# Patient Record
Sex: MALE | Race: BLACK OR AFRICAN AMERICAN | Employment: OTHER | URBAN - METROPOLITAN AREA
[De-identification: names, ages, dates, MRNs, and addresses within clinical notes are randomized per-mention and may not be internally consistent; named-entity substitution may affect disease eponyms.]

---

## 2020-08-19 VITALS
HEART RATE: 76 BPM | SYSTOLIC BLOOD PRESSURE: 110 MMHG | BODY MASS INDEX: 33.8 KG/M2 | DIASTOLIC BLOOD PRESSURE: 70 MMHG | TEMPERATURE: 97.9 F | WEIGHT: 223 LBS | OXYGEN SATURATION: 95 % | HEIGHT: 68 IN

## 2020-08-19 PROBLEM — I62.9 INTRACRANIAL HEMORRHAGE (HCC): Status: ACTIVE | Noted: 2020-08-19

## 2020-08-19 PROBLEM — R13.10 DYSPHAGIA: Status: ACTIVE | Noted: 2020-08-19

## 2020-08-19 PROBLEM — K21.9 GASTROESOPHAGEAL REFLUX DISEASE: Status: ACTIVE | Noted: 2020-08-19

## 2020-08-19 PROBLEM — I10 ESSENTIAL HYPERTENSION: Status: ACTIVE | Noted: 2020-08-19

## 2020-08-19 PROBLEM — G81.90 HEMIPLEGIA (HCC): Status: ACTIVE | Noted: 2020-08-19

## 2020-08-19 PROBLEM — Z86.010 HISTORY OF ADENOMATOUS POLYP OF COLON: Status: ACTIVE | Noted: 2020-08-19

## 2020-08-19 PROBLEM — E88.09 HYPERPROTEINEMIA: Status: ACTIVE | Noted: 2020-08-19

## 2020-08-19 PROBLEM — E11.9 DIABETES MELLITUS TYPE 2, CONTROLLED (HCC): Status: ACTIVE | Noted: 2020-08-19

## 2020-08-19 PROBLEM — K21.9 LARYNGOPHARYNGEAL REFLUX: Status: ACTIVE | Noted: 2020-08-19

## 2020-08-19 RX ORDER — DICLOFENAC SODIUM 10 MG/G
GEL TOPICAL 4 TIMES DAILY
COMMUNITY

## 2020-08-19 RX ORDER — AMLODIPINE BESYLATE 10 MG/1
TABLET ORAL DAILY
COMMUNITY

## 2020-08-19 RX ORDER — POLYETHYLENE GLYCOL 3350, SODIUM SULFATE ANHYDROUS, SODIUM BICARBONATE, SODIUM CHLORIDE, POTASSIUM CHLORIDE 236; 22.74; 6.74; 5.86; 2.97 G/4L; G/4L; G/4L; G/4L; G/4L
POWDER, FOR SOLUTION ORAL
COMMUNITY
End: 2022-03-01

## 2020-08-19 RX ORDER — DOCUSATE SODIUM 100 MG/1
100 CAPSULE, LIQUID FILLED ORAL 2 TIMES DAILY
COMMUNITY

## 2020-08-19 RX ORDER — PRAVASTATIN SODIUM 40 MG/1
40 TABLET ORAL
COMMUNITY

## 2020-08-19 RX ORDER — CARVEDILOL 25 MG/1
25 TABLET ORAL 2 TIMES DAILY WITH MEALS
COMMUNITY

## 2020-08-19 RX ORDER — PANTOPRAZOLE SODIUM 40 MG/1
40 TABLET, DELAYED RELEASE ORAL DAILY
COMMUNITY

## 2020-08-19 RX ORDER — LANCETS 33 GAUGE
EACH MISCELLANEOUS
COMMUNITY

## 2020-08-19 RX ORDER — LISINOPRIL 10 MG/1
TABLET ORAL DAILY
COMMUNITY

## 2020-08-19 RX ORDER — METFORMIN HYDROCHLORIDE 500 MG/1
TABLET ORAL 2 TIMES DAILY WITH MEALS
COMMUNITY

## 2020-08-19 RX ORDER — BLOOD SUGAR DIAGNOSTIC
STRIP MISCELLANEOUS SEE ADMIN INSTRUCTIONS
COMMUNITY

## 2020-08-30 ENCOUNTER — HOSPITAL ENCOUNTER (INPATIENT)
Age: 75
LOS: 12 days | Discharge: NURSING, REHAB OR LONG-TERM CARE | DRG: 177 | End: 2020-09-11
Attending: INTERNAL MEDICINE | Admitting: INTERNAL MEDICINE
Payer: MEDICARE

## 2020-08-30 ENCOUNTER — APPOINTMENT (OUTPATIENT)
Dept: GENERAL RADIOLOGY | Age: 75
DRG: 177 | End: 2020-08-30
Attending: INTERNAL MEDICINE
Payer: MEDICARE

## 2020-08-30 DIAGNOSIS — I47.29 NSVT (NONSUSTAINED VENTRICULAR TACHYCARDIA): ICD-10-CM

## 2020-08-30 PROBLEM — J96.90 RESPIRATORY FAILURE (HCC): Status: ACTIVE | Noted: 2020-08-30

## 2020-08-30 LAB
ABO + RH BLD: NORMAL
ALBUMIN SERPL-MCNC: 2.6 G/DL (ref 3.5–5)
ALBUMIN/GLOB SERPL: 0.7 {RATIO} (ref 1.1–2.2)
ALP SERPL-CCNC: 53 U/L (ref 45–117)
ALT SERPL-CCNC: 38 U/L (ref 12–78)
ANION GAP SERPL CALC-SCNC: 6 MMOL/L (ref 5–15)
APTT PPP: 26.1 SEC (ref 22.1–32)
AST SERPL-CCNC: 15 U/L (ref 15–37)
BILIRUB SERPL-MCNC: 0.6 MG/DL (ref 0.2–1)
BLOOD GROUP ANTIBODIES SERPL: NORMAL
BUN SERPL-MCNC: 30 MG/DL (ref 6–20)
BUN/CREAT SERPL: 42 (ref 12–20)
CALCIUM SERPL-MCNC: 8.6 MG/DL (ref 8.5–10.1)
CHLORIDE SERPL-SCNC: 106 MMOL/L (ref 97–108)
CO2 SERPL-SCNC: 30 MMOL/L (ref 21–32)
CREAT SERPL-MCNC: 0.72 MG/DL (ref 0.7–1.3)
CRP SERPL-MCNC: 4.33 MG/DL (ref 0–0.6)
D DIMER PPP FEU-MCNC: 2.87 MG/L FEU (ref 0–0.65)
ERYTHROCYTE [DISTWIDTH] IN BLOOD BY AUTOMATED COUNT: 12.3 % (ref 11.5–14.5)
FIBRINOGEN PPP-MCNC: 440 MG/DL (ref 200–475)
GLOBULIN SER CALC-MCNC: 4 G/DL (ref 2–4)
GLUCOSE BLD STRIP.AUTO-MCNC: 198 MG/DL (ref 65–100)
GLUCOSE SERPL-MCNC: 215 MG/DL (ref 65–100)
HCT VFR BLD AUTO: 43.1 % (ref 36.6–50.3)
HGB BLD-MCNC: 14.2 G/DL (ref 12.1–17)
INR PPP: 1.2 (ref 0.9–1.1)
LACTATE SERPL-SCNC: 2.2 MMOL/L (ref 0.4–2)
LDH SERPL L TO P-CCNC: 515 U/L (ref 85–241)
MAGNESIUM SERPL-MCNC: 2.6 MG/DL (ref 1.6–2.4)
MCH RBC QN AUTO: 32.6 PG (ref 26–34)
MCHC RBC AUTO-ENTMCNC: 32.9 G/DL (ref 30–36.5)
MCV RBC AUTO: 99.1 FL (ref 80–99)
NRBC # BLD: 0.03 K/UL (ref 0–0.01)
NRBC BLD-RTO: 0.2 PER 100 WBC
PLATELET # BLD AUTO: 354 K/UL (ref 150–400)
PMV BLD AUTO: 10.4 FL (ref 8.9–12.9)
POTASSIUM SERPL-SCNC: 4.1 MMOL/L (ref 3.5–5.1)
PROCALCITONIN SERPL-MCNC: <0.05 NG/ML
PROT SERPL-MCNC: 6.6 G/DL (ref 6.4–8.2)
PROTHROMBIN TIME: 12.3 SEC (ref 9–11.1)
RBC # BLD AUTO: 4.35 M/UL (ref 4.1–5.7)
SERVICE CMNT-IMP: ABNORMAL
SODIUM SERPL-SCNC: 142 MMOL/L (ref 136–145)
SPECIMEN EXP DATE BLD: NORMAL
THERAPEUTIC RANGE,PTTT: NORMAL SECS (ref 58–77)
WBC # BLD AUTO: 14.9 K/UL (ref 4.1–11.1)

## 2020-08-30 PROCEDURE — 74011250636 HC RX REV CODE- 250/636: Performed by: INTERNAL MEDICINE

## 2020-08-30 PROCEDURE — 83615 LACTATE (LD) (LDH) ENZYME: CPT

## 2020-08-30 PROCEDURE — 36600 WITHDRAWAL OF ARTERIAL BLOOD: CPT

## 2020-08-30 PROCEDURE — 85379 FIBRIN DEGRADATION QUANT: CPT

## 2020-08-30 PROCEDURE — 71045 X-RAY EXAM CHEST 1 VIEW: CPT

## 2020-08-30 PROCEDURE — 84145 PROCALCITONIN (PCT): CPT

## 2020-08-30 PROCEDURE — 36415 COLL VENOUS BLD VENIPUNCTURE: CPT

## 2020-08-30 PROCEDURE — 86900 BLOOD TYPING SEROLOGIC ABO: CPT

## 2020-08-30 PROCEDURE — 86140 C-REACTIVE PROTEIN: CPT

## 2020-08-30 PROCEDURE — 82962 GLUCOSE BLOOD TEST: CPT

## 2020-08-30 PROCEDURE — 83605 ASSAY OF LACTIC ACID: CPT

## 2020-08-30 PROCEDURE — 85610 PROTHROMBIN TIME: CPT

## 2020-08-30 PROCEDURE — 80053 COMPREHEN METABOLIC PANEL: CPT

## 2020-08-30 PROCEDURE — 74011636637 HC RX REV CODE- 636/637: Performed by: INTERNAL MEDICINE

## 2020-08-30 PROCEDURE — 94640 AIRWAY INHALATION TREATMENT: CPT

## 2020-08-30 PROCEDURE — 85384 FIBRINOGEN ACTIVITY: CPT

## 2020-08-30 PROCEDURE — 77010033678 HC OXYGEN DAILY

## 2020-08-30 PROCEDURE — 85730 THROMBOPLASTIN TIME PARTIAL: CPT

## 2020-08-30 PROCEDURE — 74011250637 HC RX REV CODE- 250/637: Performed by: NURSE PRACTITIONER

## 2020-08-30 PROCEDURE — 65610000006 HC RM INTENSIVE CARE

## 2020-08-30 PROCEDURE — 85027 COMPLETE CBC AUTOMATED: CPT

## 2020-08-30 PROCEDURE — 83735 ASSAY OF MAGNESIUM: CPT

## 2020-08-30 RX ORDER — DEXTROSE MONOHYDRATE 100 MG/ML
0-250 INJECTION, SOLUTION INTRAVENOUS AS NEEDED
Status: DISCONTINUED | OUTPATIENT
Start: 2020-08-30 | End: 2020-09-12 | Stop reason: HOSPADM

## 2020-08-30 RX ORDER — ACETAMINOPHEN 650 MG/1
650 SUPPOSITORY RECTAL
Status: DISCONTINUED | OUTPATIENT
Start: 2020-08-30 | End: 2020-09-12 | Stop reason: HOSPADM

## 2020-08-30 RX ORDER — ONDANSETRON 4 MG/1
4 TABLET, ORALLY DISINTEGRATING ORAL
Status: DISCONTINUED | OUTPATIENT
Start: 2020-08-30 | End: 2020-09-12 | Stop reason: HOSPADM

## 2020-08-30 RX ORDER — ACETAMINOPHEN 325 MG/1
650 TABLET ORAL
Status: DISCONTINUED | OUTPATIENT
Start: 2020-08-30 | End: 2020-08-30 | Stop reason: SDUPTHER

## 2020-08-30 RX ORDER — FAMOTIDINE 20 MG/1
20 TABLET, FILM COATED ORAL 2 TIMES DAILY
Status: DISCONTINUED | OUTPATIENT
Start: 2020-08-30 | End: 2020-08-31 | Stop reason: ALTCHOICE

## 2020-08-30 RX ORDER — SODIUM CHLORIDE 0.9 % (FLUSH) 0.9 %
5-40 SYRINGE (ML) INJECTION AS NEEDED
Status: DISCONTINUED | OUTPATIENT
Start: 2020-08-30 | End: 2020-09-12 | Stop reason: HOSPADM

## 2020-08-30 RX ORDER — PRAVASTATIN SODIUM 40 MG/1
40 TABLET ORAL
Status: DISCONTINUED | OUTPATIENT
Start: 2020-08-30 | End: 2020-09-12 | Stop reason: HOSPADM

## 2020-08-30 RX ORDER — DEXAMETHASONE SODIUM PHOSPHATE 4 MG/ML
6 INJECTION, SOLUTION INTRA-ARTICULAR; INTRALESIONAL; INTRAMUSCULAR; INTRAVENOUS; SOFT TISSUE EVERY 24 HOURS
Status: COMPLETED | OUTPATIENT
Start: 2020-08-30 | End: 2020-09-08

## 2020-08-30 RX ORDER — INSULIN LISPRO 100 [IU]/ML
INJECTION, SOLUTION INTRAVENOUS; SUBCUTANEOUS EVERY 6 HOURS
Status: DISCONTINUED | OUTPATIENT
Start: 2020-08-30 | End: 2020-09-01

## 2020-08-30 RX ORDER — SODIUM CHLORIDE 0.9 % (FLUSH) 0.9 %
5-40 SYRINGE (ML) INJECTION EVERY 8 HOURS
Status: DISCONTINUED | OUTPATIENT
Start: 2020-08-30 | End: 2020-09-12 | Stop reason: HOSPADM

## 2020-08-30 RX ORDER — ACETAMINOPHEN 325 MG/1
650 TABLET ORAL
Status: DISCONTINUED | OUTPATIENT
Start: 2020-08-30 | End: 2020-09-12 | Stop reason: HOSPADM

## 2020-08-30 RX ORDER — ONDANSETRON 2 MG/ML
4 INJECTION INTRAMUSCULAR; INTRAVENOUS
Status: DISCONTINUED | OUTPATIENT
Start: 2020-08-30 | End: 2020-09-12 | Stop reason: HOSPADM

## 2020-08-30 RX ORDER — ACETAMINOPHEN 650 MG/1
650 SUPPOSITORY RECTAL
Status: DISCONTINUED | OUTPATIENT
Start: 2020-08-30 | End: 2020-08-30 | Stop reason: SDUPTHER

## 2020-08-30 RX ORDER — CARVEDILOL 12.5 MG/1
25 TABLET ORAL 2 TIMES DAILY WITH MEALS
Status: DISCONTINUED | OUTPATIENT
Start: 2020-08-30 | End: 2020-09-12 | Stop reason: HOSPADM

## 2020-08-30 RX ORDER — MAGNESIUM SULFATE 100 %
4 CRYSTALS MISCELLANEOUS AS NEEDED
Status: DISCONTINUED | OUTPATIENT
Start: 2020-08-30 | End: 2020-09-12 | Stop reason: HOSPADM

## 2020-08-30 RX ORDER — ALBUTEROL SULFATE 90 UG/1
2 AEROSOL, METERED RESPIRATORY (INHALATION)
Status: DISCONTINUED | OUTPATIENT
Start: 2020-08-30 | End: 2020-09-10

## 2020-08-30 RX ORDER — FAMOTIDINE 20 MG/1
20 TABLET, FILM COATED ORAL 2 TIMES DAILY
Status: DISCONTINUED | OUTPATIENT
Start: 2020-08-30 | End: 2020-08-30 | Stop reason: SDUPTHER

## 2020-08-30 RX ORDER — ENOXAPARIN SODIUM 100 MG/ML
40 INJECTION SUBCUTANEOUS EVERY 12 HOURS
Status: DISCONTINUED | OUTPATIENT
Start: 2020-08-30 | End: 2020-09-12 | Stop reason: HOSPADM

## 2020-08-30 RX ORDER — SODIUM CHLORIDE 9 MG/ML
60 INJECTION, SOLUTION INTRAVENOUS CONTINUOUS
Status: DISCONTINUED | OUTPATIENT
Start: 2020-08-30 | End: 2020-09-08

## 2020-08-30 RX ORDER — BISACODYL 5 MG
5 TABLET, DELAYED RELEASE (ENTERIC COATED) ORAL DAILY PRN
Status: DISCONTINUED | OUTPATIENT
Start: 2020-08-30 | End: 2020-09-12 | Stop reason: HOSPADM

## 2020-08-30 RX ADMIN — DEXAMETHASONE SODIUM PHOSPHATE 6 MG: 4 INJECTION, SOLUTION INTRA-ARTICULAR; INTRALESIONAL; INTRAMUSCULAR; INTRAVENOUS; SOFT TISSUE at 18:46

## 2020-08-30 RX ADMIN — Medication 10 ML: at 21:05

## 2020-08-30 RX ADMIN — INSULIN LISPRO 2 UNITS: 100 INJECTION, SOLUTION INTRAVENOUS; SUBCUTANEOUS at 18:50

## 2020-08-30 RX ADMIN — ENOXAPARIN SODIUM 40 MG: 40 INJECTION SUBCUTANEOUS at 21:04

## 2020-08-30 RX ADMIN — SODIUM CHLORIDE 60 ML/HR: 900 INJECTION, SOLUTION INTRAVENOUS at 18:58

## 2020-08-30 RX ADMIN — Medication 10 ML: at 18:49

## 2020-08-30 RX ADMIN — ALBUTEROL SULFATE 2 PUFF: 90 AEROSOL, METERED RESPIRATORY (INHALATION) at 23:12

## 2020-08-30 NOTE — H&P
[facility]    ICU TEAM H&P    Name: Francie Velasquez   : [id number]   MRN: [id number]   Date: 2020      I  Subjective:    2020      Reason for ICU Admission: Transferred from outside facility with progressive respiratory failure due to COVID-19 infection    HPI:  33-year-old gentleman with past medical history significant for hypertension and ischemic stroke about 5 years ago left him with residual left hemiplegia presented to [facility] on  with few days history of fever cough and progressive shortness of breath. Was admitted there on nasal cannula supplemental oxygen started on broad-spectrum antibiotic for possible right middle lobe community-acquired pneumonia and his COVID-19 testing came back positive. He received this antibiotic which was advanced later to Zosyn as there was suspicion for aspiration and he received steroid in form of methylprednisolone as well. Per the report I talked from the attending physician in the referring hospital his oxygen requirement continued to increase and for the last 2 days he remained on CPAP with FiO2 of 60% and with that his PO2 was 60. They contacted me today asking for transfer to the patient for higher level of care as they do not have ICU facility and that hospital.  When patient arrived to our unit he was not in distress 100% nonrebreather oxygen mask which changed to 50% Ventimask and the saturation remained above 95% without apparent worsening in his work of breathing or distress level. He is very hard of hearing but he denies chest pain fever persistent cough nausea or vomiting. Overall he stated that he feels a bit better.         Active Problem List:     Problem List  Never Reviewed          Codes Class    Respiratory failure ([facility] ICD-10-CM: J96.90  ICD-9-CM: 518.81         Diabetes mellitus type 2, controlled ([facility] ICD-10-CM: E11.9  ICD-9-CM: 250.00         Dysphagia ICD-10-CM: R13.10  ICD-9-CM: 787.20 Essential hypertension ICD-10-CM: I10  ICD-9-CM: 401.9         Gastroesophageal reflux disease ICD-10-CM: K21.9  ICD-9-CM: 530.81         Genuine stress incontinence, female ICD-10-CM: N39.3  ICD-9-CM: 625.6         Hemiplegia (HCC) ICD-10-CM: G81.90  ICD-9-CM: 342.90         History of adenomatous polyp of colon ICD-10-CM: Z86.010  ICD-9-CM: V12.72         Hyperproteinemia ICD-10-CM: E88.09  ICD-9-CM: 273.8         Intracranial hemorrhage (HCC) ICD-10-CM: I62.9  ICD-9-CM: 432.9         Laryngopharyngeal reflux ICD-10-CM: K21.9  ICD-9-CM: 478.79               Past Medical History:      has a past medical history of Acid reflux, Anemia, Diabetes ([facility] GERD (gastroesophageal reflux disease), Hypertension, and Stroke ([facility] Past Surgical History:      has a past surgical history that includes hx colonoscopy. Home Medications:     Prior to Admission medications    Medication Sig Start Date End Date Taking? Authorizing Provider   amLODIPine (NORVASC) 10 mg tablet Take  by mouth daily. Provider, Historical   carvediloL (COREG) 25 mg tablet Take 25 mg by mouth two (2) times daily (with meals). Provider, Historical   glucose blood VI test strips (Contour Next Test Strips) strip by Does Not Apply route See Admin Instructions. Provider, Historical   diclofenac (VOLTAREN) 1 % gel Apply  to affected area four (4) times daily. Provider, Historical   PEG 3350-Electrolytes (GaviLyte-G) 236-22.74-6.74 -5.86 gram suspension Take  by mouth now. Provider, Historical   lisinopriL (PRINIVIL, ZESTRIL) 10 mg tablet Take  by mouth daily. Provider, Historical   metFORMIN (GLUCOPHAGE) 500 mg tablet Take  by mouth two (2) times daily (with meals). Provider, Historical   pantoprazole (PROTONIX) 40 mg tablet Take 40 mg by mouth daily. Provider, Historical   pravastatin (PRAVACHOL) 40 mg tablet Take 40 mg by mouth nightly.     Provider, Historical   docusate sodium (Stool Softener) 100 mg capsule Take 100 mg by mouth two (2) times a day. Provider, Historical   lancets (TRUEplus Lancets) 33 gauge misc by Does Not Apply route. Provider, Historical       Allergies/Social/Family History: Allergies no known allergies   Social History     Tobacco Use    Smoking status: Not on file   Substance Use Topics    Alcohol use: Not on file      Family History   Problem Relation Age of Onset    Heart Disease Father     Hypertension Brother     Diabetes Brother        Review of Systems:     Not able to obtain as the patient is very hard of hearing    Objective:   Vital Signs:  Visit Vitals  /81 (BP 1 Location: Left arm, BP Patient Position: At rest)   Pulse 66   Temp 98.2 °F (36.8 °C)   Resp 22   Ht 5' 8\" (1.727 m)   Wt 93.2 kg (205 lb 7.5 oz)   SpO2 98%   BMI 31.24 kg/m²    O2 Flow Rate (L/min): 15 l/min O2 Device: Non-rebreather mask Temp (24hrs), Av.2 °F (36.8 °C), Min:98.2 °F (36.8 °C), Max:98.2 °F (36.8 °C)           Intake/Output:   No intake or output data in the 24 hours ending 20 1637    Physical Exam:    General:  Alert, cooperative, well noursished, well developed, appears stated age currently on 50% Ventimask not in distress   Eyes:  Sclera anicteric. Pupils equally round and reactive to light. Mouth/Throat: Mucous membranes normal, oral pharynx clear   Neck: Supple   Lungs:    Fine crepitation bilaterally no wheezing, good effort   CV:  Regular rate and rhythm,no murmur, click, rub or gallop   Abdomen:   Soft, non-tender.  bowel sounds normal. non-distended   Extremities: No cyanosis or edema   Skin: Skin color, texture, turgor normal. no acute rash or lesions   Lymph nodes: Cervical and supraclavicular normal   Musculoskeletal: No swelling or deformity   Lines/Devices:  Intact, no erythema, drainage or tenderness   Psych: Alert and oriented, normal mood affect given the setting  Patient has dense left hemiplegia which is chronic for the last 5 years according to him good power on the right side, neurologically at baseline       LABS AND  DATA: Personally reviewed  No results for input(s): WBC, HGB, HCT, PLT, HGBEXT, HCTEXT, PLTEXT in the last 72 hours. No results for input(s): NA, K, CL, CO2, BUN, CREA, GLU, CA, MG, PHOS in the last 72 hours. No results for input(s): AP, TBIL, TP, ALB, GLOB, AML, LPSE in the last 72 hours. No lab exists for component: SGOT, GPT, AMYP  No results for input(s): INR, PTP, APTT, INREXT in the last 72 hours. No results for input(s): PHI, PCO2I, PO2I, FIO2I in the last 72 hours. No results for input(s): CPK, CKMB, TROIQ, BNPP in the last 72 hours. Hemodynamics:   PAP:   CO:     Wedge:   CI:     CVP:    SVR:       PVR:       Ventilator Settings:  Mode Rate Tidal Volume Pressure FiO2 PEEP                    Peak airway pressure:      Minute ventilation:          MEDS: Reviewed    Chest X-Ray:  CXR Results  (Last 48 hours)    None            Assessment and Plan:   - Acute hypoxic respiratory failure due to COVID-19 infection:  - Bilateral pneumonia due to COVID-19 infection  - Possible community-acquired pneumonia involving the right middle lobe/aspiration status post antibiotic treatment  - Essential hypertension  - History of ischemic stroke with residual left hemiplegia      - Currently patient in the ICU in no distress on 50% Ventimask. Obtain chest x-ray showing significant bilateral infiltrate. Pending complete set of labs including inflammatory marker. We will start him on dexamethasone, pending liver enzymes will consult infectious disease to initiate Remdesivir if appropriate. I will also start him on Lovenox 30 mg subcu twice daily pending d-dimer and fibrinogen levels. If needed he may use BiPAP at night at setting of 12/8.   - Patient completed 8-day course of broad-spectrum antibiotic in the referring hospital.  - Reintroduce antihypertensive medication slowly, will start with beta-blocker, at home he is also on low-dose lisinopril and amlodipine.  - Insulin sliding scale, maintain euglycemia, keep metformin on hold  DVT and GI prophylaxis. I discussed this with his wife and granddaughter over the phone. He is full code. CRITICAL CARE CONSULTANT NOTE  I had a face to face encounter with the patient, reviewed and interpreted patient data including clinical events, labs, images, vital signs, I/O's, and examined patient. I have discussed the case and the plan and management of the patient's care with the consulting services, the bedside nurses and the respiratory therapist.      NOTE OF PERSONAL INVOLVEMENT IN CARE   This patient has a high probability of imminent, clinically significant deterioration, which requires the highest level of preparedness to intervene urgently. I participated in the decision-making and personally managed or directed the management of the following life and organ supporting interventions that required my frequent assessment to treat or prevent imminent deterioration. I personally spent 40 minutes of critical care time. This is time spent at this critically ill patient's bedside actively involved in patient care as well as the coordination of care and discussions with the patient's family. This does not include any procedural time which has been billed separately. Jennifer Moffett M.D.   Staff Intensivist/Pulmonologist  [facility]  8/30/2020

## 2020-08-31 LAB
ALBUMIN SERPL-MCNC: 2.5 G/DL (ref 3.5–5)
ALBUMIN/GLOB SERPL: 0.6 {RATIO} (ref 1.1–2.2)
ALP SERPL-CCNC: 50 U/L (ref 45–117)
ALT SERPL-CCNC: 33 U/L (ref 12–78)
ANION GAP SERPL CALC-SCNC: 5 MMOL/L (ref 5–15)
APTT PPP: 27.3 SEC (ref 22.1–32)
ARTERIAL PATENCY WRIST A: NO
AST SERPL-CCNC: 19 U/L (ref 15–37)
BASE EXCESS BLD CALC-SCNC: 5 MMOL/L
BDY SITE: ABNORMAL
BILIRUB SERPL-MCNC: 0.6 MG/DL (ref 0.2–1)
BUN SERPL-MCNC: 31 MG/DL (ref 6–20)
BUN/CREAT SERPL: 41 (ref 12–20)
CA-I BLD-SCNC: 1.26 MMOL/L (ref 1.12–1.32)
CALCIUM SERPL-MCNC: 8.8 MG/DL (ref 8.5–10.1)
CHLORIDE SERPL-SCNC: 108 MMOL/L (ref 97–108)
CO2 SERPL-SCNC: 28 MMOL/L (ref 21–32)
COMMENT, HOLDF: NORMAL
CREAT SERPL-MCNC: 0.75 MG/DL (ref 0.7–1.3)
ERYTHROCYTE [DISTWIDTH] IN BLOOD BY AUTOMATED COUNT: 12.3 % (ref 11.5–14.5)
EST. AVERAGE GLUCOSE BLD GHB EST-MCNC: 134 MG/DL
FIBRINOGEN PPP-MCNC: 381 MG/DL (ref 200–475)
GAS FLOW.O2 O2 DELIVERY SYS: ABNORMAL L/MIN
GAS FLOW.O2 SETTING OXYMISER: 10 L/M
GLOBULIN SER CALC-MCNC: 4.4 G/DL (ref 2–4)
GLUCOSE BLD STRIP.AUTO-MCNC: 162 MG/DL (ref 65–100)
GLUCOSE BLD STRIP.AUTO-MCNC: 163 MG/DL (ref 65–100)
GLUCOSE BLD STRIP.AUTO-MCNC: 174 MG/DL (ref 65–100)
GLUCOSE BLD STRIP.AUTO-MCNC: 184 MG/DL (ref 65–100)
GLUCOSE BLD STRIP.AUTO-MCNC: 196 MG/DL (ref 65–100)
GLUCOSE SERPL-MCNC: 205 MG/DL (ref 65–100)
HBA1C MFR BLD: 6.3 % (ref 4–5.6)
HCO3 BLD-SCNC: 29.3 MMOL/L (ref 22–26)
HCT VFR BLD AUTO: 41.7 % (ref 36.6–50.3)
HGB BLD-MCNC: 13.9 G/DL (ref 12.1–17)
INR PPP: 1.2 (ref 0.9–1.1)
MAGNESIUM SERPL-MCNC: 2.7 MG/DL (ref 1.6–2.4)
MCH RBC QN AUTO: 32.8 PG (ref 26–34)
MCHC RBC AUTO-ENTMCNC: 33.3 G/DL (ref 30–36.5)
MCV RBC AUTO: 98.3 FL (ref 80–99)
NRBC # BLD: 0.02 K/UL (ref 0–0.01)
NRBC BLD-RTO: 0.1 PER 100 WBC
O2/TOTAL GAS SETTING VFR VENT: 70 %
PCO2 BLD: 44.1 MMHG (ref 35–45)
PH BLD: 7.43 [PH] (ref 7.35–7.45)
PLATELET # BLD AUTO: 337 K/UL (ref 150–400)
PMV BLD AUTO: 10.7 FL (ref 8.9–12.9)
PO2 BLD: 76 MMHG (ref 80–100)
POTASSIUM SERPL-SCNC: 4 MMOL/L (ref 3.5–5.1)
PROT SERPL-MCNC: 6.9 G/DL (ref 6.4–8.2)
PROTHROMBIN TIME: 12.4 SEC (ref 9–11.1)
RBC # BLD AUTO: 4.24 M/UL (ref 4.1–5.7)
SAMPLES BEING HELD,HOLD: NORMAL
SAO2 % BLD: 95 % (ref 92–97)
SERVICE CMNT-IMP: ABNORMAL
SODIUM SERPL-SCNC: 141 MMOL/L (ref 136–145)
SPECIMEN TYPE: ABNORMAL
THERAPEUTIC RANGE,PTTT: NORMAL SECS (ref 58–77)
TOTAL RESP. RATE, ITRR: 16
WBC # BLD AUTO: 15.4 K/UL (ref 4.1–11.1)

## 2020-08-31 PROCEDURE — 83735 ASSAY OF MAGNESIUM: CPT

## 2020-08-31 PROCEDURE — 80053 COMPREHEN METABOLIC PANEL: CPT

## 2020-08-31 PROCEDURE — 94640 AIRWAY INHALATION TREATMENT: CPT

## 2020-08-31 PROCEDURE — 82803 BLOOD GASES ANY COMBINATION: CPT

## 2020-08-31 PROCEDURE — 85610 PROTHROMBIN TIME: CPT

## 2020-08-31 PROCEDURE — 74011250637 HC RX REV CODE- 250/637: Performed by: NURSE PRACTITIONER

## 2020-08-31 PROCEDURE — 85730 THROMBOPLASTIN TIME PARTIAL: CPT

## 2020-08-31 PROCEDURE — 74011250637 HC RX REV CODE- 250/637: Performed by: INTERNAL MEDICINE

## 2020-08-31 PROCEDURE — 74011636637 HC RX REV CODE- 636/637: Performed by: INTERNAL MEDICINE

## 2020-08-31 PROCEDURE — 36415 COLL VENOUS BLD VENIPUNCTURE: CPT

## 2020-08-31 PROCEDURE — 74011250636 HC RX REV CODE- 250/636: Performed by: INTERNAL MEDICINE

## 2020-08-31 PROCEDURE — 92610 EVALUATE SWALLOWING FUNCTION: CPT | Performed by: SPEECH-LANGUAGE PATHOLOGIST

## 2020-08-31 PROCEDURE — 94760 N-INVAS EAR/PLS OXIMETRY 1: CPT

## 2020-08-31 PROCEDURE — 74011250637 HC RX REV CODE- 250/637: Performed by: HOSPITALIST

## 2020-08-31 PROCEDURE — 74011250636 HC RX REV CODE- 250/636: Performed by: HOSPITALIST

## 2020-08-31 PROCEDURE — 85384 FIBRINOGEN ACTIVITY: CPT

## 2020-08-31 PROCEDURE — 83036 HEMOGLOBIN GLYCOSYLATED A1C: CPT

## 2020-08-31 PROCEDURE — 74011000258 HC RX REV CODE- 258: Performed by: INTERNAL MEDICINE

## 2020-08-31 PROCEDURE — 65660000001 HC RM ICU INTERMED STEPDOWN

## 2020-08-31 PROCEDURE — 36600 WITHDRAWAL OF ARTERIAL BLOOD: CPT

## 2020-08-31 PROCEDURE — 82962 GLUCOSE BLOOD TEST: CPT

## 2020-08-31 PROCEDURE — 74011000250 HC RX REV CODE- 250: Performed by: INTERNAL MEDICINE

## 2020-08-31 PROCEDURE — 85027 COMPLETE CBC AUTOMATED: CPT

## 2020-08-31 RX ORDER — ASCORBIC ACID 500 MG
500 TABLET ORAL 2 TIMES DAILY
Status: DISCONTINUED | OUTPATIENT
Start: 2020-08-31 | End: 2020-09-12 | Stop reason: HOSPADM

## 2020-08-31 RX ORDER — NYSTATIN 100000 [USP'U]/ML
500000 SUSPENSION ORAL 4 TIMES DAILY
Status: DISCONTINUED | OUTPATIENT
Start: 2020-08-31 | End: 2020-09-12 | Stop reason: HOSPADM

## 2020-08-31 RX ORDER — ZINC SULFATE 50(220)MG
1 CAPSULE ORAL DAILY
Status: COMPLETED | OUTPATIENT
Start: 2020-08-31 | End: 2020-09-09

## 2020-08-31 RX ADMIN — ALBUTEROL SULFATE 2 PUFF: 90 AEROSOL, METERED RESPIRATORY (INHALATION) at 08:06

## 2020-08-31 RX ADMIN — OXYCODONE HYDROCHLORIDE AND ACETAMINOPHEN 500 MG: 500 TABLET ORAL at 17:41

## 2020-08-31 RX ADMIN — ALBUTEROL SULFATE 2 PUFF: 90 AEROSOL, METERED RESPIRATORY (INHALATION) at 20:53

## 2020-08-31 RX ADMIN — NYSTATIN 500000 UNITS: 100000 SUSPENSION ORAL at 13:02

## 2020-08-31 RX ADMIN — ZINC SULFATE 220 MG (50 MG) CAPSULE 1 CAPSULE: CAPSULE at 13:03

## 2020-08-31 RX ADMIN — Medication 10 ML: at 03:55

## 2020-08-31 RX ADMIN — ENOXAPARIN SODIUM 40 MG: 40 INJECTION SUBCUTANEOUS at 21:06

## 2020-08-31 RX ADMIN — Medication 10 ML: at 13:02

## 2020-08-31 RX ADMIN — ALBUTEROL SULFATE 2 PUFF: 90 AEROSOL, METERED RESPIRATORY (INHALATION) at 03:19

## 2020-08-31 RX ADMIN — PRAVASTATIN SODIUM 40 MG: 40 TABLET ORAL at 23:49

## 2020-08-31 RX ADMIN — Medication 10 ML: at 23:50

## 2020-08-31 RX ADMIN — DEXAMETHASONE SODIUM PHOSPHATE 6 MG: 4 INJECTION, SOLUTION INTRA-ARTICULAR; INTRALESIONAL; INTRAMUSCULAR; INTRAVENOUS; SOFT TISSUE at 17:42

## 2020-08-31 RX ADMIN — Medication 10 ML: at 07:00

## 2020-08-31 RX ADMIN — ALBUTEROL SULFATE 2 PUFF: 90 AEROSOL, METERED RESPIRATORY (INHALATION) at 15:55

## 2020-08-31 RX ADMIN — SODIUM CHLORIDE 60 ML/HR: 900 INJECTION, SOLUTION INTRAVENOUS at 16:16

## 2020-08-31 RX ADMIN — INSULIN LISPRO 2 UNITS: 100 INJECTION, SOLUTION INTRAVENOUS; SUBCUTANEOUS at 13:02

## 2020-08-31 RX ADMIN — CARVEDILOL 25 MG: 12.5 TABLET, FILM COATED ORAL at 16:16

## 2020-08-31 RX ADMIN — ENOXAPARIN SODIUM 40 MG: 40 INJECTION SUBCUTANEOUS at 09:36

## 2020-08-31 RX ADMIN — ALBUTEROL SULFATE 2 PUFF: 90 AEROSOL, METERED RESPIRATORY (INHALATION) at 11:37

## 2020-08-31 RX ADMIN — INSULIN LISPRO 2 UNITS: 100 INJECTION, SOLUTION INTRAVENOUS; SUBCUTANEOUS at 06:59

## 2020-08-31 RX ADMIN — NYSTATIN 500000 UNITS: 100000 SUSPENSION ORAL at 17:42

## 2020-08-31 RX ADMIN — INSULIN LISPRO 2 UNITS: 100 INJECTION, SOLUTION INTRAVENOUS; SUBCUTANEOUS at 00:57

## 2020-08-31 RX ADMIN — REMDESIVIR 200 MG: 5 INJECTION INTRAVENOUS at 20:01

## 2020-08-31 RX ADMIN — INSULIN LISPRO 2 UNITS: 100 INJECTION, SOLUTION INTRAVENOUS; SUBCUTANEOUS at 23:49

## 2020-08-31 RX ADMIN — NYSTATIN 500000 UNITS: 100000 SUSPENSION ORAL at 23:49

## 2020-08-31 RX ADMIN — INSULIN LISPRO 2 UNITS: 100 INJECTION, SOLUTION INTRAVENOUS; SUBCUTANEOUS at 17:41

## 2020-08-31 NOTE — PROGRESS NOTES
TRANSFER - IN REPORT:    Verbal report received from Chung  Street (name) on Isa Santillan  being received from ICU (unit) for routine progression of care      Report consisted of patients Situation, Background, Assessment and   Recommendations(SBAR). Information from the following report(s) SBAR, Kardex, ED Summary, MAR, Med Rec Status and Cardiac Rhythm NSR was reviewed with the receiving nurse. Opportunity for questions and clarification was provided. Assessment completed upon patients arrival to unit and care assumed.            Primary Nurse Latosha Borrego RN and Jolanta Adams RN performed a dual skin assessment on this patient No impairment noted  William score is 14

## 2020-08-31 NOTE — PROGRESS NOTES
[address] [facility] Adult  Hospitalist Group                                                                                          Hospitalist Progress Note  Domitila Taveras MD  Answering service: [phone] from in house phone        Date of Service:  2020  NAME:  Barbara Schafer  :    MRN:  [id number]      Admission Summary:     Barbara Schafer is a 64-year-old male with a past medical history that includes HTN, ischemic stroke with residual left hemiplegia, dysphagia, T2DM and GERD who was transferred to [facility] from [facility] for increasing oxygen requirement. He arrived here at approximately 4 PM.      Patient originally presented at [facility] on  with several days of fever, cough and shortness of breath. He tested positive for COVID and was started on ceftriaxone and azithromycin for RML CAP. There was concern for aspiration so he was started on Zosyn and ceftriaxone was discontinued. He never did receive Remdesivir. On  IV Diflucan was initiated for COVID pneumonitis. Patient was weaned from CPAP to 50% Ventimask and maintained saturations greater than 95% without worsening shortness of breath. He is transferred out of the ICU on high flow nasal cannula and is now resting comfortably on 6 L NC.     Interval history / Subjective:     Patient is a poor historian, he said he feels the same, had on and off cough, no chest pain     Assessment & Plan:     Acute hypoxic respiratory failure due to COVID, CAP POA  -High flow transitioned to mid flow, repeat ABG , , monitor pulse ox monitor  -CRP still 4.3; Lactic 2.2; PT 1.2, coags otherwise WNL  -Had total 9 days Zosyn and 5 days azithromycin PTA.  -COVID was positive at [facility]  -Droplet precautions  -On prn Albuterol     Pneumonia due to COVID 19 infection  -Had total 9 days Zosyn and 5 days azithromycin PTA.  -continue vitamin c, zinc, decadron, oxygen support,   -consult to ID    Elevated d-dimer  -CT chest 8/26 no PE  -On Lovenox 40 mg every 12 hours     Leukocytosis  -Likely related to prolonged steroid use  -Antibiotics completed  -Continue to monitor     HTN  -BP fairly controlled, Continue home carvedilol 25 mg twice daily  -Hold home amlodipine and lisinopril for now for now and monitor VS     T2DM with hyperglycemia  -On Decadron  - A1c 6.3  -continue sliding scale and monitor finger stick glucose     Hx of CVA with left hemiplegia and dysphagia  -CT 8/22 old ischemic changes noted, no acute changes  -Usually walks w assist of a rolling walker  -NPO, consult to speech  -PT/OT           Code status: Full Code  DVT prophylaxis: Lovenox    Care Plan discussed with: Patient/Family and Nurse  Anticipated Disposition: TBD  Anticipated Discharge: 24 hours to 48 hours   I called his son, Krissy Cobb [phone], updated and answered questions      Hospital Problems  Never Reviewed          Codes Class Noted POA    Respiratory failure ([address] ICD-10-CM: J96.90  ICD-9-CM: 518.81  8/30/2020 Unknown                Vital Signs:    Last 24hrs VS reviewed since prior progress note. Most recent are:  Visit Vitals  /72 (BP 1 Location: Left arm, BP Patient Position: At rest)   Pulse 64   Temp 97.4 °F (36.3 °C)   Resp 18   Ht 5' 8\" (1.727 m)   Wt 94.8 kg (209 lb)   SpO2 97%   BMI 31.78 kg/m²         Intake/Output Summary (Last 24 hours) at 8/31/2020 1300  Last data filed at 8/31/2020 0701  Gross per 24 hour   Intake 721 ml   Output 300 ml   Net 421 ml        Physical Examination:             Constitutional:  No acute distress, cooperative, pleasant    ENT:  Oral mucosa moist, oropharynx benign. Resp:  Decrease bronchial breath sound bilaterally. No wheezing/rhonchi/rales. No accessory muscle use   CV:  Regular rhythm, normal rate, no murmurs, gallops, rubs    GI:  Soft, non distended, non tender.  normoactive bowel sounds, no hepatosplenomegaly     Musculoskeletal:  No edema     Neurologic: Conscious and alert, oriented to place and person, motor RE 4-5/5, LLE 3/5, LUE 2/5     Skin:  Dry but no skin rash       Data Review:    Review and/or order of clinical lab test  Review and/or order of tests in the radiology section of CPT  Review and/or order of tests in the medicine section of CPT      Labs:     Recent Labs     08/31/20 0355 08/30/20  1739   WBC 15.4* 14.9*   HGB 13.9 14.2   HCT 41.7 43.1    354     Recent Labs     08/31/20 0355 08/30/20  1739    142   K 4.0 4.1    106   CO2 28 30   BUN 31* 30*   CREA 0.75 0.72   * 215*   CA 8.8 8.6   MG 2.7* 2.6*     Recent Labs     08/31/20 0355 08/30/20  1739   ALT 33 38   AP 50 53   TBILI 0.6 0.6   TP 6.9 6.6   ALB 2.5* 2.6*   GLOB 4.4* 4.0     Recent Labs     08/31/20 0355 08/30/20  1739   INR 1.2* 1.2*   PTP 12.4* 12.3*   APTT 27.3 26.1      No results for input(s): FE, TIBC, PSAT, FERR in the last 72 hours. No results found for: FOL, RBCF   No results for input(s): PH, PCO2, PO2 in the last 72 hours. No results for input(s): CPK, CKNDX, TROIQ in the last 72 hours.     No lab exists for component: CPKMB  No results found for: CHOL, CHOLX, CHLST, CHOLV, HDL, HDLP, LDL, LDLC, DLDLP, TGLX, TRIGL, TRIGP, CHHD, CHHDX  Lab Results   Component Value Date/Time    Glucose (POC) 174 (H) 08/31/2020 11:41 AM    Glucose (POC) 162 (H) 08/31/2020 06:06 AM    Glucose (POC) 196 (H) 08/31/2020 12:54 AM    Glucose (POC) 198 (H) 08/30/2020 06:42 PM     No results found for: COLOR, APPRN, SPGRU, REFSG, DINAH, PROTU, GLUCU, KETU, BILU, UROU, MAGALI, LEUKU, GLUKE, EPSU, BACTU, WBCU, RBCU, CASTS, UCRY      Medications Reviewed:     Current Facility-Administered Medications   Medication Dose Route Frequency    nystatin (MYCOSTATIN) 100,000 unit/mL oral suspension 500,000 Units  500,000 Units Oral QID    ascorbic acid (vitamin C) (VITAMIN C) tablet 500 mg  500 mg Oral BID    zinc sulfate (ZINCATE) 220 (50) mg capsule 1 Cap  1 Cap Oral DAILY    sodium chloride (NS) flush 5-40 mL  5-40 mL IntraVENous Q8H    sodium chloride (NS) flush 5-40 mL  5-40 mL IntraVENous PRN    bisacodyL (DULCOLAX) tablet 5 mg  5 mg Oral DAILY PRN    ondansetron (ZOFRAN ODT) tablet 4 mg  4 mg Oral Q8H PRN    Or    ondansetron (ZOFRAN) injection 4 mg  4 mg IntraVENous Q6H PRN    dexamethasone (DECADRON) 4 mg/mL injection 6 mg  6 mg IntraVENous Q24H    glucose chewable tablet 16 g  4 Tab Oral PRN    glucagon (GLUCAGEN) injection 1 mg  1 mg IntraMUSCular PRN    dextrose 10% infusion 0-250 mL  0-250 mL IntraVENous PRN    insulin lispro (HUMALOG) injection   SubCUTAneous Q6H    enoxaparin (LOVENOX) injection 40 mg  40 mg SubCUTAneous Q12H    acetaminophen (TYLENOL) tablet 650 mg  650 mg Oral Q6H PRN    Or    acetaminophen (TYLENOL) suppository 650 mg  650 mg Rectal Q6H PRN    carvediloL (COREG) tablet 25 mg  25 mg Oral BID WITH MEALS    pravastatin (PRAVACHOL) tablet 40 mg  40 mg Oral QHS    0.9% sodium chloride infusion  60 mL/hr IntraVENous CONTINUOUS    albuterol (PROVENTIL HFA, VENTOLIN HFA, PROAIR HFA) inhaler 2 Puff  2 Puff Inhalation Q4H RT     ______________________________________________________________________  EXPECTED LENGTH OF STAY: - - -  ACTUAL LENGTH OF STAY:          1                 Alyce Olivo MD

## 2020-08-31 NOTE — PROGRESS NOTES
8/31/2020; 15:40 -   CM attempted to reach patient's first listed emergency contact Darleen Kirby) but the listed phone number was actually for patient's granddaughter Lambert Farrell). The granddaughter clarified that Tj Bettencourt is the patient's spouse. Patient's children include: Kalli Chatman \"Guadalupe,\" and Miriam Luciano. Spouse is currently in transit from her home to a relative's home while the patient is admitted; spouse is also COVID + but has been discharged from the hospital.  Family is aware that anyone that comes in contact with the spouse should quarantine for 14 days. Per Prachi Kendall, she lives out of state at the present time. CM identified need to communicate directly with patient's spouse, Tj Bettencourt. Family to return call to [facility] Sherie Zarate for appropriate point of contact. KAELA:  - RUR: 11%  - Disposition is TBD dependent on progression    - ID Consult is pending for initiation of Remdesivir and potential additional ABX needs  - Patient will likely need Pulmonary Consult   - Patient is on High Flow O2 at 10L   - Albuterol and Decadron continue  - Patient will need PT and OT Consults     CM notes that patient was COVID + at previous hospital and completed a 9 day course of ABX. CM to follow and await return call from patient's spouse, Tj Bettencourt. CRM: Mina Ellis, MPH, [facility]; Z: [phone]    16:25 -   CM received return call from patient's granddaughter Lambert Farrell) indicating that the patient's spouse would be available at P: [phone] (daughter Diane's home). CM attempted to reach Alberto Warren x 3 but the call went straight to . CM returned call to granddaughter Lambert Farrell: [phone]) to indicate that CM cannot confirm emergency contact with patient's spouse at the present time and that due to the spouse's LNOK status and potential POA status and with the combined emergency contact information, the hospital should be communicating only with patient's spouse at this time. The granddaughter expressed understanding.   CM coordinated 09:00 phone call 9/1, with the patient's spouse to confirm who should have access to patient's information and to receive consent to communicate with granddaughter, Ekaterina Aleman. Nursing is aware of the above and that at the present time Ramsey Vogt, patient's spouse, should be the only one to receive information.   CRM: Amanda Diaz, MPH, [address] [place] [address]; Z: [phone]

## 2020-08-31 NOTE — CONSULTS
Infectious Disease Consult    Today's Date: 8/31/2020   Admit Date: 8/30/2020    Impression:   · COVID 19 positive  · Hypoxic respiratory failure  · Seems to qualify for remdesivir    Plan:   · Continue current therapy  · Consented for remdesivir--also discussed with family per his request  · Consider convalescent plasma, as well    Anti-infectives:   · None     Subjective:   Date of Consultation:  August 31, 2020  Referring Physician: Dr Nallley Torres    Patient is a 66 y.o. male admitted to outside hospital with pneumonia and respiratory failure. He has completed a course of antibiotic therapy, but was not given remdesivir. He may have aspirated at some point, as well. He has been transferred for progressive respiratory failure and we are asked to see him in consultation. Patient Active Problem List   Diagnosis Code    Diabetes mellitus type 2, controlled ([facility] E11.9    Dysphagia R13.10    Essential hypertension I10    Gastroesophageal reflux disease K21.9    Genuine stress incontinence, female N39.3    Hemiplegia ([facility] G81.90    History of adenomatous polyp of colon Z86.010    Hyperproteinemia E88.09    Intracranial hemorrhage (HCC) I62.9    Laryngopharyngeal reflux K21.9    Respiratory failure (HCC) J96.90     Past Medical History:   Diagnosis Date    Acid reflux     Anemia     Diabetes (HCC)     GERD (gastroesophageal reflux disease)     Hypertension     [facility]       Family History   Problem Relation Age of Onset    Heart Disease Father     Hypertension Brother     Diabetes Brother       Social History     Tobacco Use    Smoking status: Not on file   Substance Use Topics    Alcohol use: Not on file     Past Surgical History:   Procedure Laterality Date    HX COLONOSCOPY        Prior to Admission medications    Medication Sig Start Date End Date Taking? Authorizing Provider   amLODIPine (NORVASC) 10 mg tablet Take  by mouth daily.     Provider, Historical   carvediloL (COREG) 25 mg tablet Take 25 mg by mouth two (2) times daily (with meals). Provider, Historical   glucose blood VI test strips (Contour Next Test Strips) strip by Does Not Apply route See Admin Instructions. Provider, Historical   diclofenac (VOLTAREN) 1 % gel Apply  to affected area four (4) times daily. Provider, Historical   PEG 3350-Electrolytes (GaviLyte-G) 236-22.74-6.74 -5.86 gram suspension Take  by mouth now. Provider, Historical   lisinopriL (PRINIVIL, ZESTRIL) 10 mg tablet Take  by mouth daily. Provider, Historical   metFORMIN (GLUCOPHAGE) 500 mg tablet Take  by mouth two (2) times daily (with meals). Provider, Historical   pantoprazole (PROTONIX) 40 mg tablet Take 40 mg by mouth daily. Provider, Historical   pravastatin (PRAVACHOL) 40 mg tablet Take 40 mg by mouth nightly. Provider, Historical   docusate sodium (Stool Softener) 100 mg capsule Take 100 mg by mouth two (2) times a day. Provider, Historical   lancets (TRUEplus Lancets) 33 gauge misc by Does Not Apply route. Provider, Historical       No Known Allergies     Review of Systems:  Pertinent items are noted in the History of Present Illness. Objective:     Visit Vitals  /70   Pulse 66   Temp 97.2 °F (36.2 °C)   Resp 13   Ht 5' 8\" (1.727 m)   Wt 94.8 kg (209 lb)   SpO2 96%   BMI 31.78 kg/m²     Temp (24hrs), Av.5 °F (36.4 °C), Min:97 °F (36.1 °C), Max:98.3 °F (36.8 °C)       Lines:  Peripheral IV:       Physical Exam:  Lungs:  diminished breath sounds R base, L base  Heart:  regular rate and rhythm  Abdomen:  soft, non-tender.  Bowel sounds normal. No masses,  no organomegaly  Skin:  no rash or abnormalities    Data Review:     CBC:  Recent Labs     20  0355 20  1739   WBC 15.4* 14.9*   HGB 13.9 14.2   HCT 41.7 43.1    354       BMP:  Recent Labs     20  0355 20  1739   CREA 0.75 0.72   BUN 31* 30*    142   K 4.0 4.1    106   CO2 28 30   AGAP 5 6   * 215* LFTS:  Recent Labs     08/31/20  0355 08/30/20  1739   TBILI 0.6 0.6   ALT 33 38   AP 50 53   TP 6.9 6.6   ALB 2.5* 2.6*       Microbiology:     All Micro Results     None          Imaging:   Reviewed     Signed By: Elif Wilkes MD     August 31, 2020

## 2020-08-31 NOTE — PROGRESS NOTES
Problem: Dysphagia (Adult)  Goal: *Acute Goals and Plan of Care (Insert Text)  Description: Speech therapy goals  Initiated 8/31/2020    1. Patient will tolerate puree/thin liquid diet without s/s of aspiration within 7 days   2. Patient will tolerate solid trials with SLP to determine safety for diet upgrade within 7 days   Outcome: Progressing Towards Goal     SPEECH [address] EVALUATION  Patient: Isa Santillan (82 y.o. male)  Date: 8/31/2020  Primary Diagnosis: Respiratory failure ([address] [J96.90]        Precautions: aspiration, fall        ASSESSMENT :  Based on the objective data described below, the patient presents with moderate oral and suspected at least mild pharyngeal dysphagia. Slow and effortful bolus formation and manipulation with delayed posterior propulsion. Suspected delayed swallow initiation and reduced hyolaryngeal elevation/excursion via palpation. No s/s of aspiration observed, however, note patient with baseline dysphagia (unclear of baseline diet, but pt reports he was drinking thin liquids) and decreased respiratory status related to COVID-19 does increase risks. Will benefit from slow initiation of diet to increase safety as respiratory status improves. Suspect intake will be limited at this time. Patient will benefit from skilled intervention to address the above impairments. Patients rehabilitation potential is considered to be Fair     PLAN :  Recommendations and Planned Interventions:  --recommend puree/thin liquid diet. Strict aspiration precautions. No straws, single sips. Meds crushed in puree. Slow rate of intake. Hold during periods of increased RR. May benefit from smaller/more frequent meals to reduce fatigue. Suspect intake will be limited. --will follow for diet tolerance and consideration of upgrade as respiratory status improves.    Frequency/Duration: Patient will be followed by speech-language pathology 3 times a week to address goals.  Discharge Recommendations: To Be Determined     SUBJECTIVE:   Patient alert, cooperative. OBJECTIVE:     Past Medical History:   Diagnosis Date    Acid reflux     Anemia     Diabetes (HCC)     GERD (gastroesophageal reflux disease)     Hypertension     Stroke [facility])      Past Surgical History:   Procedure Laterality Date    HX COLONOSCOPY       Prior Level of Function/Home Situation:      Diet prior to admission: unknown  Current Diet:  NPO    Cognitive and Communication Status:  Neurologic State: Alert, Confused  Orientation Level: Oriented to person, Oriented to place  Cognition: Decreased attention/concentration, Follows commands     Perseveration: No perseveration noted     Oral Assessment:  Oral Assessment  Labial: Decreased seal  Dentition: Limited  Oral Hygiene: dry mucosa   Lingual: Decreased strength  Velum: Unable to visualize  Mandible: No impairment  P.O. Trials:  Patient Position: upright in bed   Vocal quality prior to P.O.: No impairment  Consistency Presented: Thin liquid;Puree; Ice chips  How Presented: SLP-fed/presented;Cup/sip; Successive swallows;Spoon     Bolus Acceptance: No impairment  Bolus Formation/Control: Impaired  Type of Impairment: Incomplete;Lip closure;Delayed(increased effort for manipulation )  Propulsion: Delayed (# of seconds)  Oral Residue: None  Initiation of Swallow: Delayed (# of seconds)  Laryngeal Elevation: Decreased  Aspiration Signs/Symptoms: None  Pharyngeal Phase Characteristics: No impairment, issues, or problems   Effective Modifications: Small sips and bites  Cues for Modifications: Minimal       Oral Phase Severity: Moderate  Pharyngeal Phase Severity : Mild(suspected )    NOMS:   The NOMS functional outcome measure was used to quantify this patient's level of swallowing impairment.   Based on the NOMS, the patient was determined to be at level 3 for swallow function       NOMS Swallowing Levels:  Level 1 (CN): NPO  Level 2 (CM): NPO but takes consistency in therapy  Level 3 (CL): Takes less than 50% of nutrition p.o. and continues with nonoral feedings; and/or safe with mod cues; and/or max diet restriction  Level 4 (CK): Safe swallow but needs mod cues; and/or mod diet restriction; and/or still requires some nonoral feeding/supplements  Level 5 (CJ): Safe swallow with min diet restriction; and/or needs min cues  Level 6 (CI): Independent with p.o.; rare cues; usually self cues; may need to avoid some foods or needs extra time  Level 7 ([address] [place] [address]): Independent for all p.o.  MIA. (2003). National Outcomes Measurement System (NOMS): Adult Speech-Language Pathology User's Guide. Pain:  Pain Scale 1: Numeric (0 - 10)  Pain Intensity 1: 0       After treatment:   Patient left in no apparent distress in bed, Call bell within reach, and Nursing notified    COMMUNICATION/EDUCATION:     The patient's plan of care including recommendations, planned interventions, and recommended diet changes were discussed with: Registered nurse. Patient/family have participated as able in goal setting and plan of care. Patient/family agree to work toward stated goals and plan of care. Thank you for this referral.  Ernestina Li M.CD.  CCC-SLP   Time Calculation: 15 mins

## 2020-08-31 NOTE — PROGRESS NOTES
1538 Verbal report received from Rolando Correa RN(name) on James Celoron  being received from [facility] for urgent transfer      Report consisted of patients Situation, Background, Assessment and   Recommendations(SBAR). Information from the following report(s) SBAR, Kardex, ED Summary, Intake/Output, MAR, Accordion, Recent Results, Med Rec Status, Cardiac Rhythm NSR with PVCs and Alarm Parameters  was reviewed with the receiving nurse. Opportunity for questions and clarification was provided. Assessment completed upon patients arrival to unit and care assumed. Primary Nurse Mikel Toscano RN and Alvaro Clement RN performed a dual skin assessment on this patient No impairment noted  William score is 13    2000 Bedside and Verbal shift change report given to Parviz Choi RN (oncoming nurse) by Kiya Bergman RN (offgoing nurse). Report included the following information SBAR, Kardex, ED Summary, Intake/Output, MAR, Accordion, Recent Results, Med Rec Status, Cardiac Rhythm NSR with PVCs and Alarm Parameters .

## 2020-08-31 NOTE — PROGRESS NOTES
[facility] Adult  Hospitalist Group     ICU Transfer/Accept Summary     [place] [address] [place]  DATE OF TRANSFER: 8/31/2020       PATIENT ID: Basim Gabriel  MRN: [id number]   [facility]: 1945    PRIMARY CARE PROVIDER: Tamra Francisco MD   DATE OF ADMISSION: 8/30/2020  3:37 PM    ATTENDING PHYSICIAN: Hector Ahmadi MD  CONSULTATIONS:   None    PROCEDURES/SURGERIES:   * No surgery found *    REASON FOR ADMISSION: Acute hypoxic respiratory failure, COVID, pneumonia    HOSPITAL PROBLEM LIST:  Patient Active Problem List   Diagnosis Code    Diabetes mellitus type 2, controlled ([facility] E11.9    Dysphagia R13.10    Essential hypertension I10    Gastroesophageal reflux disease K21.9    Genuine stress incontinence, female N39.3    Hemiplegia ([facility] G81.90    History of adenomatous polyp of colon Z86.010    Hyperproteinemia E88.09    Intracranial hemorrhage (HCC) I62.9    Laryngopharyngeal reflux K21.9    Respiratory failure ([facility] J96.90         Brief HPI and Hospital Course:      Basim Gabriel is a 66-year-old male with a past medical history that includes HTN, ischemic stroke with residual left hemiplegia, dysphagia, T2DM and GERD who was transferred to [facility] today from [facility] for increasing oxygen requirement. He arrived here at approximately 4 PM.     Patient originally presented at [facility] on August 22 with several days of fever, cough and shortness of breath. He tested positive for COVID and was started on ceftriaxone and azithromycin for RML CAP. There was concern for aspiration so he was started on Zosyn and ceftriaxone was discontinued. He never did receive Remdesivir. On 8/29 IV Diflucan was initiated for COVID pneumonitis. Patient was weaned from CPAP to 50% Ventimask and maintained saturations greater than 95% without worsening shortness of breath.   He is transferred out of the ICU on high flow nasal cannula and is now resting comfortably on 6 L NC. Assessment and Plan:    Acute hypoxic respiratory failure due to COVID, community-acquired pneumonia POA  CRP still 4.3; Lactic 2.2; PT 1.2, coags otherwise WNL  · Had total 9 days Zosyn and 5 days azithromycin PTA. · Droplet precautions  · Albuterol  · Decadron    Pneumonitis d/t COVID  · IV Diflucan since 8/29 (total 1 day). Did not receive in ICU. Elevated d-dimer  CT chest 8/26 no PE  · On Lovenox 40 mg every 12 hours    Leukocytosis  Likely related to prolonged steroid use  · Antibiotics completed  · Continue to monitor    PMH HTN  · Continue home carvedilol 25 mg twice daily  · Hold home amlodipine and lisinopril for now for now and monitor VS    PMH T2DM with hyperglycemia  On Decadron  · Sliding scale with lispro coverage  · Check A1c with next blood draw    PMH CVA with left hemiplegia and dysphagia  CT 8/22 old ischemic changes noted, no acute changes  Usually walks w assist of a rolling walker  · SLP eval  · PT/OT    DVT: Lovenox  Code: Full  Diet: NPO pending swallow eval  Act: OOB as tolerated. PT/OT           PHYSICAL EXAMINATION:    Patient Vitals for the past 4 hrs:   Temp Pulse Resp BP SpO2   08/31/20 0010 -- -- -- -- 95 %   08/31/20 0000 (P) 97 °F (36.1 °C) 69 21 132/76 92 %   08/30/20 2330 -- 68 26 134/61 94 %   08/30/20 2312 -- -- -- -- 93 %   08/30/20 2300 -- 72 20 125/76 95 %   08/30/20 2230 -- 63 17 141/69 94 %   08/30/20 2200 -- 61 17 138/75 91 %   08/30/20 2130 -- 65 21 130/80 95 %       General:          Alert, no distress. L hemiparesis (baseline  HEENT:           Atraumatic, MMM. Pupils 3mm ERLA. + gag         Neck:               Supple, symmetrical,  thyroid: non tender  Lungs:             Clear to auscultation bilaterally. No Wheezing or Rhonchi. No rales. 6L NC  Heart:              Regular  rhythm,  No  murmur   No edema  Abdomen:       Soft, non-tender. Not distended. Bowel sounds normal  Extremities:     No cyanosis. No clubbing,  +2 distal pulses.  PIV R forearm  Skin:                Not pale. Not Jaundiced  No rashes   Psych:             Not anxious or agitated. Neurologic:      Alert, moves all extremities, oriented X 3. Recent Results (from the past 24 hour(s))   METABOLIC PANEL, COMPREHENSIVE    Collection Time: 08/30/20  5:39 PM   Result Value Ref Range    Sodium 142 136 - 145 mmol/L    Potassium 4.1 3.5 - 5.1 mmol/L    Chloride 106 97 - 108 mmol/L    CO2 30 21 - 32 mmol/L    Anion gap 6 5 - 15 mmol/L    Glucose 215 (H) 65 - 100 mg/dL    BUN 30 (H) 6 - 20 MG/DL    Creatinine 0.72 0.70 - 1.30 MG/DL    BUN/Creatinine ratio 42 (H) 12 - 20      GFR est AA >60 >60 ml/min/1.73m2    GFR est non-AA >60 >60 ml/min/1.73m2    Calcium 8.6 8.5 - 10.1 MG/DL    Bilirubin, total 0.6 0.2 - 1.0 MG/DL    ALT (SGPT) 38 12 - 78 U/L    AST (SGOT) 15 15 - 37 U/L    Alk.  phosphatase 53 45 - 117 U/L    Protein, total 6.6 6.4 - 8.2 g/dL    Albumin 2.6 (L) 3.5 - 5.0 g/dL    Globulin 4.0 2.0 - 4.0 g/dL    A-G Ratio 0.7 (L) 1.1 - 2.2     MAGNESIUM    Collection Time: 08/30/20  5:39 PM   Result Value Ref Range    Magnesium 2.6 (H) 1.6 - 2.4 mg/dL   CBC W/O DIFF    Collection Time: 08/30/20  5:39 PM   Result Value Ref Range    WBC 14.9 (H) 4.1 - 11.1 K/uL    RBC 4.35 4.10 - 5.70 M/uL    HGB 14.2 12.1 - 17.0 g/dL    HCT 43.1 36.6 - 50.3 %    MCV 99.1 (H) 80.0 - 99.0 FL    MCH 32.6 26.0 - 34.0 PG    MCHC 32.9 30.0 - 36.5 g/dL    RDW 12.3 11.5 - 14.5 %    PLATELET 532 729 - 616 K/uL    MPV 10.4 8.9 - 12.9 FL    NRBC 0.2 (H) 0  WBC    ABSOLUTE NRBC 0.03 (H) 0.00 - 0.01 K/uL   D DIMER    Collection Time: 08/30/20  5:39 PM   Result Value Ref Range    D-dimer 2.87 (H) 0.00 - 0.65 mg/L FEU   FIBRINOGEN    Collection Time: 08/30/20  5:39 PM   Result Value Ref Range    Fibrinogen 440 200 - 475 mg/dL   PROTHROMBIN TIME + INR    Collection Time: 08/30/20  5:39 PM   Result Value Ref Range    INR 1.2 (H) 0.9 - 1.1      Prothrombin time 12.3 (H) 9.0 - 11.1 sec   PTT    Collection Time: 08/30/20  5:39 PM   Result Value Ref Range    aPTT 26.1 22.1 - 32.0 sec    aPTT, therapeutic range     58.0 - 77.0 SECS   LACTIC ACID    Collection Time: 08/30/20  5:39 PM   Result Value Ref Range    Lactic acid 2.2 (HH) 0.4 - 2.0 MMOL/L   LD    Collection Time: 08/30/20  5:39 PM   Result Value Ref Range     (H) 85 - 241 U/L   TYPE & SCREEN    Collection Time: 08/30/20  5:39 PM   Result Value Ref Range    Crossmatch Expiration 09/02/2020     ABO/Rh(D) Andrea Maxon POSITIVE     Antibody screen NEG    PROCALCITONIN    Collection Time: 08/30/20  5:39 PM   Result Value Ref Range    Procalcitonin <0.05 ng/mL   C REACTIVE PROTEIN, QT    Collection Time: 08/30/20  5:39 PM   Result Value Ref Range    C-Reactive protein 4.33 (H) 0.00 - 0.60 mg/dL   GLUCOSE, POC    Collection Time: 08/30/20  6:42 PM   Result Value Ref Range    Glucose (POC) 198 (H) 65 - 100 mg/dL    Performed by [address], POC    Collection Time: 08/31/20 12:54 AM   Result Value Ref Range    Glucose (POC) 196 (H) 65 - 100 mg/dL    Performed by Louis Cruz [facility] [address] [address]            CODE STATUS:  x Full Code    DNR    Partial    Comfort Care     Signed:   Annie Camacho NP  Date of Service:  8/31/2020  1:01 AM

## 2020-08-31 NOTE — PROGRESS NOTES
Verbal shift change report given to [address] and Saige RN (oncoming nurses) by Christine Byrd RN (offgoing nurse). Report included the following information SBAR, Kardex, ED Summary, Intake/Output, MAR, Med Rec Status and Cardiac Rhythm NSR. Patient Vitals for the past 12 hrs:   Temp Pulse Resp BP SpO2   08/31/20 0800 97.4 °F (36.3 °C) 64 18 153/72 97 %   08/31/20 0628 -- -- -- -- 96 %   08/31/20 0350 97.9 °F (36.6 °C) 63 20 142/69 96 %   08/31/20 0319 -- -- -- -- 98 %   08/31/20 0145 97.8 °F (36.6 °C) 73 18 147/71 95 %   08/31/20 0010 -- -- -- -- 95 %   08/31/20 0000 97 °F (36.1 °C) 69 21 132/76 92 %   08/30/20 2330 -- 68 26 134/61 94 %   08/30/20 2312 -- -- -- -- 93 %   08/30/20 2300 -- 72 20 125/76 95 %   08/30/20 2230 -- 63 17 141/69 94 %   08/30/20 2200 -- 61 17 138/75 91 %   08/30/20 2130 -- 65 21 130/80 95 %   08/30/20 2100 -- 66 20 160/78 92 %     Last 3 Recorded Weights in this Encounter    08/30/20 1538 08/31/20 0145 08/31/20 0350   Weight: 93.2 kg (205 lb 7.5 oz) 95.9 kg (211 lb 8 oz) 94.8 kg (209 lb)         Problem: Falls - Risk of  Goal: *Absence of Falls  Description: Document Devin Fall Risk and appropriate interventions in the flowsheet. 8/31/2020 0831 by Johann Reynaga RN  Outcome: Progressing Towards Goal  Note: Fall Risk Interventions:     Mentation Interventions: Adequate sleep, hydration, pain control, Evaluate medications/consider consulting pharmacy, Increase mobility, More frequent rounding, Reorient patient, Toileting rounds, Update white board    Medication Interventions: Patient to call before getting OOB    Elimination Interventions: Call light in reach, Patient to call for help with toileting needs, Stay With Me (per policy), Toileting schedule/hourly rounds         Problem: Pressure Injury - Risk of  Goal: *Prevention of pressure injury  Description: Document William Scale and appropriate interventions in the flowsheet.   Outcome: Progressing Towards Goal  Note: Pressure Injury Interventions:  Sensory Interventions: Assess changes in LOC, Assess need for specialty bed, Check visual cues for pain, Float heels, Maintain/enhance activity level, Keep linens dry and wrinkle-free, Minimize linen layers, Pressure redistribution bed/mattress (bed type)    Moisture Interventions: Absorbent underpads, Assess need for specialty bed, Check for incontinence Q2 hours and as needed, Internal/External urinary devices, Minimize layers    Activity Interventions: Assess need for specialty bed, Pressure redistribution bed/mattress(bed type)    Mobility Interventions: Assess need for specialty bed, Float heels, Pressure redistribution bed/mattress (bed type), Turn and reposition approx. every two hours(pillow and wedges)    Nutrition Interventions: Document food/fluid/supplement intake    Friction and Shear Interventions: Minimize layers, Transferring/repositioning devices                Problem: Breathing Pattern - Ineffective  Goal: *Absence of hypoxia  Outcome: Progressing Towards Goal  Goal: *Use of effective breathing techniques  Outcome: Progressing Towards Goal     Problem: Airway Clearance - Ineffective  Goal: Achieve or maintain patent airway  Outcome: Progressing Towards Goal     Problem: Gas Exchange - Impaired  Goal: Absence of hypoxia  Outcome: Progressing Towards Goal  Goal: Promote optimal lung function  Outcome: Progressing Towards Goal     Problem:  Body Temperature -  Risk of, Imbalanced  Goal: Ability to maintain a body temperature within defined limits  Outcome: Progressing Towards Goal  Goal: Will regain or maintain usual level of consciousness  Outcome: Progressing Towards Goal  Goal: Complications related to the disease process, condition or treatment will be avoided or minimized  Outcome: Progressing Towards Goal     Problem: Isolation Precautions - Risk of Spread of Infection  Goal: Prevent transmission of infectious organism to others  Outcome: Progressing Towards Goal     Problem: Loneliness or Risk for Loneliness  Goal: Demonstrate positive use of time alone when socialization is not possible  Outcome: Progressing Towards Goal

## 2020-08-31 NOTE — PROGRESS NOTES
Remdesivir Initiation Note    Kinsey Schuster meets criteria for initiation of remdesivir under the emergency use authorization (EUA) based on the following:   Known or suspected COVID-19   Severe disease (SpO2 ? 94% on RA, requiring supplemental O2, or requiring invasive mechanical ventilation)   Acceptable renal function  o CrCl ? 30 ml/min based on SCr obtained prior to initiation OR   o CrCl < 30 ml/min but the potential benefit of remdesivir outweighs the risk   Acceptable hepatic function (ALT within 5 times ULN)    I have discussed with the patient/proxy information consistent with the Fact Sheet for Patients and Parents/Caregivers\" and the patient/proxy has agreed to initiating remdesivir after being:   Given the Fact Sheet for Patients and Parents/Caregivers   Informed of the alternatives to receiving remdesivir, and    Informed that remdesivir is an unapproved drug that is authorized for use under EUA    Liver function tests will be monitored daily while on remdesivir.

## 2020-09-01 LAB
ALBUMIN SERPL-MCNC: 2.3 G/DL (ref 3.5–5)
ALBUMIN/GLOB SERPL: 0.5 {RATIO} (ref 1.1–2.2)
ALP SERPL-CCNC: 53 U/L (ref 45–117)
ALT SERPL-CCNC: 33 U/L (ref 12–78)
APTT PPP: 28.4 SEC (ref 22.1–32)
AST SERPL-CCNC: 25 U/L (ref 15–37)
BASOPHILS # BLD: 0 K/UL (ref 0–0.1)
BASOPHILS NFR BLD: 0 % (ref 0–1)
BILIRUB DIRECT SERPL-MCNC: 0.1 MG/DL (ref 0–0.2)
BILIRUB SERPL-MCNC: 0.6 MG/DL (ref 0.2–1)
DIFFERENTIAL METHOD BLD: ABNORMAL
EOSINOPHIL # BLD: 0.1 K/UL (ref 0–0.4)
EOSINOPHIL NFR BLD: 1 % (ref 0–7)
ERYTHROCYTE [DISTWIDTH] IN BLOOD BY AUTOMATED COUNT: 12.5 % (ref 11.5–14.5)
FIBRINOGEN PPP-MCNC: 372 MG/DL (ref 200–475)
GLOBULIN SER CALC-MCNC: 4.6 G/DL (ref 2–4)
GLUCOSE BLD STRIP.AUTO-MCNC: 130 MG/DL (ref 65–100)
GLUCOSE BLD STRIP.AUTO-MCNC: 157 MG/DL (ref 65–100)
GLUCOSE BLD STRIP.AUTO-MCNC: 159 MG/DL (ref 65–100)
GLUCOSE BLD STRIP.AUTO-MCNC: 257 MG/DL (ref 65–100)
HCT VFR BLD AUTO: 46.7 % (ref 36.6–50.3)
HGB BLD-MCNC: 14.7 G/DL (ref 12.1–17)
IMM GRANULOCYTES # BLD AUTO: 0.2 K/UL (ref 0–0.04)
IMM GRANULOCYTES NFR BLD AUTO: 2 % (ref 0–0.5)
INR PPP: 1.2 (ref 0.9–1.1)
LYMPHOCYTES # BLD: 0.9 K/UL (ref 0.8–3.5)
LYMPHOCYTES NFR BLD: 9 % (ref 12–49)
MCH RBC QN AUTO: 32.4 PG (ref 26–34)
MCHC RBC AUTO-ENTMCNC: 31.5 G/DL (ref 30–36.5)
MCV RBC AUTO: 102.9 FL (ref 80–99)
MONOCYTES # BLD: 0.9 K/UL (ref 0–1)
MONOCYTES NFR BLD: 9 % (ref 5–13)
NEUTS SEG # BLD: 7.9 K/UL (ref 1.8–8)
NEUTS SEG NFR BLD: 80 % (ref 32–75)
NRBC # BLD: 0 K/UL (ref 0–0.01)
NRBC BLD-RTO: 0 PER 100 WBC
PLATELET # BLD AUTO: 335 K/UL (ref 150–400)
PMV BLD AUTO: 10.4 FL (ref 8.9–12.9)
PROT SERPL-MCNC: 6.9 G/DL (ref 6.4–8.2)
PROTHROMBIN TIME: 12.4 SEC (ref 9–11.1)
RBC # BLD AUTO: 4.54 M/UL (ref 4.1–5.7)
SERVICE CMNT-IMP: ABNORMAL
THERAPEUTIC RANGE,PTTT: NORMAL SECS (ref 58–77)
WBC # BLD AUTO: 9.9 K/UL (ref 4.1–11.1)

## 2020-09-01 PROCEDURE — 85730 THROMBOPLASTIN TIME PARTIAL: CPT

## 2020-09-01 PROCEDURE — 85025 COMPLETE CBC W/AUTO DIFF WBC: CPT

## 2020-09-01 PROCEDURE — 80076 HEPATIC FUNCTION PANEL: CPT

## 2020-09-01 PROCEDURE — 74011250637 HC RX REV CODE- 250/637: Performed by: HOSPITALIST

## 2020-09-01 PROCEDURE — 74011250636 HC RX REV CODE- 250/636: Performed by: INTERNAL MEDICINE

## 2020-09-01 PROCEDURE — 94640 AIRWAY INHALATION TREATMENT: CPT

## 2020-09-01 PROCEDURE — 74011250637 HC RX REV CODE- 250/637: Performed by: INTERNAL MEDICINE

## 2020-09-01 PROCEDURE — 92526 ORAL FUNCTION THERAPY: CPT | Performed by: SPEECH-LANGUAGE PATHOLOGIST

## 2020-09-01 PROCEDURE — 85610 PROTHROMBIN TIME: CPT

## 2020-09-01 PROCEDURE — 74011636637 HC RX REV CODE- 636/637: Performed by: HOSPITALIST

## 2020-09-01 PROCEDURE — 74011250637 HC RX REV CODE- 250/637: Performed by: NURSE PRACTITIONER

## 2020-09-01 PROCEDURE — 65660000001 HC RM ICU INTERMED STEPDOWN

## 2020-09-01 PROCEDURE — 85384 FIBRINOGEN ACTIVITY: CPT

## 2020-09-01 PROCEDURE — 74011636637 HC RX REV CODE- 636/637: Performed by: INTERNAL MEDICINE

## 2020-09-01 PROCEDURE — 74011000250 HC RX REV CODE- 250: Performed by: INTERNAL MEDICINE

## 2020-09-01 PROCEDURE — 74011250636 HC RX REV CODE- 250/636: Performed by: HOSPITALIST

## 2020-09-01 PROCEDURE — 74011000258 HC RX REV CODE- 258: Performed by: INTERNAL MEDICINE

## 2020-09-01 PROCEDURE — 82962 GLUCOSE BLOOD TEST: CPT

## 2020-09-01 PROCEDURE — 36415 COLL VENOUS BLD VENIPUNCTURE: CPT

## 2020-09-01 RX ORDER — INSULIN LISPRO 100 [IU]/ML
INJECTION, SOLUTION INTRAVENOUS; SUBCUTANEOUS
Status: DISCONTINUED | OUTPATIENT
Start: 2020-09-01 | End: 2020-09-12 | Stop reason: HOSPADM

## 2020-09-01 RX ADMIN — ALBUTEROL SULFATE 2 PUFF: 90 AEROSOL, METERED RESPIRATORY (INHALATION) at 19:33

## 2020-09-01 RX ADMIN — INSULIN LISPRO 3 UNITS: 100 INJECTION, SOLUTION INTRAVENOUS; SUBCUTANEOUS at 22:28

## 2020-09-01 RX ADMIN — SODIUM CHLORIDE 60 ML/HR: 900 INJECTION, SOLUTION INTRAVENOUS at 18:47

## 2020-09-01 RX ADMIN — Medication 10 ML: at 06:49

## 2020-09-01 RX ADMIN — ENOXAPARIN SODIUM 40 MG: 40 INJECTION SUBCUTANEOUS at 09:12

## 2020-09-01 RX ADMIN — Medication 10 ML: at 13:21

## 2020-09-01 RX ADMIN — SODIUM CHLORIDE 60 ML/HR: 900 INJECTION, SOLUTION INTRAVENOUS at 13:24

## 2020-09-01 RX ADMIN — NYSTATIN 500000 UNITS: 100000 SUSPENSION ORAL at 22:14

## 2020-09-01 RX ADMIN — NYSTATIN 500000 UNITS: 100000 SUSPENSION ORAL at 18:21

## 2020-09-01 RX ADMIN — INSULIN LISPRO 2 UNITS: 100 INJECTION, SOLUTION INTRAVENOUS; SUBCUTANEOUS at 06:49

## 2020-09-01 RX ADMIN — NYSTATIN 500000 UNITS: 100000 SUSPENSION ORAL at 13:20

## 2020-09-01 RX ADMIN — ZINC SULFATE 220 MG (50 MG) CAPSULE 1 CAPSULE: CAPSULE at 09:11

## 2020-09-01 RX ADMIN — DEXAMETHASONE SODIUM PHOSPHATE 6 MG: 4 INJECTION, SOLUTION INTRA-ARTICULAR; INTRALESIONAL; INTRAMUSCULAR; INTRAVENOUS; SOFT TISSUE at 18:21

## 2020-09-01 RX ADMIN — OXYCODONE HYDROCHLORIDE AND ACETAMINOPHEN 500 MG: 500 TABLET ORAL at 19:13

## 2020-09-01 RX ADMIN — CARVEDILOL 25 MG: 12.5 TABLET, FILM COATED ORAL at 09:11

## 2020-09-01 RX ADMIN — PRAVASTATIN SODIUM 40 MG: 40 TABLET ORAL at 22:14

## 2020-09-01 RX ADMIN — ENOXAPARIN SODIUM 40 MG: 40 INJECTION SUBCUTANEOUS at 22:14

## 2020-09-01 RX ADMIN — ALBUTEROL SULFATE 2 PUFF: 90 AEROSOL, METERED RESPIRATORY (INHALATION) at 15:36

## 2020-09-01 RX ADMIN — REMDESIVIR 100 MG: 5 INJECTION INTRAVENOUS at 19:13

## 2020-09-01 RX ADMIN — ALBUTEROL SULFATE 2 PUFF: 90 AEROSOL, METERED RESPIRATORY (INHALATION) at 11:20

## 2020-09-01 RX ADMIN — Medication 10 ML: at 22:14

## 2020-09-01 RX ADMIN — NYSTATIN 500000 UNITS: 100000 SUSPENSION ORAL at 09:10

## 2020-09-01 RX ADMIN — INSULIN LISPRO 2 UNITS: 100 INJECTION, SOLUTION INTRAVENOUS; SUBCUTANEOUS at 13:20

## 2020-09-01 RX ADMIN — ALBUTEROL SULFATE 2 PUFF: 90 AEROSOL, METERED RESPIRATORY (INHALATION) at 07:22

## 2020-09-01 RX ADMIN — OXYCODONE HYDROCHLORIDE AND ACETAMINOPHEN 500 MG: 500 TABLET ORAL at 09:12

## 2020-09-01 NOTE — PROGRESS NOTES
Bedside shift change report given to Thaddeus Cerda RN (oncoming nurse) by Louis Santiago (offgoing nurse). Report included the following information SBAR, Kardex, Intake/Output, MAR, Cardiac Rhythm NSR, Quality Measures and Dual Neuro Assessment.

## 2020-09-01 NOTE — PROGRESS NOTES
[facility] Adult  Hospitalist Group                                                                                          Hospitalist Progress Note  Deon Brizuela MD  Answering service: [phone] from in house phone        Date of Service:  2020  NAME:  Umair Hamilton  :    MRN:  [id number]      Admission Summary:     Umair Hamilton is a 55-year-old male with a past medical history that includes HTN, ischemic stroke with residual left hemiplegia, dysphagia, T2DM and GERD who was transferred to [facility] today from [facility] for increasing oxygen requirement. He arrived here at approximately 4 PM.      Patient originally presented at [facility] on  with several days of fever, cough and shortness of breath. He tested positive for COVID and was started on ceftriaxone and azithromycin for RML CAP. There was concern for aspiration so he was started on Zosyn and ceftriaxone was discontinued. He never did receive Remdesivir. On  IV Diflucan was initiated for COVID pneumonitis. Patient was weaned from CPAP to 50% Ventimask and maintained saturations greater than 95% without worsening shortness of breath. He is transferred out of the ICU on high flow nasal cannula and is now resting comfortably on 6 L NC.     Interval history / Subjective:     Patient is a poor historian, he said he feels the same, has on and off cough, no chest pain     Assessment & Plan:     Acute hypoxic respiratory failure due to COVID, CAP POA  -High flow transitioned to mid flow, repeat ABG , , monitor pulse ox monitor  -CRP still 4.3; Lactic 2.2; PT 1.2, coags otherwise WNL  -Had total 9 days Zosyn and 5 days azithromycin PTA.  -COVID was positive at [facility]  -Droplet precautions  -On prn Albuterol     Pneumonia due to COVID 19 infection  -Had total 9 days Zosyn and 5 days azithromycin PTA.  -continue vitamin c, zinc, decadron, oxygen support,   - ID consulted started on remdesivir  -Elevated d-dimer  -CT chest 8/26 no PE  -On Lovenox 40 mg every 12 hours     Leukocytosis  -Likely related to prolonged steroid use  -Antibiotics completed  -improved and wbc is normal     HTN  -BP fairly controlled, Continue home carvedilol 25 mg twice daily  -Hold home amlodipine and lisinopril for now for now and monitor VS     T2DM with hyperglycemia  -On Decadron  - A1c 6.3  -continue sliding scale and monitor finger stick glucose     Hx of CVA with left hemiplegia and dysphagia  -CT 8/22 old ischemic changes noted, no acute changes  -Usually walks w assist of a rolling walker  -NPO, consult to speech  -PT/OT     Ambulate with walker and cane at base line       Code status: Full Code  DVT prophylaxis: Lovenox    Care Plan discussed with: Patient/Family and Nurse  Anticipated Disposition: TBD  Anticipated Discharge: 24 hours to 48 hours   I called his son, Festus Ahumada [phone], updated and answered questions 9/1     Hospital Problems  Never Reviewed          Codes Class Noted POA    [facility] ICD-10-CM: J96.90  ICD-9-CM: 518.81  8/30/2020 Unknown                Vital Signs:    Last 24hrs VS reviewed since prior progress note. Most recent are:  Visit Vitals  /85   Pulse 69   Temp 97.9 °F (36.6 °C)   Resp 19   Ht 5' 8\" (1.727 m)   Wt 91.8 kg (202 lb 6.4 oz)   SpO2 96%   BMI 30.77 kg/m²         Intake/Output Summary (Last 24 hours) at 9/1/2020 1210  Last data filed at 9/1/2020 0900  Gross per 24 hour   Intake 1751 ml   Output 1000 ml   Net 751 ml        Physical Examination:             Constitutional:  No acute distress, cooperative, pleasant    ENT:  Oral mucosa moist, oropharynx benign. Resp:  Decrease bronchial breath sound bilaterally. No wheezing/rhonchi/rales. No accessory muscle use   CV:  Regular rhythm, normal rate, no murmurs, gallops, rubs    GI:  Soft, non distended, non tender.  normoactive bowel sounds, no hepatosplenomegaly     Musculoskeletal:  No edema Neurologic:  Conscious and alert, oriented to place and person, motor RE 4-5/5, LLE 3/5, LUE 2/5     Skin:  Dry but no skin rash       Data Review:    Review and/or order of clinical lab test  Review and/or order of tests in the radiology section of CPT  Review and/or order of tests in the medicine section of CPT      Labs:     Recent Labs     09/01/20 0625 08/31/20  0355   WBC 9.9 15.4*   HGB 14.7 13.9   HCT 46.7 41.7    337     Recent Labs     08/31/20  0355 08/30/20  1739    142   K 4.0 4.1    106   CO2 28 30   BUN 31* 30*   CREA 0.75 0.72   * 215*   CA 8.8 8.6   MG 2.7* 2.6*     Recent Labs     09/01/20 0625 08/31/20  0355 08/30/20  1739   ALT 33 33 38   AP 53 50 53   TBILI 0.6 0.6 0.6   TP 6.9 6.9 6.6   ALB 2.3* 2.5* 2.6*   GLOB 4.6* 4.4* 4.0     Recent Labs     08/31/20 0355 08/30/20  1739   INR 1.2* 1.2*   PTP 12.4* 12.3*   APTT 27.3 26.1      No results for input(s): FE, TIBC, PSAT, FERR in the last 72 hours. No results found for: FOL, RBCF   No results for input(s): PH, PCO2, PO2 in the last 72 hours. No results for input(s): CPK, CKNDX, TROIQ in the last 72 hours.     No lab exists for component: CPKMB  No results found for: CHOL, CHOLX, CHLST, CHOLV, HDL, HDLP, LDL, LDLC, DLDLP, TGLX, TRIGL, TRIGP, CHHD, CHHDX  Lab Results   Component Value Date/Time    Glucose (POC) 157 (H) 09/01/2020 06:15 AM    Glucose (POC) 184 (H) 08/31/2020 11:38 PM    Glucose (POC) 163 (H) 08/31/2020 05:10 PM    Glucose (POC) 174 (H) 08/31/2020 11:41 AM    Glucose (POC) 162 (H) 08/31/2020 06:06 AM     No results found for: COLOR, APPRN, SPGRU, REFSG, DINAH, PROTU, GLUCU, KETU, BILU, UROU, MAGALI, LEUKU, GLUKE, EPSU, BACTU, WBCU, RBCU, CASTS, UCRY      Medications Reviewed:     Current Facility-Administered Medications   Medication Dose Route Frequency    nystatin (MYCOSTATIN) 100,000 unit/mL oral suspension 500,000 Units  500,000 Units Oral QID    ascorbic acid (vitamin C) (VITAMIN C) tablet 500 mg 500 mg Oral BID    zinc sulfate (ZINCATE) 220 (50) mg capsule 1 Cap  1 Cap Oral DAILY    remdesivir 100 mg in 0.9% sodium chloride 250 mL IVPB  100 mg IntraVENous Q24H    sodium chloride (NS) flush 5-40 mL  5-40 mL IntraVENous Q8H    sodium chloride (NS) flush 5-40 mL  5-40 mL IntraVENous PRN    bisacodyL (DULCOLAX) tablet 5 mg  5 mg Oral DAILY PRN    ondansetron (ZOFRAN ODT) tablet 4 mg  4 mg Oral Q8H PRN    Or    ondansetron (ZOFRAN) injection 4 mg  4 mg IntraVENous Q6H PRN    dexamethasone (DECADRON) 4 mg/mL injection 6 mg  6 mg IntraVENous Q24H    glucose chewable tablet 16 g  4 Tab Oral PRN    glucagon (GLUCAGEN) injection 1 mg  1 mg IntraMUSCular PRN    dextrose 10% infusion 0-250 mL  0-250 mL IntraVENous PRN    insulin lispro (HUMALOG) injection   SubCUTAneous Q6H    enoxaparin (LOVENOX) injection 40 mg  40 mg SubCUTAneous Q12H    acetaminophen (TYLENOL) tablet 650 mg  650 mg Oral Q6H PRN    Or    acetaminophen (TYLENOL) suppository 650 mg  650 mg Rectal Q6H PRN    carvediloL (COREG) tablet 25 mg  25 mg Oral BID WITH MEALS    pravastatin (PRAVACHOL) tablet 40 mg  40 mg Oral QHS    0.9% sodium chloride infusion  60 mL/hr IntraVENous CONTINUOUS    albuterol (PROVENTIL HFA, VENTOLIN HFA, PROAIR HFA) inhaler 2 Puff  2 Puff Inhalation Q4H RT     ______________________________________________________________________  EXPECTED LENGTH OF STAY: 5d 12h  ACTUAL LENGTH OF STAY:          2                 Shavon Weldon MD

## 2020-09-01 NOTE — PROGRESS NOTES
Problem: Dysphagia (Adult)  Goal: *Acute Goals and Plan of Care (Insert Text)  Description: Speech therapy goals  Initiated 8/31/2020    1. Patient will tolerate puree/thin liquid diet without s/s of aspiration within 7 days   2. Patient will tolerate solid trials with SLP to determine safety for diet upgrade within 7 days   Outcome: Progressing Towards Goal     SPEECH LANGUAGE PATHOLOGY DYSPHAGIA TREATMENT  Patient: Kinsey Schuster (51 y.o. male)  Date: 9/1/2020  Diagnosis: Respiratory failure ([facility] [J96.90]   <principal problem not specified>       Precautions: aspiration, fall       ASSESSMENT:  Patient with excellent tolerance of puree/thin liquid diet with appropriate self-calibration with only a single cue needed to slow rate on first sip. Per nsg, doing well with meals without s/s of aspiration. Solids assessed with poor tolerance with patient swallowing a cracker whole without any attempt to chew. This was followed by repeated grimacing and need for multiple sips of liquids to alleviate discomfort. Patient not yet ready for solids and suspect he will benefit from purees until his endurance and mental status further improve. PLAN:  Recommendations and Planned Interventions:  --Continue puree/thin liquid diet. General aspiration precautions. No straws. Encourage slow rate of intake. Stop with fatigue. --will follow for assessment of solids as overall strength and endurance improve. Suspect he will benefit from purees for the foreseeable future. Patient continues to benefit from skilled intervention to address the above impairments. Continue treatment per established plan of care. Discharge Recommendations: To Be Determined     SUBJECTIVE:   Patient stated oh that's good. After drinking water.      OBJECTIVE:   Cognitive and Communication Status:  Neurologic State: Alert  Orientation Level: Oriented to person, Oriented to place  Cognition: Follows commands  Perception: Appears intact  Perseveration: No perseveration noted  Safety/Judgement: Not assessed  Dysphagia Treatment:  Oral Assessment:  Oral Assessment  Labial: Decreased seal  P.O. Trials:  Patient Position: upright in bed   Vocal quality prior to P.O.: No impairment  Consistency Presented: Thin liquid;Puree; Solid  How Presented: Self-fed/presented;SLP-fed/presented;Cup/sip;Cup/gulp; Successive swallows;Spoon     Bolus Acceptance: No impairment  Bolus Formation/Control: Impaired  Type of Impairment: Poor;Mastication(swallowed cracker whole without attempt to chew )  Propulsion: Delayed (# of seconds)  Oral Residue: None  Initiation of Swallow: Delayed (# of seconds)  Laryngeal Elevation: Decreased  Aspiration Signs/Symptoms: None  Pharyngeal Phase Characteristics: No impairment, issues, or problems   Effective Modifications: Small sips and bites  Cues for Modifications: Minimal       Oral Phase Severity: Moderate  Pharyngeal Phase Severity : Mild  Exercises:  Laryngeal Exercises:                                                                                                                                   Pain:  Pain Scale 1: Numeric (0 - 10)  Pain Intensity 1: 0       After treatment:   Patient left in no apparent distress in bed, Call bell within reach, and Nursing notified    COMMUNICATION/EDUCATION:     The patient's plan of care including recommendations, planned interventions, and recommended diet changes were discussed with: Registered nurse. Joseluis Muir M.CD.  CCC-SLP   Time Calculation: 10 mins

## 2020-09-01 NOTE — PROGRESS NOTES
ID Progress Note  2020    Subjective:     Day 2 remdesivir--tolerating it well    Objective:     Antibiotics:  1. None       Vitals:   Visit Vitals  /61 (BP 1 Location: Left arm, BP Patient Position: At rest)   Pulse 62   Temp 98.3 °F (36.8 °C)   Resp 24   Ht 5' 8\" (1.727 m)   Wt 91.8 kg (202 lb 6.4 oz)   SpO2 95%   BMI 30.77 kg/m²        Tmax:  Temp (24hrs), Av.8 °F (36.6 °C), Min:97.2 °F (36.2 °C), Max:98.3 °F (36.8 °C)      Exam:  Deferred     Labs:      Recent Labs     20  0625 20  0355 20  1739   WBC 9.9 15.4* 14.9*   HGB 14.7 13.9 14.2    337 354   BUN  --  31* 30*   CREA  --  0.75 0.72   AP 53 50 53   TBILI 0.6 0.6 0.6       Cultures:     No results found for: SDES  No results found for: CULT    Radiology:     Line/Insert Date:           Assessment:     1. COVID 19  2. Hypoxia  3. Debility     Objective:     1.  Continue current therapy    Madilyn Buerger, MD

## 2020-09-01 NOTE — PROGRESS NOTES
Problem: Falls - Risk of  Goal: *Absence of Falls  Description: Document Lyric Manning Fall Risk and appropriate interventions in the flowsheet. Outcome: Progressing Towards Goal  Note: Fall Risk Interventions:       Mentation Interventions: Bed/chair exit alarm    Medication Interventions: Bed/chair exit alarm    Elimination Interventions: Bed/chair exit alarm              Problem: Patient Education: Go to Patient Education Activity  Goal: Patient/Family Education  Outcome: Progressing Towards Goal     Problem: Pressure Injury - Risk of  Goal: *Prevention of pressure injury  Description: Document William Scale and appropriate interventions in the flowsheet.   Outcome: Progressing Towards Goal  Note: Pressure Injury Interventions:  Sensory Interventions: Assess changes in LOC, Assess need for specialty bed, Avoid rigorous massage over bony prominences    Moisture Interventions: Absorbent underpads, Apply protective barrier, creams and emollients, Assess need for specialty bed    Activity Interventions: Assess need for specialty bed    Mobility Interventions: Assess need for specialty bed    Nutrition Interventions: Document food/fluid/supplement intake    Friction and Shear Interventions: Apply protective barrier, creams and emollients                Problem: Breathing Pattern - Ineffective  Goal: *Absence of hypoxia  Outcome: Progressing Towards Goal  Goal: *Use of effective breathing techniques  Outcome: Progressing Towards Goal     Problem: Patient Education: Go to Patient Education Activity  Goal: Patient/Family Education  Outcome: Progressing Towards Goal     Problem: Airway Clearance - Ineffective  Goal: Achieve or maintain patent airway  Outcome: Progressing Towards Goal     Problem: Gas Exchange - Impaired  Goal: Absence of hypoxia  Outcome: Progressing Towards Goal  Goal: Promote optimal lung function  Outcome: Progressing Towards Goal     Problem: Breathing Pattern - Ineffective  Goal: Ability to achieve and maintain a regular respiratory rate  Outcome: Progressing Towards Goal     Problem:  Body Temperature -  Risk of, Imbalanced  Goal: Ability to maintain a body temperature within defined limits  Outcome: Progressing Towards Goal  Goal: Will regain or maintain usual level of consciousness  Outcome: Progressing Towards Goal  Goal: Complications related to the disease process, condition or treatment will be avoided or minimized  Outcome: Progressing Towards Goal     Problem: Isolation Precautions - Risk of Spread of Infection  Goal: Prevent transmission of infectious organism to others  Outcome: Progressing Towards Goal     Problem: Nutrition Deficits  Goal: Optimize nutrtional status  Outcome: Progressing Towards Goal     Problem: Risk for Fluid Volume Deficit  Goal: Maintain normal heart rhythm  Outcome: Progressing Towards Goal  Goal: Maintain absence of muscle cramping  Outcome: Progressing Towards Goal  Goal: Maintain normal serum potassium, sodium, calcium, phosphorus, and pH  Outcome: Progressing Towards Goal     Problem: Loneliness or Risk for Loneliness  Goal: Demonstrate positive use of time alone when socialization is not possible  Outcome: Progressing Towards Goal     Problem: Fatigue  Goal: Verbalize increase energy and improved vitality  Outcome: Progressing Towards Goal     Problem: Patient Education: Go to Patient Education Activity  Goal: Patient/Family Education  Outcome: Progressing Towards Goal     Problem: Patient Education: Go to Patient Education Activity  Goal: Patient/Family Education  Outcome: Progressing Towards Goal     Problem: Patient Education: Go to Patient Education Activity  Goal: Patient/Family Education  Outcome: Progressing Towards Goal

## 2020-09-01 NOTE — PROGRESS NOTES
9/1/2020; 09:00 -   CM was able to reach patient's spouse Naveen Human: [phone]). Spouse clarified that all of patient's children and the granddaughter are allowed to receive information. Names include: Frantz Jett, Brook Diehl \"Guadalupe\" Jeffrey Pina. Emergency contact information updated appropriately. Patient's spouse requested that CM contact the granddaughter to update on conversation. CM contacted Theador Copas ([phone]) and updated the granddaughter appropriately. CRM: Martell Buerger, MPH, [facility]; Z: [phone]      TRANSITIONS OF CARE PLAN:   1. DESTINATION: TBD dependent on progression  2. TRANSPORT: TBD: family vs BLS Medicaid  3. ADDITIONAL SUPPORT: [employer], spouse, and daughter Lindy Luna) lives close by  4. DME: walker, bedside commode, glucometer, bp cuff  5. HOME HEALTH: TBD - has hx with [facility]  6. CODE STATUS/AMD STATUS: Full Code; ACP completed with patient's spouse with request for attending to confirm  7. FOLLOW UP APPOINTMENTS: PCP  8. STILL NEEDS: Ongoing O2 Support with weaning (currently on 6L via NC), Remdesivir, Possibly Convalescent Plasma, Will need PT and OT Consults     Reason for Admission:   Respiratory Failure due to COVID                   RUR Score:          11%           Plan for utilizing home health:      TBD - has hx with [facility]    PCP: First and Last name:  Dr. Mc Arias   [facility] of Practice: [facility]   Are you a current patient: Yes/No: yes   Approximate date of last visit: 1 month   Can you participate in a virtual visit with your PCP: no                    Current Advanced Directive/Advance Care Plan: Full Code; ACP completed with spouse                         Transition of Care Plan:  CM spoke with patient's spouse by phone. Patient has a hx of stroke with left sided hemiplegia. Patient lives with spouse in a double wide, single story trailer with no steps. Patient has care aids via [employer] M-F 9-1. Patient has hx of HH with [facility] and hx of rehab at [facility]. Patient is assisted with most ADLs and is starting to have difficulty feeding self due to tremors. Patient nor spouse drive, so family or Medicaid transport the patient. Pharmacy preference is local [facility]. Family is supportive, but most live out of the area - daughter Sonu Scott lives closest and assists as needed. Spouse identified that patient is starting to have memory issues: mostly time, historic events, and short term memory. Disposition is TBD dependent on progression. Care Management Interventions  PCP Verified by CM: Yes(last seem by Dr. Sun Cartwright 1 month ago)  Palliative Care Criteria Met (RRAT>21 & CHF Dx)?: No  Mode of Transport at Discharge:  Other (see comment)(TBD: BLS vs Family)  Transition of Care Consult (CM Consult): Discharge Planning  MyChart Signup: No  Discharge Durable Medical Equipment: No(has: walker, bedside commode, glucometer, bp cuff)  Health Maintenance Reviewed: Yes(cm spoke with patient's spouse via phone)  Physical Therapy Consult: No  Occupational Therapy Consult: No  Speech Therapy Consult: Yes  Current Support Network: Own Home, Family Lives Nearby, Has Personal Caregivers, Lives with Spouse(has care aids via [employer] M-F 9-1)  Confirm Follow Up Transport: Other (see comment)(Assisted in most ADLs; family or Medicaid transport patient)  [address] Provided?: No  Discharge Location  Discharge Placement: Unable to determine at this time(lives with spouse in a double wide trailer, no exterior steps)  CRM: Solo Llanes, MPH, [address] [place] [address]; Z: [phone]

## 2020-09-01 NOTE — ACP (ADVANCE CARE PLANNING)
Advance Care Planning     Advance Care Planning Activator (Inpatient)  Conversation Note      Date of ACP Conversation: 09/01/20     Conversation Conducted with:   Patient with Decision Making Capacity   Healthcare Decision Maker: Next of Kin by law (only applies in absence of above) (name) Spouse, Emma Fisher    ACP Activator: Antonino 78Evaristo makes decisions on behalf of the incapacitated patient: Decision Maker is asked to consider and make decisions based on patient values, known preferences, or best interests. Health Care Decision Maker:    Current Designated Health Care Decision Maker:   Primary Decision Maker: Jocelin Finley - [phone]    Secondary Decision Maker: Gabe Cronin \"Guadalupe\" - Daughter - [phone]    Secondary Decision Maker: Jocelyne Mayo - Daughter - [phone]    Care Preferences    Ventilation: \"If you were in your present state of health and suddenly became very ill and were unable to breathe on your own, what would your preference be about the use of a ventilator (breathing machine) if it were available to you? \"  YES    \"If your health worsens and it becomes clear that your chance of recovery is unlikely, what would your preference be about the use of a ventilator (breathing machine) if it were available to you? \" DEFERRED FOR NOW TO BE ABLE TO DISCUSS WITH PATIENT'S CHILDREN    Resuscitation  \"CPR works best to restart the heart when there is a sudden event, like a heart attack, in someone who is otherwise healthy. Unfortunately, CPR does not typically restart the heart for people who have serious health conditions or who are very sick. \"    \"In the event your heart stopped as a result of an underlying serious health condition, would you want attempts to be made to restart your heart?  YES    NOTE: If the patient has a valid advance directive AND now provides care preference(s) that are inconsistent with that prior directive, advise the patient to consider either: creating a new advance directive that complies with state-specific requirements; or, if that is not possible, orally revoking that prior directive in accordance with state-specific requirements, which must be documented in the EHR. [] Yes  [x] No   Educated Patient / Nelli Shahid regarding differences between Advance Directives and portable DNR orders.     Length of ACP Conversation in minutes:      Conversation Outcomes:  [x] ACP discussion completed  [] Existing advance directive reviewed with patient; no changes to patient's previously recorded wishes     [] New Advance Directive completed   [] Portable Do Not Resuscitate prepared for Provider review and signature  [] POLST/POST/MOLST/MOST prepared for Provider review and signature      Follow-up plan:    [] Schedule follow-up conversation to continue planning  [x] Referred individual to Provider for additional questions/concerns   [] Advised patient/agent/surrogate to review completed ACP document and update if needed with changes in condition, patient preferences or care setting     [] This note routed to one or more involved healthcare providers      CM to request for attending to discuss ACP with patient and potential AMD.

## 2020-09-01 NOTE — PROGRESS NOTES
1930: Bedside and Verbal shift change report given to [address] [place] [address],  [address] (oncoming nurse) by Fabiola Hermosillo and Charissa Owens RN (offgoing nurse). Report included the following information SBAR, Kardex, ED Summary, Intake/Output, MAR, Recent Results and Cardiac Rhythm NSR. Problem: Gas Exchange - Impaired  Goal: Absence of hypoxia  Outcome: Progressing Towards Goal  Goal: Promote optimal lung function  Outcome: Progressing Towards Goal  Pt is able to productively cough up sputum on his own. Pt has also decreased oxygen demands. Will continue to monitor and wean as necessary. Problem: Body Temperature -  Risk of, Imbalanced  Goal: Ability to maintain a body temperature within defined limits  Outcome: Progressing Towards Goal  Goal: Will regain or maintain usual level of consciousness  Outcome: Progressing Towards Goal   Pts appears more lively and alert today. Pt had the ability to feed himself breakfast this morning.

## 2020-09-01 NOTE — PROGRESS NOTES
1430: Speech evaluated pt started him on a pureed thin liquid diet. Pills crushed in applesauce. 1900: Pt started on remdisivir. 1930: Bedside and Verbal shift change report given to Hanna RN (oncoming nurse) by Chauncey Yu and Timi Fontanez RN (offgoing nurse). Report included the following information SBAR, Kardex, ED Summary, Intake/Output, MAR, Recent Results, and Cardiac Rhythm NSR . Problem: Breathing Pattern - Ineffective  Goal: *Absence of hypoxia  Outcome: Progressing Towards Goal     Problem: Gas Exchange - Impaired  Goal: Promote optimal lung function  Outcome: Progressing Towards Goal     Problem: Breathing Pattern - Ineffective  Goal: Ability to achieve and maintain a regular respiratory rate  Outcome: Progressing Towards Goal     Problem:  Body Temperature -  Risk of, Imbalanced  Goal: Ability to maintain a body temperature within defined limits  Outcome: Progressing Towards Goal     Problem: Isolation Precautions - Risk of Spread of Infection  Goal: Prevent transmission of infectious organism to others  Outcome: Progressing Towards Goal

## 2020-09-02 LAB
ALBUMIN SERPL-MCNC: 2.5 G/DL (ref 3.5–5)
ALBUMIN/GLOB SERPL: 0.6 {RATIO} (ref 1.1–2.2)
ALP SERPL-CCNC: 57 U/L (ref 45–117)
ALT SERPL-CCNC: 30 U/L (ref 12–78)
ANION GAP SERPL CALC-SCNC: 6 MMOL/L (ref 5–15)
AST SERPL-CCNC: 21 U/L (ref 15–37)
BILIRUB SERPL-MCNC: 0.5 MG/DL (ref 0.2–1)
BUN SERPL-MCNC: 27 MG/DL (ref 6–20)
BUN/CREAT SERPL: 27 (ref 12–20)
CALCIUM SERPL-MCNC: 8.3 MG/DL (ref 8.5–10.1)
CHLORIDE SERPL-SCNC: 106 MMOL/L (ref 97–108)
CO2 SERPL-SCNC: 27 MMOL/L (ref 21–32)
CREAT SERPL-MCNC: 1.01 MG/DL (ref 0.7–1.3)
CRP SERPL-MCNC: 1.52 MG/DL (ref 0–0.6)
D DIMER PPP FEU-MCNC: 1.81 MG/L FEU (ref 0–0.65)
ERYTHROCYTE [DISTWIDTH] IN BLOOD BY AUTOMATED COUNT: 12.4 % (ref 11.5–14.5)
FERRITIN SERPL-MCNC: 280 NG/ML (ref 26–388)
GLOBULIN SER CALC-MCNC: 4.1 G/DL (ref 2–4)
GLUCOSE BLD STRIP.AUTO-MCNC: 142 MG/DL (ref 65–100)
GLUCOSE BLD STRIP.AUTO-MCNC: 151 MG/DL (ref 65–100)
GLUCOSE BLD STRIP.AUTO-MCNC: 152 MG/DL (ref 65–100)
GLUCOSE BLD STRIP.AUTO-MCNC: 251 MG/DL (ref 65–100)
GLUCOSE SERPL-MCNC: 161 MG/DL (ref 65–100)
HCT VFR BLD AUTO: 42.1 % (ref 36.6–50.3)
HGB BLD-MCNC: 13.9 G/DL (ref 12.1–17)
LACTATE SERPL-SCNC: 2 MMOL/L (ref 0.4–2)
LDH SERPL L TO P-CCNC: 512 U/L (ref 85–241)
MAGNESIUM SERPL-MCNC: 2.2 MG/DL (ref 1.6–2.4)
MCH RBC QN AUTO: 33.1 PG (ref 26–34)
MCHC RBC AUTO-ENTMCNC: 33 G/DL (ref 30–36.5)
MCV RBC AUTO: 100.2 FL (ref 80–99)
NRBC # BLD: 0.02 K/UL (ref 0–0.01)
NRBC BLD-RTO: 0.2 PER 100 WBC
PLATELET # BLD AUTO: 290 K/UL (ref 150–400)
PMV BLD AUTO: 10.2 FL (ref 8.9–12.9)
POTASSIUM SERPL-SCNC: 3.9 MMOL/L (ref 3.5–5.1)
PROT SERPL-MCNC: 6.6 G/DL (ref 6.4–8.2)
RBC # BLD AUTO: 4.2 M/UL (ref 4.1–5.7)
SERVICE CMNT-IMP: ABNORMAL
SODIUM SERPL-SCNC: 139 MMOL/L (ref 136–145)
WBC # BLD AUTO: 9.9 K/UL (ref 4.1–11.1)

## 2020-09-02 PROCEDURE — 80053 COMPREHEN METABOLIC PANEL: CPT

## 2020-09-02 PROCEDURE — 77010033678 HC OXYGEN DAILY

## 2020-09-02 PROCEDURE — 74011000250 HC RX REV CODE- 250: Performed by: INTERNAL MEDICINE

## 2020-09-02 PROCEDURE — 83605 ASSAY OF LACTIC ACID: CPT

## 2020-09-02 PROCEDURE — 74011250637 HC RX REV CODE- 250/637: Performed by: INTERNAL MEDICINE

## 2020-09-02 PROCEDURE — 82962 GLUCOSE BLOOD TEST: CPT

## 2020-09-02 PROCEDURE — 74011250637 HC RX REV CODE- 250/637: Performed by: NURSE PRACTITIONER

## 2020-09-02 PROCEDURE — 74011000258 HC RX REV CODE- 258: Performed by: INTERNAL MEDICINE

## 2020-09-02 PROCEDURE — 97530 THERAPEUTIC ACTIVITIES: CPT

## 2020-09-02 PROCEDURE — 74011250636 HC RX REV CODE- 250/636: Performed by: HOSPITALIST

## 2020-09-02 PROCEDURE — 74011250637 HC RX REV CODE- 250/637: Performed by: HOSPITALIST

## 2020-09-02 PROCEDURE — 74011636637 HC RX REV CODE- 636/637: Performed by: HOSPITALIST

## 2020-09-02 PROCEDURE — 83615 LACTATE (LD) (LDH) ENZYME: CPT

## 2020-09-02 PROCEDURE — 36415 COLL VENOUS BLD VENIPUNCTURE: CPT

## 2020-09-02 PROCEDURE — 65660000001 HC RM ICU INTERMED STEPDOWN

## 2020-09-02 PROCEDURE — 74011250636 HC RX REV CODE- 250/636: Performed by: INTERNAL MEDICINE

## 2020-09-02 PROCEDURE — 97165 OT EVAL LOW COMPLEX 30 MIN: CPT

## 2020-09-02 PROCEDURE — 82728 ASSAY OF FERRITIN: CPT

## 2020-09-02 PROCEDURE — 83735 ASSAY OF MAGNESIUM: CPT

## 2020-09-02 PROCEDURE — 85027 COMPLETE CBC AUTOMATED: CPT

## 2020-09-02 PROCEDURE — 97162 PT EVAL MOD COMPLEX 30 MIN: CPT

## 2020-09-02 PROCEDURE — 85379 FIBRIN DEGRADATION QUANT: CPT

## 2020-09-02 PROCEDURE — 86140 C-REACTIVE PROTEIN: CPT

## 2020-09-02 PROCEDURE — 94640 AIRWAY INHALATION TREATMENT: CPT

## 2020-09-02 RX ADMIN — OXYCODONE HYDROCHLORIDE AND ACETAMINOPHEN 500 MG: 500 TABLET ORAL at 17:50

## 2020-09-02 RX ADMIN — ENOXAPARIN SODIUM 40 MG: 40 INJECTION SUBCUTANEOUS at 21:23

## 2020-09-02 RX ADMIN — ALBUTEROL SULFATE 2 PUFF: 90 AEROSOL, METERED RESPIRATORY (INHALATION) at 03:35

## 2020-09-02 RX ADMIN — NYSTATIN 500000 UNITS: 100000 SUSPENSION ORAL at 17:50

## 2020-09-02 RX ADMIN — INSULIN LISPRO 2 UNITS: 100 INJECTION, SOLUTION INTRAVENOUS; SUBCUTANEOUS at 17:50

## 2020-09-02 RX ADMIN — INSULIN LISPRO 2 UNITS: 100 INJECTION, SOLUTION INTRAVENOUS; SUBCUTANEOUS at 09:20

## 2020-09-02 RX ADMIN — ALBUTEROL SULFATE 2 PUFF: 90 AEROSOL, METERED RESPIRATORY (INHALATION) at 21:35

## 2020-09-02 RX ADMIN — INSULIN LISPRO 2 UNITS: 100 INJECTION, SOLUTION INTRAVENOUS; SUBCUTANEOUS at 12:09

## 2020-09-02 RX ADMIN — ALBUTEROL SULFATE 2 PUFF: 90 AEROSOL, METERED RESPIRATORY (INHALATION) at 16:59

## 2020-09-02 RX ADMIN — NYSTATIN 500000 UNITS: 100000 SUSPENSION ORAL at 21:23

## 2020-09-02 RX ADMIN — OXYCODONE HYDROCHLORIDE AND ACETAMINOPHEN 500 MG: 500 TABLET ORAL at 09:55

## 2020-09-02 RX ADMIN — ALBUTEROL SULFATE 2 PUFF: 90 AEROSOL, METERED RESPIRATORY (INHALATION) at 07:14

## 2020-09-02 RX ADMIN — PRAVASTATIN SODIUM 40 MG: 40 TABLET ORAL at 21:23

## 2020-09-02 RX ADMIN — SODIUM CHLORIDE 60 ML/HR: 900 INJECTION, SOLUTION INTRAVENOUS at 09:57

## 2020-09-02 RX ADMIN — REMDESIVIR 100 MG: 5 INJECTION INTRAVENOUS at 19:29

## 2020-09-02 RX ADMIN — CARVEDILOL 25 MG: 12.5 TABLET, FILM COATED ORAL at 17:50

## 2020-09-02 RX ADMIN — Medication 10 ML: at 21:24

## 2020-09-02 RX ADMIN — NYSTATIN 500000 UNITS: 100000 SUSPENSION ORAL at 09:55

## 2020-09-02 RX ADMIN — ENOXAPARIN SODIUM 40 MG: 40 INJECTION SUBCUTANEOUS at 09:56

## 2020-09-02 RX ADMIN — Medication 10 ML: at 07:05

## 2020-09-02 RX ADMIN — INSULIN LISPRO 3 UNITS: 100 INJECTION, SOLUTION INTRAVENOUS; SUBCUTANEOUS at 21:23

## 2020-09-02 RX ADMIN — ALBUTEROL SULFATE 2 PUFF: 90 AEROSOL, METERED RESPIRATORY (INHALATION) at 11:38

## 2020-09-02 RX ADMIN — NYSTATIN 500000 UNITS: 100000 SUSPENSION ORAL at 12:09

## 2020-09-02 RX ADMIN — DEXAMETHASONE SODIUM PHOSPHATE 6 MG: 4 INJECTION, SOLUTION INTRA-ARTICULAR; INTRALESIONAL; INTRAMUSCULAR; INTRAVENOUS; SOFT TISSUE at 17:50

## 2020-09-02 RX ADMIN — Medication 10 ML: at 14:04

## 2020-09-02 RX ADMIN — ZINC SULFATE 220 MG (50 MG) CAPSULE 1 CAPSULE: CAPSULE at 09:56

## 2020-09-02 RX ADMIN — CARVEDILOL 25 MG: 12.5 TABLET, FILM COATED ORAL at 09:56

## 2020-09-02 NOTE — PROGRESS NOTES
TRANSITION OF CARE  RUR--12%  Disposition--TBD. PT and OT evaluations are pending. Covid- per hospitalist note, patient was tested Positive at [facility] before transfer here. Transport--TBD. Patient continues medically unstable with treatment for covid 19 proceeding. CM received conference call from wife Shola Lopez and grand daughter Magdiel Reyna ([phone]). Wife reports  being unable to manage use of cell phone. They asked that grand daughter Vish Gr be the primary family contact. Vish Gr stated that she can readily add wife to a conference call if contacted. CM placed FYI to Physician and [facility] Staff with this request.    They also asked for daily phone contact with patient with time flexible. CM gave update to staff nurse who will call grand daughter today for patient phone contact as requested.

## 2020-09-02 NOTE — PROGRESS NOTES
Problem: Self Care Deficits Care Plan (Adult)  Goal: *Acute Goals and Plan of Care (Insert Text)  Description:   FUNCTIONAL STATUS PRIOR TO ADMISSION: Patient provided PLOF consistent with case management notes/ chart review. Patient was ambulatory with a RW PTA and received assistance with most ADLs. Patient reports he was ambulatory to the bathroom for toileting at home. HOME SUPPORT: Per case management note, patient lives with spouse in a double wide, single story trailer with no steps. Patient has care aids via [employer] M-F 9-1. Patient has hx of HH with [facility] and hx of rehab at [facility]. Occupational Therapy Goals  Initiated 9/2/2020  1. Patient will perform grooming with supervision/set-up within 7 day(s). 2.  Patient will perform bathing with moderate assistance  within 7 day(s). 3.  Patient will perform self-feeding with supervision/set-up within 7 day(s). 4.  Patient will perform toilet transfers with minimal assistance within 7 day(s). 5.  Patient will perform all aspects of toileting with moderate assistance  within 7 day(s). 6.  Patient will participate in upper extremity therapeutic exercise/activities with supervision/set-up for 10 minutes within 7 day(s). Outcome: Not Met     OCCUPATIONAL THERAPY EVALUATION  Patient: Barbara Schafer (80 y.o. male)  Date: 9/2/2020  Primary Diagnosis: Respiratory failure ([facility] [address] [J96.90]        Precautions: fall       ASSESSMENT  Based on the objective data described below, the patient presents with general weakness, L hemiparesis (affecting LE>UE, at baseline per chart d/t prior CVA, LUE only AROM is partial elbow flexion/ extension in gravity eliminated plane), LUE hypertonicity (hand/wrist/ elbow flexion + shoulder adduction, increasing with activity), impaired sitting + standing balance, impaired cognition, and impaired mobility s/p admission for respiratory failure d/t covid-19 PNA.   Patient alert, Ox1, and followed all simple commands. Per patient and chart review, he was ambulatory with RW at home PTA despite baseline hemiparesis but required assistance with most ADLs. In OT eval today, patient performed sit-stand + side stepped to L with moderate assistance x2. Patient is below functional baseline and requires increased assistance. Recommend SNF at d/c. Current Level of Function Impacting Discharge (ADLs/self-care): minimum to maximum assistance UB ADLs, total assistance LB ADLs    Functional Outcome Measure: The patient scored 10/100 on the Barthel Index outcome measure which is indicative of ~90% impairment in functional performance. Patient will benefit from skilled therapy intervention to address the above noted impairments. PLAN :  Recommendations and Planned Interventions: self care training, functional mobility training, therapeutic exercise, balance training, therapeutic activities, endurance activities, neuromuscular re-education, patient education, home safety training, and family training/education    Frequency/Duration: Patient will be followed by occupational therapy 3 times a week to address goals. Recommendation for discharge: (in order for the patient to meet his/her long term goals)  Therapy up to 5 days/week in SNF setting    This discharge recommendation:  Has not yet been discussed the attending provider and/or case management       SUBJECTIVE:   Patient stated I walk at home.     OBJECTIVE DATA SUMMARY:   HISTORY:   Past Medical History:   Diagnosis Date    Acid reflux     Anemia     Diabetes ([facility] [address]     GERD (gastroesophageal reflux disease)     Hypertension     Stroke [facility])      Past Surgical History:   Procedure Laterality Date    HX COLONOSCOPY         Expanded or extensive additional review of patient history:     Home Situation  Home Environment: Private residence  Living Alone: No  Support Systems: Family member(s)  Current DME Used/Available at Home: chaparro Morales dominance: Right    EXAMINATION OF PERFORMANCE DEFICITS:  Cognitive/Behavioral Status:  Neurologic State: Alert  Orientation Level: Oriented to person;Disoriented to place; Disoriented to situation;Disoriented to time  Cognition: Follows commands             Skin: visible skin appears intact    Edema: none noted    Hearing: WDL    Vision/Perceptual:    Tracking: Unable to track left to  midline; Unable to track right of midline              Visual Fields: (able to detect stimuli in all fields)                 Range of Motion:    AROM: Grossly decreased, non-functional(L side)  PROM: Generally decreased, functional(LUE)                      Strength:    Strength: Grossly decreased, non-functional(L side)                Coordination:  Coordination: Grossly decreased, non-functional(L side)  Fine Motor Skills-Upper: Left Impaired;Right Impaired    Gross Motor Skills-Upper: Left Impaired;Right Impaired    Tone & Sensation:    Tone: Abnormal(increased LUE)  Sensation: Intact(per patient report)                      Balance:  Sitting: Impaired  Sitting - Static: Good (unsupported)  Sitting - Dynamic: Fair (occasional)  Standing: Impaired; With support  Standing - Static: Poor;Constant support  Standing - Dynamic : Poor;Constant support    Functional Mobility and Transfers for ADLs:  Bed Mobility:  Supine to Sit: Minimum assistance  Sit to Supine: Moderate assistance  Scooting: Minimum assistance;Maximum assistance(Caprice seated EOB, maxA x2 supine )    Transfers:  Sit to Stand: Moderate assistance;Assist x2  Stand to Sit: Moderate assistance;Assist x2    ADL Assessment:  Feeding: Minimum assistance(inferred for bimanual tasks)    Oral Facial Hygiene/Grooming: Minimum assistance(inferred)    Bathing: Maximum assistance(inferred for L hemiparesis, UB and LB access, mobility)    Upper Body Dressing: Maximum assistance(inferred for L hemiparesis, UB and LB access, mobility)    Lower Body Dressing:  Total assistance(inferred for L hemiparesis, UB and LB access, mobility)    Toileting: Total assistance(inferred for L hemiparesis, UB and LB access, mobility)         Functional Measure:  Barthel Index:    Bathin  Bladder: 0  Bowels: 0  Groomin  Dressin  Feedin  Mobility: 0  Stairs: 0  Toilet Use: 0  Transfer (Bed to Chair and Back): 5  Total: 10/100        The Barthel ADL Index: Guidelines  1. The index should be used as a record of what a patient does, not as a record of what a patient could do. 2. The main aim is to establish degree of independence from any help, physical or verbal, however minor and for whatever reason. 3. The need for supervision renders the patient not independent. 4. A patient's performance should be established using the best available evidence. Asking the patient, friends/relatives and nurses are the usual sources, but direct observation and common sense are also important. However direct testing is not needed. 5. Usually the patient's performance over the preceding 24-48 hours is important, but occasionally longer periods will be relevant. 6. Middle categories imply that the patient supplies over 50 per cent of the effort. 7. Use of aids to be independent is allowed. Anurag Valadez., Barthel, D.W. (4621). Functional evaluation: the Barthel Index. [address] [facility] (14)2. SHANAE Rowe, Jessica Gilliam, Denise Guerra, [place], [address] [place] [address] (). Measuring the change indisability after inpatient rehabilitation; comparison of the responsiveness of the Barthel Index and Functional Routt Measure. Journal of Neurology, Neurosurgery, and Psychiatry, 66(4), 429-974. Lendon Soulier, N.J.A, ROSE Santos, & Luke Guy M.A. (2004.) Assessment of post-stroke quality of life in cost-effectiveness studies: The usefulness of the Barthel Index and the EuroQoL-5D.  Quality of Life Research, 15, 371-18         Occupational Therapy Evaluation Charge Determination   History Examination Decision-Making LOW Complexity : Brief history review  MEDIUM Complexity : 3-5 performance deficits relating to physical, cognitive , or psychosocial skils that result in activity limitations and / or participation restrictions MEDIUM Complexity : Patient may present with comorbidities that affect occupational performnce. Miniml to moderate modification of tasks or assistance (eg, physical or verbal ) with assesment(s) is necessary to enable patient to complete evaluation       Based on the above components, the patient evaluation is determined to be of the following complexity level: LOW   Pain Rating:  Patient did not report pain    Activity Tolerance:   VSS, fair    After treatment patient left in no apparent distress:    Supine in bed and Call bell within reach    COMMUNICATION/EDUCATION:   The patients plan of care was discussed with: Physical therapist and Registered nurse. Home safety education was provided and the patient/caregiver indicated understanding., Patient/family have participated as able in goal setting and plan of care. , and Patient/family agree to work toward stated goals and plan of care. This patients plan of care is appropriate for delegation to YEHUDA.     Thank you for this referral.  Anthony Connolly OT  Time Calculation: 31 mins

## 2020-09-02 NOTE — PROGRESS NOTES
Problem: Falls - Risk of  Goal: *Absence of Falls  Description: Document Shannan Bridges Fall Risk and appropriate interventions in the flowsheet. Outcome: Progressing Towards Goal  Note: Fall Risk Interventions:       Mentation Interventions: Reorient patient, Adequate sleep, hydration, pain control    Medication Interventions: Evaluate medications/consider consulting pharmacy    Elimination Interventions: Call light in reach, Patient to call for help with toileting needs              Problem: Pressure Injury - Risk of  Goal: *Prevention of pressure injury  Description: Document William Scale and appropriate interventions in the flowsheet. Outcome: Progressing Towards Goal  Note: Pressure Injury Interventions:  Sensory Interventions: Assess changes in LOC, Keep linens dry and wrinkle-free, Minimize linen layers    Moisture Interventions: Absorbent underpads    Activity Interventions: Pressure redistribution bed/mattress(bed type)    Mobility Interventions: HOB 30 degrees or less    Nutrition Interventions: Offer support with meals,snacks and hydration    Friction and Shear Interventions: HOB 30 degrees or less, Minimize layers                Problem: Breathing Pattern - Ineffective  Goal: *Absence of hypoxia  Outcome: Progressing Towards Goal     Problem: Gas Exchange - Impaired  Goal: Absence of hypoxia  Outcome: Progressing Towards Goal   Pt on 5L NC sats maintained above 90%. Pt being weaned as tolerated. Pt has poor intake. Will continue to monitor and educate. Bedside shift change report given to [address] [place] [address],  [phone] (oncoming nurse) by Paresh Malone RN (offgoing nurse). Report included the following information SBAR and Kardex.

## 2020-09-02 NOTE — PROGRESS NOTES
ID Progress Note  2020    Subjective:     Day 3 remdesivir--tolerating it well    Objective:     Antibiotics:  1. None       Vitals:   Visit Vitals  /74 (BP 1 Location: Left arm, BP Patient Position: At rest)   Pulse 66   Temp 98.3 °F (36.8 °C)   Resp 22   Ht 5' 8\" (1.727 m)   Wt 92 kg (202 lb 13.2 oz)   SpO2 96%   BMI 30.84 kg/m²        Tmax:  Temp (24hrs), Av °F (36.7 °C), Min:97.6 °F (36.4 °C), Max:98.3 °F (36.8 °C)      Exam:  Deferred     Labs:      Recent Labs     20  1445 20  0625 20  0355 20  1739   WBC 9.9 9.9 15.4* 14.9*   HGB 13.9 14.7 13.9 14.2    335 337 354   BUN 27*  --  31* 30*   CREA 1.01  --  0.75 0.72   AP 57 53 50 53   TBILI 0.5 0.6 0.6 0.6       Cultures:     No results found for: SDES  No results found for: CULT    Radiology:     Line/Insert Date:           Assessment:     1. COVID 19  2. Hypoxia  3. Debility     Objective:     1. Continue current therapy  2.  Consider convalescent serum    Sadia Araujo MD

## 2020-09-02 NOTE — PROGRESS NOTES
[facility] Adult  Hospitalist Group                                                                                          Hospitalist Progress Note  Nataly Abraham MD  Answering service: [phone] from in house phone        Date of Service:  2020  NAME:  Theresa De La Torre  :    MRN:  [id number]      Admission Summary:     Theresa De La Torre is a 49-year-old male with a past medical history that includes HTN, ischemic stroke with residual left hemiplegia, dysphagia, T2DM and GERD who was transferred to [facility] from [address] [place] [address] for increasing oxygen requirement. He arrived here at approximately 4 PM.      Patient originally presented at [address] [place] [address] on  with several days of fever, cough and shortness of breath. He tested positive for COVID and was started on ceftriaxone and azithromycin for RML CAP. There was concern for aspiration so he was started on Zosyn and ceftriaxone was discontinued. He never did receive Remdesivir. On  IV Diflucan was initiated for COVID pneumonitis. Patient was weaned from CPAP to 50% Ventimask and maintained saturations greater than 95% without worsening shortness of breath. He is transferred out of the ICU on high flow nasal cannula and is now resting comfortably on 6 L NC.     Interval history / Subjective:     Patient is a poor historian, he said he feels the same, has on and off cough, no chest pain     Assessment & Plan:     Acute hypoxic respiratory failure due to COVID, CAP POA  -High flow transitioned to mid flow, improving, now on 5 l/m, monitor pulse ox  -on iv decadron, remdesivir  -CRP still 4.3; Lactic 2.2; PT 1.2, coags otherwise WNL  -Had total 9 days Zosyn and 5 days azithromycin PTA.  -COVID was positive at [facility]  -Droplet precautions  -On prn Albuterol  -ID on board     Pneumonia due to COVID 19 infection  -Had total 9 days Zosyn and 5 days azithromycin PTA.  -continue vitamin c, zinc, decadron, oxygen support,   - ID consulted started on remdesivir  -Elevated d-dimer  -CT chest 8/26 no PE  -On Lovenox 40 mg every 12 hours     Leukocytosis  -Likely related to prolonged steroid use, now resolved  -Antibiotics completed  -improved and wbc is normal     HTN  -BP fairly controlled, Continue home carvedilol 25 mg twice daily  -Hold home amlodipine and lisinopril for now and monitor VS     T2DM with hyperglycemia  -On Decadron  - A1c 6.3  -continue sliding scale and monitor finger stick glucose     Hx of CVA with left hemiplegia and dysphagia  -CT 8/22 old ischemic changes noted, no acute changes  -Usually walks w assist of a rolling walker  -seen by speech, on pureed diet  -PT/OT     Ambulate with walker and cane at base line       Code status: Full Code  DVT prophylaxis: Lovenox    Care Plan discussed with: Patient/Family and Nurse  Anticipated Disposition: TBD  Anticipated Discharge: 24 hours to 48 hours     I called his son, Ashely Painter , updated and answered questions [id number] Problems  Never Reviewed          Codes Class Noted POA    Respiratory failure ([facility] [address] ICD-10-CM: J96.90  ICD-9-CM: 518.81  8/30/2020 Unknown                Vital Signs:    Last 24hrs VS reviewed since prior progress note. Most recent are:  Visit Vitals  /55 (BP 1 Location: Left arm, BP Patient Position: At rest)   Pulse 71   Temp 98.2 °F (36.8 °C)   Resp 20   Ht 5' 8\" (1.727 m)   Wt 92 kg (202 lb 13.2 oz)   SpO2 96%   BMI 30.84 kg/m²         Intake/Output Summary (Last 24 hours) at 9/2/2020 1203  Last data filed at 9/2/2020 7292  Gross per 24 hour   Intake 1310 ml   Output 750 ml   Net 560 ml        Physical Examination:             Constitutional:  No acute distress, cooperative, pleasant    ENT:  Oral mucosa moist, oropharynx benign. Resp:  Decrease bronchial breath sound bilaterally. No wheezing/rhonchi/rales.  No accessory muscle use   CV:  Regular rhythm, normal rate, no murmurs, gallops, rubs    GI: Soft, non distended, non tender. normoactive bowel sounds, no hepatosplenomegaly     Musculoskeletal:  No edema     Neurologic:  Conscious and alert, oriented to place and person, motor RE 4-5/5, LLE 3/5, LUE 2/5     Skin:  Dry but no skin rash       Data Review:    Review and/or order of clinical lab test  Review and/or order of tests in the radiology section of CPT  Review and/or order of tests in the medicine section of CPT      Labs:     Recent Labs     09/01/20  0625 08/31/20  0355   WBC 9.9 15.4*   HGB 14.7 13.9   HCT 46.7 41.7    337     Recent Labs     08/31/20  0355 08/30/20  1739    142   K 4.0 4.1    106   CO2 28 30   BUN 31* 30*   CREA 0.75 0.72   * 215*   CA 8.8 8.6   MG 2.7* 2.6*     Recent Labs     09/01/20  0625 08/31/20  0355 08/30/20  1739   ALT 33 33 38   AP 53 50 53   TBILI 0.6 0.6 0.6   TP 6.9 6.9 6.6   ALB 2.3* 2.5* 2.6*   GLOB 4.6* 4.4* 4.0     Recent Labs     09/01/20  1020 08/31/20  0355 08/30/20  1739   INR 1.2* 1.2* 1.2*   PTP 12.4* 12.4* 12.3*   APTT 28.4 27.3 26.1      No results for input(s): FE, TIBC, PSAT, FERR in the last 72 hours. No results found for: FOL, RBCF   No results for input(s): PH, PCO2, PO2 in the last 72 hours. No results for input(s): CPK, CKNDX, TROIQ in the last 72 hours.     No lab exists for component: CPKMB  No results found for: CHOL, CHOLX, CHLST, CHOLV, HDL, HDLP, LDL, LDLC, DLDLP, TGLX, TRIGL, TRIGP, CHHD, CHHDX  Lab Results   Component Value Date/Time    Glucose (POC) 151 (H) 09/02/2020 11:41 AM    Glucose (POC) 152 (H) 09/02/2020 08:18 AM    Glucose (POC) 257 (H) 09/01/2020 09:50 PM    Glucose (POC) 130 (H) 09/01/2020 06:00 PM    Glucose (POC) 159 (H) 09/01/2020 12:51 PM     No results found for: COLOR, APPRN, SPGRU, REFSG, DINAH, PROTU, GLUCU, KETU, BILU, UROU, MAGALI, LEUKU, GLUKE, EPSU, BACTU, WBCU, RBCU, CASTS, UCRY      Medications Reviewed:     Current Facility-Administered Medications   Medication Dose Route Frequency    insulin lispro (HUMALOG) injection   SubCUTAneous AC&HS    nystatin (MYCOSTATIN) 100,000 unit/mL oral suspension 500,000 Units  500,000 Units Oral QID    ascorbic acid (vitamin C) (VITAMIN C) tablet 500 mg  500 mg Oral BID    zinc sulfate (ZINCATE) 220 (50) mg capsule 1 Cap  1 Cap Oral DAILY    remdesivir 100 mg in 0.9% sodium chloride 250 mL IVPB  100 mg IntraVENous Q24H    sodium chloride (NS) flush 5-40 mL  5-40 mL IntraVENous Q8H    sodium chloride (NS) flush 5-40 mL  5-40 mL IntraVENous PRN    bisacodyL (DULCOLAX) tablet 5 mg  5 mg Oral DAILY PRN    ondansetron (ZOFRAN ODT) tablet 4 mg  4 mg Oral Q8H PRN    Or    ondansetron (ZOFRAN) injection 4 mg  4 mg IntraVENous Q6H PRN    dexamethasone (DECADRON) 4 mg/mL injection 6 mg  6 mg IntraVENous Q24H    glucose chewable tablet 16 g  4 Tab Oral PRN    glucagon (GLUCAGEN) injection 1 mg  1 mg IntraMUSCular PRN    dextrose 10% infusion 0-250 mL  0-250 mL IntraVENous PRN    enoxaparin (LOVENOX) injection 40 mg  40 mg SubCUTAneous Q12H    acetaminophen (TYLENOL) tablet 650 mg  650 mg Oral Q6H PRN    Or    acetaminophen (TYLENOL) suppository 650 mg  650 mg Rectal Q6H PRN    carvediloL (COREG) tablet 25 mg  25 mg Oral BID WITH MEALS    pravastatin (PRAVACHOL) tablet 40 mg  40 mg Oral QHS    0.9% sodium chloride infusion  60 mL/hr IntraVENous CONTINUOUS    albuterol (PROVENTIL HFA, VENTOLIN HFA, PROAIR HFA) inhaler 2 Puff  2 Puff Inhalation Q4H RT     ______________________________________________________________________  EXPECTED LENGTH OF STAY: 5d 12h  ACTUAL LENGTH OF STAY:          3                 Facundo Bailey MD

## 2020-09-02 NOTE — PROGRESS NOTES
Problem: Mobility Impaired (Adult and Pediatric)  Goal: *Acute Goals and Plan of Care (Insert Text)  Description: FUNCTIONAL STATUS PRIOR TO ADMISSION: Pt has hx of CVA with left sided weakness. Impaired cognition at baseline and requires assistance for ADLs. Pt was ambulating using RW. Physical Therapy Goals  Initiated 9/2/2020  1. Patient will move from supine to sit and sit to supine , scoot up and down, and roll side to side in bed with minimal assistance/contact guard assist within 7 day(s). 2.  Patient will transfer from bed to chair and chair to bed with moderate assistance  using the least restrictive device within 7 day(s). 3.  Patient will perform sit to stand with minimal assistance/contact guard assist within 7 day(s). 4.  Patient will ambulate with moderate assistance  for 10 feet with the least restrictive device within 7 day(s). Outcome: Not Met  PHYSICAL THERAPY EVALUATION  Patient: Caty Arthur (26 y.o. male)  Date: 9/2/2020  Primary Diagnosis: Respiratory failure ([facility] [address] [J96.90]        Precautions: Fall       ASSESSMENT  Based on the objective data described below, the patient presents with impaired mobility s/p admission for respiratory failure - pt positive for COVID-19. At baseline pt lives with family, ambulates with RW and requires assistance for ADLs. He has hx of CVA with residual L sided weakness UE>LE and cognitive deficits. This date, pt confused but agreeable to work with therapy. He was received on 5L O2, VSS throughout. He transferred supine<>sit with min-modA, sit<>stand with modA x2, and took a few side steps along EOB with modA x2 - required assistance to advance LLE and weight shift. Pt returned to bed and left with all needs met. Recommending SNF upon discharge. Current Level of Function Impacting Discharge (mobility/balance): modA x2 for transfers    Functional Outcome Measure:   The patient scored 10/100 on the Barthel outcome measure which is indicative of 90% impairment and dependence on others for basic mobility and self care tasks. Other factors to consider for discharge: hx CVA with residual deficits, wife is also COVID positive     Patient will benefit from skilled therapy intervention to address the above noted impairments. PLAN :  Recommendations and Planned Interventions: bed mobility training, transfer training, gait training, therapeutic exercises, neuromuscular re-education, patient and family training/education, and therapeutic activities      Frequency/Duration: Patient will be followed by physical therapy:  3 times a week to address goals. Recommendation for discharge: (in order for the patient to meet his/her long term goals)  Therapy up to 5 days/week in SNF setting    This discharge recommendation:  Has not yet been discussed the attending provider and/or case management    IF patient discharges home will need the following DME: patient owns DME required for discharge         SUBJECTIVE:   Patient stated It's been a while since I did this.  referring to getting out of bed    OBJECTIVE DATA SUMMARY:   HISTORY:    Past Medical History:   Diagnosis Date    Acid reflux     Anemia     Diabetes (HCC)     GERD (gastroesophageal reflux disease)     Hypertension     Stroke [facility])      Past Surgical History:   Procedure Laterality Date    HX COLONOSCOPY       Home Situation  Home Environment: Private residence  Living Alone: No  Support Systems: Family member(s)  Current DME Used/Available at Home: Walker, rolling    EXAMINATION/PRESENTATION/DECISION MAKING:   Critical Behavior:  Neurologic State: Alert, Eyes open spontaneously  Orientation Level: Oriented to person, Disoriented to time, Disoriented to situation, Oriented to place  Cognition: Follows commands  Safety/Judgement: Not assessed    Range Of Motion:  AROM: Grossly decreased, non-functional(L side)           PROM: Generally decreased, functional(LUE)           Strength: Strength: Grossly decreased, non-functional(L side)                    Tone & Sensation:   Tone: Abnormal(increased LUE)              Sensation: Intact(per patient report)               Coordination:  Coordination: Grossly decreased, non-functional(L side)  Vision:      Functional Mobility:  Bed Mobility:     Supine to Sit: Minimum assistance  Sit to Supine: Moderate assistance  Scooting: Minimum assistance;Maximum assistance(Caprice seated EOB, maxA x2 supine )  Transfers:  Sit to Stand: Moderate assistance;Assist x2  Stand to Sit: Moderate assistance;Assist x2                       Balance:   Sitting: Impaired  Sitting - Static: Good (unsupported)  Sitting - Dynamic: Fair (occasional)  Standing: Impaired; With support  Standing - Static: Poor;Constant support  Standing - Dynamic : Poor;Constant support  Ambulation/Gait Training:  Distance (ft): 2 Feet (ft)(side stepping)  Assistive Device: Gait belt(HHA x2)  Ambulation - Level of Assistance: Moderate assistance;Assist x2        Gait Abnormalities: Decreased step clearance; Hemiplegic; Shuffling gait        Base of Support: Narrowed; Center of gravity altered;Shift to right  Stance: Left decreased  Speed/Josee: Shuffled; Slow      Functional Measure:  Barthel Index:    Bathin  Bladder: 0  Bowels: 0  Groomin  Dressin  Feedin  Mobility: 0  Stairs: 0  Toilet Use: 0  Transfer (Bed to Chair and Back): 5  Total: 10/100       The Barthel ADL Index: Guidelines  1. The index should be used as a record of what a patient does, not as a record of what a patient could do. 2. The main aim is to establish degree of independence from any help, physical or verbal, however minor and for whatever reason. 3. The need for supervision renders the patient not independent. 4. A patient's performance should be established using the best available evidence.  Asking the patient, friends/relatives and nurses are the usual sources, but direct observation and common sense are also important. However direct testing is not needed. 5. Usually the patient's performance over the preceding 24-48 hours is important, but occasionally longer periods will be relevant. 6. Middle categories imply that the patient supplies over 50 per cent of the effort. 7. Use of aids to be independent is allowed. Roman Manning., Barthel, D.W. (4112). Functional evaluation: the Barthel Index. [address] [facility] (14)2. SHANAE Birmingham, Christie Hanley., Caitlin Dodge., Jacob Vasquez, [address] [facility] (1999). Measuring the change indisability after inpatient rehabilitation; comparison of the responsiveness of the Barthel Index and Functional [place] Measure. Journal of Neurology, Neurosurgery, and Psychiatry, 66(4), 933-842. NABEEL Alex, ROSE Santos, & Abdirahman Long M.A. (2004.) Assessment of post-stroke quality of life in cost-effectiveness studies: The usefulness of the Barthel Index and the EuroQoL-5D. Quality of Life Research, 15, 398-98           Physical Therapy Evaluation Charge Determination   History Examination Presentation Decision-Making   HIGH Complexity :3+ comorbidities / personal factors will impact the outcome/ POC  HIGH Complexity : 4+ Standardized tests and measures addressing body structure, function, activity limitation and / or participation in recreation  MEDIUM Complexity : Evolving with changing characteristics  Other outcome measures Barthel  HIGH       Based on the above components, the patient evaluation is determined to be of the following complexity level: MEDIUM    Activity Tolerance:   Fair  Please refer to the flowsheet for vital signs taken during this treatment. After treatment patient left in no apparent distress:   Supine in bed and Call bell within reach    COMMUNICATION/EDUCATION:   The patients plan of care was discussed with: Occupational therapist and Registered nurse.      Fall prevention education was provided and the patient/caregiver indicated understanding., Patient/family have participated as able in goal setting and plan of care. , and Patient/family agree to work toward stated goals and plan of care.     Thank you for this referral.  Codi Rose, PT, DPT   Time Calculation: 32 mins

## 2020-09-02 NOTE — PROGRESS NOTES
Problem: Pressure Injury - Risk of  Goal: Prevention of pressure injury  Outcome: Progressing Towards Goal  Assess changes in LOC, Assess need for specialty bed, Avoid rigorous massage over bony prominences, Discuss PT/OT consult with provider, Float heels, Keep linens dry and wrinkle-free, Minimize linen layers, Turn and reposition approx. every two to three hours (pillows and wedges if needed), Pressure redistribution bed/mattress (bed type)           Problem: Airway Clearance - Ineffective  Goal: Achieve or maintain patent airway  Outcome: Progressing Towards Goal  Pt coughing as needed to clear airway           Problem: Gas Exchange - Impaired  Goal: Absence of hypoxia  Outcome: Progressing Towards Goal  Pt on 6L NC, O2 saturation remains greater than 90%           Problem: Body Temperature -  Risk of, Imbalanced  Goal: Ability to maintain a body temperature within defined limits  Outcome: Progressing Towards Goal  Pt's temp remains WNL         Problem: Isolation Precautions - Risk of Spread of Infection  Goal: Prevent transmission of infectious organism to others  Outcome: Progressing Towards Goal  Pt on airborne and droplet plus precautions for COVID 19 infection           Problem: Risk for Fluid Volume Deficit  Goal: Maintain normal heart rhythm  Outcome: Progressing Towards Goal  Pt is normal sinus to sinus bradycardia, heart rhythm is regular        0000-Bedside shift change report given to [facility] (oncoming nurse) by Марина Mejia RN (offgoing nurse). Report included the following information SBAR, Kardex, ED Summary, Intake/Output, MAR, Accordion, Recent Results, Med Rec Status, and Cardiac Rhythm NSR/SB .

## 2020-09-02 NOTE — PROGRESS NOTES
Verbal shift change report given to Kim Burton RN (oncoming nurse) by Shoshana Gar (offgoing nurse). Report included the following information SBAR, Kardex, Intake/Output, Recent Results and Cardiac Rhythm NSR.

## 2020-09-03 LAB
ALBUMIN SERPL-MCNC: 2.4 G/DL (ref 3.5–5)
ALBUMIN/GLOB SERPL: 0.6 {RATIO} (ref 1.1–2.2)
ALP SERPL-CCNC: 45 U/L (ref 45–117)
ALT SERPL-CCNC: 28 U/L (ref 12–78)
ANION GAP SERPL CALC-SCNC: 7 MMOL/L (ref 5–15)
APTT PPP: 29.9 SEC (ref 22.1–32)
AST SERPL-CCNC: 17 U/L (ref 15–37)
BASOPHILS # BLD: 0 K/UL (ref 0–0.1)
BASOPHILS NFR BLD: 0 % (ref 0–1)
BILIRUB SERPL-MCNC: 0.5 MG/DL (ref 0.2–1)
BUN SERPL-MCNC: 22 MG/DL (ref 6–20)
BUN/CREAT SERPL: 38 (ref 12–20)
CALCIUM SERPL-MCNC: 8.6 MG/DL (ref 8.5–10.1)
CHLORIDE SERPL-SCNC: 106 MMOL/L (ref 97–108)
CO2 SERPL-SCNC: 26 MMOL/L (ref 21–32)
CREAT SERPL-MCNC: 0.58 MG/DL (ref 0.7–1.3)
D DIMER PPP FEU-MCNC: 1.2 MG/L FEU (ref 0–0.65)
DIFFERENTIAL METHOD BLD: ABNORMAL
EOSINOPHIL # BLD: 0 K/UL (ref 0–0.4)
EOSINOPHIL NFR BLD: 0 % (ref 0–7)
ERYTHROCYTE [DISTWIDTH] IN BLOOD BY AUTOMATED COUNT: 12.1 % (ref 11.5–14.5)
FIBRINOGEN PPP-MCNC: 330 MG/DL (ref 200–475)
GLOBULIN SER CALC-MCNC: 3.7 G/DL (ref 2–4)
GLUCOSE BLD STRIP.AUTO-MCNC: 137 MG/DL (ref 65–100)
GLUCOSE BLD STRIP.AUTO-MCNC: 144 MG/DL (ref 65–100)
GLUCOSE BLD STRIP.AUTO-MCNC: 170 MG/DL (ref 65–100)
GLUCOSE BLD STRIP.AUTO-MCNC: 230 MG/DL (ref 65–100)
GLUCOSE SERPL-MCNC: 224 MG/DL (ref 65–100)
HCT VFR BLD AUTO: 40.3 % (ref 36.6–50.3)
HGB BLD-MCNC: 13.3 G/DL (ref 12.1–17)
IMM GRANULOCYTES # BLD AUTO: 0.1 K/UL (ref 0–0.04)
IMM GRANULOCYTES NFR BLD AUTO: 1 % (ref 0–0.5)
INR PPP: 1.3 (ref 0.9–1.1)
LYMPHOCYTES # BLD: 0.8 K/UL (ref 0.8–3.5)
LYMPHOCYTES NFR BLD: 10 % (ref 12–49)
MAGNESIUM SERPL-MCNC: 2.2 MG/DL (ref 1.6–2.4)
MCH RBC QN AUTO: 32.5 PG (ref 26–34)
MCHC RBC AUTO-ENTMCNC: 33 G/DL (ref 30–36.5)
MCV RBC AUTO: 98.5 FL (ref 80–99)
MONOCYTES # BLD: 0.6 K/UL (ref 0–1)
MONOCYTES NFR BLD: 7 % (ref 5–13)
NEUTS SEG # BLD: 6.4 K/UL (ref 1.8–8)
NEUTS SEG NFR BLD: 82 % (ref 32–75)
NRBC # BLD: 0 K/UL (ref 0–0.01)
NRBC BLD-RTO: 0 PER 100 WBC
PLATELET # BLD AUTO: 274 K/UL (ref 150–400)
PMV BLD AUTO: 10.6 FL (ref 8.9–12.9)
POTASSIUM SERPL-SCNC: 4.4 MMOL/L (ref 3.5–5.1)
PROT SERPL-MCNC: 6.1 G/DL (ref 6.4–8.2)
PROTHROMBIN TIME: 12.8 SEC (ref 9–11.1)
RBC # BLD AUTO: 4.09 M/UL (ref 4.1–5.7)
RBC MORPH BLD: ABNORMAL
SERVICE CMNT-IMP: ABNORMAL
SODIUM SERPL-SCNC: 139 MMOL/L (ref 136–145)
THERAPEUTIC RANGE,PTTT: NORMAL SECS (ref 58–77)
WBC # BLD AUTO: 7.9 K/UL (ref 4.1–11.1)

## 2020-09-03 PROCEDURE — 83735 ASSAY OF MAGNESIUM: CPT

## 2020-09-03 PROCEDURE — 80053 COMPREHEN METABOLIC PANEL: CPT

## 2020-09-03 PROCEDURE — 74011250637 HC RX REV CODE- 250/637: Performed by: NURSE PRACTITIONER

## 2020-09-03 PROCEDURE — 85025 COMPLETE CBC W/AUTO DIFF WBC: CPT

## 2020-09-03 PROCEDURE — 94640 AIRWAY INHALATION TREATMENT: CPT

## 2020-09-03 PROCEDURE — 82962 GLUCOSE BLOOD TEST: CPT

## 2020-09-03 PROCEDURE — 36415 COLL VENOUS BLD VENIPUNCTURE: CPT

## 2020-09-03 PROCEDURE — 85730 THROMBOPLASTIN TIME PARTIAL: CPT

## 2020-09-03 PROCEDURE — XW033E5 INTRODUCTION OF REMDESIVIR ANTI-INFECTIVE INTO PERIPHERAL VEIN, PERCUTANEOUS APPROACH, NEW TECHNOLOGY GROUP 5: ICD-10-PCS | Performed by: INTERNAL MEDICINE

## 2020-09-03 PROCEDURE — 85379 FIBRIN DEGRADATION QUANT: CPT

## 2020-09-03 PROCEDURE — 77010033678 HC OXYGEN DAILY

## 2020-09-03 PROCEDURE — 74011250636 HC RX REV CODE- 250/636: Performed by: INTERNAL MEDICINE

## 2020-09-03 PROCEDURE — 74011636637 HC RX REV CODE- 636/637: Performed by: HOSPITALIST

## 2020-09-03 PROCEDURE — 74011000250 HC RX REV CODE- 250: Performed by: INTERNAL MEDICINE

## 2020-09-03 PROCEDURE — 85384 FIBRINOGEN ACTIVITY: CPT

## 2020-09-03 PROCEDURE — 74011250637 HC RX REV CODE- 250/637: Performed by: INTERNAL MEDICINE

## 2020-09-03 PROCEDURE — 65660000001 HC RM ICU INTERMED STEPDOWN

## 2020-09-03 PROCEDURE — 74011250637 HC RX REV CODE- 250/637: Performed by: HOSPITALIST

## 2020-09-03 PROCEDURE — 74011000258 HC RX REV CODE- 258: Performed by: INTERNAL MEDICINE

## 2020-09-03 PROCEDURE — 74011250636 HC RX REV CODE- 250/636: Performed by: HOSPITALIST

## 2020-09-03 PROCEDURE — 85610 PROTHROMBIN TIME: CPT

## 2020-09-03 RX ADMIN — ALBUTEROL SULFATE 2 PUFF: 90 AEROSOL, METERED RESPIRATORY (INHALATION) at 19:48

## 2020-09-03 RX ADMIN — INSULIN LISPRO 2 UNITS: 100 INJECTION, SOLUTION INTRAVENOUS; SUBCUTANEOUS at 22:35

## 2020-09-03 RX ADMIN — ALBUTEROL SULFATE 2 PUFF: 90 AEROSOL, METERED RESPIRATORY (INHALATION) at 07:35

## 2020-09-03 RX ADMIN — CARVEDILOL 25 MG: 12.5 TABLET, FILM COATED ORAL at 09:37

## 2020-09-03 RX ADMIN — ZINC SULFATE 220 MG (50 MG) CAPSULE 1 CAPSULE: CAPSULE at 09:37

## 2020-09-03 RX ADMIN — Medication 10 ML: at 07:13

## 2020-09-03 RX ADMIN — Medication 10 ML: at 15:22

## 2020-09-03 RX ADMIN — CARVEDILOL 25 MG: 12.5 TABLET, FILM COATED ORAL at 17:38

## 2020-09-03 RX ADMIN — OXYCODONE HYDROCHLORIDE AND ACETAMINOPHEN 500 MG: 500 TABLET ORAL at 17:38

## 2020-09-03 RX ADMIN — ALBUTEROL SULFATE 2 PUFF: 90 AEROSOL, METERED RESPIRATORY (INHALATION) at 00:33

## 2020-09-03 RX ADMIN — DEXAMETHASONE SODIUM PHOSPHATE 6 MG: 4 INJECTION, SOLUTION INTRA-ARTICULAR; INTRALESIONAL; INTRAMUSCULAR; INTRAVENOUS; SOFT TISSUE at 17:38

## 2020-09-03 RX ADMIN — REMDESIVIR 100 MG: 5 INJECTION INTRAVENOUS at 19:13

## 2020-09-03 RX ADMIN — INSULIN LISPRO 2 UNITS: 100 INJECTION, SOLUTION INTRAVENOUS; SUBCUTANEOUS at 13:10

## 2020-09-03 RX ADMIN — NYSTATIN 500000 UNITS: 100000 SUSPENSION ORAL at 09:37

## 2020-09-03 RX ADMIN — SODIUM CHLORIDE 60 ML/HR: 900 INJECTION, SOLUTION INTRAVENOUS at 03:54

## 2020-09-03 RX ADMIN — SODIUM CHLORIDE 60 ML/HR: 900 INJECTION, SOLUTION INTRAVENOUS at 22:09

## 2020-09-03 RX ADMIN — ALBUTEROL SULFATE 2 PUFF: 90 AEROSOL, METERED RESPIRATORY (INHALATION) at 11:23

## 2020-09-03 RX ADMIN — ENOXAPARIN SODIUM 40 MG: 40 INJECTION SUBCUTANEOUS at 09:37

## 2020-09-03 RX ADMIN — OXYCODONE HYDROCHLORIDE AND ACETAMINOPHEN 500 MG: 500 TABLET ORAL at 09:37

## 2020-09-03 RX ADMIN — ALBUTEROL SULFATE 2 PUFF: 90 AEROSOL, METERED RESPIRATORY (INHALATION) at 03:30

## 2020-09-03 RX ADMIN — ENOXAPARIN SODIUM 40 MG: 40 INJECTION SUBCUTANEOUS at 22:02

## 2020-09-03 RX ADMIN — Medication 10 ML: at 22:01

## 2020-09-03 RX ADMIN — INSULIN LISPRO 2 UNITS: 100 INJECTION, SOLUTION INTRAVENOUS; SUBCUTANEOUS at 17:39

## 2020-09-03 RX ADMIN — PRAVASTATIN SODIUM 40 MG: 40 TABLET ORAL at 22:01

## 2020-09-03 RX ADMIN — ALBUTEROL SULFATE 2 PUFF: 90 AEROSOL, METERED RESPIRATORY (INHALATION) at 16:00

## 2020-09-03 NOTE — PROGRESS NOTES
Problem: Falls - Risk of  Goal: Absence of Falls  Outcome: Progressing Towards Goal  Call light in reach, Patient to call for help with toileting needs, Stay With Me (per policy), Toileting schedule/hourly rounds            Problem: Pressure Injury - Risk of  Goal: Prevention of pressure injury  Outcome: Progressing Towards Goal  Assess changes in LOC, Assess need for specialty bed, Float heels, Keep linens dry and wrinkle-free, Discuss PT/OT consult with provider, Minimize linen layers, Turn and reposition approx. every two to three hours (pillows and wedges if needed)             Problem: Airway Clearance - Ineffective  Goal: Achieve or maintain patent airway  Outcome: Progressing Towards Goal  Pt's airway remains clear, pt has a strong cough           Problem: Gas Exchange - Impaired  Goal: Absence of hypoxia  Outcome: Progressing Towards Goal  Pt on 2L NC, O2 saturation remains greater than 90%           Problem: Breathing Pattern - Ineffective  Goal: Ability to achieve and maintain a regular respiratory rateOutcome: Progressing Towards Goal  Pt's respiratory rate is regular, even, and deep           Problem:  Body Temperature -  Risk of, Imbalanced  Goal: Ability to maintain a body temperature within defined limits  Outcome: Progressing Towards Goal  Pt's temperature remains within normal limits           Problem: Isolation Precautions - Risk of Spread of Infection  Goal: Prevent transmission of infectious organism to others  Outcome: Progressing Towards Goal  Pt on airborne and droplet plus precautions for COVID 19 infection           Problem: Risk for Fluid Volume Deficit  Goal: Maintain normal heart rhythm  Outcome: Progressing Towards Goal  Pt's heart rhythm is in normal sinus and sinus lindsay, rhythm is regular           Problem: Nutrition Deficit  Goal: Optimize nutritional status  Outcome: Progressing Towards Goal  Pt is on a pureed diet, assistance offered during dinner as needed to encourage more PO intake        Hourly rounding done, pt in NSR/SB, on 2L NC, O2 saturation greater than 90%, on bedrest, has condom cath, urine yellow and clear, no complaints of pain throughout shift. 0800-Bedside shift change report given to [address] (oncoming nurse) by Allyssa Weaver RN (offgoing nurse). Report included the following information SBAR, Kardex, ED Summary, Intake/Output, MAR, Accordion, Recent Results, Med Rec Status and Cardiac Rhythm NSR/SB.          Last 3 Recorded Weights in this Encounter    09/01/20 0408 09/02/20 0041 09/03/20 0348   Weight: 91.8 kg (202 lb 6.4 oz) 92 kg (202 lb 13.2 oz) 88.5 kg (195 lb)

## 2020-09-03 NOTE — PROGRESS NOTES
[facility]  Hospitalist Group                                                                                          Hospitalist Progress Note  Nasima Tarango MD  Answering service: [phone] or [phone] from in house phone        Date of Service:  9/3/2020  NAME:  Ela Sarabia  :    MRN:  [id number]      Admission Summary:     Ela Sarabia is a 42-year-old male with a past medical history that includes HTN, ischemic stroke with residual left hemiplegia, dysphagia, T2DM and GERD who was transferred to [facility] from [facility] for increasing oxygen requirement. He arrived here at approximately 4 PM.      Patient originally presented at [facility] on  with several days of fever, cough and shortness of breath. He tested positive for COVID and was started on ceftriaxone and azithromycin for RML CAP. There was concern for aspiration so he was started on Zosyn and ceftriaxone was discontinued. He never did receive Remdesivir. On  IV Diflucan was initiated for COVID pneumonitis. Patient was weaned from CPAP to 50% Ventimask and maintained saturations greater than 95% without worsening shortness of breath. He is transferred out of the ICU on high flow nasal cannula and is now resting comfortably on 6 L NC.     Interval history / Subjective:   F/u Acute respiratory failure  Patient is a poor historian, he said he feels the same, has on and off cough, no chest pain  On 1l NC     Assessment & Plan:     Acute hypoxic respiratory failure due to COVID, CAP POA  -High flow transitioned to mid flow, improving, now on 5 l/m, monitor pulse ox  -on iv decadron (--), remdesivir (--)  -Had total 9 days Zosyn and 5 days azithromycin PTA.  -COVID was positive at [facility]  -Droplet precautions  -Prone positioning  -On prn Albuterol  -ID on board     Pneumonia due to COVID 19 infection  -Had total 9 days Zosyn and 5 days azithromycin PTA.  -continue vitamin c, zinc, decadron, oxygen support,   - ID consulted started on remdesivir  -Elevated d-dimer  -CT chest 8/26 no PE  -On Lovenox 40 mg every 12 hours     Leukocytosis  -Likely related to prolonged steroid use, now resolved  -Antibiotics completed  -improved and wbc is normal     HTN  -BP fairly controlled, Continue home carvedilol 25 mg twice daily  -Hold home amlodipine and lisinopril for now and monitor VS     T2DM with hyperglycemia  -On Decadron  - A1c 6.3  -continue sliding scale and monitor finger stick glucose     Hx of CVA with left hemiplegia and dysphagia  -CT 8/22 old ischemic changes noted, no acute changes  -Usually walks w assist of a rolling walker  -seen by speech, on pureed diet  -PT/OT     Pureed diet    PT/OT SNF    Code status: Full Code  DVT prophylaxis: Lovenox 40 BID  PTA: Ambulate with walker and cane at base line     Care Plan discussed with: Patient/Family and Nurse  Anticipated Disposition: TBD  Anticipated Discharge: 24 hours to 48 hours     I called his son, Yesenia Calabrese , updated and answered questions [id number] Problems  Date Reviewed: 9/3/2020          Codes Class Noted POA    * (Principal) Respiratory failure ([facility] [address] ICD-10-CM: J96.90  ICD-9-CM: 518.81  8/30/2020 Yes                Vital Signs:    Last 24hrs VS reviewed since prior progress note. Most recent are:  Visit Vitals  /69 (BP 1 Location: Right arm, BP Patient Position: At rest)   Pulse (!) 53   Temp 97.8 °F (36.6 °C)   Resp 21   Ht 5' 8\" (1.727 m)   Wt 88.5 kg (195 lb)   SpO2 96%   BMI 29.65 kg/m²         Intake/Output Summary (Last 24 hours) at 9/3/2020 0925  Last data filed at 9/3/2020 3945  Gross per 24 hour   Intake 1645 ml   Output 825 ml   Net 820 ml        Physical Examination:             Constitutional:  No acute distress, cooperative, pleasant    ENT:  Oral mucosa moist, oropharynx benign. Resp:  Decrease bronchial breath sound bilaterally. No wheezing/rhonchi/rales.  No accessory muscle use   CV: Regular rhythm, normal rate, no murmurs, gallops, rubs    GI:  Soft, non distended, non tender. normoactive bowel sounds, no hepatosplenomegaly     Musculoskeletal:  No edema     Neurologic:  Conscious and alert, oriented to place and person, motor RE 4-5/5, LLE 3/5, LUE 2/5     Skin:  Dry but no skin rash       Data Review:    Review and/or order of clinical lab test  Review and/or order of tests in the radiology section of CPT  Review and/or order of tests in the medicine section of CPT      Labs:     Recent Labs     09/03/20 0347 09/02/20  1445   WBC 7.9 9.9   HGB 13.3 13.9   HCT 40.3 42.1    290     Recent Labs     09/03/20  0347 09/02/20  1445    139   K 4.4 3.9    106   CO2 26 27   BUN 22* 27*   CREA 0.58* 1.01   * 161*   CA 8.6 8.3*   MG 2.2 2.2     Recent Labs     09/03/20  0347 09/02/20  1445 09/01/20  0625   ALT 28 30 33   AP 45 57 53   TBILI 0.5 0.5 0.6   TP 6.1* 6.6 6.9   ALB 2.4* 2.5* 2.3*   GLOB 3.7 4.1* 4.6*     Recent Labs     09/03/20  0347 09/01/20  1020   INR 1.3* 1.2*   PTP 12.8* 12.4*   APTT 29.9 28.4      Recent Labs     09/02/20  1445   FERR 280      No results found for: FOL, RBCF   No results for input(s): PH, PCO2, PO2 in the last 72 hours. No results for input(s): CPK, CKNDX, TROIQ in the last 72 hours.     No lab exists for component: CPKMB  No results found for: CHOL, CHOLX, CHLST, CHOLV, HDL, HDLP, LDL, LDLC, DLDLP, TGLX, TRIGL, TRIGP, CHHD, CHHDX  Lab Results   Component Value Date/Time    Glucose (POC) 251 (H) 09/02/2020 09:09 PM    Glucose (POC) 142 (H) 09/02/2020 05:27 PM    Glucose (POC) 151 (H) 09/02/2020 11:41 AM    Glucose (POC) 152 (H) 09/02/2020 08:18 AM    Glucose (POC) 257 (H) 09/01/2020 09:50 PM     No results found for: COLOR, APPRN, SPGRU, REFSG, DINAH, PROTU, GLUCU, KETU, BILU, UROU, MAGALI, LEUKU, GLUKE, EPSU, BACTU, WBCU, RBCU, CASTS, UCRY      Medications Reviewed:     Current Facility-Administered Medications   Medication Dose Route Frequency  insulin lispro (HUMALOG) injection   SubCUTAneous AC&HS    nystatin (MYCOSTATIN) 100,000 unit/mL oral suspension 500,000 Units  500,000 Units Oral QID    ascorbic acid (vitamin C) (VITAMIN C) tablet 500 mg  500 mg Oral BID    zinc sulfate (ZINCATE) 220 (50) mg capsule 1 Cap  1 Cap Oral DAILY    remdesivir 100 mg in 0.9% sodium chloride 250 mL IVPB  100 mg IntraVENous Q24H    sodium chloride (NS) flush 5-40 mL  5-40 mL IntraVENous Q8H    sodium chloride (NS) flush 5-40 mL  5-40 mL IntraVENous PRN    bisacodyL (DULCOLAX) tablet 5 mg  5 mg Oral DAILY PRN    ondansetron (ZOFRAN ODT) tablet 4 mg  4 mg Oral Q8H PRN    Or    ondansetron (ZOFRAN) injection 4 mg  4 mg IntraVENous Q6H PRN    dexamethasone (DECADRON) 4 mg/mL injection 6 mg  6 mg IntraVENous Q24H    glucose chewable tablet 16 g  4 Tab Oral PRN    glucagon (GLUCAGEN) injection 1 mg  1 mg IntraMUSCular PRN    dextrose 10% infusion 0-250 mL  0-250 mL IntraVENous PRN    enoxaparin (LOVENOX) injection 40 mg  40 mg SubCUTAneous Q12H    acetaminophen (TYLENOL) tablet 650 mg  650 mg Oral Q6H PRN    Or    acetaminophen (TYLENOL) suppository 650 mg  650 mg Rectal Q6H PRN    carvediloL (COREG) tablet 25 mg  25 mg Oral BID WITH MEALS    pravastatin (PRAVACHOL) tablet 40 mg  40 mg Oral QHS    0.9% sodium chloride infusion  60 mL/hr IntraVENous CONTINUOUS    albuterol (PROVENTIL HFA, VENTOLIN HFA, PROAIR HFA) inhaler 2 Puff  2 Puff Inhalation Q4H RT     ______________________________________________________________________  EXPECTED LENGTH OF STAY: 5d 12h  ACTUAL LENGTH OF STAY:          Daniella Barnett MD

## 2020-09-03 NOTE — PROGRESS NOTES
Problem: Falls - Risk of  Goal: *Absence of Falls  Description: Document Shayla Morataya Fall Risk and appropriate interventions in the flowsheet. Outcome: Progressing Towards Goal  Note: Fall Risk Interventions:       Mentation Interventions: Update white board, Room close to nurse's station, Reorient patient, More frequent rounding    Medication Interventions: Assess postural VS orthostatic hypotension, Patient to call before getting OOB, Teach patient to arise slowly    Elimination Interventions: Call light in reach, Patient to call for help with toileting needs              Problem: Pressure Injury - Risk of  Goal: *Prevention of pressure injury  Description: Document William Scale and appropriate interventions in the flowsheet. Outcome: Progressing Towards Goal  Note: Pressure Injury Interventions:  Sensory Interventions: Assess changes in LOC, Turn and reposition approx. every two hours (pillows and wedges if needed), Pressure redistribution bed/mattress (bed type), Minimize linen layers    Moisture Interventions: Absorbent underpads, Minimize layers, Internal/External urinary devices    Activity Interventions: Pressure redistribution bed/mattress(bed type)    Mobility Interventions: Pressure redistribution bed/mattress (bed type)    Nutrition Interventions: Document food/fluid/supplement intake    Friction and Shear Interventions: Minimize layers, Apply protective barrier, creams and emollients                Problem: Breathing Pattern - Ineffective  Goal: *Absence of hypoxia  Outcome: Progressing Towards Goal     Problem: Gas Exchange - Impaired  Goal: Absence of hypoxia  Outcome: Progressing Towards Goal   Pt weaned to RA. Sats 88-mid 90s. Pt in no apparent distress. Zoom called pt wife and granddaughter today. Will continue to monitor and educate. Bedside shift change report given to Charito (oncoming nurse) by Amber Greene  (offgoing nurse). Report included the following information SBAR.

## 2020-09-03 NOTE — PROGRESS NOTES
ID Progress Note  9/3/2020    Subjective:     Day 4 remdesivir--tolerating it well    Objective:     Antibiotics:  1. None       Vitals:   Visit Vitals  /89 (BP 1 Location: Right arm, BP Patient Position: At rest)   Pulse 71   Temp 98.1 °F (36.7 °C)   Resp 12   Ht 5' 8\" (1.727 m)   Wt 88.5 kg (195 lb)   SpO2 91%   BMI 29.65 kg/m²        Tmax:  Temp (24hrs), Av.8 °F (36.6 °C), Min:97.2 °F (36.2 °C), Max:98.2 °F (36.8 °C)      Exam:  Deferred     Labs:      Recent Labs     20  0347 20  1445 20  0625   WBC 7.9 9.9 9.9   HGB 13.3 13.9 14.7    290 335   BUN 22* 27*  --    CREA 0.58* 1.01  --    AP 45 57 53   TBILI 0.5 0.5 0.6       Cultures:     No results found for: SDES  No results found for: CULT    Radiology:     Line/Insert Date:           Assessment:     1. COVID 19  2. Hypoxia  3. Debility     Objective:     1. Continue current therapy  2.  He is improved overall    Jaci Sinha MD

## 2020-09-03 NOTE — PROGRESS NOTES
09/03/20 1948   Oxygen Therapy   O2 Sat (%) (!) 88 %   Pulse via Oximetry 65 beats per minute   O2 Device Room air     Placed on 1 lpm Nasal Cannula.  Patient saturations now 93%

## 2020-09-04 ENCOUNTER — APPOINTMENT (OUTPATIENT)
Dept: GENERAL RADIOLOGY | Age: 75
DRG: 177 | End: 2020-09-04
Attending: HOSPITALIST
Payer: MEDICARE

## 2020-09-04 LAB
ALBUMIN SERPL-MCNC: 2.6 G/DL (ref 3.5–5)
ALBUMIN/GLOB SERPL: 0.8 {RATIO} (ref 1.1–2.2)
ALP SERPL-CCNC: 48 U/L (ref 45–117)
ALT SERPL-CCNC: 33 U/L (ref 12–78)
ANION GAP SERPL CALC-SCNC: 6 MMOL/L (ref 5–15)
APTT PPP: 29.4 SEC (ref 22.1–32)
AST SERPL-CCNC: 17 U/L (ref 15–37)
BASOPHILS # BLD: 0 K/UL (ref 0–0.1)
BASOPHILS NFR BLD: 0 % (ref 0–1)
BILIRUB SERPL-MCNC: 0.5 MG/DL (ref 0.2–1)
BUN SERPL-MCNC: 17 MG/DL (ref 6–20)
BUN/CREAT SERPL: 29 (ref 12–20)
CALCIUM SERPL-MCNC: 8.7 MG/DL (ref 8.5–10.1)
CHLORIDE SERPL-SCNC: 106 MMOL/L (ref 97–108)
CO2 SERPL-SCNC: 26 MMOL/L (ref 21–32)
CREAT SERPL-MCNC: 0.58 MG/DL (ref 0.7–1.3)
D DIMER PPP FEU-MCNC: 0.88 MG/L FEU (ref 0–0.65)
DIFFERENTIAL METHOD BLD: ABNORMAL
EOSINOPHIL # BLD: 0 K/UL (ref 0–0.4)
EOSINOPHIL NFR BLD: 0 % (ref 0–7)
ERYTHROCYTE [DISTWIDTH] IN BLOOD BY AUTOMATED COUNT: 12.5 % (ref 11.5–14.5)
FIBRINOGEN PPP-MCNC: 372 MG/DL (ref 200–475)
GLOBULIN SER CALC-MCNC: 3.4 G/DL (ref 2–4)
GLUCOSE BLD STRIP.AUTO-MCNC: 126 MG/DL (ref 65–100)
GLUCOSE BLD STRIP.AUTO-MCNC: 145 MG/DL (ref 65–100)
GLUCOSE BLD STRIP.AUTO-MCNC: 149 MG/DL (ref 65–100)
GLUCOSE BLD STRIP.AUTO-MCNC: 185 MG/DL (ref 65–100)
GLUCOSE SERPL-MCNC: 187 MG/DL (ref 65–100)
HCT VFR BLD AUTO: 40.3 % (ref 36.6–50.3)
HGB BLD-MCNC: 13.5 G/DL (ref 12.1–17)
IMM GRANULOCYTES # BLD AUTO: 0.1 K/UL (ref 0–0.04)
IMM GRANULOCYTES NFR BLD AUTO: 1 % (ref 0–0.5)
INR PPP: 1.3 (ref 0.9–1.1)
LYMPHOCYTES # BLD: 1 K/UL (ref 0.8–3.5)
LYMPHOCYTES NFR BLD: 12 % (ref 12–49)
MAGNESIUM SERPL-MCNC: 2.1 MG/DL (ref 1.6–2.4)
MCH RBC QN AUTO: 33.2 PG (ref 26–34)
MCHC RBC AUTO-ENTMCNC: 33.5 G/DL (ref 30–36.5)
MCV RBC AUTO: 99 FL (ref 80–99)
MONOCYTES # BLD: 0.7 K/UL (ref 0–1)
MONOCYTES NFR BLD: 9 % (ref 5–13)
NEUTS SEG # BLD: 6.3 K/UL (ref 1.8–8)
NEUTS SEG NFR BLD: 78 % (ref 32–75)
NRBC # BLD: 0 K/UL (ref 0–0.01)
NRBC BLD-RTO: 0 PER 100 WBC
PLATELET # BLD AUTO: 292 K/UL (ref 150–400)
PMV BLD AUTO: 11 FL (ref 8.9–12.9)
POTASSIUM SERPL-SCNC: 4.1 MMOL/L (ref 3.5–5.1)
PROT SERPL-MCNC: 6 G/DL (ref 6.4–8.2)
PROTHROMBIN TIME: 13 SEC (ref 9–11.1)
RBC # BLD AUTO: 4.07 M/UL (ref 4.1–5.7)
SERVICE CMNT-IMP: ABNORMAL
SODIUM SERPL-SCNC: 138 MMOL/L (ref 136–145)
THERAPEUTIC RANGE,PTTT: NORMAL SECS (ref 58–77)
WBC # BLD AUTO: 8.2 K/UL (ref 4.1–11.1)

## 2020-09-04 PROCEDURE — 85384 FIBRINOGEN ACTIVITY: CPT

## 2020-09-04 PROCEDURE — 85379 FIBRIN DEGRADATION QUANT: CPT

## 2020-09-04 PROCEDURE — 36415 COLL VENOUS BLD VENIPUNCTURE: CPT

## 2020-09-04 PROCEDURE — 74011250637 HC RX REV CODE- 250/637: Performed by: HOSPITALIST

## 2020-09-04 PROCEDURE — 80053 COMPREHEN METABOLIC PANEL: CPT

## 2020-09-04 PROCEDURE — 74011636637 HC RX REV CODE- 636/637: Performed by: HOSPITALIST

## 2020-09-04 PROCEDURE — 74011250637 HC RX REV CODE- 250/637: Performed by: INTERNAL MEDICINE

## 2020-09-04 PROCEDURE — 85610 PROTHROMBIN TIME: CPT

## 2020-09-04 PROCEDURE — 74011250636 HC RX REV CODE- 250/636: Performed by: HOSPITALIST

## 2020-09-04 PROCEDURE — 94640 AIRWAY INHALATION TREATMENT: CPT

## 2020-09-04 PROCEDURE — 85025 COMPLETE CBC W/AUTO DIFF WBC: CPT

## 2020-09-04 PROCEDURE — 71045 X-RAY EXAM CHEST 1 VIEW: CPT

## 2020-09-04 PROCEDURE — 65660000001 HC RM ICU INTERMED STEPDOWN

## 2020-09-04 PROCEDURE — 97530 THERAPEUTIC ACTIVITIES: CPT

## 2020-09-04 PROCEDURE — 74011250637 HC RX REV CODE- 250/637: Performed by: NURSE PRACTITIONER

## 2020-09-04 PROCEDURE — 85730 THROMBOPLASTIN TIME PARTIAL: CPT

## 2020-09-04 PROCEDURE — 74011250636 HC RX REV CODE- 250/636: Performed by: INTERNAL MEDICINE

## 2020-09-04 PROCEDURE — 83735 ASSAY OF MAGNESIUM: CPT

## 2020-09-04 PROCEDURE — 82962 GLUCOSE BLOOD TEST: CPT

## 2020-09-04 PROCEDURE — 74011000258 HC RX REV CODE- 258: Performed by: INTERNAL MEDICINE

## 2020-09-04 PROCEDURE — 74011000250 HC RX REV CODE- 250: Performed by: INTERNAL MEDICINE

## 2020-09-04 RX ADMIN — OXYCODONE HYDROCHLORIDE AND ACETAMINOPHEN 500 MG: 500 TABLET ORAL at 17:35

## 2020-09-04 RX ADMIN — ALBUTEROL SULFATE 2 PUFF: 90 AEROSOL, METERED RESPIRATORY (INHALATION) at 15:07

## 2020-09-04 RX ADMIN — CARVEDILOL 25 MG: 12.5 TABLET, FILM COATED ORAL at 17:35

## 2020-09-04 RX ADMIN — ALBUTEROL SULFATE 2 PUFF: 90 AEROSOL, METERED RESPIRATORY (INHALATION) at 07:44

## 2020-09-04 RX ADMIN — Medication 10 ML: at 05:19

## 2020-09-04 RX ADMIN — CARVEDILOL 25 MG: 12.5 TABLET, FILM COATED ORAL at 09:12

## 2020-09-04 RX ADMIN — INSULIN LISPRO 2 UNITS: 100 INJECTION, SOLUTION INTRAVENOUS; SUBCUTANEOUS at 17:34

## 2020-09-04 RX ADMIN — ALBUTEROL SULFATE 2 PUFF: 90 AEROSOL, METERED RESPIRATORY (INHALATION) at 19:45

## 2020-09-04 RX ADMIN — INSULIN LISPRO 2 UNITS: 100 INJECTION, SOLUTION INTRAVENOUS; SUBCUTANEOUS at 11:59

## 2020-09-04 RX ADMIN — OXYCODONE HYDROCHLORIDE AND ACETAMINOPHEN 500 MG: 500 TABLET ORAL at 09:12

## 2020-09-04 RX ADMIN — DEXAMETHASONE SODIUM PHOSPHATE 6 MG: 4 INJECTION, SOLUTION INTRA-ARTICULAR; INTRALESIONAL; INTRAMUSCULAR; INTRAVENOUS; SOFT TISSUE at 17:34

## 2020-09-04 RX ADMIN — PRAVASTATIN SODIUM 40 MG: 40 TABLET ORAL at 21:27

## 2020-09-04 RX ADMIN — ENOXAPARIN SODIUM 40 MG: 40 INJECTION SUBCUTANEOUS at 21:27

## 2020-09-04 RX ADMIN — REMDESIVIR 100 MG: 5 INJECTION INTRAVENOUS at 20:16

## 2020-09-04 RX ADMIN — ALBUTEROL SULFATE 2 PUFF: 90 AEROSOL, METERED RESPIRATORY (INHALATION) at 11:21

## 2020-09-04 RX ADMIN — Medication 10 ML: at 13:41

## 2020-09-04 RX ADMIN — ZINC SULFATE 220 MG (50 MG) CAPSULE 1 CAPSULE: CAPSULE at 09:12

## 2020-09-04 RX ADMIN — ENOXAPARIN SODIUM 40 MG: 40 INJECTION SUBCUTANEOUS at 09:12

## 2020-09-04 NOTE — PROGRESS NOTES
Problem: Falls - Risk of  Goal: Absence of Falls  Outcome: Progressing Towards Goal  Call light in reach, Patient to call for help with toileting needs, Stay With Me (per policy), Toileting schedule/hourly rounds           Problem: Pressure Injury - Risk of  Goal: Prevention of pressure injury  Outcome: Progressing Towards Goal  Assess changes in LOC, Discuss PT/OT consult with provider, Float heels, Keep linens dry and wrinkle-free, Minimize linen layers, Pressure redistribution bed/mattress (bed type), Turn and reposition approx. every two hours (pillows and wedges if needed)             Problem: Airway Clearance - Ineffective  Goal: Achieve or maintain patent airway  Outcome: Progressing Towards Goal  Pt coughing as needed, airway remains clear           Problem: Gas Exchange - Impaired  Goal: Absence of hypoxia  Outcome: Progressing Towards Goal  Pt on room air, O2 saturation remains greater than 90%           Problem: Body Temperature -  Risk of, Imbalanced  Goal: Ability to maintain a body temperature within defined limitsOutcome: Progressing Towards Goal  Pt's temperature remains WNL           Problem: Isolation Precautions - Risk of Spread of Infection  Goal: Prevent transmission of infectious organism to othersOutcome: Progressing Towards Goal  Pt on airborne and droplet plus precautions for COVID 19 infection           Problem: Risk for Fluid Volume Deficit  Goal: Maintain normal heart rhythm  Outcome: Progressing Towards Goal  Pt in NSR to Sinus Rosita Saha, rhythm is regular          Hourly rounding done, pt in NSR, on room air, O2 saturation greater than 90%, on bedrest, has condom cath, urine yellow and clear, no complaints of pain throughout shift. 0800-Bedside shift change report given to Indira Morrell [address] (oncoming nurse) by Kriss Hoff RN (offgoing nurse).  Report included the following information SBAR, Kardex, ED Summary, Intake/Output, MAR, Accordion, Recent Results, Med Rec Status and Cardiac Rhythm NSR/SB.           Last 3 Recorded Weights in this Encounter    09/02/20 0041 09/03/20 0348 09/04/20 0430   Weight: 92 kg (202 lb 13.2 oz) 88.5 kg (195 lb) 96.5 kg (212 lb 12.8 oz)   Ofelia Garcia RN notified of weight gain, attempted to call pt mobility for kameron lift to zero bed, left message

## 2020-09-04 NOTE — PROGRESS NOTES
Problem: Falls - Risk of  Goal: *Absence of Falls  Description: Document Brett Montalvo Fall Risk and appropriate interventions in the flowsheet. Outcome: Progressing Towards Goal  Note: Fall Risk Interventions:       Mentation Interventions: Adequate sleep, hydration, pain control, Update white board, Reorient patient, More frequent rounding, Room close to nurse's station    Medication Interventions: Assess postural VS orthostatic hypotension, Teach patient to arise slowly, Patient to call before getting OOB    Elimination Interventions: Call light in reach, Patient to call for help with toileting needs              Problem: Breathing Pattern - Ineffective  Goal: *Absence of hypoxia  Outcome: Progressing Towards Goal     Problem: Airway Clearance - Ineffective  Goal: Achieve or maintain patent airway  Outcome: Progressing Towards Goal     Problem: Fatigue  Goal: Verbalize increase energy and improved vitality  Outcome: Progressing Towards Goal     Problem: Discharge Planning  Goal: *Discharge to safe environment  Outcome: Progressing Towards Goal    Pt on RA for last 2 days. Last day on remdesivir. Possible d/c tomorrow. Repeat chest xray today. Poor intake. Will continue to monitor and educate. Bedside shift change report given to Yang Segundo  (oncoming nurse) by Haile Treviño (offgoing nurse). Report included the following information Kardex.

## 2020-09-04 NOTE — PROGRESS NOTES
Problem: Mobility Impaired (Adult and Pediatric)  Goal: *Acute Goals and Plan of Care (Insert Text)  Description: FUNCTIONAL STATUS PRIOR TO ADMISSION: Pt has hx of CVA with left sided weakness. Impaired cognition at baseline and requires assistance for ADLs. Pt was ambulating using RW. Physical Therapy Goals  Initiated 9/2/2020  1. Patient will move from supine to sit and sit to supine , scoot up and down, and roll side to side in bed with minimal assistance/contact guard assist within 7 day(s). 2.  Patient will transfer from bed to chair and chair to bed with moderate assistance  using the least restrictive device within 7 day(s). 3.  Patient will perform sit to stand with minimal assistance/contact guard assist within 7 day(s). 4.  Patient will ambulate with moderate assistance  for 10 feet with the least restrictive device within 7 day(s). Outcome: Progressing Towards Goal       PHYSICAL THERAPY TREATMENT  Patient: Denver Money (75 y.o. male)  Date: 9/4/2020  Diagnosis: Respiratory failure ([facility] [J96.90]   Respiratory failure ([facility]       Precautions:    Chart, physical therapy assessment, plan of care and goals were reviewed. ASSESSMENT  Patient continues with skilled PT services and is progressing towards goals. Pt was able to stand with decrease assistance. Pt utilized back of chair for support with standing. Pt agreeable to transfer to the chair. Pt required increase time and encouragement. Pt is hesitant with out the rolling walker. Placed a rolling walker in room for to assist back to bed and next session . Current Level of Function Impacting Discharge (mobility/balance): Max A    Other factors to consider for discharge: below baseline          PLAN :  Patient continues to benefit from skilled intervention to address the above impairments. Continue treatment per established plan of care. to address goals.     Recommendation for discharge: (in order for the patient to meet his/her long term goals)  Therapy up to 5 days/week in SNF setting    This discharge recommendation:  Has not yet been discussed the attending provider and/or case management    IF patient discharges home will need the following DME: to be determined (TBD)       SUBJECTIVE:   Patient stated it is good.  to be OOB    OBJECTIVE DATA SUMMARY:   Critical Behavior:  Neurologic State: Alert, Eyes open spontaneously  Orientation Level: Oriented to person, Disoriented to place, Disoriented to situation, Disoriented to time  Cognition: Follows commands  Safety/Judgement: Not assessed  Functional Mobility Training:  Bed Mobility:     Supine to Sit: Minimum assistance              Transfers:  Sit to Stand: Maximum assistance  Stand to Sit: Maximum assistance        Bed to Chair: Maximum assistance; Additional time                    Balance:  Sitting: Impaired  Sitting - Static: Good (unsupported)  Standing: Impaired  Standing - Static: Fair  Standing - Dynamic : Poor  Ambulation/Gait Training:                                                        Stairs: Therapeutic Exercises:     Pain Rating:  No complaints    Activity Tolerance:   limited  Please refer to the flowsheet for vital signs taken during this treatment. After treatment patient left in no apparent distress:   Sitting in chair and Call bell within reach    COMMUNICATION/COLLABORATION:   The patients plan of care was discussed with: Registered nurse.      Tamia Coughlin PTA   Time Calculation: 26 mins

## 2020-09-04 NOTE — PROGRESS NOTES
[facility] [facility] Adult  Hospitalist Group                                                                                          Hospitalist Progress Note  Pina Saenz MD  Answering service: [phone] OR [phone] from in house phone        Date of Service:  2020  NAME:  Barbara Schafer  :  [birth year]  MRN:  [id number]      Admission Summary:     Barbara Schafer is a 40-year-old male with a past medical history that includes HTN, ischemic stroke with residual left hemiplegia, dysphagia, T2DM and GERD who was transferred to [facility] from [facility] for increasing oxygen requirement. He arrived here at approximately 4 PM.      Patient originally presented at [facility] on  with several days of fever, cough and shortness of breath. He tested positive for COVID and was started on ceftriaxone and azithromycin for RML CAP. There was concern for aspiration so he was started on Zosyn and ceftriaxone was discontinued. He never did receive Remdesivir. On  IV Diflucan was initiated for COVID pneumonitis. Patient was weaned from CPAP to 50% Ventimask and maintained saturations greater than 95% without worsening shortness of breath. He is transferred out of the ICU on high flow nasal cannula and is now resting comfortably on 6 L NC.     Interval history / Subjective:   F/u Acute respiratory failure  Patient is a poor historian, he said he feels the same, has on and off cough, no chest pain  Now on room air     Assessment & Plan:     Acute hypoxic respiratory failure due to COVID, CAP POA  -High flow transitioned to mid flow, improving, now on 5 l/m, monitor pulse ox  -on iv decadron (--), remdesivir (--)  -Had total 9 days Zosyn and 5 days azithromycin PTA.  -COVID was positive at [facility]  -Droplet precautions  -Prone positioning  -On prn Albuterol  -ID on board     Pneumonia due to COVID 19 infection  -Had total 9 days Zosyn and 5 days azithromycin PTA.  -continue vitamin c, zinc, decadron, oxygen support,   - ID consulted started on remdesivir (8/31--)  -Elevated d-dimer  -CT chest 8/26 no PE  -On Lovenox 40 mg every 12 hours     Leukocytosis  -Likely related to prolonged steroid use, now resolved  -Antibiotics completed  -improved and wbc is normal     HTN  -BP fairly controlled, Continue home carvedilol 25 mg twice daily  -Hold home amlodipine and lisinopril for now and monitor VS     T2DM with hyperglycemia  -On Decadron  - A1c 6.3  -continue sliding scale and monitor finger stick glucose     Hx of CVA with left hemiplegia and dysphagia  -CT 8/22 old ischemic changes noted, no acute changes  -Usually walks w assist of a rolling walker  -seen by speech, on pureed diet  -PT/OT     Pureed diet    PT/OT SNF    Code status: Full Code  DVT prophylaxis: Lovenox 40 BID  PTA: Ambulate with walker and cane at base line   Call grand daughter Kevin Contreras ([phone]). Plan: If remains stable ?tomorrow, will talk to Halle Fagan discussed with: Patient/Family and Nurse  Anticipated Disposition:?tomorrow  Anticipated Discharge: 24 hours to 48 hours     I called his son, Loan Lopez , updated and answered questions [id number]  Date Reviewed: 9/3/2020          Codes Class Noted POA    * (Principal) Respiratory failure ([facility] ICD-10-CM: J96.90  ICD-9-CM: 518.81  8/30/2020 Yes                Vital Signs:    Last 24hrs VS reviewed since prior progress note.  Most recent are:  Visit Vitals  /67 (BP 1 Location: Right arm, BP Patient Position: At rest)   Pulse (!) 52   Temp 98.2 °F (36.8 °C)   Resp 18   Ht 5' 8\" (1.727 m)   Wt 96.5 kg (212 lb 12.8 oz)   SpO2 93%   BMI 32.36 kg/m²         Intake/Output Summary (Last 24 hours) at 9/4/2020 0626  Last data filed at 9/4/2020 0430  Gross per 24 hour   Intake 1802 ml   Output 750 ml   Net 1052 ml        Physical Examination:             Constitutional:  No acute distress, cooperative, pleasant    ENT:  Oral mucosa moist, oropharynx benign. Resp:  Decrease bronchial breath sound bilaterally. No wheezing/rhonchi/rales. No accessory muscle use   CV:  Regular rhythm, normal rate, no murmurs, gallops, rubs    GI:  Soft, non distended, non tender. normoactive bowel sounds, no hepatosplenomegaly     Musculoskeletal:  No edema     Neurologic:  Conscious and alert, oriented to place and person, motor RE 4-5/5, LLE 3/5, LUE 2/5     Skin:  Dry but no skin rash       Data Review:    Review and/or order of clinical lab test  Review and/or order of tests in the radiology section of CPT  Review and/or order of tests in the medicine section of CPT      Labs:     Recent Labs     09/04/20 0513 09/03/20 0347   WBC 8.2 7.9   HGB 13.5 13.3   HCT 40.3 40.3    274     Recent Labs     09/04/20 0513 09/03/20 0347 09/02/20  1445    139 139   K 4.1 4.4 3.9    106 106   CO2 26 26 27   BUN 17 22* 27*   CREA 0.58* 0.58* 1.01   * 224* 161*   CA 8.7 8.6 8.3*   MG 2.1 2.2 2.2     Recent Labs     09/04/20 0513 09/03/20 0347 09/02/20  1445   ALT 33 28 30   AP 48 45 57   TBILI 0.5 0.5 0.5   TP 6.0* 6.1* 6.6   ALB 2.6* 2.4* 2.5*   GLOB 3.4 3.7 4.1*     Recent Labs     09/04/20 0513 09/03/20 0347 09/01/20  1020   INR 1.3* 1.3* 1.2*   PTP 13.0* 12.8* 12.4*   APTT 29.4 29.9 28.4      Recent Labs     09/02/20  1445   FERR 280      No results found for: FOL, RBCF   No results for input(s): PH, PCO2, PO2 in the last 72 hours. No results for input(s): CPK, CKNDX, TROIQ in the last 72 hours.     No lab exists for component: CPKMB  No results found for: CHOL, CHOLX, CHLST, CHOLV, HDL, HDLP, LDL, LDLC, DLDLP, TGLX, TRIGL, TRIGP, CHHD, CHHDX  Lab Results   Component Value Date/Time    Glucose (POC) 230 (H) 09/03/2020 09:45 PM    Glucose (POC) 144 (H) 09/03/2020 04:49 PM    Glucose (POC) 170 (H) 09/03/2020 12:04 PM    Glucose (POC) 137 (H) 09/03/2020 09:37 AM    Glucose (POC) 251 (H) 09/02/2020 09:09 PM     No results found for: COLOR, APPRN, SPGRU, REFSG, DINAH, PROTU, GLUCU, KETU, BILU, UROU, MAGALI, LEUKU, GLUKE, EPSU, BACTU, WBCU, RBCU, CASTS, UCRY      Medications Reviewed:     Current Facility-Administered Medications   Medication Dose Route Frequency    insulin lispro (HUMALOG) injection   SubCUTAneous AC&HS    nystatin (MYCOSTATIN) 100,000 unit/mL oral suspension 500,000 Units  500,000 Units Oral QID    ascorbic acid (vitamin C) (VITAMIN C) tablet 500 mg  500 mg Oral BID    zinc sulfate (ZINCATE) 220 (50) mg capsule 1 Cap  1 Cap Oral DAILY    remdesivir 100 mg in 0.9% sodium chloride 250 mL IVPB  100 mg IntraVENous Q24H    sodium chloride (NS) flush 5-40 mL  5-40 mL IntraVENous Q8H    sodium chloride (NS) flush 5-40 mL  5-40 mL IntraVENous PRN    bisacodyL (DULCOLAX) tablet 5 mg  5 mg Oral DAILY PRN    ondansetron (ZOFRAN ODT) tablet 4 mg  4 mg Oral Q8H PRN    Or    ondansetron (ZOFRAN) injection 4 mg  4 mg IntraVENous Q6H PRN    dexamethasone (DECADRON) 4 mg/mL injection 6 mg  6 mg IntraVENous Q24H    glucose chewable tablet 16 g  4 Tab Oral PRN    glucagon (GLUCAGEN) injection 1 mg  1 mg IntraMUSCular PRN    dextrose 10% infusion 0-250 mL  0-250 mL IntraVENous PRN    enoxaparin (LOVENOX) injection 40 mg  40 mg SubCUTAneous Q12H    acetaminophen (TYLENOL) tablet 650 mg  650 mg Oral Q6H PRN    Or    acetaminophen (TYLENOL) suppository 650 mg  650 mg Rectal Q6H PRN    carvediloL (COREG) tablet 25 mg  25 mg Oral BID WITH MEALS    pravastatin (PRAVACHOL) tablet 40 mg  40 mg Oral QHS    0.9% sodium chloride infusion  60 mL/hr IntraVENous CONTINUOUS    albuterol (PROVENTIL HFA, VENTOLIN HFA, PROAIR HFA) inhaler 2 Puff  2 Puff Inhalation Q4H RT     ______________________________________________________________________  EXPECTED LENGTH OF STAY: 5d 12h  ACTUAL LENGTH OF STAY:          Sheela Bhatia MD

## 2020-09-04 NOTE — PROGRESS NOTES
ID Progress Note  2020    Subjective:     Antiviral therapy is complete. He is on room air. Objective:     Antibiotics:  1. None       Vitals:   Visit Vitals  /85 (BP 1 Location: Right arm, BP Patient Position: At rest)   Pulse 70   Temp 98 °F (36.7 °C)   Resp 29   Ht 5' 8\" (1.727 m)   Wt 96.5 kg (212 lb 12.8 oz)   SpO2 93%   BMI 32.36 kg/m²        Tmax:  Temp (24hrs), Av.2 °F (36.8 °C), Min:97.9 °F (36.6 °C), Max:98.5 °F (36.9 °C)      Exam:  Deferred     Labs:      Recent Labs     20  0513 20  0347 20  1445   WBC 8.2 7.9 9.9   HGB 13.5 13.3 13.9    274 290   BUN 17 22* 27*   CREA 0.58* 0.58* 1.01   AP 48 45 57   TBILI 0.5 0.5 0.5       Cultures:     No results found for: SDES  No results found for: CULT    Radiology:     Line/Insert Date:           Assessment:     1. COVID 19  2. Hypoxia  3. Debility     Objective:     1. Continue current therapy  2. He is improved overall  3.  PRN this weekend    Tessa Acosta MD

## 2020-09-05 PROBLEM — I47.29 NSVT (NONSUSTAINED VENTRICULAR TACHYCARDIA): Status: ACTIVE | Noted: 2020-09-05

## 2020-09-05 LAB
ALBUMIN SERPL-MCNC: 2.2 G/DL (ref 3.5–5)
ALBUMIN/GLOB SERPL: 0.6 {RATIO} (ref 1.1–2.2)
ALP SERPL-CCNC: 52 U/L (ref 45–117)
ALT SERPL-CCNC: 33 U/L (ref 12–78)
ANION GAP SERPL CALC-SCNC: 7 MMOL/L (ref 5–15)
APTT PPP: 29.1 SEC (ref 22.1–32)
AST SERPL-CCNC: 16 U/L (ref 15–37)
BASOPHILS # BLD: 0 K/UL (ref 0–0.1)
BASOPHILS NFR BLD: 0 % (ref 0–1)
BILIRUB SERPL-MCNC: 0.5 MG/DL (ref 0.2–1)
BUN SERPL-MCNC: 15 MG/DL (ref 6–20)
BUN/CREAT SERPL: 23 (ref 12–20)
CALCIUM SERPL-MCNC: 8.2 MG/DL (ref 8.5–10.1)
CHLORIDE SERPL-SCNC: 104 MMOL/L (ref 97–108)
CO2 SERPL-SCNC: 26 MMOL/L (ref 21–32)
CREAT SERPL-MCNC: 0.64 MG/DL (ref 0.7–1.3)
D DIMER PPP FEU-MCNC: 0.75 MG/L FEU (ref 0–0.65)
DIFFERENTIAL METHOD BLD: ABNORMAL
EOSINOPHIL # BLD: 0 K/UL (ref 0–0.4)
EOSINOPHIL NFR BLD: 0 % (ref 0–7)
ERYTHROCYTE [DISTWIDTH] IN BLOOD BY AUTOMATED COUNT: 12.5 % (ref 11.5–14.5)
FIBRINOGEN PPP-MCNC: 343 MG/DL (ref 200–475)
GLOBULIN SER CALC-MCNC: 3.8 G/DL (ref 2–4)
GLUCOSE BLD STRIP.AUTO-MCNC: 153 MG/DL (ref 65–100)
GLUCOSE BLD STRIP.AUTO-MCNC: 159 MG/DL (ref 65–100)
GLUCOSE BLD STRIP.AUTO-MCNC: 169 MG/DL (ref 65–100)
GLUCOSE BLD STRIP.AUTO-MCNC: 248 MG/DL (ref 65–100)
GLUCOSE SERPL-MCNC: 213 MG/DL (ref 65–100)
HCT VFR BLD AUTO: 39.8 % (ref 36.6–50.3)
HGB BLD-MCNC: 13.3 G/DL (ref 12.1–17)
IMM GRANULOCYTES # BLD AUTO: 0.1 K/UL (ref 0–0.04)
IMM GRANULOCYTES NFR BLD AUTO: 1 % (ref 0–0.5)
INR PPP: 1.3 (ref 0.9–1.1)
LYMPHOCYTES # BLD: 1 K/UL (ref 0.8–3.5)
LYMPHOCYTES NFR BLD: 12 % (ref 12–49)
MAGNESIUM SERPL-MCNC: 1.9 MG/DL (ref 1.6–2.4)
MCH RBC QN AUTO: 32.8 PG (ref 26–34)
MCHC RBC AUTO-ENTMCNC: 33.4 G/DL (ref 30–36.5)
MCV RBC AUTO: 98.3 FL (ref 80–99)
MONOCYTES # BLD: 0.6 K/UL (ref 0–1)
MONOCYTES NFR BLD: 7 % (ref 5–13)
NEUTS SEG # BLD: 6.3 K/UL (ref 1.8–8)
NEUTS SEG NFR BLD: 80 % (ref 32–75)
NRBC # BLD: 0 K/UL (ref 0–0.01)
NRBC BLD-RTO: 0 PER 100 WBC
PLATELET # BLD AUTO: 273 K/UL (ref 150–400)
PMV BLD AUTO: 10.9 FL (ref 8.9–12.9)
POTASSIUM SERPL-SCNC: 4 MMOL/L (ref 3.5–5.1)
PROT SERPL-MCNC: 6 G/DL (ref 6.4–8.2)
PROTHROMBIN TIME: 13 SEC (ref 9–11.1)
RBC # BLD AUTO: 4.05 M/UL (ref 4.1–5.7)
SERVICE CMNT-IMP: ABNORMAL
SODIUM SERPL-SCNC: 137 MMOL/L (ref 136–145)
THERAPEUTIC RANGE,PTTT: NORMAL SECS (ref 58–77)
WBC # BLD AUTO: 8 K/UL (ref 4.1–11.1)

## 2020-09-05 PROCEDURE — 82962 GLUCOSE BLOOD TEST: CPT

## 2020-09-05 PROCEDURE — 85025 COMPLETE CBC W/AUTO DIFF WBC: CPT

## 2020-09-05 PROCEDURE — 80053 COMPREHEN METABOLIC PANEL: CPT

## 2020-09-05 PROCEDURE — 74011250637 HC RX REV CODE- 250/637: Performed by: NURSE PRACTITIONER

## 2020-09-05 PROCEDURE — 74011250637 HC RX REV CODE- 250/637: Performed by: HOSPITALIST

## 2020-09-05 PROCEDURE — 83735 ASSAY OF MAGNESIUM: CPT

## 2020-09-05 PROCEDURE — 85384 FIBRINOGEN ACTIVITY: CPT

## 2020-09-05 PROCEDURE — 74011250636 HC RX REV CODE- 250/636: Performed by: INTERNAL MEDICINE

## 2020-09-05 PROCEDURE — 94640 AIRWAY INHALATION TREATMENT: CPT

## 2020-09-05 PROCEDURE — 85730 THROMBOPLASTIN TIME PARTIAL: CPT

## 2020-09-05 PROCEDURE — 99222 1ST HOSP IP/OBS MODERATE 55: CPT | Performed by: SPECIALIST

## 2020-09-05 PROCEDURE — 74011636637 HC RX REV CODE- 636/637: Performed by: HOSPITALIST

## 2020-09-05 PROCEDURE — 65660000000 HC RM CCU STEPDOWN

## 2020-09-05 PROCEDURE — 74011250637 HC RX REV CODE- 250/637: Performed by: INTERNAL MEDICINE

## 2020-09-05 PROCEDURE — 74011250636 HC RX REV CODE- 250/636: Performed by: HOSPITALIST

## 2020-09-05 PROCEDURE — 36415 COLL VENOUS BLD VENIPUNCTURE: CPT

## 2020-09-05 PROCEDURE — 85379 FIBRIN DEGRADATION QUANT: CPT

## 2020-09-05 PROCEDURE — 87635 SARS-COV-2 COVID-19 AMP PRB: CPT

## 2020-09-05 PROCEDURE — 85610 PROTHROMBIN TIME: CPT

## 2020-09-05 RX ORDER — AMLODIPINE BESYLATE 5 MG/1
5 TABLET ORAL DAILY
Status: DISCONTINUED | OUTPATIENT
Start: 2020-09-05 | End: 2020-09-12 | Stop reason: HOSPADM

## 2020-09-05 RX ADMIN — AMLODIPINE BESYLATE 5 MG: 5 TABLET ORAL at 09:23

## 2020-09-05 RX ADMIN — INSULIN LISPRO 2 UNITS: 100 INJECTION, SOLUTION INTRAVENOUS; SUBCUTANEOUS at 09:23

## 2020-09-05 RX ADMIN — ALBUTEROL SULFATE 2 PUFF: 90 AEROSOL, METERED RESPIRATORY (INHALATION) at 11:41

## 2020-09-05 RX ADMIN — CARVEDILOL 25 MG: 12.5 TABLET, FILM COATED ORAL at 09:23

## 2020-09-05 RX ADMIN — ALBUTEROL SULFATE 2 PUFF: 90 AEROSOL, METERED RESPIRATORY (INHALATION) at 03:32

## 2020-09-05 RX ADMIN — PRAVASTATIN SODIUM 40 MG: 40 TABLET ORAL at 21:54

## 2020-09-05 RX ADMIN — CARVEDILOL 25 MG: 12.5 TABLET, FILM COATED ORAL at 18:26

## 2020-09-05 RX ADMIN — OXYCODONE HYDROCHLORIDE AND ACETAMINOPHEN 500 MG: 500 TABLET ORAL at 09:23

## 2020-09-05 RX ADMIN — ALBUTEROL SULFATE 2 PUFF: 90 AEROSOL, METERED RESPIRATORY (INHALATION) at 00:04

## 2020-09-05 RX ADMIN — INSULIN LISPRO 2 UNITS: 100 INJECTION, SOLUTION INTRAVENOUS; SUBCUTANEOUS at 13:41

## 2020-09-05 RX ADMIN — OXYCODONE HYDROCHLORIDE AND ACETAMINOPHEN 500 MG: 500 TABLET ORAL at 18:26

## 2020-09-05 RX ADMIN — INSULIN LISPRO 2 UNITS: 100 INJECTION, SOLUTION INTRAVENOUS; SUBCUTANEOUS at 21:54

## 2020-09-05 RX ADMIN — SODIUM CHLORIDE 60 ML/HR: 900 INJECTION, SOLUTION INTRAVENOUS at 11:18

## 2020-09-05 RX ADMIN — NYSTATIN 500000 UNITS: 100000 SUSPENSION ORAL at 13:42

## 2020-09-05 RX ADMIN — ALBUTEROL SULFATE 2 PUFF: 90 AEROSOL, METERED RESPIRATORY (INHALATION) at 07:54

## 2020-09-05 RX ADMIN — ENOXAPARIN SODIUM 40 MG: 40 INJECTION SUBCUTANEOUS at 21:54

## 2020-09-05 RX ADMIN — NYSTATIN 500000 UNITS: 100000 SUSPENSION ORAL at 18:26

## 2020-09-05 RX ADMIN — DEXAMETHASONE SODIUM PHOSPHATE 6 MG: 4 INJECTION, SOLUTION INTRA-ARTICULAR; INTRALESIONAL; INTRAMUSCULAR; INTRAVENOUS; SOFT TISSUE at 18:27

## 2020-09-05 RX ADMIN — ALBUTEROL SULFATE 2 PUFF: 90 AEROSOL, METERED RESPIRATORY (INHALATION) at 17:00

## 2020-09-05 RX ADMIN — INSULIN LISPRO 2 UNITS: 100 INJECTION, SOLUTION INTRAVENOUS; SUBCUTANEOUS at 18:26

## 2020-09-05 RX ADMIN — ENOXAPARIN SODIUM 40 MG: 40 INJECTION SUBCUTANEOUS at 09:24

## 2020-09-05 RX ADMIN — NYSTATIN 500000 UNITS: 100000 SUSPENSION ORAL at 09:26

## 2020-09-05 RX ADMIN — ZINC SULFATE 220 MG (50 MG) CAPSULE 1 CAPSULE: CAPSULE at 09:23

## 2020-09-05 RX ADMIN — Medication 10 ML: at 16:18

## 2020-09-05 RX ADMIN — ALBUTEROL SULFATE 2 PUFF: 90 AEROSOL, METERED RESPIRATORY (INHALATION) at 02:00

## 2020-09-05 NOTE — CONSULTS
Consult Note      Assessment:    Patient Active Problem List   Diagnosis Code    Diabetes mellitus type 2, controlled ([facility] E11.9    Dysphagia R13.10    Essential hypertension I10    Gastroesophageal reflux disease K21.9    Genuine stress incontinence, female N39.3    Hemiplegia ([facility] G81.90    History of adenomatous polyp of colon Z86.010    Hyperproteinemia E88.09    Intracranial hemorrhage (HCC) I62.9    Laryngopharyngeal reflux K21.9    Respiratory failure ([facility] J96.90    NSVT (nonsustained ventricular tachycardia) ([facility]) I47.2       Recommendations:    Echocardiogram when feasible, continue to monitor. Wai Hua MD  [phone]  Kim Martino is a 66 y.o. male who presented 8/30/2020 with complaints of shortness of breath. The symptoms began approximately 6 days ago. He has no history of cardiac disease including CAD, MI, Stents, CHF and atrial fib. He has history of CVA but no cardiac history, transferred here for progressive SOB. Examination by the attending physician suggested the possibility of NSVT 6 beats on monitor. At present the patient has slightly improved since initial presentation. Therapy thus far has included O2. Cardiology has been consulted to assist in the management of this patient.     Current Facility-Administered Medications   Medication Dose Route Frequency    amLODIPine (NORVASC) tablet 5 mg  5 mg Oral DAILY    insulin lispro (HUMALOG) injection   SubCUTAneous AC&HS    nystatin (MYCOSTATIN) 100,000 unit/mL oral suspension 500,000 Units  500,000 Units Oral QID    ascorbic acid (vitamin C) (VITAMIN C) tablet 500 mg  500 mg Oral BID    zinc sulfate (ZINCATE) 220 (50) mg capsule 1 Cap  1 Cap Oral DAILY    sodium chloride (NS) flush 5-40 mL  5-40 mL IntraVENous Q8H    sodium chloride (NS) flush 5-40 mL  5-40 mL IntraVENous PRN    bisacodyL (DULCOLAX) tablet 5 mg  5 mg Oral DAILY PRN    ondansetron (ZOFRAN ODT) tablet 4 mg  4 mg Oral Q8H PRN    Or    ondansetron (ZOFRAN) injection 4 mg  4 mg IntraVENous Q6H PRN    dexamethasone (DECADRON) 4 mg/mL injection 6 mg  6 mg IntraVENous Q24H    glucose chewable tablet 16 g  4 Tab Oral PRN    glucagon (GLUCAGEN) injection 1 mg  1 mg IntraMUSCular PRN    dextrose 10% infusion 0-250 mL  0-250 mL IntraVENous PRN    enoxaparin (LOVENOX) injection 40 mg  40 mg SubCUTAneous Q12H    acetaminophen (TYLENOL) tablet 650 mg  650 mg Oral Q6H PRN    Or    acetaminophen (TYLENOL) suppository 650 mg  650 mg Rectal Q6H PRN    carvediloL (COREG) tablet 25 mg  25 mg Oral BID WITH MEALS    pravastatin (PRAVACHOL) tablet 40 mg  40 mg Oral QHS    0.9% sodium chloride infusion  60 mL/hr IntraVENous CONTINUOUS    albuterol (PROVENTIL HFA, VENTOLIN HFA, PROAIR HFA) inhaler 2 Puff  2 Puff Inhalation Q4H RT     Past Medical History:   Diagnosis Date    Acid reflux     Anemia     Diabetes (HCC)     GERD (gastroesophageal reflux disease)     Hypertension     Stroke [facility])      Patient Active Problem List   Diagnosis Code    Diabetes mellitus type 2, controlled ([facility] E11.9    Dysphagia R13.10    Essential hypertension I10    Gastroesophageal reflux disease K21.9    Genuine stress incontinence, female N39.3    Hemiplegia ([facility] G81.90    History of adenomatous polyp of colon Z86.010    Hyperproteinemia E88.09    Intracranial hemorrhage (HCC) I62.9    Laryngopharyngeal reflux K21.9    Respiratory failure (HCC) J96.90    NSVT (nonsustained ventricular tachycardia) ([facility]) I47.2     No Known Allergies  Social History     Tobacco Use    Smoking status: Not on file   Substance Use Topics    Alcohol use: Not on file    Drug use: Not on file     Family History   Problem Relation Age of Onset    Heart Disease Father     Hypertension Brother     Diabetes Brother        Review of Symptoms:  A comprehensive review of systems was negative except for that written in the HPI.      Objective:      Visit Vitals  /42 (BP 1 Location: Right arm, BP Patient Position: At rest)   Pulse 74   Temp 98.3 °F (36.8 °C)   Resp 18   Ht 5' 8\" (1.727 m)   Wt 218 lb 14.7 oz (99.3 kg)   SpO2 95%   BMI 33.29 kg/m²      Physical Exam    Exam deferred due to his being Covid PUI, I attempted to speak to him on the phone but it was difficult. Cardiographics    Telemetry: PAC  ECG: normal sinus rhythm, PAC's noted, 6 beats NSVT  Echocardiogram: Not done    Labs:   Recent Results (from the past 24 hour(s))   GLUCOSE, POC    Collection Time: 09/04/20  4:42 PM   Result Value Ref Range    Glucose (POC) 145 (H) 65 - 100 mg/dL    Performed by Waylon Yarbrough    GLUCOSE, POC    Collection Time: 09/04/20  9:05 PM   Result Value Ref Range    Glucose (POC) 185 (H) 65 - 100 mg/dL    Performed by Olga Mcclelland PCT    MAGNESIUM    Collection Time: 09/05/20  3:27 AM   Result Value Ref Range    Magnesium 1.9 1.6 - 2.4 mg/dL   PROTHROMBIN TIME + INR    Collection Time: 09/05/20  3:27 AM   Result Value Ref Range    INR 1.3 (H) 0.9 - 1.1      Prothrombin time 13.0 (H) 9.0 - 11.1 sec   PTT    Collection Time: 09/05/20  3:27 AM   Result Value Ref Range    aPTT 29.1 22.1 - 32.0 sec    aPTT, therapeutic range     58.0 - 77.0 SECS   FIBRINOGEN    Collection Time: 09/05/20  3:27 AM   Result Value Ref Range    Fibrinogen 343 200 - 393 mg/dL   METABOLIC PANEL, COMPREHENSIVE    Collection Time: 09/05/20  3:27 AM   Result Value Ref Range    Sodium 137 136 - 145 mmol/L    Potassium 4.0 3.5 - 5.1 mmol/L    Chloride 104 97 - 108 mmol/L    CO2 26 21 - 32 mmol/L    Anion gap 7 5 - 15 mmol/L    Glucose 213 (H) 65 - 100 mg/dL    BUN 15 6 - 20 MG/DL    Creatinine 0.64 (L) 0.70 - 1.30 MG/DL    BUN/Creatinine ratio 23 (H) 12 - 20      GFR est AA >60 >60 ml/min/1.73m2    GFR est non-AA >60 >60 ml/min/1.73m2    Calcium 8.2 (L) 8.5 - 10.1 MG/DL    Bilirubin, total 0.5 0.2 - 1.0 MG/DL    ALT (SGPT) 33 12 - 78 U/L    AST (SGOT) 16 15 - 37 U/L    Alk.  phosphatase 52 45 - 117 U/L    Protein, total 6.0 (L) 6.4 - 8.2 g/dL    Albumin 2.2 (L) 3.5 - 5.0 g/dL    Globulin 3.8 2.0 - 4.0 g/dL    A-G Ratio 0.6 (L) 1.1 - 2.2     CBC WITH AUTOMATED DIFF    Collection Time: 09/05/20  3:27 AM   Result Value Ref Range    WBC 8.0 4.1 - 11.1 K/uL    RBC 4.05 (L) 4.10 - 5.70 M/uL    HGB 13.3 12.1 - 17.0 g/dL    HCT 39.8 36.6 - 50.3 %    MCV 98.3 80.0 - 99.0 FL    MCH 32.8 26.0 - 34.0 PG    MCHC 33.4 30.0 - 36.5 g/dL    RDW 12.5 11.5 - 14.5 %    PLATELET 461 521 - 240 K/uL    MPV 10.9 8.9 - 12.9 FL    NRBC 0.0 0  WBC    ABSOLUTE NRBC 0.00 0.00 - 0.01 K/uL    NEUTROPHILS 80 (H) 32 - 75 %    LYMPHOCYTES 12 12 - 49 %    MONOCYTES 7 5 - 13 %    EOSINOPHILS 0 0 - 7 %    BASOPHILS 0 0 - 1 %    IMMATURE GRANULOCYTES 1 (H) 0.0 - 0.5 %    ABS. NEUTROPHILS 6.3 1.8 - 8.0 K/UL    ABS. LYMPHOCYTES 1.0 0.8 - 3.5 K/UL    ABS. MONOCYTES 0.6 0.0 - 1.0 K/UL    ABS. EOSINOPHILS 0.0 0.0 - 0.4 K/UL    ABS. BASOPHILS 0.0 0.0 - 0.1 K/UL    ABS. IMM.  GRANS. 0.1 (H) 0.00 - 0.04 K/UL    DF AUTOMATED     D DIMER    Collection Time: 09/05/20  3:27 AM   Result Value Ref Range    D-dimer 0.75 (H) 0.00 - 0.65 mg/L FEU   GLUCOSE, POC    Collection Time: 09/05/20  8:53 AM   Result Value Ref Range    Glucose (POC) 169 (H) 65 - 100 mg/dL    Performed by Westley Quesada)        Connie Lakhani MD

## 2020-09-05 NOTE — PROGRESS NOTES
1500: Received [facility] (Phone: [phone]) stating they have been waiting on a  to call them about nursing home placement. Patient's normal caregiver (Wife) has COVID and is not currently strong enough to assist patient with needs. Crystal Sewell wants patient to go to a nursing home at discharge. CM paged. [phone]: Contacted Case Management and let them know the [facility]'s wish for SNF. 2030: Bedside shift change report given to DONNA Terry (oncoming nurse) by Bard Bharathi RN (offgoing nurse). Report included the following information SBAR, Kardex, Intake/Output, MAR, Recent Results, Med Rec Status and Cardiac Rhythm NSR.

## 2020-09-05 NOTE — PROGRESS NOTES
[address] [facility] Adult  Hospitalist Group                                                                                          Hospitalist Progress Note  Boy Schaffer MD  Answering service: [phone] OR [phone] from in house phone        Date of Service:  2020  NAME:  Tanvi Morse  :    MRN:  [id number]      Admission Summary:     Tanvi Morse is a 69-year-old male with a past medical history that includes HTN, ischemic stroke with residual left hemiplegia, dysphagia, T2DM and GERD who was transferred to [address] [place] [address] today from [facility] for increasing oxygen requirement. He arrived here at approximately 4 PM.      Patient originally presented at [facility] on  with several days of fever, cough and shortness of breath. He tested positive for COVID and was started on ceftriaxone and azithromycin for RML CAP. There was concern for aspiration so he was started on Zosyn and ceftriaxone was discontinued. He never did receive Remdesivir. On  IV Diflucan was initiated for COVID pneumonitis. Patient was weaned from CPAP to 50% Ventimask and maintained saturations greater than 95% without worsening shortness of breath. He is transferred out of the ICU on high flow nasal cannula and is now resting comfortably on 6 L NC.     Interval history / Subjective:   F/u Acute respiratory failure  No new issues  Continues on room air  BP slightly elevated     Assessment & Plan:     Acute hypoxic respiratory failure due to COVID, CAP POA  -High flow transitioned to mid flow, improving, now on 5 l/m, monitor pulse ox  -on iv decadron (--), remdesivir (--)  -Had total 9 days Zosyn and 5 days azithromycin PTA.  -COVID was positive at [facility]  -Droplet precautions  -Prone positioning  -On prn Albuterol  -ID on board     Pneumonia due to COVID 19 infection  -Had total 9 days Zosyn and 5 days azithromycin PTA.  -continue vitamin c, zinc, decadron, oxygen support,   - ID consulted started on remdesivir (8/31--)  -Elevated d-dimer  -CT chest 8/26 no PE  -On Lovenox 40 mg every 12 hours     Leukocytosis  -Likely related to prolonged steroid use, now resolved  -Antibiotics completed  -improved and wbc is normal     HTN  -BP fairly controlled, Continue home carvedilol 25 mg twice daily  -Will restart norvasc, may need to increase the dose. May add home Lisinopril as well if continues to remain elevated     T2DM with hyperglycemia  -On Decadron  - A1c 6.3  -continue sliding scale and monitor finger stick glucose     Hx of CVA with left hemiplegia and dysphagia  -CT 8/22 old ischemic changes noted, no acute changes  -Usually walks w assist of a rolling walker  -seen by speech, on pureed diet  -PT/OT     Pureed diet    PT/OT SNF    Code status: Full Code  DVT prophylaxis: Lovenox 40 BID  PTA: Ambulate with walker and cane at base line   Spoke to grand daughter Dyana Lopez ([phone]). Agreeing on SNF and wants COVID testing before discharge, will consult CM    Plan: The patient needs SNF, otherwise medically stable    Care Plan discussed with: Patient/Family and Nurse  Anticipated Disposition:?tomorrow  Anticipated Discharge: 24 hours to 48 hours     I called his son, Zoltan Nab , updated and answered questions [phone] Problems  Date Reviewed: 9/3/2020          Codes Class Noted POA    * (Principal) Respiratory failure ([facility] ICD-10-CM: J96.90  ICD-9-CM: 518.81  8/30/2020 Yes                Vital Signs:    Last 24hrs VS reviewed since prior progress note.  Most recent are:  Visit Vitals  /72   Pulse (!) 56   Temp 97.8 °F (36.6 °C)   Resp 14   Ht 5' 8\" (1.727 m)   Wt 99.3 kg (218 lb 14.7 oz)   SpO2 95%   BMI 33.29 kg/m²         Intake/Output Summary (Last 24 hours) at 9/5/2020 4866  Last data filed at 9/4/2020 2027  Gross per 24 hour   Intake 400 ml   Output 800 ml   Net -400 ml        Physical Examination:             Constitutional:  No acute distress, cooperative, pleasant    ENT:  Oral mucosa moist, oropharynx benign. Resp:  Decrease bronchial breath sound bilaterally. No wheezing/rhonchi/rales. No accessory muscle use   CV:  Regular rhythm, normal rate, no murmurs, gallops, rubs    GI:  Soft, non distended, non tender. normoactive bowel sounds, no hepatosplenomegaly     Musculoskeletal:  No edema     Neurologic:  Conscious and alert, oriented to place and person, motor RE 4-5/5, LLE 3/5, LUE 2/5     Skin:  Dry but no skin rash       Data Review:    Review and/or order of clinical lab test  Review and/or order of tests in the radiology section of CPT  Review and/or order of tests in the medicine section of CPT      Labs:     Recent Labs     09/05/20 0327 09/04/20 0513   WBC 8.0 8.2   HGB 13.3 13.5   HCT 39.8 40.3    292     Recent Labs     09/05/20 0327 09/04/20 0513 09/03/20 0347    138 139   K 4.0 4.1 4.4    106 106   CO2 26 26 26   BUN 15 17 22*   CREA 0.64* 0.58* 0.58*   * 187* 224*   CA 8.2* 8.7 8.6   MG 1.9 2.1 2.2     Recent Labs     09/05/20 0327 09/04/20 0513 09/03/20  0347   ALT 33 33 28   AP 52 48 45   TBILI 0.5 0.5 0.5   TP 6.0* 6.0* 6.1*   ALB 2.2* 2.6* 2.4*   GLOB 3.8 3.4 3.7     Recent Labs     09/05/20 0327 09/04/20 0513 09/03/20  0347   INR 1.3* 1.3* 1.3*   PTP 13.0* 13.0* 12.8*   APTT 29.1 29.4 29.9      Recent Labs     09/02/20  1445   FERR 280      No results found for: FOL, RBCF   No results for input(s): PH, PCO2, PO2 in the last 72 hours. No results for input(s): CPK, CKNDX, TROIQ in the last 72 hours.     No lab exists for component: CPKMB  No results found for: CHOL, CHOLX, CHLST, CHOLV, HDL, HDLP, LDL, LDLC, DLDLP, TGLX, TRIGL, TRIGP, CHHD, [facility]  Lab Results   Component Value Date/Time    Glucose (POC) 185 (H) 09/04/2020 09:05 PM    Glucose (POC) 145 (H) 09/04/2020 04:42 PM    Glucose (POC) 149 (H) 09/04/2020 11:32 AM    Glucose (POC) 126 (H) 09/04/2020 09:10 AM    Glucose (POC) 230 (H) 09/03/2020 09:45 PM     No results found for: COLOR, APPRN, SPGRU, REFSG, DINAH, PROTU, GLUCU, KETU, BILU, UROU, MAGALI, LEUKU, GLUKE, EPSU, BACTU, WBCU, RBCU, CASTS, UCRY      Medications Reviewed:     Current Facility-Administered Medications   Medication Dose Route Frequency    insulin lispro (HUMALOG) injection   SubCUTAneous AC&HS    nystatin (MYCOSTATIN) 100,000 unit/mL oral suspension 500,000 Units  500,000 Units Oral QID    ascorbic acid (vitamin C) (VITAMIN C) tablet 500 mg  500 mg Oral BID    zinc sulfate (ZINCATE) 220 (50) mg capsule 1 Cap  1 Cap Oral DAILY    sodium chloride (NS) flush 5-40 mL  5-40 mL IntraVENous Q8H    sodium chloride (NS) flush 5-40 mL  5-40 mL IntraVENous PRN    bisacodyL (DULCOLAX) tablet 5 mg  5 mg Oral DAILY PRN    ondansetron (ZOFRAN ODT) tablet 4 mg  4 mg Oral Q8H PRN    Or    ondansetron (ZOFRAN) injection 4 mg  4 mg IntraVENous Q6H PRN    dexamethasone (DECADRON) 4 mg/mL injection 6 mg  6 mg IntraVENous Q24H    glucose chewable tablet 16 g  4 Tab Oral PRN    glucagon (GLUCAGEN) injection 1 mg  1 mg IntraMUSCular PRN    dextrose 10% infusion 0-250 mL  0-250 mL IntraVENous PRN    enoxaparin (LOVENOX) injection 40 mg  40 mg SubCUTAneous Q12H    acetaminophen (TYLENOL) tablet 650 mg  650 mg Oral Q6H PRN    Or    acetaminophen (TYLENOL) suppository 650 mg  650 mg Rectal Q6H PRN    carvediloL (COREG) tablet 25 mg  25 mg Oral BID WITH MEALS    pravastatin (PRAVACHOL) tablet 40 mg  40 mg Oral QHS    0.9% sodium chloride infusion  60 mL/hr IntraVENous CONTINUOUS    albuterol (PROVENTIL HFA, VENTOLIN HFA, PROAIR HFA) inhaler 2 Puff  2 Puff Inhalation Q4H RT     ______________________________________________________________________  EXPECTED LENGTH OF STAY: 5d 12h  ACTUAL LENGTH OF STAY:          6                 Arturo Greenfield MD

## 2020-09-05 NOTE — PROGRESS NOTES
Transition of Care  RUR 12%    Patient will need COVID 19 Negative test 72 hours prior to discharge to Snf    Disposition   SNF for rehab  [facility]   Referral sent via allacripts and medicals faxed    Transportation  BLS    Main Contacts  Granddaughter  Argelia Desai [phone]  Wife  Trice Rosenberg [phone]    CM talked with Sreekanth Costa on the phone regarding transition of care plan for patient. The family would like patient to go to [facility] as recommended by therapy when discharged. Sreekanth Costa confirmed that patient lives with his wife, Bia Corley, in [place] and has aides provided by [facility] (medicaid personal care). Patient's wife is recovering from [place] and is not able to assist patient if he were to return directly home. Choices of Snf's discussed. .  The family would like patient in the [place] area. Patient went to [facility] after CVA in 2015. . [facility] [phone]. The family would like him to be placed there when discharged  as they plan to have window visits. Sreekanth Costa said the long term plan is for patient to return to the home with continued aides from [facility] [address]. Sreekanth Costa had questions about the rehab in the facility and CM suggested she call admissions director at the facility of choice and get her questions answered. She will     Cm talked with RHYS Wynn at the facility. CM   faxed medicals to her at [phone] and sent  referral to facility via [employer]. Eduard Salmon confirmed that the facility does have SNF availability but will need COVID 19 negative test prior to admission. CM informed his nurse, Guerda Khan, and he will get the COVID 19 test ordered    Patient has Medicare and [facility] Medicaid. .  No authorization is needed.

## 2020-09-05 NOTE — PROGRESS NOTES
Bedside shift change report given to Dalton Grande RN by Jl Hope RN . Report included the following information SBAR, Kardex, ED Summary, Intake/Output, MAR, and Recent Results. Pt oriented to time, place, person and situation, Normal Sinus Rhythm , room air, Pain none. No acute events overnight. Last 3 Recorded Weights in this Encounter    09/03/20 0348 09/04/20 0430 09/05/20 0325   Weight: 88.5 kg (195 lb) 96.5 kg (212 lb 12.8 oz) 99.3 kg (218 lb 14.7 oz)   Pt weighed in bed with one blanket, sheet, and pillow. Weight variance noted        Problem: Pressure Injury - Risk of  Goal: *Prevention of pressure injury  Description: Document William Scale and appropriate interventions in the flowsheet.   Outcome: Progressing Towards Goal  Note: Pressure Injury Interventions:  Sensory Interventions: Assess changes in LOC, Check visual cues for pain, Keep linens dry and wrinkle-free, Float heels    Moisture Interventions: Absorbent underpads, Apply protective barrier, creams and emollients, Internal/External urinary devices    Activity Interventions: Increase time out of bed    Mobility Interventions: Float heels, HOB 30 degrees or less    Nutrition Interventions: Document food/fluid/supplement intake    Friction and Shear Interventions: HOB 30 degrees or less                Problem: Gas Exchange - Impaired  Goal: Absence of hypoxia  Outcome: Progressing Towards Goal  Note: Pt on room air, o2 sats remain above 95%

## 2020-09-06 LAB
ANION GAP SERPL CALC-SCNC: 5 MMOL/L (ref 5–15)
APTT PPP: 29.1 SEC (ref 22.1–32)
BASOPHILS # BLD: 0 K/UL (ref 0–0.1)
BASOPHILS NFR BLD: 0 % (ref 0–1)
BUN SERPL-MCNC: 13 MG/DL (ref 6–20)
BUN/CREAT SERPL: 18 (ref 12–20)
CALCIUM SERPL-MCNC: 8.2 MG/DL (ref 8.5–10.1)
CHLORIDE SERPL-SCNC: 105 MMOL/L (ref 97–108)
CO2 SERPL-SCNC: 27 MMOL/L (ref 21–32)
COVID-19, XGCOVT: DETECTED
CREAT SERPL-MCNC: 0.71 MG/DL (ref 0.7–1.3)
D DIMER PPP FEU-MCNC: 0.75 MG/L FEU (ref 0–0.65)
DIFFERENTIAL METHOD BLD: ABNORMAL
EOSINOPHIL # BLD: 0 K/UL (ref 0–0.4)
EOSINOPHIL NFR BLD: 0 % (ref 0–7)
ERYTHROCYTE [DISTWIDTH] IN BLOOD BY AUTOMATED COUNT: 12.6 % (ref 11.5–14.5)
FIBRINOGEN PPP-MCNC: 330 MG/DL (ref 200–475)
GLUCOSE BLD STRIP.AUTO-MCNC: 137 MG/DL (ref 65–100)
GLUCOSE BLD STRIP.AUTO-MCNC: 174 MG/DL (ref 65–100)
GLUCOSE BLD STRIP.AUTO-MCNC: 180 MG/DL (ref 65–100)
GLUCOSE BLD STRIP.AUTO-MCNC: 206 MG/DL (ref 65–100)
GLUCOSE SERPL-MCNC: 236 MG/DL (ref 65–100)
HCT VFR BLD AUTO: 39 % (ref 36.6–50.3)
HEALTH STATUS, XMCV2T: ABNORMAL
HGB BLD-MCNC: 13.4 G/DL (ref 12.1–17)
IMM GRANULOCYTES # BLD AUTO: 0.1 K/UL (ref 0–0.04)
IMM GRANULOCYTES NFR BLD AUTO: 1 % (ref 0–0.5)
INR PPP: 1.3 (ref 0.9–1.1)
LYMPHOCYTES # BLD: 1 K/UL (ref 0.8–3.5)
LYMPHOCYTES NFR BLD: 12 % (ref 12–49)
MAGNESIUM SERPL-MCNC: 2 MG/DL (ref 1.6–2.4)
MCH RBC QN AUTO: 33.3 PG (ref 26–34)
MCHC RBC AUTO-ENTMCNC: 34.4 G/DL (ref 30–36.5)
MCV RBC AUTO: 97 FL (ref 80–99)
MONOCYTES # BLD: 0.6 K/UL (ref 0–1)
MONOCYTES NFR BLD: 8 % (ref 5–13)
NEUTS SEG # BLD: 6.4 K/UL (ref 1.8–8)
NEUTS SEG NFR BLD: 79 % (ref 32–75)
NRBC # BLD: 0.02 K/UL (ref 0–0.01)
NRBC BLD-RTO: 0.2 PER 100 WBC
PLATELET # BLD AUTO: 266 K/UL (ref 150–400)
PMV BLD AUTO: 10.7 FL (ref 8.9–12.9)
POTASSIUM SERPL-SCNC: 4 MMOL/L (ref 3.5–5.1)
PROTHROMBIN TIME: 13.1 SEC (ref 9–11.1)
RBC # BLD AUTO: 4.02 M/UL (ref 4.1–5.7)
SERVICE CMNT-IMP: ABNORMAL
SODIUM SERPL-SCNC: 137 MMOL/L (ref 136–145)
SOURCE, COVRS: ABNORMAL
SPECIMEN SOURCE, FCOV2M: ABNORMAL
SPECIMEN TYPE, XMCV1T: ABNORMAL
THERAPEUTIC RANGE,PTTT: NORMAL SECS (ref 58–77)
WBC # BLD AUTO: 8.1 K/UL (ref 4.1–11.1)

## 2020-09-06 PROCEDURE — 85025 COMPLETE CBC W/AUTO DIFF WBC: CPT

## 2020-09-06 PROCEDURE — 94760 N-INVAS EAR/PLS OXIMETRY 1: CPT

## 2020-09-06 PROCEDURE — 74011250637 HC RX REV CODE- 250/637: Performed by: NURSE PRACTITIONER

## 2020-09-06 PROCEDURE — 74011250637 HC RX REV CODE- 250/637: Performed by: HOSPITALIST

## 2020-09-06 PROCEDURE — 85610 PROTHROMBIN TIME: CPT

## 2020-09-06 PROCEDURE — 85730 THROMBOPLASTIN TIME PARTIAL: CPT

## 2020-09-06 PROCEDURE — 74011250636 HC RX REV CODE- 250/636: Performed by: INTERNAL MEDICINE

## 2020-09-06 PROCEDURE — 85379 FIBRIN DEGRADATION QUANT: CPT

## 2020-09-06 PROCEDURE — 94640 AIRWAY INHALATION TREATMENT: CPT

## 2020-09-06 PROCEDURE — 65660000000 HC RM CCU STEPDOWN

## 2020-09-06 PROCEDURE — 74011250637 HC RX REV CODE- 250/637: Performed by: INTERNAL MEDICINE

## 2020-09-06 PROCEDURE — 82962 GLUCOSE BLOOD TEST: CPT

## 2020-09-06 PROCEDURE — 99233 SBSQ HOSP IP/OBS HIGH 50: CPT | Performed by: SPECIALIST

## 2020-09-06 PROCEDURE — 36415 COLL VENOUS BLD VENIPUNCTURE: CPT

## 2020-09-06 PROCEDURE — 83735 ASSAY OF MAGNESIUM: CPT

## 2020-09-06 PROCEDURE — 74011250636 HC RX REV CODE- 250/636: Performed by: HOSPITALIST

## 2020-09-06 PROCEDURE — 74011636637 HC RX REV CODE- 636/637: Performed by: HOSPITALIST

## 2020-09-06 PROCEDURE — 80048 BASIC METABOLIC PNL TOTAL CA: CPT

## 2020-09-06 PROCEDURE — 85384 FIBRINOGEN ACTIVITY: CPT

## 2020-09-06 RX ADMIN — OXYCODONE HYDROCHLORIDE AND ACETAMINOPHEN 500 MG: 500 TABLET ORAL at 09:15

## 2020-09-06 RX ADMIN — ENOXAPARIN SODIUM 40 MG: 40 INJECTION SUBCUTANEOUS at 09:16

## 2020-09-06 RX ADMIN — NYSTATIN 500000 UNITS: 100000 SUSPENSION ORAL at 17:38

## 2020-09-06 RX ADMIN — SODIUM CHLORIDE 60 ML/HR: 900 INJECTION, SOLUTION INTRAVENOUS at 05:49

## 2020-09-06 RX ADMIN — NYSTATIN 500000 UNITS: 100000 SUSPENSION ORAL at 12:55

## 2020-09-06 RX ADMIN — CARVEDILOL 25 MG: 12.5 TABLET, FILM COATED ORAL at 17:37

## 2020-09-06 RX ADMIN — Medication 10 ML: at 17:38

## 2020-09-06 RX ADMIN — OXYCODONE HYDROCHLORIDE AND ACETAMINOPHEN 500 MG: 500 TABLET ORAL at 17:37

## 2020-09-06 RX ADMIN — AMLODIPINE BESYLATE 5 MG: 5 TABLET ORAL at 09:15

## 2020-09-06 RX ADMIN — ALBUTEROL SULFATE 2 PUFF: 90 AEROSOL, METERED RESPIRATORY (INHALATION) at 23:50

## 2020-09-06 RX ADMIN — INSULIN LISPRO 2 UNITS: 100 INJECTION, SOLUTION INTRAVENOUS; SUBCUTANEOUS at 23:09

## 2020-09-06 RX ADMIN — ALBUTEROL SULFATE 2 PUFF: 90 AEROSOL, METERED RESPIRATORY (INHALATION) at 19:50

## 2020-09-06 RX ADMIN — ZINC SULFATE 220 MG (50 MG) CAPSULE 1 CAPSULE: CAPSULE at 09:15

## 2020-09-06 RX ADMIN — Medication 10 ML: at 05:49

## 2020-09-06 RX ADMIN — NYSTATIN 500000 UNITS: 100000 SUSPENSION ORAL at 23:09

## 2020-09-06 RX ADMIN — ENOXAPARIN SODIUM 40 MG: 40 INJECTION SUBCUTANEOUS at 23:10

## 2020-09-06 RX ADMIN — Medication 10 ML: at 23:11

## 2020-09-06 RX ADMIN — INSULIN LISPRO 2 UNITS: 100 INJECTION, SOLUTION INTRAVENOUS; SUBCUTANEOUS at 17:42

## 2020-09-06 RX ADMIN — DEXAMETHASONE SODIUM PHOSPHATE 6 MG: 4 INJECTION, SOLUTION INTRA-ARTICULAR; INTRALESIONAL; INTRAMUSCULAR; INTRAVENOUS; SOFT TISSUE at 17:38

## 2020-09-06 RX ADMIN — CARVEDILOL 25 MG: 12.5 TABLET, FILM COATED ORAL at 09:15

## 2020-09-06 RX ADMIN — INSULIN LISPRO 2 UNITS: 100 INJECTION, SOLUTION INTRAVENOUS; SUBCUTANEOUS at 09:16

## 2020-09-06 RX ADMIN — PRAVASTATIN SODIUM 40 MG: 40 TABLET ORAL at 23:09

## 2020-09-06 RX ADMIN — NYSTATIN 500000 UNITS: 100000 SUSPENSION ORAL at 09:15

## 2020-09-06 NOTE — PROGRESS NOTES
Transition of Care Plan  RUR 12%    COVID 19 test pending  Must be negative for admission to the Snf for rehab    Disposition   Snf for rehab   [facility] [address]  Fax [phone]  Contact:  Angella in admissions     Transportation  BLS    Main Contacts  Granddadaniella Davis [phone]  Wife  Jessica Chavis  [phone]    CM confirmed with DON at the facility, Anna Tolbert,  that medicals were received. Angella, admission director, called CM  to inform that patient will be accepted providing COVID 19 test is negative. Angella said she will talk with CM on Tuesday 9/8/20 and if the COVID test is negative, and the nursing supervisor approves,  patient will be able to be admitted to the facility Tuesday    CM called granddaughter, John Paul Bui, to provide an update.

## 2020-09-06 NOTE — PROGRESS NOTES
Problem: Falls - Risk of  Goal: *Absence of Falls  Description: Document Donata Carrel Fall Risk and appropriate interventions in the flowsheet. Outcome: Progressing Towards Goal  Note: Fall Risk Interventions:       Mentation Interventions: Adequate sleep, hydration, pain control, Door open when patient unattended, Evaluate medications/consider consulting pharmacy, Eyeglasses and hearing aids, Increase mobility, More frequent rounding, Reorient patient, Update white board    Medication Interventions: Assess postural VS orthostatic hypotension, Evaluate medications/consider consulting pharmacy, Patient to call before getting OOB, Teach patient to arise slowly    Elimination Interventions: Bed/chair exit alarm, Call light in reach, Patient to call for help with toileting needs              Problem: Pressure Injury - Risk of  Goal: *Prevention of pressure injury  Description: Document William Scale and appropriate interventions in the flowsheet. Outcome: Progressing Towards Goal  Note: Pressure Injury Interventions:  Sensory Interventions: Assess changes in LOC, Assess need for specialty bed, Avoid rigorous massage over bony prominences, Float heels, Keep linens dry and wrinkle-free, Maintain/enhance activity level, Minimize linen layers, Pressure redistribution bed/mattress (bed type)    Moisture Interventions: Absorbent underpads, Apply protective barrier, creams and emollients, Assess need for specialty bed, Limit adult briefs, Maintain skin hydration (lotion/cream)    Activity Interventions: Assess need for specialty bed, Increase time out of bed, Pressure redistribution bed/mattress(bed type), PT/OT evaluation    Mobility Interventions: Assess need for specialty bed, HOB 30 degrees or less, Pressure redistribution bed/mattress (bed type), PT/OT evaluation, Turn and reposition approx.  every two hours(pillow and wedges)    Nutrition Interventions: Document food/fluid/supplement intake, Discuss nutritional consult with provider, Offer support with meals,snacks and hydration    Friction and Shear Interventions: Apply protective barrier, creams and emollients, Foam dressings/transparent film/skin sealants, HOB 30 degrees or less, Lift team/patient mobility team, Minimize layers                Problem: Airway Clearance - Ineffective  Goal: Achieve or maintain patent airway  Outcome: Progressing Towards Goal

## 2020-09-06 NOTE — PROGRESS NOTES
Bedside shift change report given to An Ledezma RN by Ellyn Ga RN . Report included the following information SBAR, Kardex, ED Summary, Intake/Output, MAR, and Recent Results. Pt oriented to time, place, person and situation, Normal Sinus Rhythm , room air, Pain none. No acute events overnight. Last 3 Recorded Weights in this Encounter    09/04/20 0430 09/05/20 0325 09/06/20 0310   Weight: 96.5 kg (212 lb 12.8 oz) 99.3 kg (218 lb 14.7 oz) 98.3 kg (216 lb 11.2 oz)       Problem: Pressure Injury - Risk of  Goal: *Prevention of pressure injury  Description: Document William Scale and appropriate interventions in the flowsheet.   Outcome: Progressing Towards Goal  Note: Pressure Injury Interventions:  Sensory Interventions: Assess changes in LOC, Check visual cues for pain, Float heels, Keep linens dry and wrinkle-free, Minimize linen layers    Moisture Interventions: Absorbent underpads, Apply protective barrier, creams and emollients, Check for incontinence Q2 hours and as needed, Internal/External urinary devices    Activity Interventions: Increase time out of bed, Pressure redistribution bed/mattress(bed type)    Mobility Interventions: Float heels, HOB 30 degrees or less    Nutrition Interventions: Document food/fluid/supplement intake    Friction and Shear Interventions: HOB 30 degrees or less, Apply protective barrier, creams and emollients            Problem: Gas Exchange - Impaired  Goal: Absence of hypoxia  Outcome: Progressing Towards Goal  Note: Pt remains on room air, o2 sats remain above 95%

## 2020-09-06 NOTE — PROGRESS NOTES
[facility] [facility] Adult  Hospitalist Group                                                                                          Hospitalist Progress Note  James Martinez MD  Answering service: [phone] OR [phone] from in house phone        Date of Service:  2020  NAME:  Kinsey Schuster  :    MRN:  [id number]      Admission Summary:     Kinsey Schuster is a 38-year-old male with a past medical history that includes HTN, ischemic stroke with residual left hemiplegia, dysphagia, T2DM and GERD who was transferred to [facility] today from [facility] for increasing oxygen requirement. He arrived here at approximately 4 PM.      Patient originally presented at [facility] on  with several days of fever, cough and shortness of breath. He tested positive for COVID and was started on ceftriaxone and azithromycin for RML CAP. There was concern for aspiration so he was started on Zosyn and ceftriaxone was discontinued. He never did receive Remdesivir. On  IV Diflucan was initiated for COVID pneumonitis. Patient was weaned from CPAP to 50% Ventimask and maintained saturations greater than 95% without worsening shortness of breath. He is transferred out of the ICU on high flow nasal cannula and is now resting comfortably on 6 L NC.     Interval history / Subjective:   F/u Acute respiratory failure  6 beats of NSVT   Continues on room air  BP slightly elevated     Assessment & Plan:     Acute hypoxic respiratory failure due to COVID, CAP POA  -High flow transitioned to mid flow, improving, now on 5 l/m, monitor pulse ox  -on iv decadron (--), remdesivir (--)  -Had total 9 days Zosyn and 5 days azithromycin PTA.  -COVID was positive at [facility]  -Droplet precautions  -Prone positioning  -On prn Albuterol  -ID on board     Pneumonia due to COVID 19 infection  -Had total 9 days Zosyn and 5 days azithromycin PTA.  -continue vitamin c, zinc, decadron, oxygen support, - ID consulted started on remdesivir (8/31--)  -Elevated d-dimer  -CT chest 8/26 no PE  -On Lovenox 40 mg every 12 hours     Leukocytosis  -Likely related to prolonged steroid use, now resolved  -Antibiotics completed  -improved and wbc is normal     HTN  -BP fairly controlled, Continue home carvedilol 25 mg twice daily  -Will restart norvasc, may need to increase the dose. May add home Lisinopril as well if continues to remain elevated     T2DM with hyperglycemia  -On Decadron  - A1c 6.3  -continue sliding scale and monitor finger stick glucose     Hx of CVA with left hemiplegia and dysphagia  -CT 8/22 old ischemic changes noted, no acute changes  -Usually walks w assist of a rolling walker  -seen by speech, on pureed diet  -PT/OT     Pureed diet    PT/OT SNF    Code status: Full Code  DVT prophylaxis: Lovenox 40 BID  PTA: Ambulate with walker and cane at base line   Spoke to grand daughter Singh Erwin ([phone]). Agreeing on SNF and wants COVID testing before discharge, will consult CM    Plan: The patient needs SNF, otherwise medically stable    Care Plan discussed with: Patient/Family and Nurse  Anticipated Disposition:?tomorrow  Anticipated Discharge: 24 hours to 48 hours     I called his son, Donya Hsu , updated and answered questions [phone] Problems  Date Reviewed: 9/5/2020          Codes Class Noted POA    NSVT (nonsustained ventricular tachycardia) ([facility] ICD-10-CM: I47.2  ICD-9-CM: 427.1  9/5/2020 Unknown        * (Principal) Respiratory failure ([facility] ICD-10-CM: J96.90  ICD-9-CM: 518.81  8/30/2020 Yes                Vital Signs:    Last 24hrs VS reviewed since prior progress note.  Most recent are:  Visit Vitals  /70 (BP 1 Location: Right arm, BP Patient Position: At rest;Supine)   Pulse 73   Temp 97.8 °F (36.6 °C)   Resp 17   Ht 5' 8\" (1.727 m)   Wt 98.3 kg (216 lb 11.2 oz)   SpO2 95%   BMI 32.95 kg/m²         Intake/Output Summary (Last 24 hours) at 9/6/2020 0858  Last data filed at 9/6/2020 0617  Gross per 24 hour   Intake --   Output 1525 ml   Net -1525 ml        Physical Examination:             Constitutional:  No acute distress, cooperative, pleasant    ENT:  Oral mucosa moist, oropharynx benign. Resp:  Decrease bronchial breath sound bilaterally. No wheezing/rhonchi/rales. No accessory muscle use   CV:  Regular rhythm, normal rate, no murmurs, gallops, rubs    GI:  Soft, non distended, non tender. normoactive bowel sounds, no hepatosplenomegaly     Musculoskeletal:  No edema     Neurologic:  Conscious and alert, oriented to place and person, motor RE 4-5/5, LLE 3/5, LUE 2/5     Skin:  Dry but no skin rash       Data Review:    Review and/or order of clinical lab test  Review and/or order of tests in the radiology section of CPT  Review and/or order of tests in the medicine section of CPT      Labs:     Recent Labs     09/06/20 0540 09/05/20 0327   WBC 8.1 8.0   HGB 13.4 13.3   HCT 39.0 39.8    273     Recent Labs     09/06/20 0540 09/05/20 0327 09/04/20  0513    137 138   K 4.0 4.0 4.1    104 106   CO2 27 26 26   BUN 13 15 17   CREA 0.71 0.64* 0.58*   * 213* 187*   CA 8.2* 8.2* 8.7   MG 2.0 1.9 2.1     Recent Labs     09/05/20 0327 09/04/20  0513   ALT 33 33   AP 52 48   TBILI 0.5 0.5   TP 6.0* 6.0*   ALB 2.2* 2.6*   GLOB 3.8 3.4     Recent Labs     09/06/20 0540 09/05/20 0327 09/04/20  0513   INR 1.3* 1.3* 1.3*   PTP 13.1* 13.0* 13.0*   APTT 29.1 29.1 29.4      No results for input(s): FE, TIBC, PSAT, FERR in the last 72 hours. No results found for: FOL, RBCF   No results for input(s): PH, PCO2, PO2 in the last 72 hours. No results for input(s): CPK, CKNDX, TROIQ in the last 72 hours.     No lab exists for component: CPKMB  No results found for: CHOL, CHOLX, CHLST, CHOLV, HDL, HDLP, LDL, LDLC, DLDLP, TGLX, TRIGL, TRIGP, CHHD, CHHDX  Lab Results   Component Value Date/Time    Glucose (POC) 180 (H) 09/06/2020 07:54 AM    Glucose (POC) 248 (H) 09/05/2020 09:08 PM    Glucose (POC) 159 (H) 09/05/2020 06:04 PM    Glucose (POC) 153 (H) 09/05/2020 12:45 PM    Glucose (POC) 169 (H) 09/05/2020 08:53 AM     No results found for: COLOR, APPRN, SPGRU, REFSG, DINAH, PROTU, GLUCU, KETU, BILU, UROU, MAGALI, LEUKU, GLUKE, EPSU, BACTU, WBCU, RBCU, CASTS, UCRY      Medications Reviewed:     Current Facility-Administered Medications   Medication Dose Route Frequency    amLODIPine (NORVASC) tablet 5 mg  5 mg Oral DAILY    insulin lispro (HUMALOG) injection   SubCUTAneous AC&HS    nystatin (MYCOSTATIN) 100,000 unit/mL oral suspension 500,000 Units  500,000 Units Oral QID    ascorbic acid (vitamin C) (VITAMIN C) tablet 500 mg  500 mg Oral BID    zinc sulfate (ZINCATE) 220 (50) mg capsule 1 Cap  1 Cap Oral DAILY    sodium chloride (NS) flush 5-40 mL  5-40 mL IntraVENous Q8H    sodium chloride (NS) flush 5-40 mL  5-40 mL IntraVENous PRN    bisacodyL (DULCOLAX) tablet 5 mg  5 mg Oral DAILY PRN    ondansetron (ZOFRAN ODT) tablet 4 mg  4 mg Oral Q8H PRN    Or    ondansetron (ZOFRAN) injection 4 mg  4 mg IntraVENous Q6H PRN    dexamethasone (DECADRON) 4 mg/mL injection 6 mg  6 mg IntraVENous Q24H    glucose chewable tablet 16 g  4 Tab Oral PRN    glucagon (GLUCAGEN) injection 1 mg  1 mg IntraMUSCular PRN    dextrose 10% infusion 0-250 mL  0-250 mL IntraVENous PRN    enoxaparin (LOVENOX) injection 40 mg  40 mg SubCUTAneous Q12H    acetaminophen (TYLENOL) tablet 650 mg  650 mg Oral Q6H PRN    Or    acetaminophen (TYLENOL) suppository 650 mg  650 mg Rectal Q6H PRN    carvediloL (COREG) tablet 25 mg  25 mg Oral BID WITH MEALS    pravastatin (PRAVACHOL) tablet 40 mg  40 mg Oral QHS    0.9% sodium chloride infusion  60 mL/hr IntraVENous CONTINUOUS    albuterol (PROVENTIL HFA, VENTOLIN HFA, PROAIR HFA) inhaler 2 Puff  2 Puff Inhalation Q4H RT     ______________________________________________________________________  EXPECTED LENGTH OF STAY: 5d 12h  ACTUAL LENGTH OF STAY:          Wil Nguyen MD

## 2020-09-06 NOTE — PROGRESS NOTES
Cardiology Progress Note  9/6/2020 11:01 AM    Admit Date: 8/30/2020    Admit Diagnosis: Respiratory failure ([facility] [J96.90]      Assessment:     Principal Problem:    Respiratory failure ([facility] (8/30/2020)    Active Problems:    NSVT (nonsustained ventricular tachycardia) ([facility] (9/5/2020)        Plan:     Ventricular Tachycardia improved, no more noted, frequent atrial ectopy. Echocardiogram ordered, still Covid PUI. Darnelle Rinne, MD  [phone]  Cell        Subjective:     He continues to be Covid PUI, CXR improving, no more VT on beta blocker. ROS: negative except as noted above.     Objective:       Visit Vitals  /70 (BP 1 Location: Right arm, BP Patient Position: At rest;Supine)   Pulse 73   Temp 97.8 °F (36.6 °C)   Resp 17   Ht 5' 8\" (1.727 m)   Wt 216 lb 11.2 oz (98.3 kg)   SpO2 95%   BMI 32.95 kg/m²       Current Facility-Administered Medications   Medication Dose Route Frequency    amLODIPine (NORVASC) tablet 5 mg  5 mg Oral DAILY    insulin lispro (HUMALOG) injection   SubCUTAneous AC&HS    nystatin (MYCOSTATIN) 100,000 unit/mL oral suspension 500,000 Units  500,000 Units Oral QID    ascorbic acid (vitamin C) (VITAMIN C) tablet 500 mg  500 mg Oral BID    zinc sulfate (ZINCATE) 220 (50) mg capsule 1 Cap  1 Cap Oral DAILY    sodium chloride (NS) flush 5-40 mL  5-40 mL IntraVENous Q8H    sodium chloride (NS) flush 5-40 mL  5-40 mL IntraVENous PRN    bisacodyL (DULCOLAX) tablet 5 mg  5 mg Oral DAILY PRN    ondansetron (ZOFRAN ODT) tablet 4 mg  4 mg Oral Q8H PRN    Or    ondansetron (ZOFRAN) injection 4 mg  4 mg IntraVENous Q6H PRN    dexamethasone (DECADRON) 4 mg/mL injection 6 mg  6 mg IntraVENous Q24H    glucose chewable tablet 16 g  4 Tab Oral PRN    glucagon (GLUCAGEN) injection 1 mg  1 mg IntraMUSCular PRN    dextrose 10% infusion 0-250 mL  0-250 mL IntraVENous PRN    enoxaparin (LOVENOX) injection 40 mg  40 mg SubCUTAneous Q12H    acetaminophen (TYLENOL) tablet 650 mg  650 mg Oral Q6H PRN    Or    acetaminophen (TYLENOL) suppository 650 mg  650 mg Rectal Q6H PRN    carvediloL (COREG) tablet 25 mg  25 mg Oral BID WITH MEALS    pravastatin (PRAVACHOL) tablet 40 mg  40 mg Oral QHS    0.9% sodium chloride infusion  60 mL/hr IntraVENous CONTINUOUS    albuterol (PROVENTIL HFA, VENTOLIN HFA, PROAIR HFA) inhaler 2 Puff  2 Puff Inhalation Q4H RT         Physical Exam:  Deferred due to Covid status.     Telemetry: PAC    Data Review:     Labs:    Recent Results (from the past 24 hour(s))   GLUCOSE, POC    Collection Time: 09/05/20 12:45 PM   Result Value Ref Range    Glucose (POC) 153 (H) 65 - 100 mg/dL    Performed by Debbie Rodriguez    GLUCOSE, POC    Collection Time: 09/05/20  6:04 PM   Result Value Ref Range    Glucose (POC) 159 (H) 65 - 100 mg/dL    Performed by SHAN TERRAZAS    SARS-COV-2    Collection Time: 09/05/20  6:35 PM   Result Value Ref Range    Specimen source Nasopharyngeal      Specimen source Nasopharyngeal      Specimen type NP Swab      Health status Symptomatic Testing      COVID-19 PENDING    GLUCOSE, POC    Collection Time: 09/05/20  9:08 PM   Result Value Ref Range    Glucose (POC) 248 (H) 65 - 100 mg/dL    Performed by Caryle Mai PCT    D DIMER    Collection Time: 09/06/20  5:40 AM   Result Value Ref Range    D-dimer 0.75 (H) 0.00 - 0.65 mg/L FEU   FIBRINOGEN    Collection Time: 09/06/20  5:40 AM   Result Value Ref Range    Fibrinogen 330 200 - 475 mg/dL   MAGNESIUM    Collection Time: 09/06/20  5:40 AM   Result Value Ref Range    Magnesium 2.0 1.6 - 2.4 mg/dL   CBC WITH AUTOMATED DIFF    Collection Time: 09/06/20  5:40 AM   Result Value Ref Range    WBC 8.1 4.1 - 11.1 K/uL    RBC 4.02 (L) 4.10 - 5.70 M/uL    HGB 13.4 12.1 - 17.0 g/dL    HCT 39.0 36.6 - 50.3 %    MCV 97.0 80.0 - 99.0 FL    MCH 33.3 26.0 - 34.0 PG    MCHC 34.4 30.0 - 36.5 g/dL    RDW 12.6 11.5 - 14.5 %    PLATELET 635 270 - 808 K/uL    MPV 10.7 8.9 - 12.9 FL    NRBC 0.2 (H) 0  WBC    ABSOLUTE NRBC 0.02 (H) 0.00 - 0.01 K/uL    NEUTROPHILS 79 (H) 32 - 75 %    LYMPHOCYTES 12 12 - 49 %    MONOCYTES 8 5 - 13 %    EOSINOPHILS 0 0 - 7 %    BASOPHILS 0 0 - 1 %    IMMATURE GRANULOCYTES 1 (H) 0.0 - 0.5 %    ABS. NEUTROPHILS 6.4 1.8 - 8.0 K/UL    ABS. LYMPHOCYTES 1.0 0.8 - 3.5 K/UL    ABS. MONOCYTES 0.6 0.0 - 1.0 K/UL    ABS. EOSINOPHILS 0.0 0.0 - 0.4 K/UL    ABS. BASOPHILS 0.0 0.0 - 0.1 K/UL    ABS. IMM.  GRANS. 0.1 (H) 0.00 - 0.04 K/UL    DF AUTOMATED     METABOLIC PANEL, BASIC    Collection Time: 09/06/20  5:40 AM   Result Value Ref Range    Sodium 137 136 - 145 mmol/L    Potassium 4.0 3.5 - 5.1 mmol/L    Chloride 105 97 - 108 mmol/L    CO2 27 21 - 32 mmol/L    Anion gap 5 5 - 15 mmol/L    Glucose 236 (H) 65 - 100 mg/dL    BUN 13 6 - 20 MG/DL    Creatinine 0.71 0.70 - 1.30 MG/DL    BUN/Creatinine ratio 18 12 - 20      GFR est AA >60 >60 ml/min/1.73m2    GFR est non-AA >60 >60 ml/min/1.73m2    Calcium 8.2 (L) 8.5 - 10.1 MG/DL   PROTHROMBIN TIME + INR    Collection Time: 09/06/20  5:40 AM   Result Value Ref Range    INR 1.3 (H) 0.9 - 1.1      Prothrombin time 13.1 (H) 9.0 - 11.1 sec   PTT    Collection Time: 09/06/20  5:40 AM   Result Value Ref Range    aPTT 29.1 22.1 - 32.0 sec    aPTT, therapeutic range     58.0 - 77.0 SECS   GLUCOSE, POC    Collection Time: 09/06/20  7:54 AM   Result Value Ref Range    Glucose (POC) 180 (H) 65 - 100 mg/dL    Performed by Jeanie Cage MD

## 2020-09-07 LAB
APTT PPP: 29.3 SEC (ref 22.1–32)
D DIMER PPP FEU-MCNC: 0.71 MG/L FEU (ref 0–0.65)
FIBRINOGEN PPP-MCNC: 325 MG/DL (ref 200–475)
GLUCOSE BLD STRIP.AUTO-MCNC: 132 MG/DL (ref 65–100)
GLUCOSE BLD STRIP.AUTO-MCNC: 154 MG/DL (ref 65–100)
GLUCOSE BLD STRIP.AUTO-MCNC: 168 MG/DL (ref 65–100)
GLUCOSE BLD STRIP.AUTO-MCNC: 306 MG/DL (ref 65–100)
INR PPP: 1.3 (ref 0.9–1.1)
MAGNESIUM SERPL-MCNC: 2.1 MG/DL (ref 1.6–2.4)
PROTHROMBIN TIME: 12.8 SEC (ref 9–11.1)
SERVICE CMNT-IMP: ABNORMAL
THERAPEUTIC RANGE,PTTT: NORMAL SECS (ref 58–77)

## 2020-09-07 PROCEDURE — 74011636637 HC RX REV CODE- 636/637: Performed by: HOSPITALIST

## 2020-09-07 PROCEDURE — 83735 ASSAY OF MAGNESIUM: CPT

## 2020-09-07 PROCEDURE — 36415 COLL VENOUS BLD VENIPUNCTURE: CPT

## 2020-09-07 PROCEDURE — 85730 THROMBOPLASTIN TIME PARTIAL: CPT

## 2020-09-07 PROCEDURE — 74011250637 HC RX REV CODE- 250/637: Performed by: HOSPITALIST

## 2020-09-07 PROCEDURE — 74011250636 HC RX REV CODE- 250/636: Performed by: INTERNAL MEDICINE

## 2020-09-07 PROCEDURE — 65660000000 HC RM CCU STEPDOWN

## 2020-09-07 PROCEDURE — 85384 FIBRINOGEN ACTIVITY: CPT

## 2020-09-07 PROCEDURE — 94640 AIRWAY INHALATION TREATMENT: CPT

## 2020-09-07 PROCEDURE — 85610 PROTHROMBIN TIME: CPT

## 2020-09-07 PROCEDURE — 82962 GLUCOSE BLOOD TEST: CPT

## 2020-09-07 PROCEDURE — 74011250637 HC RX REV CODE- 250/637: Performed by: INTERNAL MEDICINE

## 2020-09-07 PROCEDURE — 74011250636 HC RX REV CODE- 250/636: Performed by: HOSPITALIST

## 2020-09-07 PROCEDURE — 74011250637 HC RX REV CODE- 250/637: Performed by: NURSE PRACTITIONER

## 2020-09-07 PROCEDURE — 85379 FIBRIN DEGRADATION QUANT: CPT

## 2020-09-07 RX ADMIN — NYSTATIN 500000 UNITS: 100000 SUSPENSION ORAL at 18:35

## 2020-09-07 RX ADMIN — ALBUTEROL SULFATE 2 PUFF: 90 AEROSOL, METERED RESPIRATORY (INHALATION) at 19:47

## 2020-09-07 RX ADMIN — NYSTATIN 500000 UNITS: 100000 SUSPENSION ORAL at 12:16

## 2020-09-07 RX ADMIN — ENOXAPARIN SODIUM 40 MG: 40 INJECTION SUBCUTANEOUS at 09:07

## 2020-09-07 RX ADMIN — SODIUM CHLORIDE 60 ML/HR: 900 INJECTION, SOLUTION INTRAVENOUS at 22:11

## 2020-09-07 RX ADMIN — ZINC SULFATE 220 MG (50 MG) CAPSULE 1 CAPSULE: CAPSULE at 09:01

## 2020-09-07 RX ADMIN — NYSTATIN 500000 UNITS: 100000 SUSPENSION ORAL at 09:00

## 2020-09-07 RX ADMIN — SODIUM CHLORIDE 60 ML/HR: 900 INJECTION, SOLUTION INTRAVENOUS at 04:54

## 2020-09-07 RX ADMIN — NYSTATIN 500000 UNITS: 100000 SUSPENSION ORAL at 22:08

## 2020-09-07 RX ADMIN — CARVEDILOL 25 MG: 12.5 TABLET, FILM COATED ORAL at 09:01

## 2020-09-07 RX ADMIN — ALBUTEROL SULFATE 2 PUFF: 90 AEROSOL, METERED RESPIRATORY (INHALATION) at 12:23

## 2020-09-07 RX ADMIN — DEXAMETHASONE SODIUM PHOSPHATE 6 MG: 4 INJECTION, SOLUTION INTRA-ARTICULAR; INTRALESIONAL; INTRAMUSCULAR; INTRAVENOUS; SOFT TISSUE at 18:35

## 2020-09-07 RX ADMIN — AMLODIPINE BESYLATE 5 MG: 5 TABLET ORAL at 09:01

## 2020-09-07 RX ADMIN — INSULIN LISPRO 2 UNITS: 100 INJECTION, SOLUTION INTRAVENOUS; SUBCUTANEOUS at 09:02

## 2020-09-07 RX ADMIN — OXYCODONE HYDROCHLORIDE AND ACETAMINOPHEN 500 MG: 500 TABLET ORAL at 18:35

## 2020-09-07 RX ADMIN — INSULIN LISPRO 4 UNITS: 100 INJECTION, SOLUTION INTRAVENOUS; SUBCUTANEOUS at 22:09

## 2020-09-07 RX ADMIN — Medication 10 ML: at 15:11

## 2020-09-07 RX ADMIN — CARVEDILOL 25 MG: 12.5 TABLET, FILM COATED ORAL at 18:35

## 2020-09-07 RX ADMIN — ALBUTEROL SULFATE 2 PUFF: 90 AEROSOL, METERED RESPIRATORY (INHALATION) at 07:28

## 2020-09-07 RX ADMIN — Medication 10 ML: at 22:10

## 2020-09-07 RX ADMIN — ENOXAPARIN SODIUM 40 MG: 40 INJECTION SUBCUTANEOUS at 22:08

## 2020-09-07 RX ADMIN — PRAVASTATIN SODIUM 40 MG: 40 TABLET ORAL at 22:08

## 2020-09-07 RX ADMIN — OXYCODONE HYDROCHLORIDE AND ACETAMINOPHEN 500 MG: 500 TABLET ORAL at 09:01

## 2020-09-07 RX ADMIN — ALBUTEROL SULFATE 2 PUFF: 90 AEROSOL, METERED RESPIRATORY (INHALATION) at 15:09

## 2020-09-07 RX ADMIN — INSULIN LISPRO 2 UNITS: 100 INJECTION, SOLUTION INTRAVENOUS; SUBCUTANEOUS at 18:35

## 2020-09-07 NOTE — PROGRESS NOTES
Bedside shift change report given to Josey Funez RN by Erika Adkins RN. Report included the following information SBAR, Kardex, ED Summary, Intake/Output, MAR, Recent Results and Cardiac Rhythm SB/NSR. Problem: Breathing Pattern - Ineffective  Goal: *Absence of hypoxia  Outcome: Progressing Towards Goal  Note: Oxygen saturations within defined normal limits on room air. Will continue to monitor.

## 2020-09-07 NOTE — PROGRESS NOTES
[address] [facility] Adult  Hospitalist Group                                                                                          Hospitalist Progress Note  Pina Saenz MD  Answering service: [phone] OR [phone] from in house phone        Date of Service:  2020  NAME:  Barbara Schafer  :    MRN:  [id number]      Admission Summary:     Barbara Schafer is a 64-year-old male with a past medical history that includes HTN, ischemic stroke with residual left hemiplegia, dysphagia, T2DM and GERD who was transferred to [facility] from [facility] for increasing oxygen requirement. He arrived here at approximately 4 PM.      Patient originally presented at [facility] on  with several days of fever, cough and shortness of breath. He tested positive for COVID and was started on ceftriaxone and azithromycin for RML CAP. There was concern for aspiration so he was started on Zosyn and ceftriaxone was discontinued. He never did receive Remdesivir. On  IV Diflucan was initiated for COVID pneumonitis. Patient was weaned from CPAP to 50% Ventimask and maintained saturations greater than 95% without worsening shortness of breath. He is transferred out of the ICU on high flow nasal cannula and is now resting comfortably on 6 L NC.     Interval history / Subjective:   F/u Acute respiratory failure  -COVID  positive again  6 beats of NSVT   Continues on room air  BP slightly elevated     Assessment & Plan:     Acute hypoxic respiratory failure due to COVID, CAP POA  -High flow transitioned to mid flow, improving, now on 5 l/m, monitor pulse ox  -on iv decadron (--), remdesivir (--)  -Had total 9 days Zosyn and 5 days azithromycin PTA.  -COVID was positive at [facility]  -COVID repeat --positive  -Droplet precautions  -Prone positioning  -On prn Albuterol  -ID on board     Pneumonia due to COVID 19 infection  -Had total 9 days Zosyn and 5 days azithromycin PTA.  -continue vitamin c, zinc, decadron, oxygen support,   - ID consulted started on remdesivir (8/31--)  -Elevated d-dimer  -CT chest 8/26 no PE  -On Lovenox 40 mg every 12 hours     Leukocytosis  -Likely related to prolonged steroid use, now resolved  -Antibiotics completed  -improved and wbc is normal     HTN  -BP fairly controlled, Continue home carvedilol 25 mg twice daily  -restarted norvasc, may need to increase the dose. May add home Lisinopril as well if continues to remain elevated     T2DM with hyperglycemia  -On Decadron  - A1c 6.3  -continue sliding scale and monitor finger stick glucose     Hx of CVA with left hemiplegia and dysphagia  -CT 8/22 old ischemic changes noted, no acute changes  -Usually walks w assist of a rolling walker  -seen by speech, on pureed diet  -PT/OT     Pureed diet    PT/OT SNF    Code status: Full Code  DVT prophylaxis: Lovenox 40 BID  PTA: Ambulate with walker and cane at base line   Spoke to grand daughter Nash Aleman ([phone]). Agreeing on SNF    Plan: COVID still positive. CM arranging for SNF for rehab    Care Plan discussed with: Patient/Family and Nurse  Anticipated Disposition:?tomorrow  Anticipated Discharge: 24 hours to 48 hours        Hospital Problems  Date Reviewed: 9/5/2020          Codes Class Noted POA    NSVT (nonsustained ventricular tachycardia) ([facility] ICD-10-CM: I47.2  ICD-9-CM: 427.1  9/5/2020 Unknown        * (Principal) Respiratory failure ([facility] ICD-10-CM: J96.90  ICD-9-CM: 518.81  8/30/2020 Yes                Vital Signs:    Last 24hrs VS reviewed since prior progress note.  Most recent are:  Visit Vitals  /87 (BP 1 Location: Right arm, BP Patient Position: At rest)   Pulse (!) 57   Temp 98.2 °F (36.8 °C)   Resp 20   Ht 5' 8\" (1.727 m)   Wt 98 kg (216 lb)   SpO2 95%   BMI 32.84 kg/m²         Intake/Output Summary (Last 24 hours) at 9/7/2020 1103  Last data filed at 9/6/2020 1956  Gross per 24 hour   Intake --   Output 750 ml   Net -750 ml Physical Examination:             Constitutional:  No acute distress, cooperative, pleasant    ENT:  Oral mucosa moist, oropharynx benign. Resp:  Decrease bronchial breath sound bilaterally. No wheezing/rhonchi/rales. No accessory muscle use   CV:  Regular rhythm, normal rate, no murmurs, gallops, rubs    GI:  Soft, non distended, non tender. normoactive bowel sounds, no hepatosplenomegaly     Musculoskeletal:  No edema     Neurologic:  Conscious and alert, oriented to place and person, motor RE 4-5/5, LLE 3/5, LUE 2/5     Skin:  Dry but no skin rash       Data Review:    Review and/or order of clinical lab test  Review and/or order of tests in the radiology section of CPT  Review and/or order of tests in the medicine section of CPT      Labs:     Recent Labs     09/06/20  0540 09/05/20  0327   WBC 8.1 8.0   HGB 13.4 13.3   HCT 39.0 39.8    273     Recent Labs     09/07/20  0431 09/06/20  0540 09/05/20  0327   NA  --  137 137   K  --  4.0 4.0   CL  --  105 104   CO2  --  27 26   BUN  --  13 15   CREA  --  0.71 0.64*   GLU  --  236* 213*   CA  --  8.2* 8.2*   MG 2.1 2.0 1.9     Recent Labs     09/05/20  0327   ALT 33   AP 52   TBILI 0.5   TP 6.0*   ALB 2.2*   GLOB 3.8     Recent Labs     09/07/20  0431 09/06/20  0540 09/05/20  0327   INR 1.3* 1.3* 1.3*   PTP 12.8* 13.1* 13.0*   APTT 29.3 29.1 29.1      No results for input(s): FE, TIBC, PSAT, FERR in the last 72 hours. No results found for: FOL, RBCF   No results for input(s): PH, PCO2, PO2 in the last 72 hours. No results for input(s): CPK, CKNDX, TROIQ in the last 72 hours.     No lab exists for component: CPKMB  No results found for: CHOL, CHOLX, CHLST, CHOLV, HDL, HDLP, LDL, LDLC, DLDLP, TGLX, TRIGL, TRIGP, CHHD, CHHDX  Lab Results   Component Value Date/Time    Glucose (POC) 154 (H) 09/07/2020 08:33 AM    Glucose (POC) 206 (H) 09/06/2020 09:45 PM    Glucose (POC) 174 (H) 09/06/2020 05:40 PM    Glucose (POC) 137 (H) 09/06/2020 12:21 PM Glucose (POC) 180 (H) 09/06/2020 07:54 AM     No results found for: COLOR, APPRN, SPGRU, REFSG, DINAH, PROTU, GLUCU, KETU, BILU, UROU, MAGALI, LEUKU, GLUKE, EPSU, BACTU, WBCU, RBCU, CASTS, UCRY      Medications Reviewed:     Current Facility-Administered Medications   Medication Dose Route Frequency    amLODIPine (NORVASC) tablet 5 mg  5 mg Oral DAILY    insulin lispro (HUMALOG) injection   SubCUTAneous AC&HS    nystatin (MYCOSTATIN) 100,000 unit/mL oral suspension 500,000 Units  500,000 Units Oral QID    ascorbic acid (vitamin C) (VITAMIN C) tablet 500 mg  500 mg Oral BID    zinc sulfate (ZINCATE) 220 (50) mg capsule 1 Cap  1 Cap Oral DAILY    sodium chloride (NS) flush 5-40 mL  5-40 mL IntraVENous Q8H    sodium chloride (NS) flush 5-40 mL  5-40 mL IntraVENous PRN    bisacodyL (DULCOLAX) tablet 5 mg  5 mg Oral DAILY PRN    ondansetron (ZOFRAN ODT) tablet 4 mg  4 mg Oral Q8H PRN    Or    ondansetron (ZOFRAN) injection 4 mg  4 mg IntraVENous Q6H PRN    dexamethasone (DECADRON) 4 mg/mL injection 6 mg  6 mg IntraVENous Q24H    glucose chewable tablet 16 g  4 Tab Oral PRN    glucagon (GLUCAGEN) injection 1 mg  1 mg IntraMUSCular PRN    dextrose 10% infusion 0-250 mL  0-250 mL IntraVENous PRN    enoxaparin (LOVENOX) injection 40 mg  40 mg SubCUTAneous Q12H    acetaminophen (TYLENOL) tablet 650 mg  650 mg Oral Q6H PRN    Or    acetaminophen (TYLENOL) suppository 650 mg  650 mg Rectal Q6H PRN    carvediloL (COREG) tablet 25 mg  25 mg Oral BID WITH MEALS    pravastatin (PRAVACHOL) tablet 40 mg  40 mg Oral QHS    0.9% sodium chloride infusion  60 mL/hr IntraVENous CONTINUOUS    albuterol (PROVENTIL HFA, VENTOLIN HFA, PROAIR HFA) inhaler 2 Puff  2 Puff Inhalation Q4H RT     ______________________________________________________________________  EXPECTED LENGTH OF STAY: 5d 12h  ACTUAL LENGTH OF STAY:          8                 Bartolo Prader, MD

## 2020-09-07 NOTE — PROGRESS NOTES
Spiritual Care Assessment/Progress Note  [facility]      NAME: Renae Siddiqui      MRN: [id number]  AGE: 66 y.o. SEX: male  Muslim Affiliation:    Language: English     9/7/2020           Spiritual Assessment begun in [facility] [place] [facility] through conversation with:         []Patient        [] Family    [] Friend(s)        Reason for Consult: Initial/Spiritual assessment, patient floor     Spiritual beliefs: (Please include comment if needed)     [] Identifies with a zohra tradition:         [] Supported by a zohra community:            [] Claims no spiritual orientation:           [] Seeking spiritual identity:                [] Adheres to an individual form of spirituality:           [x] Not able to assess:                           Identified resources for coping:      [] Prayer                               [] Music                  [] Guided Imagery     [] Family/friends                 [] Pet visits     [] Devotional reading                         [] Unknown     [] Other:                                               Interventions offered during this visit: (See comments for more details)                Plan of Care:     [] Support spiritual and/or cultural needs    [] Support AMD and/or advance care planning process      [] Support grieving process   [] Coordinate Rites and/or Rituals    [] Coordination with community clergy   [] No spiritual needs identified at this time   [] Detailed Plan of Care below (See Comments)  [] Make referral to Music Therapy  [] Make referral to Pet Therapy     [] Make referral to Addiction services  [] Make referral to [facility]  [] Make referral to Spiritual Care Partner  [] No future visits requested        [] Follow up visits as needed     Comments: It was noted that Mr Rufino Odell in room 411 was COVID+;  did not enter patient's room.  Consulted with patient's nurse who shared that he was able to talk on the phone; she said that she had not spoken with any family recently. Attempted times three to call patient and each time his line rang busy. Please notify [facility][note 3] Nilay Estevez if  support needed/desired as it may be offered per phone. : . Martha Lomeli.  Lucy Quan; [facility], to contact [phone] Nilay Estevez call: [phone]

## 2020-09-07 NOTE — PROGRESS NOTES
Problem: Breathing Pattern - Ineffective  Goal: Ability to achieve and maintain a regular respiratory rate  Outcome: Progressing Towards Goal  Pt on RA saturating above 92%      Problem: Body Temperature -  Risk of, Imbalanced  Goal: Ability to maintain a body temperature within defined limits  Outcome: Progressing Towards Goal  Pt remained afebrile throughout shift     Problem: Nutrition Deficits  Goal: Optimize nutrtional status  Outcome: Progressing Towards Goal  Pt tolerating prescribed diet       1930 Bedside shift change report given to Carina Lee (oncoming nurse) by Leigha Pedraza (offgoing nurse). Report included the following information SBAR, Kardex, Intake/Output, MAR, Accordion, and Recent Results.

## 2020-09-08 LAB
APTT PPP: 29.2 SEC (ref 22.1–32)
D DIMER PPP FEU-MCNC: 0.92 MG/L FEU (ref 0–0.65)
FIBRINOGEN PPP-MCNC: 318 MG/DL (ref 200–475)
GLUCOSE BLD STRIP.AUTO-MCNC: 200 MG/DL (ref 65–100)
GLUCOSE BLD STRIP.AUTO-MCNC: 249 MG/DL (ref 65–100)
GLUCOSE BLD STRIP.AUTO-MCNC: 278 MG/DL (ref 65–100)
GLUCOSE BLD STRIP.AUTO-MCNC: 405 MG/DL (ref 65–100)
INR PPP: 1.2 (ref 0.9–1.1)
MAGNESIUM SERPL-MCNC: 1.9 MG/DL (ref 1.6–2.4)
PROTHROMBIN TIME: 12 SEC (ref 9–11.1)
SERVICE CMNT-IMP: ABNORMAL
THERAPEUTIC RANGE,PTTT: NORMAL SECS (ref 58–77)

## 2020-09-08 PROCEDURE — 65270000029 HC RM PRIVATE

## 2020-09-08 PROCEDURE — 85610 PROTHROMBIN TIME: CPT

## 2020-09-08 PROCEDURE — 85730 THROMBOPLASTIN TIME PARTIAL: CPT

## 2020-09-08 PROCEDURE — 74011250636 HC RX REV CODE- 250/636: Performed by: INTERNAL MEDICINE

## 2020-09-08 PROCEDURE — 94640 AIRWAY INHALATION TREATMENT: CPT

## 2020-09-08 PROCEDURE — 74011250637 HC RX REV CODE- 250/637: Performed by: HOSPITALIST

## 2020-09-08 PROCEDURE — 74011250636 HC RX REV CODE- 250/636: Performed by: HOSPITALIST

## 2020-09-08 PROCEDURE — 82962 GLUCOSE BLOOD TEST: CPT

## 2020-09-08 PROCEDURE — 85384 FIBRINOGEN ACTIVITY: CPT

## 2020-09-08 PROCEDURE — 74011636637 HC RX REV CODE- 636/637: Performed by: HOSPITALIST

## 2020-09-08 PROCEDURE — 74011250637 HC RX REV CODE- 250/637: Performed by: NURSE PRACTITIONER

## 2020-09-08 PROCEDURE — 83735 ASSAY OF MAGNESIUM: CPT

## 2020-09-08 PROCEDURE — 99232 SBSQ HOSP IP/OBS MODERATE 35: CPT | Performed by: SPECIALIST

## 2020-09-08 PROCEDURE — 92526 ORAL FUNCTION THERAPY: CPT | Performed by: SPEECH-LANGUAGE PATHOLOGIST

## 2020-09-08 PROCEDURE — 36415 COLL VENOUS BLD VENIPUNCTURE: CPT

## 2020-09-08 PROCEDURE — 87635 SARS-COV-2 COVID-19 AMP PRB: CPT

## 2020-09-08 PROCEDURE — 85379 FIBRIN DEGRADATION QUANT: CPT

## 2020-09-08 PROCEDURE — 74011250637 HC RX REV CODE- 250/637: Performed by: INTERNAL MEDICINE

## 2020-09-08 PROCEDURE — 74011636637 HC RX REV CODE- 636/637: Performed by: NURSE PRACTITIONER

## 2020-09-08 PROCEDURE — 94760 N-INVAS EAR/PLS OXIMETRY 1: CPT

## 2020-09-08 RX ORDER — INSULIN LISPRO 100 [IU]/ML
6 INJECTION, SOLUTION INTRAVENOUS; SUBCUTANEOUS ONCE
Status: COMPLETED | OUTPATIENT
Start: 2020-09-08 | End: 2020-09-08

## 2020-09-08 RX ADMIN — ENOXAPARIN SODIUM 40 MG: 40 INJECTION SUBCUTANEOUS at 09:08

## 2020-09-08 RX ADMIN — NYSTATIN 500000 UNITS: 100000 SUSPENSION ORAL at 09:08

## 2020-09-08 RX ADMIN — INSULIN LISPRO 5 UNITS: 100 INJECTION, SOLUTION INTRAVENOUS; SUBCUTANEOUS at 12:56

## 2020-09-08 RX ADMIN — ALBUTEROL SULFATE 2 PUFF: 90 AEROSOL, METERED RESPIRATORY (INHALATION) at 00:25

## 2020-09-08 RX ADMIN — CARVEDILOL 25 MG: 12.5 TABLET, FILM COATED ORAL at 17:10

## 2020-09-08 RX ADMIN — Medication 10 ML: at 21:49

## 2020-09-08 RX ADMIN — PRAVASTATIN SODIUM 40 MG: 40 TABLET ORAL at 21:49

## 2020-09-08 RX ADMIN — ZINC SULFATE 220 MG (50 MG) CAPSULE 1 CAPSULE: CAPSULE at 09:46

## 2020-09-08 RX ADMIN — AMLODIPINE BESYLATE 5 MG: 5 TABLET ORAL at 09:08

## 2020-09-08 RX ADMIN — OXYCODONE HYDROCHLORIDE AND ACETAMINOPHEN 500 MG: 500 TABLET ORAL at 18:08

## 2020-09-08 RX ADMIN — ALBUTEROL SULFATE 2 PUFF: 90 AEROSOL, METERED RESPIRATORY (INHALATION) at 15:12

## 2020-09-08 RX ADMIN — DEXAMETHASONE SODIUM PHOSPHATE 6 MG: 4 INJECTION, SOLUTION INTRA-ARTICULAR; INTRALESIONAL; INTRAMUSCULAR; INTRAVENOUS; SOFT TISSUE at 18:09

## 2020-09-08 RX ADMIN — ALBUTEROL SULFATE 2 PUFF: 90 AEROSOL, METERED RESPIRATORY (INHALATION) at 07:06

## 2020-09-08 RX ADMIN — INSULIN LISPRO 3 UNITS: 100 INJECTION, SOLUTION INTRAVENOUS; SUBCUTANEOUS at 09:08

## 2020-09-08 RX ADMIN — NYSTATIN 500000 UNITS: 100000 SUSPENSION ORAL at 22:20

## 2020-09-08 RX ADMIN — ALBUTEROL SULFATE 2 PUFF: 90 AEROSOL, METERED RESPIRATORY (INHALATION) at 11:38

## 2020-09-08 RX ADMIN — Medication 10 ML: at 14:32

## 2020-09-08 RX ADMIN — ALBUTEROL SULFATE 2 PUFF: 90 AEROSOL, METERED RESPIRATORY (INHALATION) at 19:19

## 2020-09-08 RX ADMIN — NYSTATIN 500000 UNITS: 100000 SUSPENSION ORAL at 12:56

## 2020-09-08 RX ADMIN — INSULIN LISPRO 6 UNITS: 100 INJECTION, SOLUTION INTRAVENOUS; SUBCUTANEOUS at 22:19

## 2020-09-08 RX ADMIN — CARVEDILOL 25 MG: 12.5 TABLET, FILM COATED ORAL at 09:08

## 2020-09-08 RX ADMIN — OXYCODONE HYDROCHLORIDE AND ACETAMINOPHEN 500 MG: 500 TABLET ORAL at 09:08

## 2020-09-08 RX ADMIN — NYSTATIN 500000 UNITS: 100000 SUSPENSION ORAL at 18:08

## 2020-09-08 RX ADMIN — ENOXAPARIN SODIUM 40 MG: 40 INJECTION SUBCUTANEOUS at 21:49

## 2020-09-08 RX ADMIN — INSULIN LISPRO 3 UNITS: 100 INJECTION, SOLUTION INTRAVENOUS; SUBCUTANEOUS at 17:09

## 2020-09-08 RX ADMIN — Medication 10 ML: at 05:52

## 2020-09-08 NOTE — PROGRESS NOTES
TRANSFER - IN REPORT:    Verbal report received from Diane Barros RN(name) on James Desai  being received from [facility]) for routine progression of care      Report consisted of patients Situation, Background, Assessment and   Recommendations(SBAR). Information from the following report(s) SBAR, Kardex, Intake/Output, MAR and Recent Results was reviewed with the receiving nurse. Opportunity for questions and clarification was provided. Assessment completed upon patients arrival to unit and care assumed.

## 2020-09-08 NOTE — PROGRESS NOTES
ID Progress Note  2020    Subjective:     Antiviral therapy is complete. He is on room air. Objective:     Antibiotics:  1. None       Vitals:   Visit Vitals  /54 (BP 1 Location: Right arm, BP Patient Position: At rest)   Pulse 74   Temp 98.3 °F (36.8 °C)   Resp 18   Ht 5' 8\" (1.727 m)   Wt 96.3 kg (212 lb 6.4 oz)   SpO2 95%   BMI 32.30 kg/m²        Tmax:  Temp (24hrs), Av °F (36.7 °C), Min:97.2 °F (36.2 °C), Max:98.6 °F (37 °C)      Exam:  Deferred     Labs:      Recent Labs     20  0540   WBC 8.1   HGB 13.4      BUN 13   CREA 0.71       Cultures:     No results found for: SDES  No results found for: CULT    Radiology:     Line/Insert Date:           Assessment:     1. COVID 19  2. Hypoxia  3. Debility     Objective:     1. Continue current therapy  2. He is improved overall  3.  Will sign off--call again if needed--thank you for the consult    Elif Wilkes MD

## 2020-09-08 NOTE — PROGRESS NOTES
TRANSFER - OUT REPORT:    Verbal report given to Alyssia Choi RN (name) on Umair Hamilton  being transferred to (unit) for routine progression of care       Report consisted of patients Situation, Background, Assessment and   Recommendations(SBAR). Information from the following report(s) SBAR, Kardex, Intake/Output, MAR and Accordion was reviewed with the receiving nurse. Lines:   Peripheral IV 09/01/20 Distal;Left;Posterior Forearm (Active)   Site Assessment Clean, dry, & intact 09/08/20 1201   Phlebitis Assessment 0 09/08/20 1201   Infiltration Assessment 0 09/08/20 1201   Dressing Status Clean, dry, & intact 09/08/20 1201   Dressing Type Transparent;Tape 09/08/20 1201   Hub Color/Line Status Pink; Infusing 09/08/20 1201   Action Taken Open ports on tubing capped 09/08/20 1201   Alcohol Cap Used Yes 09/08/20 1201        Opportunity for questions and clarification was provided. Patient transported with:   Registered Nurse  Tech                    Problem: Falls - Risk of  Goal: *Absence of Falls  Description: Document Reggeetha Kitchene Fall Risk and appropriate interventions in the flowsheet. Outcome: Progressing Towards Goal  Note: Fall Risk Interventions:  Mobility Interventions: Communicate number of staff needed for ambulation/transfer, Patient to call before getting OOB, Strengthening exercises (ROM-active/passive)    Mentation Interventions: Adequate sleep, hydration, pain control, Eyeglasses and hearing aids, Gait belt with transfers/ambulation, More frequent rounding, Room close to nurse's station    Medication Interventions: Teach patient to arise slowly, Patient to call before getting OOB    Elimination Interventions: Call light in reach, Patient to call for help with toileting needs, Toileting schedule/hourly rounds              Problem: Pressure Injury - Risk of  Goal: *Prevention of pressure injury  Description: Document William Scale and appropriate interventions in the flowsheet.   Outcome: Progressing Towards Goal  Note: Pressure Injury Interventions:  Sensory Interventions: Assess changes in LOC, Float heels, Minimize linen layers, Turn and reposition approx. every two hours (pillows and wedges if needed)    Moisture Interventions: Absorbent underpads, Internal/External urinary devices, Limit adult briefs, Minimize layers    Activity Interventions: Assess need for specialty bed, Pressure redistribution bed/mattress(bed type)    Mobility Interventions: Assess need for specialty bed, HOB 30 degrees or less, PT/OT evaluation, Turn and reposition approx.  every two hours(pillow and wedges)    Nutrition Interventions: Document food/fluid/supplement intake, Offer support with meals,snacks and hydration    Friction and Shear Interventions: HOB 30 degrees or less, Lift sheet, Minimize layers                Problem: Breathing Pattern - Ineffective  Goal: *Absence of hypoxia  Outcome: Progressing Towards Goal     Problem: Airway Clearance - Ineffective  Goal: Achieve or maintain patent airway  Outcome: Progressing Towards Goal

## 2020-09-08 NOTE — PROGRESS NOTES
[facility]  Hospitalist Group                                                                                          Hospitalist Progress Note  Anneliese Collins MD  Answering service: [phone] OR [phone] from in house phone        Date of Service:  2020  NAME:  Tatiana Landrum  :    MRN:  [id number]      Admission Summary:     Tatiana Landrum is a 59-year-old male with a past medical history that includes HTN, ischemic stroke with residual left hemiplegia, dysphagia, T2DM and GERD who was transferred to [facility] from [facility] for increasing oxygen requirement. He arrived here at approximately 4 PM.      Patient originally presented at [facility] on  with several days of fever, cough and shortness of breath. He tested positive for COVID and was started on ceftriaxone and azithromycin for RML CAP. There was concern for aspiration so he was started on Zosyn and ceftriaxone was discontinued. He never did receive Remdesivir. On  IV Diflucan was initiated for COVID pneumonitis. Patient was weaned from CPAP to 50% Ventimask and maintained saturations greater than 95% without worsening shortness of breath. He is transferred out of the ICU on high flow nasal cannula and is now resting comfortably on 6 L NC.     Interval history / Subjective:   F/u Acute respiratory failure  -COVID  positive again  6 beats of NSVT   Continues on room air  BP much better     Assessment & Plan:     Acute hypoxic respiratory failure due to COVID, CAP POA  -High flow transitioned to mid flow, improving, now on 5 l/m, monitor pulse ox  -on iv decadron (--), remdesivir (--)  -Had total 9 days Zosyn and 5 days azithromycin PTA.  -COVID was positive at [facility]  -COVID repeat --positive  -Droplet precautions  -Prone positioning  -On prn Albuterol  -ID on board     Pneumonia due to COVID 19 infection  -Had total 9 days Zosyn and 5 days azithromycin PTA.  -continue vitamin c, zinc, decadron, oxygen support,   - ID consulted started on remdesivir (8/31--)  -Elevated d-dimer  -CT chest 8/26 no PE  -On Lovenox 40 mg every 12 hours     Leukocytosis  -Likely related to prolonged steroid use, now resolved  -Antibiotics completed  -improved and wbc is normal     HTN  -BP fairly controlled, Continue home carvedilol 25 mg twice daily  -restarted norvasc, may need to increase the dose. May add home Lisinopril as well if continues to remain elevated     T2DM with hyperglycemia  -On Decadron  - A1c 6.3  -continue sliding scale and monitor finger stick glucose     Hx of CVA with left hemiplegia and dysphagia  -CT 8/22 old ischemic changes noted, no acute changes  -Usually walks w assist of a rolling walker  -seen by speech, on pureed diet  -PT/OT     Pureed diet    PT/OT SNF    Code status: Full Code  DVT prophylaxis: Lovenox 40 BID  PTA: Ambulate with walker and cane at base line   Spoke to grand daughter Jayda Wilkes ([phone]). Agreeing on SNF ([address] and rehab)    Plan: COVID still positive. CM informed that the patient needs COVID negative before he can be discharged    Care Plan discussed with: Patient/Family and Nurse  Anticipated Disposition:?tomorrow   Anticipated Discharge: 24 hours to 48 hours        Hospital Problems  Date Reviewed: 9/5/2020          Codes Class Noted POA    NSVT (nonsustained ventricular tachycardia) ([facility] [address] ICD-10-CM: I47.2  ICD-9-CM: 427.1  9/5/2020 Unknown        * (Principal) Respiratory failure ([facility] [address] ICD-10-CM: J96.90  ICD-9-CM: 518.81  8/30/2020 Yes                Vital Signs:    Last 24hrs VS reviewed since prior progress note.  Most recent are:  Visit Vitals  /84   Pulse 76   Temp 98 °F (36.7 °C)   Resp 18   Ht 5' 8\" (1.727 m)   Wt 96.3 kg (212 lb 6.4 oz)   SpO2 94%   BMI 32.30 kg/m²         Intake/Output Summary (Last 24 hours) at 9/8/2020 1104  Last data filed at 9/8/2020 0951  Gross per 24 hour   Intake -- Output 1875 ml   Net -1875 ml        Physical Examination:             Constitutional:  No acute distress, cooperative, pleasant    ENT:  Oral mucosa moist, oropharynx benign. Resp:  Decrease bronchial breath sound bilaterally. No wheezing/rhonchi/rales. No accessory muscle use   CV:  Regular rhythm, normal rate, no murmurs, gallops, rubs    GI:  Soft, non distended, non tender. normoactive bowel sounds, no hepatosplenomegaly     Musculoskeletal:  No edema     Neurologic:  Conscious and alert, oriented to place and person, motor RE 4-5/5, LLE 3/5, LUE 2/5     Skin:  Dry but no skin rash       Data Review:    Review and/or order of clinical lab test  Review and/or order of tests in the radiology section of CPT  Review and/or order of tests in the medicine section of CPT      Labs:     Recent Labs     09/06/20  0540   WBC 8.1   HGB 13.4   HCT 39.0        Recent Labs     09/08/20 0221 09/07/20 0431 09/06/20  0540   NA  --   --  137   K  --   --  4.0   CL  --   --  105   CO2  --   --  27   BUN  --   --  13   CREA  --   --  0.71   GLU  --   --  236*   CA  --   --  8.2*   MG 1.9 2.1 2.0     No results for input(s): ALT, AP, TBIL, TBILI, TP, ALB, GLOB, GGT, AML, LPSE in the last 72 hours. No lab exists for component: SGOT, GPT, AMYP, HLPSE  Recent Labs     09/08/20 0221 09/07/20 0431 09/06/20  0540   INR 1.2* 1.3* 1.3*   PTP 12.0* 12.8* 13.1*   APTT 29.2 29.3 29.1      No results for input(s): FE, TIBC, PSAT, FERR in the last 72 hours. No results found for: FOL, RBCF   No results for input(s): PH, PCO2, PO2 in the last 72 hours. No results for input(s): CPK, CKNDX, TROIQ in the last 72 hours.     No lab exists for component: CPKMB  No results found for: CHOL, CHOLX, CHLST, CHOLV, HDL, HDLP, LDL, LDLC, DLDLP, TGLX, TRIGL, TRIGP, CHHD, CHHDX  Lab Results   Component Value Date/Time    Glucose (POC) 200 (H) 09/08/2020 08:25 AM    Glucose (POC) 306 (H) 09/07/2020 09:39 PM    Glucose (POC) 168 (H) 09/07/2020 04:56 PM    Glucose (POC) 132 (H) 09/07/2020 11:56 AM    Glucose (POC) 154 (H) 09/07/2020 08:33 AM     No results found for: COLOR, APPRN, SPGRU, REFSG, DINAH, PROTU, GLUCU, KETU, BILU, UROU, MAGALI, LEUKU, GLUKE, EPSU, BACTU, WBCU, RBCU, CASTS, UCRY      Medications Reviewed:     Current Facility-Administered Medications   Medication Dose Route Frequency    amLODIPine (NORVASC) tablet 5 mg  5 mg Oral DAILY    insulin lispro (HUMALOG) injection   SubCUTAneous AC&HS    nystatin (MYCOSTATIN) 100,000 unit/mL oral suspension 500,000 Units  500,000 Units Oral QID    ascorbic acid (vitamin C) (VITAMIN C) tablet 500 mg  500 mg Oral BID    zinc sulfate (ZINCATE) 220 (50) mg capsule 1 Cap  1 Cap Oral DAILY    sodium chloride (NS) flush 5-40 mL  5-40 mL IntraVENous Q8H    sodium chloride (NS) flush 5-40 mL  5-40 mL IntraVENous PRN    bisacodyL (DULCOLAX) tablet 5 mg  5 mg Oral DAILY PRN    ondansetron (ZOFRAN ODT) tablet 4 mg  4 mg Oral Q8H PRN    Or    ondansetron (ZOFRAN) injection 4 mg  4 mg IntraVENous Q6H PRN    dexamethasone (DECADRON) 4 mg/mL injection 6 mg  6 mg IntraVENous Q24H    glucose chewable tablet 16 g  4 Tab Oral PRN    glucagon (GLUCAGEN) injection 1 mg  1 mg IntraMUSCular PRN    dextrose 10% infusion 0-250 mL  0-250 mL IntraVENous PRN    enoxaparin (LOVENOX) injection 40 mg  40 mg SubCUTAneous Q12H    acetaminophen (TYLENOL) tablet 650 mg  650 mg Oral Q6H PRN    Or    acetaminophen (TYLENOL) suppository 650 mg  650 mg Rectal Q6H PRN    carvediloL (COREG) tablet 25 mg  25 mg Oral BID WITH MEALS    pravastatin (PRAVACHOL) tablet 40 mg  40 mg Oral QHS    0.9% sodium chloride infusion  60 mL/hr IntraVENous CONTINUOUS    albuterol (PROVENTIL HFA, VENTOLIN HFA, PROAIR HFA) inhaler 2 Puff  2 Puff Inhalation Q4H RT     ______________________________________________________________________  EXPECTED LENGTH OF STAY: 5d 12h  ACTUAL LENGTH OF STAY:          Tanvir Gastelum MD

## 2020-09-08 NOTE — PROGRESS NOTES
Attempted therapy session prior to transfer to . Pt states he will not participate in EOB activity, states \"get on out of here if you think i'm walking\" and becoming mildly upset. Will attempt to mobilize another day.     Mahnaz Christopher, ADARSHT, PT

## 2020-09-08 NOTE — PROGRESS NOTES
[facility]               Division of Cardiology  [phone]                       Progress note              Joeann Cranker M.D., F.A.CAsiaC. NAME:  Inderjit Salazar   :      MRN:   [id number]         ICD-10-CM ICD-9-CM    1. NSVT (nonsustained ventricular tachycardia) ([facility])  I47.2 427.1                  HPI  66years old gentleman with past medical history remarkable for CVA with residual left hemiplegia and dysphagia, type 2 diabetes, gastroesophageal reflux disease who was admitted from  [facility] on account of shortness of breath and increasing oxygen requirements. Patient was tested positive for COVID. Cardiology call for nonsustained ventricular tachycardia. He also has a history hypertension hyperlipidemia. Patient not seen on account of positivity for COVID. Chart reviewed. Assessment/Plan: 1. Non-sustained ventricular tachycardia: No recurrence thus far. Continue Coreg. Magnesium is normal.  Potassium not yet available today but was normal 2 days ago. Continue to follow. No additional interventions for now continue monitoring. Awaiting for echo results. 2.  COVID: Positive as per primary team.                CARDIAC STUDIES                      EKG Results     None            IMAGING      CT Results (most recent):  No results found for this or any previous visit. XR Results (most recent):  Results from Hospital Encounter encounter on 20   XR CHEST PORT    Narrative INDICATION:  ?pneumonia     Exam: Portable chest 3656. Comparison: 2020. Findings: Cardiomediastinal silhouette is within normal limits. Pulmonary  vasculature is not engorged. Diffuse bilateral airspace disease has  significantly improved. No pneumothorax or effusion      Impression Impression:  1.  Significant improvement in bilateral airspace disease           MRI Results (most recent):  No results found for this or any previous visit. Past Medical History:   Diagnosis Date    Acid reflux     Anemia     Diabetes (HCC)     GERD (gastroesophageal reflux disease)     Hypertension     Stroke [facility])            Past Surgical History:   Procedure Laterality Date    HX COLONOSCOPY                 Visit Vitals  /84   Pulse 76   Temp 98 °F (36.7 °C)   Resp 18   Ht 5' 8\" (1.727 m)   Wt 212 lb 6.4 oz (96.3 kg)   SpO2 94%   BMI 32.30 kg/m²         Wt Readings from Last 3 Encounters:   09/08/20 212 lb 6.4 oz (96.3 kg)   08/19/20 223 lb (101.2 kg)         Review of Systems:   Review of systems not obtained due to patient factors.          09/08 0701 - 09/08 1900  In: -   Out: 525 [Urine:525]    09/06 1901 - 09/08 0700  In: -   Out: 2100 [Urine:2100]      Telemetry: normal sinus rhythm    Physical Exam:        Current Facility-Administered Medications   Medication Dose Route Frequency    amLODIPine (NORVASC) tablet 5 mg  5 mg Oral DAILY    insulin lispro (HUMALOG) injection   SubCUTAneous AC&HS    nystatin (MYCOSTATIN) 100,000 unit/mL oral suspension 500,000 Units  500,000 Units Oral QID    ascorbic acid (vitamin C) (VITAMIN C) tablet 500 mg  500 mg Oral BID    zinc sulfate (ZINCATE) 220 (50) mg capsule 1 Cap  1 Cap Oral DAILY    sodium chloride (NS) flush 5-40 mL  5-40 mL IntraVENous Q8H    sodium chloride (NS) flush 5-40 mL  5-40 mL IntraVENous PRN    bisacodyL (DULCOLAX) tablet 5 mg  5 mg Oral DAILY PRN    ondansetron (ZOFRAN ODT) tablet 4 mg  4 mg Oral Q8H PRN    Or    ondansetron (ZOFRAN) injection 4 mg  4 mg IntraVENous Q6H PRN    dexamethasone (DECADRON) 4 mg/mL injection 6 mg  6 mg IntraVENous Q24H    glucose chewable tablet 16 g  4 Tab Oral PRN    glucagon (GLUCAGEN) injection 1 mg  1 mg IntraMUSCular PRN    dextrose 10% infusion 0-250 mL  0-250 mL IntraVENous PRN    enoxaparin (LOVENOX) injection 40 mg  40 mg SubCUTAneous Q12H    acetaminophen (TYLENOL) tablet 650 mg  650 mg Oral Q6H PRN    Or    acetaminophen (TYLENOL) suppository 650 mg  650 mg Rectal Q6H PRN    carvediloL (COREG) tablet 25 mg  25 mg Oral BID WITH MEALS    pravastatin (PRAVACHOL) tablet 40 mg  40 mg Oral QHS    0.9% sodium chloride infusion  60 mL/hr IntraVENous CONTINUOUS    albuterol (PROVENTIL HFA, VENTOLIN HFA, PROAIR HFA) inhaler 2 Puff  2 Puff Inhalation Q4H RT         All Cardiac Markers in the last 24 hours:  No results found for: CPK, CK, CKMMB, CKMB, RCK3, CKMBT, CKMBPOC, CKNDX, CKND1, JESSIKA, TROPT, TROIQ, FLAKITO, TROPT, TNIPOC, BNP, BNPP, BNPNT     Lab Results   Component Value Date/Time    Creatinine 0.71 09/06/2020 05:40 AM          No results found for: CPK, RCK1, RCK2, RCK3, RCK4, CKMB, CKNDX, CKND1, TROPT, TROIQ, BNPP, BNP      Lab Results   Component Value Date/Time    WBC 8.1 09/06/2020 05:40 AM    HGB 13.4 09/06/2020 05:40 AM    HCT 39.0 09/06/2020 05:40 AM    PLATELET 644 59/84/1455 05:40 AM    MCV 97.0 09/06/2020 05:40 AM         Lab Results   Component Value Date/Time    Sodium 137 09/06/2020 05:40 AM    Potassium 4.0 09/06/2020 05:40 AM    Chloride 105 09/06/2020 05:40 AM    CO2 27 09/06/2020 05:40 AM    Anion gap 5 09/06/2020 05:40 AM    Glucose 236 (H) 09/06/2020 05:40 AM    BUN 13 09/06/2020 05:40 AM    Creatinine 0.71 09/06/2020 05:40 AM    BUN/Creatinine ratio 18 09/06/2020 05:40 AM    GFR est AA >60 09/06/2020 05:40 AM    GFR est non-AA >60 09/06/2020 05:40 AM    Calcium 8.2 (L) 09/06/2020 05:40 AM         No results found for: BNP, BNPP, BNPPPOC, XBNPT, BNPNT      No results found for: CHOL, CHOLPOCT, CHOLX, CHLST, CHOLV, HDL, HDLPOC, HDLP, LDL, LDLCPOC, LDLC, DLDLP, VLDLC, VLDL, TGLX, TRIGL, TRIGP, TGLPOCT, CHHD, CHHDX      Lab Results   Component Value Date/Time    ALT (SGPT) 33 09/05/2020 03:27 AM    Alk.  phosphatase 52 09/05/2020 03:27 AM    Bilirubin, direct 0.1 09/01/2020 06:25 AM    Bilirubin, total 0.5 09/05/2020 03:27 AM

## 2020-09-08 NOTE — PROGRESS NOTES
Problem: Dysphagia (Adult)  Goal: *Acute Goals and Plan of Care (Insert Text)  Description: Speech therapy goals  Initiated 8/31/2020; revised 9/8/2020    1. Patient will tolerate puree/thin liquid diet without s/s of aspiration within 7 days MET 9/8/2020   2. Patient will tolerate solid trials with SLP to determine safety for diet upgrade within 7 days continue 9/8/2020   Outcome: Progressing Towards Goal     SPEECH LANGUAGE PATHOLOGY DYSPHAGIA TREATMENT: WEEKLY REASSESSMENT  Patient: Umair Hamilton (7 y.o. male)  Date: 9/8/2020  Diagnosis: Respiratory failure ([address] [J96.90]   Respiratory failure ([address]       Precautions: fall, COVID+      ASSESSMENT:  Patient with excellent tolerance of puree/thin liquid diet without s/s of aspiration with lunch tray. Solid trials result in continued poor mastication with multiple minutes required for breakdown of a single bite of a cracker and moderate oral residue needing liquid wash to clear. Remains inappropriate for diet upgrade and suspect purees will be most appropriate during hospitalization. Patient's progression toward goals since last assessment: doing well with current diet, remains unsafe for upgrade. PLAN:  Goals have been updated based on progression since last assessment. Patient continues to benefit from skilled intervention to address the above impairments. Continue to follow the patient 1 time a week to address goals. Recommendations and Planned Interventions:  --continue puree/thin liquid diet. General aspiration precautions. --will reduce frequency to 1x/week as it is likely that purees is most appropriate diet during acute hospitalization  Discharge Recommendations: [facility]:   Patient stated you look funny in your hat.  Repeated to therapist at least half a dozen times during session referring to scrub cap and face shield (COVID+ airborne precautions)    OBJECTIVE:   Cognitive and Communication Status:  Neurologic State: Alert, Confused  Orientation Level: Oriented X4(but confused in conversation )  Cognition: Memory loss, Decreased command following, Decreased attention/concentration  Perception: Appears intact  Perseveration: No perseveration noted  Safety/Judgement: Not assessed  Dysphagia Treatment:  Oral Assessment:  Oral Assessment  Labial: Decreased seal  Dentition: Natural;Limited  Oral Hygiene: moist mucosa   Lingual: Decreased strength  Mandible: No impairment  P.O. Trials:  Patient Position: upright in bed  Vocal quality prior to P.O.: No impairment  Consistency Presented: Thin liquid; Solid;Puree(lunch tray )  How Presented: SLP-fed/presented;Self-fed/presented;Spoon;Straw     Bolus Acceptance: No impairment  Bolus Formation/Control: Impaired  Type of Impairment: Poor;Mastication(took multiple minutes to chew a single piece of cracker )  Propulsion: Delayed (# of seconds)(with solids )  Oral Residue: 10-50% of bolus(with solids only )  Initiation of Swallow: No impairment  Laryngeal Elevation: Decreased(mild - suspect baseline )  Aspiration Signs/Symptoms: None  Pharyngeal Phase Characteristics: No impairment, issues, or problems   Effective Modifications: Small sips and bites  Cues for Modifications: None     Oral Phase Severity: Mild-moderate  Pharyngeal Phase Severity : Mild  Exercises:  Laryngeal Exercises:                                                                                                                                   Pain:  Pain Scale 1: Numeric (0 - 10)  Pain Intensity 1: 0       After treatment patient left in no apparent distress:   Patient left in no apparent distress in bed, Call bell within reach, and Nursing notified    COMMUNICATION/EDUCATION:     The patients plan of care including recommendations, planned interventions, and recommended diet changes were discussed with: Registered nurse. Hilda Shirley M.CD.  CCC-SLP   Time Calculation: 15 mins

## 2020-09-08 NOTE — PROGRESS NOTES
Problem: Falls - Risk of  Goal: *Absence of Falls  Description: Document Shayla Morataya Fall Risk and appropriate interventions in the flowsheet. Outcome: Progressing Towards Goal  Note: Fall Risk Interventions:  Mobility Interventions: Communicate number of staff needed for ambulation/transfer, Patient to call before getting OOB, PT Consult for mobility concerns    Mentation Interventions: Adequate sleep, hydration, pain control, Reorient patient    Medication Interventions: Evaluate medications/consider consulting pharmacy, Patient to call before getting OOB    Elimination Interventions: Call light in reach, Toileting schedule/hourly rounds       Problem: Pressure Injury - Risk of  Goal: *Prevention of pressure injury  Description: Document William Scale and appropriate interventions in the flowsheet. Outcome: Progressing Towards Goal  Note: Pressure Injury Interventions:  Sensory Interventions: Assess changes in LOC, Check visual cues for pain, Keep linens dry and wrinkle-free, Pressure redistribution bed/mattress (bed type), Turn and reposition approx. every two hours (pillows and wedges if needed)    Moisture Interventions: Absorbent underpads, Apply protective barrier, creams and emollients, Check for incontinence Q2 hours and as needed, Internal/External urinary devices, Maintain skin hydration (lotion/cream), Minimize layers, Moisture barrier, Offer toileting Q_hr    Activity Interventions: Pressure redistribution bed/mattress(bed type), PT/OT evaluation    Mobility Interventions: Pressure redistribution bed/mattress (bed type), PT/OT evaluation, Turn and reposition approx.  every two hours(pillow and wedges)    Nutrition Interventions: Document food/fluid/supplement intake    Friction and Shear Interventions: Apply protective barrier, creams and emollients, Lift sheet, Minimize layers     Problem: Breathing Pattern - Ineffective  Goal: *Absence of hypoxia  Outcome: Progressing Towards Goal  Note: O2 sats stable on room air     Problem: Airway Clearance - Ineffective  Goal: Achieve or maintain patent airway  Outcome: Progressing Towards Goal     Problem: Gas Exchange - Impaired  Goal: Absence of hypoxia  Outcome: Progressing Towards Goal     Problem:  Body Temperature -  Risk of, Imbalanced  Goal: Ability to maintain a body temperature within defined limits  Outcome: Progressing Towards Goal  Note: Afebrile

## 2020-09-08 NOTE — PROGRESS NOTES
Transition of Care Plan  RUR 12%    2 COVID 19 negative tests are required for admission to [facility] for rehab--     Disposition   Snf for rehab-- Accepted to [facility] and [phone]  Fax [phone] in admissions, Admissions Director    Transportation   [facility]     Main Contacts    D[place]  [place] [facility]  [phone]  Wife   Chetna Andrews  [phone]    Spoke with [address] in admissions today and confirmed that 2 COVIS 19 tests are required prior to patient being admitted to the facility for rehab. COVID test is pending. Patient will not be discharging today. CM will call Angella when COVID 19 results are completed and negative and determine when patient can be admitted.

## 2020-09-08 NOTE — PROGRESS NOTES
Comprehensive Nutrition Assessment    Type and Reason for Visit:      Nutrition Recommendations/Plan:   1. Continue puree diet (per SLP) + ONS with meals  2. Feeding assistance with all meals to maximize intake    Nutrition Assessment:    66 y.o. male who has a past medical history of Acid reflux, Anemia, Diabetes, GERD, Hypertension, and Stroke who was tx from [facility] with Respiratory failure 2° COVID PNA. Pt screened for LOS. Discussed in rounds. Noted: periodic confusion, but A/O x 4 for most part. Ate 100% for breakfast.  Needs assistance. Unsure if pureed is baseline diet. SLP following. Seen today- doing well with pureed, remains unsafe for upgraded diet. Possible downgrade from IMCU today. Last COVID+ 9/5. Completed Remdesivir. Last steroid dose today - should see better BG control once off. Currently on room air, previously high flow to mid flow. Awaiting repeat swab for SNF placement. Needs PT/OT. Unable to reach pt on phone to discuss wt hx. Suspect inaccuracies vs bed scale discrepancies vs fluid given large fluctuations recently. Also, unclear source/visit of earliest weight. Already on [place], but not taking consistently. PO intake appears improved as of today, but was poor prior. Will add Glucerna BID and d/c Boost Pudding. Continue Magic Cup for more variety. If continues to eat well, may not need ONS. RD will continue to follow and monitor for PO adequacy.       Wt Readings from Last 20 Encounters:   09/08/20 96.5 kg (212 lb 12.8 oz)   08/30/20 93.2 kg (205 lb 7.5 oz)   08/19/20 101.2 kg (223 lb)         Malnutrition Assessment:  Malnutrition Status:  Insufficient data        Nutritionally Significant Medications:   Vit C 500 mg BID, Lovenox, correctional scale, nystatin oral QID, pravastatin, decadron (last dose), zincate      Estimated Daily Nutrient Needs:  Energy (kcal):  3466-9439(MSJ x 1.2 x 1.1)  Protein (g):  95(1 gm/kg) Fluid (ml/day):  1 mL/kcal    Nutrition Related Findings:    Edema: 1+ RUE  Last BM: 9/8 - formed    Wounds:    None       Current Nutrition Therapies:   PUREED + MAGIC CUP & BOOST PUDDING ONCE DAILY  Meal intake:   Patient Vitals for the past 168 hrs:   % Diet Eaten   09/08/20 1201 100 %   09/08/20 0908 100 %   09/04/20 1600 20 %   09/04/20 0912 0 %   09/03/20 0937 20 %         Anthropometric Measures:  · Height:  5' 8\" (172.7 cm)  · Current Body Wt:  96.5 kg (212 lb 11.9 oz)   · Admission Body Wt:  205 lb 7.5 oz(93.2 kg)      · Ideal Body Wt:    lb:  138.1 %    · BMI Categories:  Obese class 1 (BMI 30.0-34. 9)       Nutrition Diagnosis:   · Swallowing difficulty related to cognitive or neurological impairment, impaired respiratory function as evidenced by swallowing study results(pureed diet)      Nutrition Interventions:   Food and/or Nutrient Delivery: Continue current diet, Continue oral nutrition supplement  Nutrition Education and Counseling: No recommendations at this time  Coordination of Nutrition Care: Continued inpatient monitoring, Feeding assistance/environmental change, Speech therapy, Interdisciplinary rounds    Goals:  continued PO >/=50% of all meals with at least 1 ONS daily, or PO % without ONS       Nutrition Monitoring and Evaluation:   Behavioral-Environmental Outcomes: (-)  Food/Nutrient Intake Outcomes: Diet advancement/tolerance, Food and nutrient intake, Supplement intake  Physical Signs/Symptoms Outcomes: Biochemical data, GI status, Fluid status or edema, Skin, Meal time behavior, Hemodynamic status, Chewing or swallowing, Weight    Discharge Planning:     Too soon to determine       Recent Labs     09/08/20  0221 09/07/20  0431 09/06/20  0540   GLU  --   --  236*   BUN  --   --  13   CREA  --   --  0.71   NA  --   --  137   K  --   --  4.0   CL  --   --  105   CO2  --   --  27   CA  --   --  8.2*   MG 1.9 2.1 2.0       Recent Labs     09/08/20  1648 09/08/20  1159 09/08/20  0825 09/07/20  2139 09/07/20  1656 09/07/20  1156 09/07/20  0833 09/06/20  2145 09/06/20  1740 09/06/20  1221 09/06/20  0754 09/05/20  2108 09/05/20  1804   GLUCPOC 249* 278* 200* 306* 168* 132* 154* 206* 174* 137* 180* 248* 159*       Lab Results   Component Value Date/Time    Hemoglobin A1c 6.3 (H) 08/31/2020 04:00 AM         Kim Turner RD  Available via [employer]  Or Pager# [phone]

## 2020-09-09 LAB
ANION GAP SERPL CALC-SCNC: 8 MMOL/L (ref 5–15)
APTT PPP: 27.3 SEC (ref 22.1–32)
BASOPHILS # BLD: 0 K/UL (ref 0–0.1)
BASOPHILS NFR BLD: 0 % (ref 0–1)
BUN SERPL-MCNC: 19 MG/DL (ref 6–20)
BUN/CREAT SERPL: 26 (ref 12–20)
CALCIUM SERPL-MCNC: 9 MG/DL (ref 8.5–10.1)
CHLORIDE SERPL-SCNC: 105 MMOL/L (ref 97–108)
CO2 SERPL-SCNC: 23 MMOL/L (ref 21–32)
COVID-19, XGCOVT: DETECTED
CREAT SERPL-MCNC: 0.72 MG/DL (ref 0.7–1.3)
D DIMER PPP FEU-MCNC: 0.6 MG/L FEU (ref 0–0.65)
DIFFERENTIAL METHOD BLD: ABNORMAL
EOSINOPHIL # BLD: 0 K/UL (ref 0–0.4)
EOSINOPHIL NFR BLD: 0 % (ref 0–7)
ERYTHROCYTE [DISTWIDTH] IN BLOOD BY AUTOMATED COUNT: 12.8 % (ref 11.5–14.5)
FIBRINOGEN PPP-MCNC: 336 MG/DL (ref 200–475)
GLUCOSE BLD STRIP.AUTO-MCNC: 230 MG/DL (ref 65–100)
GLUCOSE BLD STRIP.AUTO-MCNC: 258 MG/DL (ref 65–100)
GLUCOSE BLD STRIP.AUTO-MCNC: 316 MG/DL (ref 65–100)
GLUCOSE BLD STRIP.AUTO-MCNC: 317 MG/DL (ref 65–100)
GLUCOSE BLD STRIP.AUTO-MCNC: 337 MG/DL (ref 65–100)
GLUCOSE SERPL-MCNC: 329 MG/DL (ref 65–100)
HCT VFR BLD AUTO: 41.4 % (ref 36.6–50.3)
HEALTH STATUS, XMCV2T: ABNORMAL
HGB BLD-MCNC: 14 G/DL (ref 12.1–17)
IMM GRANULOCYTES # BLD AUTO: 0 K/UL (ref 0–0.04)
IMM GRANULOCYTES NFR BLD AUTO: 0 % (ref 0–0.5)
INR PPP: 1.1 (ref 0.9–1.1)
LYMPHOCYTES # BLD: 1 K/UL (ref 0.8–3.5)
LYMPHOCYTES NFR BLD: 12 % (ref 12–49)
MAGNESIUM SERPL-MCNC: 2.1 MG/DL (ref 1.6–2.4)
MCH RBC QN AUTO: 33.1 PG (ref 26–34)
MCHC RBC AUTO-ENTMCNC: 33.8 G/DL (ref 30–36.5)
MCV RBC AUTO: 97.9 FL (ref 80–99)
MONOCYTES # BLD: 0.6 K/UL (ref 0–1)
MONOCYTES NFR BLD: 7 % (ref 5–13)
NEUTS SEG # BLD: 6.9 K/UL (ref 1.8–8)
NEUTS SEG NFR BLD: 81 % (ref 32–75)
NRBC # BLD: 0 K/UL (ref 0–0.01)
NRBC BLD-RTO: 0 PER 100 WBC
PLATELET # BLD AUTO: 246 K/UL (ref 150–400)
PMV BLD AUTO: 11.8 FL (ref 8.9–12.9)
POTASSIUM SERPL-SCNC: 4.1 MMOL/L (ref 3.5–5.1)
PROTHROMBIN TIME: 11.7 SEC (ref 9–11.1)
RBC # BLD AUTO: 4.23 M/UL (ref 4.1–5.7)
SERVICE CMNT-IMP: ABNORMAL
SODIUM SERPL-SCNC: 136 MMOL/L (ref 136–145)
SOURCE, COVRS: ABNORMAL
SPECIMEN SOURCE, FCOV2M: ABNORMAL
SPECIMEN TYPE, XMCV1T: ABNORMAL
THERAPEUTIC RANGE,PTTT: NORMAL SECS (ref 58–77)
WBC # BLD AUTO: 8.5 K/UL (ref 4.1–11.1)

## 2020-09-09 PROCEDURE — 65270000029 HC RM PRIVATE

## 2020-09-09 PROCEDURE — 85025 COMPLETE CBC W/AUTO DIFF WBC: CPT

## 2020-09-09 PROCEDURE — 74011250637 HC RX REV CODE- 250/637: Performed by: INTERNAL MEDICINE

## 2020-09-09 PROCEDURE — 85379 FIBRIN DEGRADATION QUANT: CPT

## 2020-09-09 PROCEDURE — 77010033678 HC OXYGEN DAILY

## 2020-09-09 PROCEDURE — 94760 N-INVAS EAR/PLS OXIMETRY 1: CPT

## 2020-09-09 PROCEDURE — 36415 COLL VENOUS BLD VENIPUNCTURE: CPT

## 2020-09-09 PROCEDURE — 97530 THERAPEUTIC ACTIVITIES: CPT

## 2020-09-09 PROCEDURE — 94664 DEMO&/EVAL PT USE INHALER: CPT

## 2020-09-09 PROCEDURE — 85384 FIBRINOGEN ACTIVITY: CPT

## 2020-09-09 PROCEDURE — 83735 ASSAY OF MAGNESIUM: CPT

## 2020-09-09 PROCEDURE — 74011250637 HC RX REV CODE- 250/637: Performed by: HOSPITALIST

## 2020-09-09 PROCEDURE — 80048 BASIC METABOLIC PNL TOTAL CA: CPT

## 2020-09-09 PROCEDURE — 74011250636 HC RX REV CODE- 250/636: Performed by: INTERNAL MEDICINE

## 2020-09-09 PROCEDURE — 94640 AIRWAY INHALATION TREATMENT: CPT

## 2020-09-09 PROCEDURE — 74011250637 HC RX REV CODE- 250/637: Performed by: NURSE PRACTITIONER

## 2020-09-09 PROCEDURE — 74011636637 HC RX REV CODE- 636/637: Performed by: HOSPITALIST

## 2020-09-09 PROCEDURE — 99231 SBSQ HOSP IP/OBS SF/LOW 25: CPT | Performed by: SPECIALIST

## 2020-09-09 PROCEDURE — 85730 THROMBOPLASTIN TIME PARTIAL: CPT

## 2020-09-09 PROCEDURE — 97535 SELF CARE MNGMENT TRAINING: CPT

## 2020-09-09 PROCEDURE — 82962 GLUCOSE BLOOD TEST: CPT

## 2020-09-09 PROCEDURE — 85610 PROTHROMBIN TIME: CPT

## 2020-09-09 RX ADMIN — INSULIN LISPRO 7 UNITS: 100 INJECTION, SOLUTION INTRAVENOUS; SUBCUTANEOUS at 17:13

## 2020-09-09 RX ADMIN — OXYCODONE HYDROCHLORIDE AND ACETAMINOPHEN 500 MG: 500 TABLET ORAL at 08:38

## 2020-09-09 RX ADMIN — AMLODIPINE BESYLATE 5 MG: 5 TABLET ORAL at 08:38

## 2020-09-09 RX ADMIN — Medication 10 ML: at 22:11

## 2020-09-09 RX ADMIN — ALBUTEROL SULFATE 2 PUFF: 90 AEROSOL, METERED RESPIRATORY (INHALATION) at 03:22

## 2020-09-09 RX ADMIN — INSULIN LISPRO 5 UNITS: 100 INJECTION, SOLUTION INTRAVENOUS; SUBCUTANEOUS at 07:32

## 2020-09-09 RX ADMIN — Medication 10 ML: at 12:49

## 2020-09-09 RX ADMIN — INSULIN LISPRO 3 UNITS: 100 INJECTION, SOLUTION INTRAVENOUS; SUBCUTANEOUS at 12:48

## 2020-09-09 RX ADMIN — ALBUTEROL SULFATE 2 PUFF: 90 AEROSOL, METERED RESPIRATORY (INHALATION) at 07:30

## 2020-09-09 RX ADMIN — NYSTATIN 500000 UNITS: 100000 SUSPENSION ORAL at 17:13

## 2020-09-09 RX ADMIN — ENOXAPARIN SODIUM 40 MG: 40 INJECTION SUBCUTANEOUS at 08:38

## 2020-09-09 RX ADMIN — ZINC SULFATE 220 MG (50 MG) CAPSULE 1 CAPSULE: CAPSULE at 08:38

## 2020-09-09 RX ADMIN — PRAVASTATIN SODIUM 40 MG: 40 TABLET ORAL at 22:10

## 2020-09-09 RX ADMIN — ALBUTEROL SULFATE 2 PUFF: 90 AEROSOL, METERED RESPIRATORY (INHALATION) at 22:25

## 2020-09-09 RX ADMIN — ALBUTEROL SULFATE 2 PUFF: 90 AEROSOL, METERED RESPIRATORY (INHALATION) at 11:06

## 2020-09-09 RX ADMIN — NYSTATIN 500000 UNITS: 100000 SUSPENSION ORAL at 22:10

## 2020-09-09 RX ADMIN — OXYCODONE HYDROCHLORIDE AND ACETAMINOPHEN 500 MG: 500 TABLET ORAL at 17:13

## 2020-09-09 RX ADMIN — INSULIN LISPRO 4 UNITS: 100 INJECTION, SOLUTION INTRAVENOUS; SUBCUTANEOUS at 22:20

## 2020-09-09 RX ADMIN — NYSTATIN 500000 UNITS: 100000 SUSPENSION ORAL at 12:49

## 2020-09-09 RX ADMIN — ENOXAPARIN SODIUM 40 MG: 40 INJECTION SUBCUTANEOUS at 22:10

## 2020-09-09 RX ADMIN — CARVEDILOL 25 MG: 12.5 TABLET, FILM COATED ORAL at 17:13

## 2020-09-09 RX ADMIN — ALBUTEROL SULFATE 2 PUFF: 90 AEROSOL, METERED RESPIRATORY (INHALATION) at 17:52

## 2020-09-09 RX ADMIN — NYSTATIN 500000 UNITS: 100000 SUSPENSION ORAL at 08:38

## 2020-09-09 NOTE — PROGRESS NOTES
Problem: Self Care Deficits Care Plan (Adult)  Goal: *Acute Goals and Plan of Care (Insert Text)  Description:   FUNCTIONAL STATUS PRIOR TO ADMISSION: Patient provided PLOF consistent with case management notes/ chart review. Patient was ambulatory with a RW PTA and received assistance with most ADLs. Patient reports he was ambulatory to the bathroom for toileting at home. HOME SUPPORT: Per case management note, patient lives with spouse in a double wide, single story trailer with no steps. Patient has care aids via [facility] [employer] M[facility] 9-1. Patient has hx of HH with [facility] and hx of rehab at [facility]. Occupational Therapy Goals  Weekly Re-Assessment 9/9/2020, goals modified below  Initiated 9/2/2020  1. Patient will perform grooming with supervision/set-up within 7 day(s). DOWNGRADED to [address] [place] [address] with LUE as GM assist PRN 9/9  2. Patient will perform bathing with moderate assistance within 7 day(s). CONTINUE  3. Patient will perform self-feeding with supervision/set-up within 7 day(s). CONTINUE  4.  Patient will perform toilet transfers with minimal assistance within 7 day(s). MODIFIED to/from [facility] 9/9  5. Patient will perform all aspects of toileting with moderate assistance within 7 day(s). CONTINUE  6. Patient will participate in upper extremity therapeutic exercise/activities with supervision/set-up for 10 minutes within 7 day(s). CONTINUE              Outcome: Progressing Towards Goal     OCCUPATIONAL THERAPY TREATMENT/WEEKLY RE-ASSESSMENT  Patient: Shey Perez (80 y.o. male)  Date: 9/9/2020  Diagnosis: Respiratory failure ([address] [J96.90]   Respiratory failure ([address]       Precautions:    Chart, occupational therapy assessment, plan of care, and goals were reviewed.     ASSESSMENT  Patient continues with skilled OT services and is progressing towards goals however remains limited by decreased balance, strength, activity tolerance, safety awareness, distal lower body access, and LUE>LLE ROM, strength, & extensor tone (moreso distal). Improved functional mobility noted this session, able to advance to the chair with Min-Mod A x2 and RW support, however MAX cues for sequencing and LLE positioning. Patient able to minimally assist with distal lower body dressing, lack of initiation with LUE noted requiring cues for positioning and assist PRN. Set up for lunch in the chair, noted RUE tremoring with food to mouth, but able to achieve with increased time. Continue to recommend d/c to SNF as he remains well below his baseline, remains a high fall and safety risk requiring 2 assist at all times for safety. Current Level of Function Impacting Discharge (ADLs): Min-Max A for ADLs, Min-Mod A x2 for mobility    Other factors to consider for discharge: fall risk, PMH, PLOF, LUE>LLE hemiparesis with extensor tone (jose with weight bearing)         PLAN :  Goals have been updated based on progression since last assessment. Patient continues to benefit from skilled intervention to address the above impairments. Continue to follow patient 3 times a week to address goals. Recommend with staff: Recommend with nursing patient to complete as able in order to maintain strength, endurance and independence: ADLs with assist PRN, OOB to chair 3x/day and mobilizing to the [facility] as appropriate for toileting with 2 assist & RW. Thank you for your assistance. Recommend next OT session: Seated bathing, LUE GM assist ADLs/neuro re-ed    Recommendation for discharge: (in order for the patient to meet his/her long term goals)  Therapy up to 5 days/week in SNF setting    This discharge recommendation:  Has been made in collaboration with the attending provider and/or case management    IF patient discharges home will need the following DME:  To be determined (TBD), anticipate a BSC, RW, lower body AE, transport w/c, tub transfer bench, and 2 people for physical assistance at the least at this time       SUBJECTIVE: Patient stated Oh okay, let's do it.     OBJECTIVE DATA SUMMARY:   Cognitive/Behavioral Status:  Neurologic State: Alert;Confused  Orientation Level: Disoriented to place; Disoriented to situation;Disoriented to time;Oriented to person  Cognition: Appropriate for age attention/concentration; Follows commands;Poor safety awareness  Perception: Appears intact  Perseveration: No perseveration noted  Safety/Judgement: Awareness of environment;Decreased awareness of need for assistance;Decreased awareness of need for safety;Decreased insight into deficits    Functional Mobility and Transfers for ADLs:  Bed Mobility:  Rolling: Minimum assistance  Supine to Sit: Minimum assistance; Additional time  Sit to Supine: (in chair)  Scooting: Minimum assistance(A for LLE blocking d/t extensor tone w/ WB)    Transfers:  Sit to Stand: Minimum assistance;Assist x2; Moderate assistance;Assist x1(initial Min A x2, progressed to Mod A x1 )     Bed to Chair: Moderate assistance;Assist x2;Adaptive equipment(RW, step by step cues for sequencing & RW mgmt Simultaneous filing)    Balance:  Sitting: Impaired; Without support(posterior lean )  Sitting - Static: Fair (occasional)  Sitting - Dynamic: Fair (occasional)  Standing: (blaine HHA vs RW)  Standing - Static: Fair;Constant support  Standing - Dynamic : Poor;Constant support    ADL Intervention:  Feeding  Feeding Assistance: Set-up  Container Management: Moderate assistance(A d/t LUE weakness, limits use despite cues)  Utensil Management: Supervision  Food to Mouth: Supervision(tremulous RUE, not using LUE)  Cues: Verbal cues provided;Visual cues provided       Lower Body Dressing Assistance  Dressing Assistance: Maximum assistance  Pants With Elastic Waist: Maximum assistance(simulated)  Socks: Maximum assistance  Leg Crossed Method Used: No  Position Performed: Bending forward method;Seated edge of bed;Standing  Cues: Physical assistance; Tactile cues provided;Verbal cues provided;Visual cues provided    Toileting  Toileting Assistance: Total assistance(dependent)(brief, condom catheter off upon entry)    Cognitive Retraining  Safety/Judgement: Awareness of environment;Decreased awareness of need for assistance;Decreased awareness of need for safety;Decreased insight into deficits    Therapeutic Exercises:   Sit<>stand x3 with initial Min-Mod A x2 and BUE HHA, progressing to Mod A with RW and assist for LUE weight bearing/positioning on RW d/t tone in L hand, able to stand for ~3 minutes with Min A but increased posterior lean with fatigue. Completed stand pivot with a few steps to the chair with Min-Mod A x2 and RW support, MAX step by step cues for BLE and RW sequencing    Pain:  None reported    Activity Tolerance:   Fair  Please refer to the flowsheet for vital signs taken during this treatment. After treatment patient left in no apparent distress:   Sitting in chair and Call bell within reach    COMMUNICATION/COLLABORATION:   The patients plan of care was discussed with: Physical therapist and Registered nurse.      Apryl Valdez OTD, OTR/L  Time Calculation: 32 mins

## 2020-09-09 NOTE — PROGRESS NOTES
09/09/20 1400   Vital Signs   Temp 97.8 °F (36.6 °C)   Temp Source Oral   Pulse (Heart Rate) (!) 56   Heart Rate Source Monitor   Resp Rate 19   O2 Sat (%) 96 %   Level of Consciousness Alert   /72   MAP (Calculated) 91   BP 1 Method Automatic   BP 1 Location Right arm   BP Patient Position Supine   MEWS Score 1

## 2020-09-09 NOTE — PROGRESS NOTES
Problem: Pressure Injury - Risk of  Goal: *Prevention of pressure injury  Description: Document William Scale and appropriate interventions in the flowsheet.   9/9/2020 1840 by Babatunde Hernandez RN  Outcome: Progressing Towards Goal  Note: Pressure Injury Interventions:  Sensory Interventions: Float heels, Keep linens dry and wrinkle-free    Moisture Interventions: Apply protective barrier, creams and emollients, Assess need for specialty bed, Internal/External urinary devices    Activity Interventions: Pressure redistribution bed/mattress(bed type)    Mobility Interventions: Float heels, HOB 30 degrees or less    Nutrition Interventions: Document food/fluid/supplement intake    Friction and Shear Interventions: HOB 30 degrees or less             9/9/2020 0959 by Babatunde Hernandez RN  Outcome: Progressing Towards Goal  Note: Pressure Injury Interventions:  Sensory Interventions: Float heels, Keep linens dry and wrinkle-free    Moisture Interventions: Apply protective barrier, creams and emollients, Assess need for specialty bed, Internal/External urinary devices    Activity Interventions: Pressure redistribution bed/mattress(bed type)    Mobility Interventions: Float heels, HOB 30 degrees or less    Nutrition Interventions: Document food/fluid/supplement intake    Friction and Shear Interventions: HOB 30 degrees or less

## 2020-09-09 NOTE — PROGRESS NOTES
Problem: Falls - Risk of  Goal: *Absence of Falls  Description: Document Norm Wilmer Fall Risk and appropriate interventions in the flowsheet.   Outcome: Progressing Towards Goal  Note: Fall Risk Interventions:  Mobility Interventions: Communicate number of staff needed for ambulation/transfer    Mentation Interventions: Adequate sleep, hydration, pain control, Bed/chair exit alarm    Medication Interventions: Bed/chair exit alarm    Elimination Interventions: Call light in reach

## 2020-09-09 NOTE — PROGRESS NOTES
Bedside shift change report given to yenny (oncoming nurse) by Janett Centeno (offgoing nurse). Report included the following information SBAR, Kardex and Intake/Output.

## 2020-09-09 NOTE — PROGRESS NOTES
Bedside and Verbal shift change report given to Danny Howell (oncoming nurse) by Imelda Thomas (offgoing nurse). Report included the following information SBAR, Kardex, MAR and Recent Results.

## 2020-09-09 NOTE — PROGRESS NOTES
[address] [place] [address]               Division of Cardiology  [phone]                       Progress note              Javier Fitzpatrick M.D., F.A.CAsiaC. NAME:  Umair Hamilton   :   1112   MRN:   [id number]         ICD-10-CM ICD-9-CM    1. 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 Patient eventually tested positive for COVID. Cardiology has been following him for nonsustained ventricular tachycardia. He also has a history of pretension hyperlipidemia. Chart reviewed. Assessment/Plan:    1. Nonsustained ventricular tachycardia: No recurrence thus far. The patient is off telemetry at this time. Continue Coreg. Keep magnesium above 2 and potassium above 4. Awaiting echo results to ascertain ejection fraction. 2.  COVID: Positive, treatment as per primary team.    I will continue to follow from a distance at this time. And remain available as needed        CARDIAC STUDIES                      EKG Results     None            IMAGING      CT Results (most recent):  No results found for this or any previous visit. XR Results (most recent):  Results from Hospital Encounter encounter on 20   XR CHEST PORT    Narrative INDICATION:  ?pneumonia     Exam: Portable chest 2777. Comparison: 2020. Findings: Cardiomediastinal silhouette is within normal limits. Pulmonary  vasculature is not engorged. Diffuse bilateral airspace disease has  significantly improved. No pneumothorax or effusion      Impression Impression:  1.  Significant improvement in bilateral airspace disease           MRI Results (most recent):  No results found for this or any previous visit. Past Medical History:   Diagnosis Date    Acid reflux     Anemia     Diabetes (HCC)     GERD (gastroesophageal reflux disease)     Hypertension     Stroke [facility])            Past Surgical History:   Procedure Laterality Date    HX COLONOSCOPY                 Visit Vitals  /75 (BP 1 Location: Right arm)   Pulse 65   Temp 98.1 °F (36.7 °C)   Resp 18   Ht 5' 8\" (1.727 m)   Wt 212 lb 6.4 oz (96.3 kg)   SpO2 96%   BMI 32.30 kg/m²         Wt Readings from Last 3 Encounters:   09/09/20 212 lb 6.4 oz (96.3 kg)   08/19/20 223 lb (101.2 kg)         Review of Systems:   Pertinent items are noted in the History of Present Illness. No intake/output data recorded.     09/07 1901 - 09/09 0700  In: -   Out: 600 [Urine:600]        Current Facility-Administered Medications   Medication Dose Route Frequency    amLODIPine (NORVASC) tablet 5 mg  5 mg Oral DAILY    insulin lispro (HUMALOG) injection   SubCUTAneous AC&HS    nystatin (MYCOSTATIN) 100,000 unit/mL oral suspension 500,000 Units  500,000 Units Oral QID    ascorbic acid (vitamin C) (VITAMIN C) tablet 500 mg  500 mg Oral BID    sodium chloride (NS) flush 5-40 mL  5-40 mL IntraVENous Q8H    sodium chloride (NS) flush 5-40 mL  5-40 mL IntraVENous PRN    bisacodyL (DULCOLAX) tablet 5 mg  5 mg Oral DAILY PRN    ondansetron (ZOFRAN ODT) tablet 4 mg  4 mg Oral Q8H PRN    Or    ondansetron (ZOFRAN) injection 4 mg  4 mg IntraVENous Q6H PRN    glucose chewable tablet 16 g  4 Tab Oral PRN    glucagon (GLUCAGEN) injection 1 mg  1 mg IntraMUSCular PRN    dextrose 10% infusion 0-250 mL  0-250 mL IntraVENous PRN    enoxaparin (LOVENOX) injection 40 mg  40 mg SubCUTAneous Q12H    acetaminophen (TYLENOL) tablet 650 mg  650 mg Oral Q6H PRN    Or    acetaminophen (TYLENOL) suppository 650 mg  650 mg Rectal Q6H PRN    carvediloL (COREG) tablet 25 mg  25 mg Oral BID WITH MEALS    pravastatin (PRAVACHOL) tablet 40 mg  40 mg Oral QHS    albuterol (PROVENTIL HFA, VENTOLIN HFA, PROAIR HFA) inhaler 2 Puff  2 Puff Inhalation Q4H RT         All Cardiac Markers in the last 24 hours:  No results found for: CPK, CK, CKMMB, CKMB, RCK3, CKMBT, CKMBPOC, CKNDX, CKND1, JESSIKA, TROPT, TROIQ, FLAKITO, TROPT, TNIPOC, BNP, BNPP, BNPNT     Lab Results   Component Value Date/Time    Creatinine 0.72 09/09/2020 03:16 AM          No results found for: CPK, RCK1, RCK2, RCK3, RCK4, CKMB, CKNDX, CKND1, TROPT, TROIQ, BNPP, BNP      Lab Results   Component Value Date/Time    WBC 8.5 09/09/2020 08:46 AM    HGB 14.0 09/09/2020 08:46 AM    HCT 41.4 09/09/2020 08:46 AM    PLATELET 809 61/88/6728 08:46 AM    MCV 97.9 09/09/2020 08:46 AM         Lab Results   Component Value Date/Time    Sodium 136 09/09/2020 03:16 AM    Potassium 4.1 09/09/2020 03:16 AM    Chloride 105 09/09/2020 03:16 AM    CO2 23 09/09/2020 03:16 AM    Anion gap 8 09/09/2020 03:16 AM    Glucose 329 (H) 09/09/2020 03:16 AM    BUN 19 09/09/2020 03:16 AM    Creatinine 0.72 09/09/2020 03:16 AM    BUN/Creatinine ratio 26 (H) 09/09/2020 03:16 AM    GFR est AA >60 09/09/2020 03:16 AM    GFR est non-AA >60 09/09/2020 03:16 AM    Calcium 9.0 09/09/2020 03:16 AM         No results found for: BNP, BNPP, BNPPPOC, XBNPT, BNPNT      No results found for: CHOL, CHOLPOCT, CHOLX, CHLST, CHOLV, HDL, HDLPOC, HDLP, LDL, LDLCPOC, LDLC, DLDLP, VLDLC, VLDL, TGLX, TRIGL, TRIGP, TGLPOCT, CHHD, CHHDX      Lab Results   Component Value Date/Time    ALT (SGPT) 33 09/05/2020 03:27 AM    Alk.  phosphatase 52 09/05/2020 03:27 AM    Bilirubin, direct 0.1 09/01/2020 06:25 AM    Bilirubin, total 0.5 09/05/2020 03:27 AM

## 2020-09-09 NOTE — PROGRESS NOTES
Problem: Mobility Impaired (Adult and Pediatric)  Goal: *Acute Goals and Plan of Care (Insert Text)  Description: FUNCTIONAL STATUS PRIOR TO ADMISSION: Pt has hx of CVA with left sided weakness. Impaired cognition at baseline and requires assistance for ADLs. Pt was ambulating using RW. Physical Therapy Goals  Initiated 9/2/2020  1. Patient will move from supine to sit and sit to supine , scoot up and down, and roll side to side in bed with minimal assistance/contact guard assist within 7 day(s). 2.  Patient will transfer from bed to chair and chair to bed with moderate assistance  using the least restrictive device within 7 day(s). 3.  Patient will perform sit to stand with minimal assistance/contact guard assist within 7 day(s). 4.  Patient will ambulate with moderate assistance  for 10 feet with the least restrictive device within 7 day(s). Outcome: Progressing Towards Goal     PHYSICAL THERAPY TREATMENT: WEEKLY REASSESSMENT  Patient: Shey Perez (2 y.o. male)  Date: 9/9/2020  Primary Diagnosis: Respiratory failure ([facility] [address] [J96.90]        Precautions: fall         ASSESSMENT  Patient continues with skilled PT services and is progressing towards goals. Pt tolerates EOB with cues and encouragement, performed improved balance edge of bed though requires increased cues for leaning forward and maintaining balance. Pt unable to put on socks at EOB however able to reach down towards ankle without forward LOB. Pt performed standing with min A x2, blocking of L LE and assist from R hand. Pt able to tolerate standing with assist x1 for approx 45 seconds. Pt returned to sitting and Rw/chair gotten, able to stand min A x2 and assist for placement of L hand on RW, stands for 2 minutes, and takes small pivot steps to the chair. Pt requires repeated cues for proper technique and for bringing R foot forward L foot back. Left in chair with call bell, set pt up with lunch. Will continue to follow. Patient's progression toward goals since last assessment: patient tolerates transfer to chair, limited endurance and requires increased encouragement to be out of bed    Current Level of Function Impacting Discharge (mobility/balance): min A x2    Functional Outcome Measure: The patient scored 15/100 on the barthel outcome measure which is indicative of high complexity. Other factors to consider for discharge: assistance at baseline         PLAN :  Goals have been updated based on progression since last assessment. Patient continues to benefit from skilled intervention to address the above impairments. Recommendations and Planned Interventions: bed mobility training, transfer training, gait training, therapeutic exercises, neuromuscular re-education, patient and family training/education, and therapeutic activities      Frequency/Duration: Patient will be followed by physical therapy:  3 times a week to address goals. Recommendation for discharge: (in order for the patient to meet his/her long term goals)  Therapy up to 5 days/week in SNF setting    This discharge recommendation:  Has been made in collaboration with the attending provider and/or case management    IF patient discharges home will need the following DME: pt has rolling walker and to be determined (TBD)         SUBJECTIVE:   Patient stated I guess I can get to the chair, yeah.     OBJECTIVE DATA SUMMARY:   HISTORY:    Past Medical History:   Diagnosis Date    Acid reflux     Anemia     Diabetes (HCC)     GERD (gastroesophageal reflux disease)     Hypertension     Stroke [facility])      Past Surgical History:   Procedure Laterality Date    HX COLONOSCOPY         Personal factors and/or comorbidities impacting plan of care:     Home Situation  Home Environment: Private residence  One/Two Story Residence: Other (Comment)  Living Alone: No  Support Systems: Family member(s)  Patient Expects to be Discharged to[de-identified] Rehabilitation facility  Current DME Used/Available at Home: [employer]    EXAMINATION/PRESENTATION/DECISION MAKING:   Critical Behavior:  Neurologic State: Alert, Confused  Orientation Level: Disoriented to place, Disoriented to situation, Disoriented to time, Oriented to person  Cognition: Follows commands  Safety/Judgement: Not assessed  Hearing: Auditory  Auditory Impairment: Hard of hearing, bilateral  Skin:  intact  Edema: none  Range Of Motion:  AROM: Generally decreased, functional           PROM: Generally decreased, functional           Strength:    Strength: Generally decreased, functional(L weaker than R, requires heavy cues but able)                    Tone & Sensation:   Tone: Normal              Sensation: Intact               Coordination:  Coordination: Grossly decreased, non-functional  Vision:      Functional Mobility:  Bed Mobility:  Rolling: Minimum assistance  Supine to Sit: Minimum assistance     Scooting: Minimum assistance  Transfers:  Sit to Stand: Minimum assistance;Assist x2  Stand to Sit: Minimum assistance;Assist x2  Stand Pivot Transfers: Minimum assistance;Assist x2     Bed to Chair: Minimum assistance;Assist x2              Balance:   Sitting: Impaired  Sitting - Static: Fair (occasional); Poor (constant support)(repeated LOB backwards, maintains fair+ with vcues/tcues)  Sitting - Dynamic: Fair (occasional); Poor (constant support)  Standing: Impaired; With support  Standing - Static: Constant support;Fair;Poor  Standing - Dynamic : Poor;Constant support  Ambulation/Gait Training:        Functional Measure:  Barthel Index:    Bathin  Bladder: 0  Bowels: 0  Groomin  Dressin  Feedin  Mobility: 0  Stairs: 0  Toilet Use: 0  Transfer (Bed to Chair and Back): 10  Total: 15/100       The Barthel ADL Index: Guidelines  1. The index should be used as a record of what a patient does, not as a record of what a patient could do.   2. The main aim is to establish degree of independence from any help, physical or verbal, however minor and for whatever reason. 3. The need for supervision renders the patient not independent. 4. A patient's performance should be established using the best available evidence. Asking the patient, friends/relatives and nurses are the usual sources, but direct observation and common sense are also important. However direct testing is not needed. 5. Usually the patient's performance over the preceding 24-48 hours is important, but occasionally longer periods will be relevant. 6. Middle categories imply that the patient supplies over 50 per cent of the effort. 7. Use of aids to be independent is allowed. Michelle Whitlock., Barthel, DJENNIFER. (2814). Functional evaluation: the Barthel Index. [address] (14)2. Twin Jaramillo nahed SHANAE Cowan, Luz Marina Atkinson., Oren Shoemaker., [place], [address] [place] [address] Ave (1999). Measuring the change indisability after inpatient rehabilitation; comparison of the responsiveness of the Barthel Index and Functional Cole Measure. Journal of Neurology, Neurosurgery, and Psychiatry, 66(4), 081-920. SHELLEY Ramirez.J.A, ROSE Santos, & Hayes Ruiz MKRISTIN. (2004.) Assessment of post-stroke quality of life in cost-effectiveness studies: The usefulness of the Barthel Index and the EuroQoL-5D. Quality of Life Research, 13, 427-77     Pain Rating:  none    Activity Tolerance:   Fair and requires frequent rest breaks  Please refer to the flowsheet for vital signs taken during this treatment. After treatment patient left in no apparent distress:   Sitting in chair and Call bell within reach    COMMUNICATION/EDUCATION:   The patients plan of care was discussed with: Registered nurse. Fall prevention education was provided and the patient/caregiver indicated understanding. and Patient/family have participated as able in goal setting and plan of care.     Thank you for this referral.  Dayna Apple, PT   Time Calculation: 32 mins

## 2020-09-09 NOTE — PROGRESS NOTES
KAELA:  1. SNF placement pending. 2. BLS transport. CM noted patient is accepted to [facility], however they are requesting 2 covid negative test results. Patient was tested again, and still remains positive. CM contacted his granddaughter, Wayne Santiago [phone] to explain the above, and to offer choice for 2nd option ([facility] or [facility]). She is agreeable and will talk to her grandmother (patient wife) and contact this cm by the end of the day. Attending MD aware. 1539: CM spoke to Wayen Santiago, she is interested in having Jaú with both [facility] and [facility] contact her for additional questions. Jaú is calling her.       Marii Sullivan [facility]

## 2020-09-09 NOTE — PROGRESS NOTES
Problem: Pressure Injury - Risk of  Goal: *Prevention of pressure injury  Description: Document William Scale and appropriate interventions in the flowsheet.   Outcome: Progressing Towards Goal  Note: Pressure Injury Interventions:  Sensory Interventions: Float heels, Keep linens dry and wrinkle-free    Moisture Interventions: Apply protective barrier, creams and emollients, Assess need for specialty bed, Internal/External urinary devices    Activity Interventions: Pressure redistribution bed/mattress(bed type)    Mobility Interventions: Float heels, HOB 30 degrees or less    Nutrition Interventions: Document food/fluid/supplement intake    Friction and Shear Interventions: HOB 30 degrees or less

## 2020-09-09 NOTE — PROGRESS NOTES
[address] [facility] Adult  Hospitalist Group                                                                                          Hospitalist Progress Note  Nas Marti MD  Answering service: [phone] or [phone] from in house phone        Date of Service:  2020  NAME:  Jim Ga  :    MRN:  [id number]      Admission Summary:     Jim Ga is a 61-year-old male with a past medical history that includes HTN, ischemic stroke with residual left hemiplegia, dysphagia, T2DM and GERD who was transferred to [facility] from [facility] for increasing oxygen requirement. He arrived here at approximately 4 PM.      Patient originally presented at [facility] on  with several days of fever, cough and shortness of breath. He tested positive for COVID and was started on ceftriaxone and azithromycin for RML CAP. There was concern for aspiration so he was started on Zosyn and ceftriaxone was discontinued. He never did receive Remdesivir. On  IV Diflucan was initiated for COVID pneumonitis. Patient was weaned from CPAP to 50% Ventimask and maintained saturations greater than 95% without worsening shortness of breath. He is transferred out of the ICU on high flow nasal cannula and is now resting comfortably on 6 L NC.     Interval history / Subjective:   F/u Acute respiratory failure  -COVID  positive again  6 beats of NSVT   Continues on room air  [facility]  pending     Assessment & Plan:     Acute hypoxic respiratory failure due to COVID, CAP POA  -High flow transitioned to mid flow, improving, now on 5 l/m, monitor pulse ox  -on iv decadron (--), remdesivir (--)  -Had total 9 days Zosyn and 5 days azithromycin PTA.  -COVID was positive at [facility]  -COVID repeat --positive  -Droplet precautions  -Prone positioning  -On prn Albuterol  -ID on board     Pneumonia due to COVID 19 infection  -CT chest  no PE  -Had total 9 days Zosyn and 5 days azithromycin PTA.  -continue vitamin c, zinc   - ID consulted completed a course of remdesivir (8/31-9/4)  -Completed a course of decadron  -On Lovenox 40 mg every 12 hours, switch to oral at discharge  -COVID 9/5 positive  -COVID 9/8 pending     Leukocytosis  -Likely related to prolonged steroid use, now resolved  -Antibiotics completed  -improved and wbc is normal     HTN  -BP fairly controlled, Continue home carvedilol 25 mg twice daily  -restarted norvasc, may need to increase the dose. May add home Lisinopril as well if continues to remain elevated     T2DM with hyperglycemia  -On Decadron  - A1c 6.3  -continue sliding scale and monitor finger stick glucose     Hx of CVA with left hemiplegia and dysphagia  -CT 8/22 old ischemic changes noted, no acute changes  -Usually walks w assist of a rolling walker  -seen by speech, on pureed diet  -PT/OT     Pureed diet    PT/OT SNF    Code status: Full Code  DVT prophylaxis: Lovenox 40 BID  PTA: Ambulate with walker and cane at base line   Spoke to grand daughter Jian Cardona ([phone]). Agreeing on SNF ([facility] and rehab)    Plan: The patient needs two COVID negatives before he can be discharged. Also needs Echo when COVID negative    Care Plan discussed with: Patient/Family and Nurse  Anticipated Disposition:? In 3-4 days   Anticipated Discharge: greater than 48 hours       Hospital Problems  Date Reviewed: 9/5/2020          Codes Class Noted POA    NSVT (nonsustained ventricular tachycardia) ([facility]) ICD-10-CM: I47.2  ICD-9-CM: 427.1  9/5/2020 Unknown        * (Principal) Respiratory failure ([facility] [address] ICD-10-CM: J96.90  ICD-9-CM: 518.81  8/30/2020 Yes                Vital Signs:    Last 24hrs VS reviewed since prior progress note.  Most recent are:  Visit Vitals  /81   Pulse (!) 55   Temp 98.4 °F (36.9 °C)   Resp 18   Ht 5' 8\" (1.727 m)   Wt 96.3 kg (212 lb 6.4 oz)   SpO2 94%   BMI 32.30 kg/m²         Intake/Output Summary (Last 24 hours) at 9/9/2020 0737  Last data filed at 9/8/2020 1921  Gross per 24 hour   Intake --   Output 600 ml   Net -600 ml        Physical Examination:             Constitutional:  No acute distress, cooperative, pleasant    ENT:  Oral mucosa moist, oropharynx benign. Resp:  Decrease bronchial breath sound bilaterally. No wheezing/rhonchi/rales. No accessory muscle use   CV:  Regular rhythm, normal rate, no murmurs, gallops, rubs    GI:  Soft, non distended, non tender. normoactive bowel sounds, no hepatosplenomegaly     Musculoskeletal:  No edema     Neurologic:  Conscious and alert, oriented to place and person, motor RE 4-5/5, LLE 3/5, LUE 2/5     Skin:  Dry but no skin rash       Data Review:    Review and/or order of clinical lab test  Review and/or order of tests in the radiology section of CPT  Review and/or order of tests in the medicine section of CPT      Labs:     No results for input(s): WBC, HGB, HCT, PLT, HGBEXT, HCTEXT, PLTEXT, HGBEXT, HCTEXT, PLTEXT in the last 72 hours. Recent Labs     09/09/20 0316 09/08/20 0221 09/07/20  0431     --   --    K 4.1  --   --      --   --    CO2 23  --   --    BUN 19  --   --    CREA 0.72  --   --    *  --   --    CA 9.0  --   --    MG 2.1 1.9 2.1     No results for input(s): ALT, AP, TBIL, TBILI, TP, ALB, GLOB, GGT, AML, LPSE in the last 72 hours. No lab exists for component: SGOT, GPT, AMYP, HLPSE  Recent Labs     09/09/20 0316 09/08/20 0221 09/07/20  0431   INR 1.1 1.2* 1.3*   PTP 11.7* 12.0* 12.8*   APTT 27.3 29.2 29.3      No results for input(s): FE, TIBC, PSAT, FERR in the last 72 hours. No results found for: FOL, RBCF   No results for input(s): PH, PCO2, PO2 in the last 72 hours. No results for input(s): CPK, CKNDX, TROIQ in the last 72 hours.     No lab exists for component: CPKMB  No results found for: CHOL, CHOLX, CHLST, CHOLV, HDL, HDLP, LDL, LDLC, DLDLP, TGLX, TRIGL, TRIGP, CHHD, CHHDX  Lab Results   Component Value Date/Time    Glucose (POC) 258 (H) 09/09/2020 07:17 AM    Glucose (POC) 405 (H) 09/08/2020 09:48 PM    Glucose (POC) 249 (H) 09/08/2020 04:48 PM    Glucose (POC) 278 (H) 09/08/2020 11:59 AM    Glucose (POC) 200 (H) 09/08/2020 08:25 AM     No results found for: COLOR, APPRN, SPGRU, REFSG, DINAH, PROTU, GLUCU, KETU, BILU, UROU, MAGALI, LEUKU, GLUKE, EPSU, BACTU, WBCU, RBCU, CASTS, UCRY      Medications Reviewed:     Current Facility-Administered Medications   Medication Dose Route Frequency    amLODIPine (NORVASC) tablet 5 mg  5 mg Oral DAILY    insulin lispro (HUMALOG) injection   SubCUTAneous AC&HS    nystatin (MYCOSTATIN) 100,000 unit/mL oral suspension 500,000 Units  500,000 Units Oral QID    ascorbic acid (vitamin C) (VITAMIN C) tablet 500 mg  500 mg Oral BID    zinc sulfate (ZINCATE) 220 (50) mg capsule 1 Cap  1 Cap Oral DAILY    sodium chloride (NS) flush 5-40 mL  5-40 mL IntraVENous Q8H    sodium chloride (NS) flush 5-40 mL  5-40 mL IntraVENous PRN    bisacodyL (DULCOLAX) tablet 5 mg  5 mg Oral DAILY PRN    ondansetron (ZOFRAN ODT) tablet 4 mg  4 mg Oral Q8H PRN    Or    ondansetron (ZOFRAN) injection 4 mg  4 mg IntraVENous Q6H PRN    glucose chewable tablet 16 g  4 Tab Oral PRN    glucagon (GLUCAGEN) injection 1 mg  1 mg IntraMUSCular PRN    dextrose 10% infusion 0-250 mL  0-250 mL IntraVENous PRN    enoxaparin (LOVENOX) injection 40 mg  40 mg SubCUTAneous Q12H    acetaminophen (TYLENOL) tablet 650 mg  650 mg Oral Q6H PRN    Or    acetaminophen (TYLENOL) suppository 650 mg  650 mg Rectal Q6H PRN    carvediloL (COREG) tablet 25 mg  25 mg Oral BID WITH MEALS    pravastatin (PRAVACHOL) tablet 40 mg  40 mg Oral QHS    albuterol (PROVENTIL HFA, VENTOLIN HFA, PROAIR HFA) inhaler 2 Puff  2 Puff Inhalation Q4H RT     ______________________________________________________________________  EXPECTED LENGTH OF STAY: 5d 12h  ACTUAL LENGTH OF STAY:          Bishop Ortega MD

## 2020-09-10 LAB
APTT PPP: 29.8 SEC (ref 22.1–32)
D DIMER PPP FEU-MCNC: 0.67 MG/L FEU (ref 0–0.65)
FIBRINOGEN PPP-MCNC: 336 MG/DL (ref 200–475)
GLUCOSE BLD STRIP.AUTO-MCNC: 176 MG/DL (ref 65–100)
GLUCOSE BLD STRIP.AUTO-MCNC: 180 MG/DL (ref 65–100)
GLUCOSE BLD STRIP.AUTO-MCNC: 196 MG/DL (ref 65–100)
GLUCOSE BLD STRIP.AUTO-MCNC: 221 MG/DL (ref 65–100)
INR PPP: 1.1 (ref 0.9–1.1)
MAGNESIUM SERPL-MCNC: 2 MG/DL (ref 1.6–2.4)
PROTHROMBIN TIME: 11.6 SEC (ref 9–11.1)
SERVICE CMNT-IMP: ABNORMAL
THERAPEUTIC RANGE,PTTT: NORMAL SECS (ref 58–77)

## 2020-09-10 PROCEDURE — 85730 THROMBOPLASTIN TIME PARTIAL: CPT

## 2020-09-10 PROCEDURE — 65270000029 HC RM PRIVATE

## 2020-09-10 PROCEDURE — 74011250637 HC RX REV CODE- 250/637: Performed by: INTERNAL MEDICINE

## 2020-09-10 PROCEDURE — 94664 DEMO&/EVAL PT USE INHALER: CPT

## 2020-09-10 PROCEDURE — 77010033678 HC OXYGEN DAILY

## 2020-09-10 PROCEDURE — 74011250636 HC RX REV CODE- 250/636: Performed by: INTERNAL MEDICINE

## 2020-09-10 PROCEDURE — 94640 AIRWAY INHALATION TREATMENT: CPT

## 2020-09-10 PROCEDURE — 82962 GLUCOSE BLOOD TEST: CPT

## 2020-09-10 PROCEDURE — 85379 FIBRIN DEGRADATION QUANT: CPT

## 2020-09-10 PROCEDURE — 74011636637 HC RX REV CODE- 636/637: Performed by: HOSPITALIST

## 2020-09-10 PROCEDURE — 36415 COLL VENOUS BLD VENIPUNCTURE: CPT

## 2020-09-10 PROCEDURE — 83735 ASSAY OF MAGNESIUM: CPT

## 2020-09-10 PROCEDURE — 74011250637 HC RX REV CODE- 250/637: Performed by: NURSE PRACTITIONER

## 2020-09-10 PROCEDURE — 74011250637 HC RX REV CODE- 250/637: Performed by: HOSPITALIST

## 2020-09-10 PROCEDURE — 85610 PROTHROMBIN TIME: CPT

## 2020-09-10 PROCEDURE — 85384 FIBRINOGEN ACTIVITY: CPT

## 2020-09-10 RX ORDER — ALBUTEROL SULFATE 90 UG/1
2 AEROSOL, METERED RESPIRATORY (INHALATION)
Status: DISCONTINUED | OUTPATIENT
Start: 2020-09-10 | End: 2020-09-12 | Stop reason: HOSPADM

## 2020-09-10 RX ADMIN — ALBUTEROL SULFATE: 90 AEROSOL, METERED RESPIRATORY (INHALATION) at 17:38

## 2020-09-10 RX ADMIN — INSULIN LISPRO 2 UNITS: 100 INJECTION, SOLUTION INTRAVENOUS; SUBCUTANEOUS at 12:35

## 2020-09-10 RX ADMIN — OXYCODONE HYDROCHLORIDE AND ACETAMINOPHEN 500 MG: 500 TABLET ORAL at 17:18

## 2020-09-10 RX ADMIN — CARVEDILOL 25 MG: 12.5 TABLET, FILM COATED ORAL at 07:37

## 2020-09-10 RX ADMIN — Medication 10 ML: at 12:36

## 2020-09-10 RX ADMIN — INSULIN LISPRO 2 UNITS: 100 INJECTION, SOLUTION INTRAVENOUS; SUBCUTANEOUS at 22:38

## 2020-09-10 RX ADMIN — Medication 10 ML: at 07:39

## 2020-09-10 RX ADMIN — INSULIN LISPRO 2 UNITS: 100 INJECTION, SOLUTION INTRAVENOUS; SUBCUTANEOUS at 17:18

## 2020-09-10 RX ADMIN — Medication 10 ML: at 22:38

## 2020-09-10 RX ADMIN — ALBUTEROL SULFATE: 90 AEROSOL, METERED RESPIRATORY (INHALATION) at 22:41

## 2020-09-10 RX ADMIN — NYSTATIN 500000 UNITS: 100000 SUSPENSION ORAL at 09:31

## 2020-09-10 RX ADMIN — ALBUTEROL SULFATE 2 PUFF: 90 AEROSOL, METERED RESPIRATORY (INHALATION) at 12:28

## 2020-09-10 RX ADMIN — ALBUTEROL SULFATE 2 PUFF: 90 AEROSOL, METERED RESPIRATORY (INHALATION) at 09:01

## 2020-09-10 RX ADMIN — PRAVASTATIN SODIUM 40 MG: 40 TABLET ORAL at 22:37

## 2020-09-10 RX ADMIN — NYSTATIN 500000 UNITS: 100000 SUSPENSION ORAL at 22:37

## 2020-09-10 RX ADMIN — OXYCODONE HYDROCHLORIDE AND ACETAMINOPHEN 500 MG: 500 TABLET ORAL at 09:32

## 2020-09-10 RX ADMIN — ACETAMINOPHEN 650 MG: 325 TABLET ORAL at 09:32

## 2020-09-10 RX ADMIN — ENOXAPARIN SODIUM 40 MG: 40 INJECTION SUBCUTANEOUS at 22:37

## 2020-09-10 RX ADMIN — ALBUTEROL SULFATE 2 PUFF: 90 AEROSOL, METERED RESPIRATORY (INHALATION) at 03:31

## 2020-09-10 RX ADMIN — CARVEDILOL 25 MG: 12.5 TABLET, FILM COATED ORAL at 17:18

## 2020-09-10 RX ADMIN — ENOXAPARIN SODIUM 40 MG: 40 INJECTION SUBCUTANEOUS at 09:32

## 2020-09-10 RX ADMIN — INSULIN LISPRO 2 UNITS: 100 INJECTION, SOLUTION INTRAVENOUS; SUBCUTANEOUS at 07:38

## 2020-09-10 RX ADMIN — AMLODIPINE BESYLATE 5 MG: 5 TABLET ORAL at 09:32

## 2020-09-10 NOTE — PROGRESS NOTES
Problem: Pressure Injury - Risk of  Goal: *Prevention of pressure injury  Description: Document William Scale and appropriate interventions in the flowsheet.   9/10/2020 1312 by Precious Butler RN  Outcome: Progressing Towards Goal  Note: Pressure Injury Interventions:  Sensory Interventions: Float heels, Keep linens dry and wrinkle-free    Moisture Interventions: Apply protective barrier, creams and emollients    Activity Interventions: Pressure redistribution bed/mattress(bed type)    Mobility Interventions: Float heels, HOB 30 degrees or less    Nutrition Interventions: Document food/fluid/supplement intake    Friction and Shear Interventions: HOB 30 degrees or less             9/10/2020 1119 by Precious Butler RN  Outcome: Progressing Towards Goal  Note: Pressure Injury Interventions:  Sensory Interventions: Float heels, Keep linens dry and wrinkle-free    Moisture Interventions: Apply protective barrier, creams and emollients    Activity Interventions: Pressure redistribution bed/mattress(bed type)    Mobility Interventions: Float heels, HOB 30 degrees or less    Nutrition Interventions: Document food/fluid/supplement intake    Friction and Shear Interventions: HOB 30 degrees or less

## 2020-09-10 NOTE — PROGRESS NOTES
[facility] Adult  Hospitalist Group                                                                                          Hospitalist Progress Note  Jerad Luis MD  Answering service: [phone] OR [phone] from in house phone        Date of Service:  9/10/2020  NAME:  Senthil Aguilar  :    MRN:  [id number]      Admission Summary:     Senthil Aguilar is a 29-year-old male with a past medical history that includes HTN, ischemic stroke with residual left hemiplegia, dysphagia, T2DM and GERD who was transferred to [facility] from [facility] for increasing oxygen requirement. He arrived here at approximately 4 PM.      Patient originally presented at [facility] on  with several days of fever, cough and shortness of breath. He tested positive for COVID and was started on ceftriaxone and azithromycin for RML CAP. There was concern for aspiration so he was started on Zosyn and ceftriaxone was discontinued. He never did receive Remdesivir. On  IV Diflucan was initiated for COVID pneumonitis. Patient was weaned from CPAP to 50% Ventimask and maintained saturations greater than 95% without worsening shortness of breath. He is transferred out of the ICU on high flow nasal cannula and is now resting comfortably on 6 L NC. Interval history / Subjective:   F/u Acute respiratory failure  Patient seen and examined this afternoon. He has no complaints. I have talked to the family over the phone, Family is okay for patient to go to SNF tomorrow. They are agreeable and understanding.       Assessment & Plan:     # Acute hypoxic respiratory failure due to COVID, CAP POA  - High flow transitioned to mid flow, improving, now on 5 l/m, monitor pulse ox  - on iv decadron (--), remdesivir (--)  - Had total 9 days Zosyn and 5 days azithromycin PTA.  - COVID was positive at [address] repeat --positive  - Droplet precautions  - Prone positioning  - On prn Albuterol  - ID on board     # Pneumonia due to COVID 19 infection  - CT chest 8/26 no PE  - Had total 9 days Zosyn and 5 days azithromycin PTA. - continue vitamin c, zinc   - ID consulted completed a course of remdesivir (8/31-9/4)  - Completed a course of decadron  - On Lovenox 40 mg every 12 hours, switch to oral at discharge  - COVID 9/5 & 9/8 positive     # Leukocytosis  - Likely related to prolonged steroid use, now resolved  - Antibiotics completed  - improved and wbc is normal     # HTN  - BP fairly controlled, Continue home carvedilol 25 mg twice daily  - restarted norvasc, may need to increase the dose. May add home Lisinopril as well if continues to remain elevated     # DM type 2 with hyperglycemia  - On Decadron  - A1c 6.3  - continue sliding scale and monitor finger stick glucose     # Hx of CVA with left hemiplegia and dysphagia  - CT 8/22 old ischemic changes noted, no acute changes  - Usually walks w assist of a rolling walker  - seen by speech, on pureed diet  - PT/OT     Pureed diet    PT/OT SNF    Code status: Full Code  DVT prophylaxis: Lovenox 40 BID  PTA: Ambulate with walker and cane at base line   Spoke to grand daughter John Lopez ([phone]). Agreeing on SNF ([facility] and rehab)    Plan: The patient needs two COVID negatives before he can be discharged. Also needs Echo when COVID negative    Care Plan discussed with: Patient/Family and Nurse  Anticipated Disposition:? In 3-4 days   Anticipated Discharge: greater than 48 hours       Hospital Problems  Date Reviewed: 9/5/2020          Codes Class Noted POA    NSVT (nonsustained ventricular tachycardia) ([facility]) ICD-10-CM: I47.2  ICD-9-CM: 427.1  9/5/2020 Unknown        * (Principal) Respiratory failure ([facility] [address] ICD-10-CM: J96.90  ICD-9-CM: 518.81  8/30/2020 Yes                Vital Signs:    Last 24hrs VS reviewed since prior progress note.  Most recent are:  Visit Vitals  /74 (BP 1 Location: Right arm, BP Patient Position: At rest)   Pulse 60   Temp 98.7 °F (37.1 °C)   Resp 18   Ht 5' 8\" (1.727 m)   Wt 96.3 kg (212 lb 6.4 oz)   SpO2 97%   BMI 32.30 kg/m²       No intake or output data in the 24 hours ending 09/10/20 1823     Physical Examination:             Constitutional:  No acute distress, cooperative, pleasant    ENT:  Oral mucosa moist, oropharynx benign. Resp:  Decrease bronchial breath sound bilaterally. No wheezing/rhonchi/rales. No accessory muscle use   CV:  Regular rhythm, normal rate, no murmurs, gallops, rubs    GI:  Soft, non distended, non tender. normoactive bowel sounds, no hepatosplenomegaly     Musculoskeletal:  No edema     Neurologic:  Conscious and alert, oriented to place and person, motor RE 4-5/5, LLE 3/5, LUE 2/5            Data Review:   I personally reviewed labs and imaging     Labs:     Recent Labs     09/09/20  0846   WBC 8.5   HGB 14.0   HCT 41.4        Recent Labs     09/10/20  0312 09/09/20  0316 09/08/20  0221   NA  --  136  --    K  --  4.1  --    CL  --  105  --    CO2  --  23  --    BUN  --  19  --    CREA  --  0.72  --    GLU  --  329*  --    CA  --  9.0  --    MG 2.0 2.1 1.9     No results for input(s): ALT, AP, TBIL, TBILI, TP, ALB, GLOB, GGT, AML, LPSE in the last 72 hours. No lab exists for component: SGOT, GPT, AMYP, HLPSE  Recent Labs     09/10/20  0312 09/09/20  0316 09/08/20  0221   INR 1.1 1.1 1.2*   PTP 11.6* 11.7* 12.0*   APTT 29.8 27.3 29.2      No results for input(s): FE, TIBC, PSAT, FERR in the last 72 hours. No results found for: FOL, RBCF   No results for input(s): PH, PCO2, PO2 in the last 72 hours. No results for input(s): CPK, CKNDX, TROIQ in the last 72 hours.     No lab exists for component: CPKMB  No results found for: CHOL, CHOLX, CHLST, CHOLV, HDL, HDLP, LDL, LDLC, DLDLP, TGLX, TRIGL, TRIGP, CHHD, CHHDX  Lab Results   Component Value Date/Time    Glucose (POC) 196 (H) 09/10/2020 04:46 PM    Glucose (POC) 176 (H) 09/10/2020 12:06 PM    Glucose (POC) 180 (H) 09/10/2020 06:32 AM    Glucose (POC) 316 (H) 09/09/2020 10:14 PM    Glucose (POC) 317 (H) 09/09/2020 04:15 PM     No results found for: COLOR, APPRN, SPGRU, REFSG, DINAH, PROTU, GLUCU, KETU, BILU, UROU, MAGALI, LEUKU, GLUKE, EPSU, BACTU, WBCU, RBCU, CASTS, UCRY      Medications Reviewed:     Current Facility-Administered Medications   Medication Dose Route Frequency    amLODIPine (NORVASC) tablet 5 mg  5 mg Oral DAILY    insulin lispro (HUMALOG) injection   SubCUTAneous AC&HS    nystatin (MYCOSTATIN) 100,000 unit/mL oral suspension 500,000 Units  500,000 Units Oral QID    ascorbic acid (vitamin C) (VITAMIN C) tablet 500 mg  500 mg Oral BID    sodium chloride (NS) flush 5-40 mL  5-40 mL IntraVENous Q8H    sodium chloride (NS) flush 5-40 mL  5-40 mL IntraVENous PRN    bisacodyL (DULCOLAX) tablet 5 mg  5 mg Oral DAILY PRN    ondansetron (ZOFRAN ODT) tablet 4 mg  4 mg Oral Q8H PRN    Or    ondansetron (ZOFRAN) injection 4 mg  4 mg IntraVENous Q6H PRN    glucose chewable tablet 16 g  4 Tab Oral PRN    glucagon (GLUCAGEN) injection 1 mg  1 mg IntraMUSCular PRN    dextrose 10% infusion 0-250 mL  0-250 mL IntraVENous PRN    enoxaparin (LOVENOX) injection 40 mg  40 mg SubCUTAneous Q12H    acetaminophen (TYLENOL) tablet 650 mg  650 mg Oral Q6H PRN    Or    acetaminophen (TYLENOL) suppository 650 mg  650 mg Rectal Q6H PRN    carvediloL (COREG) tablet 25 mg  25 mg Oral BID WITH MEALS    pravastatin (PRAVACHOL) tablet 40 mg  40 mg Oral QHS    albuterol (PROVENTIL HFA, VENTOLIN HFA, PROAIR HFA) inhaler 2 Puff  2 Puff Inhalation Q4H RT     ______________________________________________________________________  EXPECTED LENGTH OF STAY: 5d 12h  ACTUAL LENGTH OF STAY:          11                 Latoya Johns MD

## 2020-09-10 NOTE — PROGRESS NOTES
KAELA:  1. [facility] discharge on Friday. 2. BLS transport. CHUY spoke to [facility], andrea this morning. She would like for cm to call [address], and [facility] to see if they are accepting covid+ patients. Cm contacted the facilities, they are not. Mary Lou Sood with [facility] and [facility] will be calling the family this afternoon. CM to follow. 200-  MD is going to contact patient grandfoxughter, [facility] to assist with discharge plan. [facility] and patient wife are not agreeable for patient to go to [facility] or [facility]. CHUY notified  CRM. [phone] and [facility] SNF both have accepted patient after referrals sent. CHUY contacted patient Sonia mendez Client [phone] to let her know patient is stable for discharge and there is a facility who can accept him. She is requesting to appeal discharge. CM emailed her the IM letter to [email or web address] She will contact CM soon with decision. 1632: CM received phone call from [facility], she has decided to choose [facility]. CM notified Mary Lou Sood and attending MD, patient will leave tomorrow morning via [facility] transport. Medicare pt has received, reviewed, and signed 2nd IM letter informing them of their right to appeal the discharge. Signed copy has been placed on pt bedside chart.       Memo Pride, [facility]

## 2020-09-10 NOTE — CONSULTS
MARTHA MIKE  CLINICAL NURSE SPECIALIST CONSULT  PROGRAM FOR DIABETES HEALTH    INITIAL NOTE    Presentation   Francie Velasquez is a 66 y.o. male with a PMH of HTN, diabetes and CVA with residual left hemiplegia who presented to [facility] with fever, cough and SOB and was found to be COVID +. He was managed there but was transferred to [facility] as O2 requirements continued to increase. DX:COVID-19 pneumonia  TX: Steroids, remdesivir, IV antibiotics    Current clinical course has been complicated by hyperglycemia. Diabetes: Patient has known Type two diabetes, treated with metformin PTA. Admitting A1C 6.3%     Consulted by Provider for advanced diabetes nursing assessment and care, specifically related to   [x] Inpatient management strategy      Diabetes-related medical history  Acute complications: Hypperglycemia without acidosis  Neurological complications  Peripheral neuropathy  Microvascular disease: None  Macrovascular disease  Cerebral vascular accident      Diabetes medication history  Drug class Currently in use Discontinued Never used   Biguanide Metformin 500 mg BID     DDP-4 inhibitor       Sulfonylurea      Thiazolidinedione      GLP-1 RA      SGLT-2 inhibitors      Basal insulin      Fixed Dose  Combinations      Bolus insulin        Subjective   On Room Air  Steroids: last dose 9/8 at 1800  Fasting glucose 180, 176 at noon  Glucose yesterday 230-337  Correctional insulin ACHS  Pureed diet  Objective   Physical exam  General Patient on isolation for COVID-19 infection. Not able to reach patient via bedside phone.   Vital Signs   Visit Vitals  /73 (BP 1 Location: Right arm, BP Patient Position: At rest)   Pulse 64   Temp 98.9 °F (37.2 °C)   Resp 18   Ht 5' 8\" (1.727 m)   Wt 96.3 kg (212 lb 6.4 oz)   SpO2 98%   BMI 32.30 kg/m²     Deferred due to active COVID-19 infection    Laboratory  Lab Results   Component Value Date/Time    Hemoglobin A1c 6.3 (H) 08/31/2020 04:00 AM     No results found for: LDL, LDLC, DLDLP  Lab Results   Component Value Date/Time    Creatinine 0.72 09/09/2020 03:16 AM     Lab Results   Component Value Date/Time    Sodium 136 09/09/2020 03:16 AM    Potassium 4.1 09/09/2020 03:16 AM    Chloride 105 09/09/2020 03:16 AM    CO2 23 09/09/2020 03:16 AM    Anion gap 8 09/09/2020 03:16 AM    Glucose 329 (H) 09/09/2020 03:16 AM    BUN 19 09/09/2020 03:16 AM    Creatinine 0.72 09/09/2020 03:16 AM    BUN/Creatinine ratio 26 (H) 09/09/2020 03:16 AM    GFR est AA >60 09/09/2020 03:16 AM    GFR est non-AA >60 09/09/2020 03:16 AM    Calcium 9.0 09/09/2020 03:16 AM    Bilirubin, total 0.5 09/05/2020 03:27 AM    Alk. phosphatase 52 09/05/2020 03:27 AM    Protein, total 6.0 (L) 09/05/2020 03:27 AM    Albumin 2.2 (L) 09/05/2020 03:27 AM    Globulin 3.8 09/05/2020 03:27 AM    A-G Ratio 0.6 (L) 09/05/2020 03:27 AM    ALT (SGPT) 33 09/05/2020 03:27 AM     Lab Results   Component Value Date/Time    ALT (SGPT) 33 09/05/2020 03:27 AM       Factors affecting BG pattern  Factor Dose Comments   Nutrition:  Carb-controlled meals   60 grams/meal Pureed diet  Glucerna shakes with meals   Steroids Decadron 6 mg daily, completed 9/8    Infection COVID-19 infection He has two risk factors for having more serious complications related to COVID-19 infection: Diabetes and HTN. Hyperglycemia in the setting of COVID-19 infection is associated with a blunted immune response and delayed recovery.        Blood glucose pattern        Assessment and Plan   Nursing Diagnosis Risk for unstable blood glucose pattern   Nursing Intervention Domain 5909 Decision-making Support   Nursing Interventions Examined current inpatient diabetes control   Explored factors facilitating and impeding inpatient management  Identified self-management practices impeding diabetes control  Explored corrective strategies with patient and responsible inpatient provider   Informed patient of rational for insulin strategy while hospitalized     Evaluation   Gray Quintanilla is a 66year old gentleman with controlled type two diabetes on metformin admitted for COVID-19 pneumonia. He was transferred to [facility] nearly two weeks ago for increasing WOB and O2 requirements and has sence completed a course of IV antibiotics, remdesivir, decadron and weaned to room air. He did experience steroid induced hyperglycemia for which correctional insulin was used. Steroids completed 24 hours ago and glucose near goal of 100-180. At this time patient is eating well, patient medically stablizing and case management assisting with discharge plans. Recommendations   Recommend:  1. Restart home metformin 500 mg BID if patient stable from a respiratory and PO intake standpoint. Would d/c if he needs to be NPO for a period of time or if clinical condition declines. 2. Continue correctional insulin ACHS, adjust to start at a glucose of 200.    3. Adjust diet to no added sweets/consistent carbohydrate. Billing Code(s)   I personally reviewed chart, notes, data and current medications in the medical record, and examined the patient at bedside before making care recommendations.      [x] 70643 IP subsequent hospital care - 30 minutes      Isai Alex, CNS  Program for Diabetes Health  Access via [address] [place] [address]

## 2020-09-10 NOTE — PROGRESS NOTES
Bedside and Verbal shift change report given to Melanie (oncoming nurse) by [facility] (offgoing nurse). Report included the following information SBAR, Kardex, Intake/Output and MAR.

## 2020-09-10 NOTE — PROGRESS NOTES
Problem: Pressure Injury - Risk of  Goal: *Prevention of pressure injury  Description: Document William Scale and appropriate interventions in the flowsheet.   Outcome: Progressing Towards Goal  Note: Pressure Injury Interventions:  Sensory Interventions: Float heels, Keep linens dry and wrinkle-free    Moisture Interventions: Apply protective barrier, creams and emollients    Activity Interventions: Pressure redistribution bed/mattress(bed type)    Mobility Interventions: Float heels, HOB 30 degrees or less    Nutrition Interventions: Document food/fluid/supplement intake    Friction and Shear Interventions: HOB 30 degrees or less

## 2020-09-10 NOTE — PROGRESS NOTES
Bedside shift change report given to yenny (oncoming nurse) by Noemi Chatman (offgoing nurse). Report included the following information SBAR, Kardex and Intake/Output.

## 2020-09-10 NOTE — PROGRESS NOTES
Problem: Breathing Pattern - Ineffective  Goal: *Absence of hypoxia  Outcome: Progressing Towards Goal  Goal: *Use of effective breathing techniques  Outcome: Progressing Towards Goal     Problem: Airway Clearance - Ineffective  Goal: Achieve or maintain patent airway  Outcome: Progressing Towards Goal     Problem: Gas Exchange - Impaired  Goal: Absence of hypoxia  Outcome: Progressing Towards Goal  Goal: Promote optimal lung function  Outcome: Progressing Towards Goal     Problem: Breathing Pattern - Ineffective  Goal: Ability to achieve and maintain a regular respiratory rate  Outcome: Progressing Towards Goal

## 2020-09-11 ENCOUNTER — TELEPHONE (OUTPATIENT)
Dept: CARDIOLOGY CLINIC | Age: 75
End: 2020-09-11

## 2020-09-11 VITALS
SYSTOLIC BLOOD PRESSURE: 134 MMHG | WEIGHT: 212.4 LBS | RESPIRATION RATE: 17 BRPM | OXYGEN SATURATION: 96 % | TEMPERATURE: 98.6 F | DIASTOLIC BLOOD PRESSURE: 76 MMHG | HEART RATE: 60 BPM | HEIGHT: 68 IN | BODY MASS INDEX: 32.19 KG/M2

## 2020-09-11 LAB
APTT PPP: 30.5 SEC (ref 22.1–32)
D DIMER PPP FEU-MCNC: 0.49 MG/L FEU (ref 0–0.65)
FIBRINOGEN PPP-MCNC: 407 MG/DL (ref 200–475)
GLUCOSE BLD STRIP.AUTO-MCNC: 159 MG/DL (ref 65–100)
GLUCOSE BLD STRIP.AUTO-MCNC: 166 MG/DL (ref 65–100)
GLUCOSE BLD STRIP.AUTO-MCNC: 183 MG/DL (ref 65–100)
INR PPP: 1.1 (ref 0.9–1.1)
MAGNESIUM SERPL-MCNC: 2.1 MG/DL (ref 1.6–2.4)
PROTHROMBIN TIME: 11.4 SEC (ref 9–11.1)
SERVICE CMNT-IMP: ABNORMAL
THERAPEUTIC RANGE,PTTT: NORMAL SECS (ref 58–77)

## 2020-09-11 PROCEDURE — 83735 ASSAY OF MAGNESIUM: CPT

## 2020-09-11 PROCEDURE — 74011250637 HC RX REV CODE- 250/637: Performed by: INTERNAL MEDICINE

## 2020-09-11 PROCEDURE — 85384 FIBRINOGEN ACTIVITY: CPT

## 2020-09-11 PROCEDURE — 74011250637 HC RX REV CODE- 250/637: Performed by: HOSPITALIST

## 2020-09-11 PROCEDURE — 85730 THROMBOPLASTIN TIME PARTIAL: CPT

## 2020-09-11 PROCEDURE — 65270000029 HC RM PRIVATE

## 2020-09-11 PROCEDURE — 85610 PROTHROMBIN TIME: CPT

## 2020-09-11 PROCEDURE — 85379 FIBRIN DEGRADATION QUANT: CPT

## 2020-09-11 PROCEDURE — 36415 COLL VENOUS BLD VENIPUNCTURE: CPT

## 2020-09-11 PROCEDURE — 74011636637 HC RX REV CODE- 636/637: Performed by: HOSPITALIST

## 2020-09-11 PROCEDURE — 82962 GLUCOSE BLOOD TEST: CPT

## 2020-09-11 PROCEDURE — 74011250636 HC RX REV CODE- 250/636: Performed by: INTERNAL MEDICINE

## 2020-09-11 RX ADMIN — INSULIN LISPRO 2 UNITS: 100 INJECTION, SOLUTION INTRAVENOUS; SUBCUTANEOUS at 12:48

## 2020-09-11 RX ADMIN — AMLODIPINE BESYLATE 5 MG: 5 TABLET ORAL at 09:23

## 2020-09-11 RX ADMIN — ENOXAPARIN SODIUM 40 MG: 40 INJECTION SUBCUTANEOUS at 09:23

## 2020-09-11 RX ADMIN — OXYCODONE HYDROCHLORIDE AND ACETAMINOPHEN 500 MG: 500 TABLET ORAL at 17:34

## 2020-09-11 RX ADMIN — OXYCODONE HYDROCHLORIDE AND ACETAMINOPHEN 500 MG: 500 TABLET ORAL at 09:23

## 2020-09-11 RX ADMIN — NYSTATIN 500000 UNITS: 100000 SUSPENSION ORAL at 09:23

## 2020-09-11 RX ADMIN — INSULIN LISPRO 2 UNITS: 100 INJECTION, SOLUTION INTRAVENOUS; SUBCUTANEOUS at 07:15

## 2020-09-11 RX ADMIN — ACETAMINOPHEN 650 MG: 325 TABLET ORAL at 09:23

## 2020-09-11 RX ADMIN — CARVEDILOL 25 MG: 12.5 TABLET, FILM COATED ORAL at 17:34

## 2020-09-11 RX ADMIN — INSULIN LISPRO 2 UNITS: 100 INJECTION, SOLUTION INTRAVENOUS; SUBCUTANEOUS at 17:34

## 2020-09-11 RX ADMIN — CARVEDILOL 25 MG: 12.5 TABLET, FILM COATED ORAL at 07:11

## 2020-09-11 RX ADMIN — Medication 10 ML: at 07:11

## 2020-09-11 NOTE — PROGRESS NOTES
ADULT PROTOCOL: JET AEROSOL ASSESSMENT    Patient  Yenni Dobbs     66 y.o.   male     9/10/2020  9:06 PM    Breath Sounds Pre Procedure:  Breath Sounds Bilateral: Diminished              Left Breath Sounds: Diminished                        Right Breath Sounds: Diminished    Breath Sounds Post Procedure: Breath Sounds Bilateral: Diminished                          Left Breath Sounds: Diminished               Right Breath Sounds: Diminished    Breathing pattern: Pre procedure  Breathing Pattern: Regular          Post procedure  Breathing Pattern: Regular    Cough: Pre procedure  Cough: Non-productive               Post procedure Cough: Non-productive    Heart Rate: Pre procedure Pulse: 84           Post procedure Pulse: 50    Resp Rate: Pre procedure  Respirations: 18           Post procedure  Cues: Verbal cues provided, Visual cues provided       Nebulizer Therapy: Current medications Aerosolized Medications: Albuterol       Problem List:   Patient Active Problem List   Diagnosis Code    [facility] E11.9    Dysphagia R13.10    Essential hypertension I10    Gastroesophageal reflux disease K21.9    Genuine stress incontinence, female N39.3    Hemiplegia ([facility]) G81.90    History of adenomatous polyp of colon Z86.010    Hyperproteinemia E88.09    Intracranial hemorrhage ([facility]) I62.9    Laryngopharyngeal reflux K21.9    Respiratory failure ([facility]) J96.90    NSVT (nonsustained ventricular tachycardia) ([facility]) I47.2       Patient alert and cooperative to use MDI: Yes    Home Respiratory Therapy Regimen/Frequency:  NO  Medication   Device  Frequency     SEVERITY INDEX:    ITEM 0 1 2 3 4 Score   Respiratory Pattern and or Rate Regular  10-19 Regular  20-24   24-30    30-34 Severe SOB or   Greater than 35 0   Breath Sounds Clear Occasional Wheeze Mild Wheezing Moderate Wheezing  wheezing/Absent breath sounds 0   Shortness of Breath None Dyspnea on Exertion Dyspnea at Rest Moderate Shortness of Breath at Rest Severe Shortness of Breath - Limited Speech 0       Total Score:  0    * Scoring Guidelines  0-4 pts:  PRN-BID   5-7 pts:  BID, TID, QID  8-9 pts:  TID, QID, Q6  10-12 pts:  Q4-Q6  * - Guidelines used with clinical judgement. PRN Treatments can be ordered to supplement scheduled treatments. Regardless of score, frequency should not be less than normal home regimen.     Recommended Order/Frequency:  Changed to PRN     Comments:          Respiratory Therapist: Yandy Taylor

## 2020-09-11 NOTE — PROGRESS NOTES
Problem: Pressure Injury - Risk of  Goal: *Prevention of pressure injury  Description: Document William Scale and appropriate interventions in the flowsheet.   Outcome: Progressing Towards Goal  Note: Pressure Injury Interventions:  Sensory Interventions: Float heels, Keep linens dry and wrinkle-free    Moisture Interventions: Apply protective barrier, creams and emollients, Check for incontinence Q2 hours and as needed    Activity Interventions: Pressure redistribution bed/mattress(bed type)    Mobility Interventions: Float heels    Nutrition Interventions: Document food/fluid/supplement intake    Friction and Shear Interventions: HOB 30 degrees or less

## 2020-09-11 NOTE — ADVANCED PRACTICE NURSE
[facility] [address] [place] [address], [address] [place] [address],[address]   Jasmin Munson   [phone]  Cortes Gandara  9/11/2020  3:39 PM      Patient ready for discharge. Question was raised about the outstanding order for echo. Primary team ordered echo on admission. Patient may discharge without completing echo as in patient. He can follow up as an outpatient for completion   of echo. D/w Dr. Ne Ramirez. Ok to discharge from Cardiology standpoint.     Navya Mensah PA-C

## 2020-09-11 NOTE — PROGRESS NOTES
Problem: Falls - Risk of  Goal: *Absence of Falls  Description: Document [place]abs [place] Fall Risk and appropriate interventions in the flowsheet.   Note: Fall Risk Interventions:  Mobility Interventions: Communicate number of staff needed for ambulation/transfer    Mentation Interventions: Evaluate medications/consider consulting pharmacy    Medication Interventions: Evaluate medications/consider consulting pharmacy    Elimination Interventions: Call light in reach

## 2020-09-11 NOTE — PROGRESS NOTES
[facility] phone [phone]    Transition of Care Plan to SNF/Rehab    SNF/Rehab Transition:  [facility] by [facility] and expected to leave at 1820. Communication to Patient/Family:  Met with patient and his granddaughter, Izzy Pierre (identified care giver) and they are agreeable to the transition plan. Communication to SNF/Rehab:  Bedside RN, Chante Echevarria, has been notified to update the transition plan to the facility and call report (phone number [phone]  Discharge information has been updated on the AVS.             Nursing Please include all hard scripts for controlled substances, med rec and dc summary, and AVS in packet. Reviewed and confirmed with facility, [facility], can manage the patient care needs for the following:     SNF/Rehab Transition:  Facility to determine home health    [facility] care needs for the following:     Manasa Man with (X) only those applicable:    Medication:  []  Medications will be available at the facility  []  IV Antibiotics   []  Controlled Substance - hard copy to be sent with patient   []  Weekly Labs   Documents:  [x] Hard RX  [x] MAR  [x] Kardex  [x] AVS  [x]Transfer Summary  [x]Discharge   Equipment:  []  CPAP/BiPAP  []  Wound Vacuum  []  Sanders or Urinary Device  []  PICC/Central Line  []  Nebulizer  []  Ventilator   Treatment:  []Isolation (for MRSA, VRE, etc.)  []Surgical Drain Management  []Tracheostomy Care  []Dressing Changes  []Dialysis with transportation and chair time   []PEG Care  []Oxygen  []Daily Weights for Heart Failure   Dietary:  []Any diet limitations  []Tube Feedings   []Total Parenteral Management (TPN)   Eligible for Medicaid Long Term Services and Supports  Yes:  [] Eligible for medical assistance or will become eligible within 180 days and UAI completed.    [] Provider/Patient and/or support system has requested screening. [x] UAI copy provided to patient or responsible party, [address] [] UAI unavailable at discharge will send once processed to SNF provider. [] UAI unavailable at discharged mailed to patient  No:   [] Private pay and is not financially eligible for Medicaid within the next 180 days. [] Reside out-of-state.   [] A residents of a state owned/operated facility that is licensed  by [facility] or [facility]  [] Enrollment in [facility] hospice services  [] [address] [place] [address] [address]  [] Patient /Family declines to have screening completed or provide financial information for screening     Financial Resources:  Medicaid    [] Initiated and application pending   [x] Full coverage     Advanced Care Plan:  []Surrogate Decision Maker of Care  []POA  []Communicated Code Status (DDNR\", \"Full\")    Other   Candace L[facility]    Financial Resources:  Medicaid    [] Initiated and application pending   [] Full coverage     Advanced Care Plan:  []Surrogate Decision Maker of Care  []POA  []Communicated Code Status (DDNR\", \"Full\")    Other

## 2020-09-11 NOTE — PROGRESS NOTES
KAELA:  1. Waiting cardiology clearance for discharge. [facility] accepted with BLS transport. 1422: CM spoke to Kiya Hess Cardiology PA. He confirmed that patient can get echo op and can discharge to SNF.       Cj Villegas, [facility]

## 2020-09-11 NOTE — PROGRESS NOTES
Bedside shift change report given to yenny (oncoming nurse) by Crystal Rodriguez (offgoing nurse). Report included the following information SBAR, Kardex and Intake/Output.

## 2020-09-11 NOTE — PROGRESS NOTES
Attempt to reach cardiologist to clarify the Echo order, office was called twice by this nurse at different time, no response yet, will try calling again at later time.

## 2020-09-11 NOTE — PROGRESS NOTES
Bedside and Verbal shift change report given to Melanie (oncoming nurse) by Ivette Nelson (offgoing nurse). Report included the following information SBAR, Kardex, MAR and Accordion.

## 2020-09-11 NOTE — PROGRESS NOTES
Report call to [facility] city, d/c instruction given, went over cardiologist recommendation for completing echo.

## 2020-09-11 NOTE — TELEPHONE ENCOUNTER
Milli with [facility] has a question about the test order.  Please advise      Phone:[phone]

## 2020-09-11 NOTE — DISCHARGE SUMMARY
Discharge Summary       PATIENT ID: Renae Siddiqui  MRN: [id number]   YOB: 1945    DATE OF ADMISSION: 8/30/2020  3:37 PM    DATE OF DISCHARGE: 09/11/20  PRIMARY CARE PROVIDER: Garcia Bey MD       DISCHARGING PROVIDER: Jose Francisco Higgins MD    To contact this individual call [phone] and ask the  to page. If unavailable ask to be transferred the Adult Hospitalist Department. CONSULTATIONS: IP CONSULT TO INFECTIOUS DISEASES  IP CONSULT TO CARDIOLOGY      PROCEDURES/SURGERIES: * No surgery found *    IMAGING  Xr Chest Port    Result Date: 9/4/2020  Impression: 1. Significant improvement in bilateral airspace disease      Xr Chest Port    Result Date: 8/30/2020  IMPRESSION: Multilobar pneumonia          [address] COURSE:   # Acute hypoxic respiratory failure due to COVID, CAP POA  - High flow transitioned to mid flow, improving, now on 5 l/m, monitor pulse ox  - on iv decadron (8/30--9/8), remdesivir (8/31--9/4)  - Had total 9 days Zosyn and 5 days azithromycin PTA.  - COVID was positive at [address] repeat 9/5--positive  - was on Droplet precautions  - had Prone positioning and prn Albuterol  - ID input appt      # Pneumonia due to COVID 19 infection  - CT chest 8/26 no PE  - Had total 9 days Zosyn and 5 days azithromycin PTA.   - continue vitamin c, zinc   - ID consulted completed a course of remdesivir (8/31-9/4)  - Completed a course of decadron  - On Lovenox 40 mg every 12 hours, switch to oral at discharge  - COVID 9/5 & 9/8 positive     # Leukocytosis  - Likely related to prolonged steroid use, now resolved  - Antibiotics completed  - improved and wbc normal     # HTN  - BP fairly controlled,   - Continue home carvedilol 25 mg twice daily  - on norvasc     # DM type 2 with hyperglycemia  - On Decadron  - A1c 6.3  - was on sliding scale and monitor finger stick glucose     # Hx of CVA with left hemiplegia and dysphagia  - CT 8/22 old ischemic changes noted, no acute changes  - Usually walks w assist of a rolling walker  - seen by speech, on pureed diet  - PT/OT           DISCHARGE DIAGNOSES / PLAN:      # Acute hypoxic respiratory failure due to COVID, CAP POA  - Resolved      # Pneumonia due to COVID 19 infection  - Completed trial treatment      # Leukocytosis  - improved and wbc is normal     # HTN  - Resume home medication      # DM type 2 with hyperglycemia  - Resume home Metformin      # Hx of CVA with left hemiplegia and dysphagia  - Usually walks w assist of a rolling walker  - seen by speech, on pureed diet  - PT/OT     Patient Active Problem List   Diagnosis Code    Diabetes mellitus type 2, controlled ([facility] E11.9    Dysphagia R13.10    Essential hypertension I10    Gastroesophageal reflux disease K21.9    Genuine stress incontinence, female N39.3    Hemiplegia ([address] G81.90    History of adenomatous polyp of colon Z86.010    Hyperproteinemia E88.09    Intracranial hemorrhage (HCC) I62.9    Laryngopharyngeal reflux K21.9    Respiratory failure ([facility] J96.90    NSVT (nonsustained ventricular tachycardia) (HCC) I47.2               PENDING TEST RESULTS:   At the time of discharge the following test results are still pending:None    FOLLOW UP APPOINTMENTS:    Follow-up Information     Follow up With Specialties Details Why Contact Shireen Mcgregor PA-C Physician Assistant  Please schedule f/u for echo, and conference call patient granddaughter on phone at appointment [address] [facility] [address]      April Leon MD Internal Medicine Schedule an appointment as soon as possible for a visit in 1 week Post hospital discharge follow up  [address] [place] [address]  [phone]             ADDITIONAL CARE RECOMMENDATIONS:   · It is important that you take the medication exactly as they are prescribed.    · Keep your medication in the bottles provided by the pharmacist and keep a list of the medication names, dosages, and times to be taken in your wallet. · Do not take other medications without consulting your doctor. · No drinking alcohol or driving car or operating machinery if you are on narcotic pain medications. Donot take sedating mediations if you are sleepy or confused. · Fall Precautions  · Keep Well Hydrated  · Report to your medical provider if you feel you have  developed allergies to medications  · Follow up with your PCP or Consultant for medication adjustments and refills  · Monitor for signs of fevers,chills,bleeding,chest pain and seek medical attention if you do so. DIET: Puree diet     TUBE FEEDING INSTRUCTIONS: None     OXYGEN / BiPAP SETTINGS: None     ACTIVITY: As tolerated     WOUND CARE: None     EQUIPMENT needed: None     DISCHARGE MEDICATIONS:  Current Discharge Medication List      CONTINUE these medications which have NOT CHANGED    Details   amLODIPine (NORVASC) 10 mg tablet Take  by mouth daily. carvediloL (COREG) 25 mg tablet Take 25 mg by mouth two (2) times daily (with meals). glucose blood VI test strips (Contour Next Test Strips) strip by Does Not Apply route See Admin Instructions. diclofenac (VOLTAREN) 1 % gel Apply  to affected area four (4) times daily. PEG 3350-Electrolytes (GaviLyte-G) 236-22.74-6.74 -5.86 gram suspension Take  by mouth now.      lisinopriL (PRINIVIL, ZESTRIL) 10 mg tablet Take  by mouth daily. metFORMIN (GLUCOPHAGE) 500 mg tablet Take  by mouth two (2) times daily (with meals). pantoprazole (PROTONIX) 40 mg tablet Take 40 mg by mouth daily. pravastatin (PRAVACHOL) 40 mg tablet Take 40 mg by mouth nightly. docusate sodium (Stool Softener) 100 mg capsule Take 100 mg by mouth two (2) times a day. lancets (TRUEplus Lancets) 33 gauge misc by Does Not Apply route.              All Micro Results     None          Recent Results (from the past 24 hour(s))   GLUCOSE, POC    Collection Time: 09/10/20 10:30 PM   Result Value Ref Range    Glucose (POC) 221 (H) 65 - 100 mg/dL    Performed by Debbie Govea    MAGNESIUM    Collection Time: 09/11/20  2:56 AM   Result Value Ref Range    Magnesium 2.1 1.6 - 2.4 mg/dL   PROTHROMBIN TIME + INR    Collection Time: 09/11/20  2:56 AM   Result Value Ref Range    INR 1.1 0.9 - 1.1      Prothrombin time 11.4 (H) 9.0 - 11.1 sec   PTT    Collection Time: 09/11/20  2:56 AM   Result Value Ref Range    aPTT 30.5 22.1 - 32.0 sec    aPTT, therapeutic range     58.0 - 77.0 SECS   FIBRINOGEN    Collection Time: 09/11/20  2:56 AM   Result Value Ref Range    Fibrinogen 407 200 - 475 mg/dL   D DIMER    Collection Time: 09/11/20  2:56 AM   Result Value Ref Range    D-dimer 0.49 0.00 - 0.65 mg/L FEU   GLUCOSE, POC    Collection Time: 09/11/20  7:15 AM   Result Value Ref Range    Glucose (POC) 159 (H) 65 - 100 mg/dL    Performed by Gracia Rico    GLUCOSE, POC    Collection Time: 09/11/20 10:50 AM   Result Value Ref Range    Glucose (POC) 166 (H) 65 - 100 mg/dL    Performed by Adri Estevez, POC    Collection Time: 09/11/20  4:12 PM   Result Value Ref Range    Glucose (POC) 183 (H) 65 - 100 mg/dL    Performed by Meche Muro            NOTIFY YOUR PHYSICIAN FOR ANY OF THE FOLLOWING:   Fever over 101 degrees for 24 hours. Chest pain, shortness of breath, fever, chills, nausea, vomiting, diarrhea, change in mentation, falling, weakness, bleeding. Severe pain or pain not relieved by medications. Or, any other signs or symptoms that you may have questions about.     DISPOSITION:    Home With:   OT  PT  HH  RN      x Long term SNF/Inpatient Rehab    Independent/assisted living    Hospice    Other:       PATIENT CONDITION AT DISCHARGE:     Functional status    Poor    x Deconditioned     Independent      Cognition     Lucid     Forgetful     Dementia      Catheters/lines (plus indication)    Sanders PICC     PEG    x None      Code status    x Full code     DNR      PHYSICAL EXAMINATION AT DISCHARGE:  Constitutional:  No acute distress, cooperative, pleasant    ENT:  Oral mucosa moist, oropharynx benign. Resp:  Decrease bronchial breath sound bilaterally. No wheezing/rhonchi/rales. No accessory muscle use   CV:  Regular rhythm, normal rate, no murmurs, gallops, rubs    GI:  Soft, non distended, non tender. normoactive bowel sounds, no hepatosplenomegaly     Musculoskeletal:  No edema     Neurologic:  Conscious and alert, oriented to place and person, motor RE 4-5/5, LLE 3/5, LUE 2/5         CHRONIC MEDICAL DIAGNOSES:  Problem List as of 9/11/2020 Date Reviewed: 9/5/2020          Codes Class Noted - Resolved    NSVT (nonsustained ventricular tachycardia) (HCC) ICD-10-CM: I47.2  ICD-9-CM: 427.1  9/5/2020 - Present        * (Principal) Respiratory failure ([facility] ICD-10-CM: J96.90  ICD-9-CM: 518.81  8/30/2020 - Present        Diabetes mellitus type 2, controlled ([facility] ICD-10-CM: E11.9  ICD-9-CM: 250.00  8/19/2020 - Present        Dysphagia ICD-10-CM: R13.10  ICD-9-CM: 787.20  8/19/2020 - Present        Essential hypertension ICD-10-CM: I10  ICD-9-CM: 401.9  8/19/2020 - Present        Gastroesophageal reflux disease ICD-10-CM: K21.9  ICD-9-CM: 530.81  8/19/2020 - Present        Genuine stress incontinence, female ICD-10-CM: N39.3  ICD-9-CM: 625.6  8/19/2020 - Present        Hemiplegia ([facility] ICD-10-CM: G81.90  ICD-9-CM: 342.90  8/19/2020 - Present        History of adenomatous polyp of colon ICD-10-CM: Z86.010  ICD-9-CM: V12.72  8/19/2020 - Present        Hyperproteinemia ICD-10-CM: E88.09  ICD-9-CM: 273.8  8/19/2020 - Present        Intracranial hemorrhage (HCC) ICD-10-CM: I62.9  ICD-9-CM: 432.9  8/19/2020 - Present        Laryngopharyngeal reflux ICD-10-CM: K21.9  ICD-9-CM: 478.79  8/19/2020 - Present                Discussed with patient and family.  Explained the importance of following up, Compliance with medications and recommendations on discharge,Side effect profile of medications were explained. Safety precautions at home and while taking pain medications also explained. All questions answered to the satisfaction of the patient/family. Discussed with consultant(s) who are agreeable to the discharge. Verbal and written instructions on discharge given. Explained about Discharge medications and side effect profile. Advised patient/family to followup with their pcp for medication refills and preauthorization of medications, Home health orders. checkups,screenign programs as appropriate for age. Thank you Tamra Francisco MD for taking care of your patient, Please call with any questions.       Greater than 35 minutes were spent with the patient on counseling and coordination of care    Signed:   Karen Larson MD  9/11/2020  5:33 PM

## 2020-09-11 NOTE — DIABETES MGMT
MARTHA MIKE  CLINICAL NURSE SPECIALIST CONSULT  PROGRAM FOR DIABETES HEALTH    FOLLOW UP NOTE    Presentation   Shey Perez is a 66 y.o. male with a PMH of HTN, diabetes and CVA with residual left hemiplegia who presented to [facility] 8/22 with fever, cough and SOB and was found to be COVID +. He was managed there but was transferred to [facility] as O2 requirements continued to increase. DX:COVID-19 pneumonia  TX: Steroids, remdesivir, IV antibiotics    Current clinical course has been complicated by hyperglycemia. Diabetes: Patient has known Type two diabetes, treated with metformin PTA. Admitting A1C 6.3%     Diabetes medication history  Drug class Currently in use Discontinued Never used   Biguanide Metformin 500 mg BID     DDP-4 inhibitor       Sulfonylurea      Thiazolidinedione      GLP-1 RA      SGLT-2 inhibitors      Basal insulin      Fixed Dose  Combinations      Bolus insulin        Subjective   On Room Air  Steroids: last dose 9/8 at 1800  Fasting glucose 159  Glucose in the past 24 hours: 159-221  Correctional insulin ACHS  Pureed diet  Waiting cardiology clearance for discharge  Will be discharged to [facility]  Objective   Physical exam  General Patient on isolation for COVID-19 infection. Not able to reach patient via bedside phone.   Vital Signs   Visit Vitals  /67 (BP 1 Location: Right arm, BP Patient Position: At rest)   Pulse (!) 57   Temp 98.2 °F (36.8 °C)   Resp 18   Ht 5' 8\" (1.727 m)   Wt 96.3 kg (212 lb 6.4 oz)   SpO2 96%   BMI 32.30 kg/m²     Deferred due to active COVID-19 infection    Laboratory  Lab Results   Component Value Date/Time    Hemoglobin A1c 6.3 (H) 08/31/2020 04:00 AM     No results found for: LDL, LDLC, DLDLP  Lab Results   Component Value Date/Time    Creatinine 0.72 09/09/2020 03:16 AM     Lab Results   Component Value Date/Time    Sodium 136 09/09/2020 03:16 AM    Potassium 4.1 09/09/2020 03:16 AM    Chloride 105 09/09/2020 03:16 AM    CO2 23 09/09/2020 03:16 AM    Anion gap 8 09/09/2020 03:16 AM    Glucose 329 (H) 09/09/2020 03:16 AM    BUN 19 09/09/2020 03:16 AM    Creatinine 0.72 09/09/2020 03:16 AM    BUN/Creatinine ratio 26 (H) 09/09/2020 03:16 AM    GFR est AA >60 09/09/2020 03:16 AM    GFR est non-AA >60 09/09/2020 03:16 AM    Calcium 9.0 09/09/2020 03:16 AM    Bilirubin, total 0.5 09/05/2020 03:27 AM    Alk. phosphatase 52 09/05/2020 03:27 AM    Protein, total 6.0 (L) 09/05/2020 03:27 AM    Albumin 2.2 (L) 09/05/2020 03:27 AM    Globulin 3.8 09/05/2020 03:27 AM    A-G Ratio 0.6 (L) 09/05/2020 03:27 AM    ALT (SGPT) 33 09/05/2020 03:27 AM     Lab Results   Component Value Date/Time    ALT (SGPT) 33 09/05/2020 03:27 AM       Factors affecting BG pattern  Factor Dose Comments   Nutrition:  Carb-controlled meals   60 grams/meal Pureed diet  Glucerna shakes with meals   Steroids Decadron 6 mg daily, completed 9/8    Infection COVID-19 infection He has two risk factors for having more serious complications related to COVID-19 infection: Diabetes and HTN. Hyperglycemia in the setting of COVID-19 infection is associated with a blunted immune response and delayed recovery. Blood glucose pattern        Assessment and Plan   Nursing Diagnosis Risk for unstable blood glucose pattern   Nursing Intervention Domain 0829 Decision-making Support   Nursing Interventions Examined current inpatient diabetes control   Explored factors facilitating and impeding inpatient management  Identified self-management practices impeding diabetes control  Explored corrective strategies with patient and responsible inpatient provider   Informed patient of rational for insulin strategy while hospitalized     Evaluation   Ludivina Anand is a 66year old gentleman with controlled type two diabetes on metformin admitted for COVID-19 pneumonia.   He was transferred to [facility] nearly two weeks ago for increasing WOB and O2 requirements and has sence completed a course of IV antibiotics, remdesivir, decadron and weaned to room air. He did experience steroid induced hyperglycemia for which correctional insulin was used. Steroids completed 24 hours ago and glucose near goal of 100-180. At this time patient is eating well, patient medically stablizing and case management assisting with discharge plans. Recommendations   Recommend:  1. Consider restarting home metformin 500 mg BID if patient stable from a respiratory and PO intake standpoint. Would d/c if he needs to be NPO for a period of time or if clinical condition declines. 2. Continue correctional insulin ACHS, adjust to start at a glucose of 200.    3. Adjust diet to no added sweets/consistent carbohydrate. Diabetes Management Team to sign off at this point as patient's blood glucose remains stable. Please re-consult us if patient needs change. Thank you for including us in their care. Billing Code(s)   I personally reviewed chart, notes, data and current medications in the medical record, and examined the patient at bedside before making care recommendations.      [x] 43775 IP subsequent hospital care - 15 minutes      DEAN Moreno  Program for Diabetes Health  Access via [address] [place] [address]

## 2020-09-11 NOTE — PROGRESS NOTES
Attempt to call the cardiologist office at [phone] at 3:33pm for the 3rd time, person spoke to was [facility] and will send a page to Dr. Bruce Smith.

## 2020-09-17 NOTE — DISCHARGE INSTRUCTIONS
Discharge SNF/Rehab Instructions/LTAC       PATIENT ID: Robert Gaines  MRN: [id number]   YOB: 1945    DATE OF ADMISSION: 8/30/2020  3:37 PM    DATE OF DISCHARGE: 9/11/2020    PRIMARY CARE PROVIDER: Nikky Allison MD       ATTENDING PHYSICIAN: Sb Cabezas MD  DISCHARGING PROVIDER: Al Cole MD     To contact this individual call [phone] and ask the  to page. If unavailable ask to be transferred the Adult Hospitalist Department.     CONSULTATIONS: IP CONSULT TO INFECTIOUS DISEASES  IP CONSULT TO CARDIOLOGY    PROCEDURES/SURGERIES: * No surgery found *        DISCHARGE DIAGNOSES / PLAN:      # Acute hypoxic respiratory failure due to COVID, CAP POA  - Resolved      # Pneumonia due to COVID 19 infection  - Completed trial treatment      # Leukocytosis  - improved and wbc is normal     # HTN  - Resume home medication      # DM type 2 with hyperglycemia  - Resume home Metformin      # Hx of CVA with left hemiplegia and dysphagia  - Usually walks w assist of a rolling walker  - seen by speech, on pureed diet  - PT/OT          PENDING TEST RESULTS:   At the time of discharge the following test results are still pending: None     FOLLOW UP APPOINTMENTS:    Follow-up Information     Follow up With Specialties Details Why Contact Info    Svetlana Howell PA-C Physician Assistant  Please schedule f/u for echo, and conference call patient granddaughter on phone at appointment [address] [facility]      Nikky Allison MD Internal Medicine Schedule an appointment as soon as possible for a visit in 1 week Post hospital discharge follow up  [address] [place] [address]  [phone]             ADDITIONAL CARE RECOMMENDATIONS:     DIET: Puree diet     TUBE FEEDING INSTRUCTIONS: None     OXYGEN / BiPAP SETTINGS: None     ACTIVITY: As tolerated     WOUND CARE: None     EQUIPMENT needed: None       DISCHARGE MEDICATIONS:   See Medication Reconciliation Form      NOTIFY YOUR PHYSICIAN FOR ANY OF THE FOLLOWING:   Fever over 101 degrees for 24 hours. Chest pain, shortness of breath, fever, chills, nausea, vomiting, diarrhea, change in mentation, falling, weakness, bleeding. Severe pain or pain not relieved by medications. Or, any other signs or symptoms that you may have questions about.     DISPOSITION:    Home With:   OT  PT  HH  RN      x SNF/Inpatient Rehab/LTAC    Independent/assisted living    Hospice    Other:       PATIENT CONDITION AT DISCHARGE:     Functional status    Poor    x Deconditioned     Independent      Cognition     Lucid     Forgetful     Dementia      Catheters/lines (plus indication)    Sanders     PICC     PEG    x None      Code status   x  Full code     DNR      PHYSICAL EXAMINATION AT DISCHARGE:   Refer to Progress Note***      CHRONIC MEDICAL DIAGNOSES:  Problem List as of 9/11/2020 Date Reviewed: 9/5/2020          Codes Class Noted - Resolved    NSVT (nonsustained ventricular tachycardia) (HCC) ICD-10-CM: I47.2  ICD-9-CM: 427.1  9/5/2020 - Present        * (Principal) Respiratory failure ([facility] ICD-10-CM: J96.90  ICD-9-CM: 518.81  8/30/2020 - Present        Diabetes mellitus type 2, controlled ([facility] ICD-10-CM: E11.9  ICD-9-CM: 250.00  8/19/2020 - Present        Dysphagia ICD-10-CM: R13.10  ICD-9-CM: 787.20  8/19/2020 - Present        Essential hypertension ICD-10-CM: I10  ICD-9-CM: 401.9  8/19/2020 - Present        Gastroesophageal reflux disease ICD-10-CM: K21.9  ICD-9-CM: 530.81  8/19/2020 - Present        Genuine stress incontinence, female ICD-10-CM: N39.3  ICD-9-CM: 625.6  8/19/2020 - Present        Hemiplegia ([facility] ICD-10-CM: G81.90  ICD-9-CM: 342.90  8/19/2020 - Present        History of adenomatous polyp of colon ICD-10-CM: Z86.010  ICD-9-CM: V12.72  8/19/2020 - Present        Hyperproteinemia ICD-10-CM: E88.09  ICD-9-CM: 273.8  8/19/2020 - Present        Intracranial hemorrhage ([facility] ICD-10-CM: I62.9  ICD-9-CM: 432.9  8/19/2020 - Present        Laryngopharyngeal reflux ICD-10-CM: K21.9  ICD-9-CM: 478.79  8/19/2020 - Present                CDMP Checked:   Yes ***     PROBLEM LIST Updated:  Yes ***         Signed:   hKushbu Isaac MD  9/11/2020  5:29 PM

## 2020-11-05 ENCOUNTER — DOCUMENTATION ONLY (OUTPATIENT)
Dept: CASE MANAGEMENT | Age: 75
End: 2020-11-05

## 2020-11-05 NOTE — PROGRESS NOTES
CM received page from [facility] (ph#: [phone]) representative, Mirian Harp (ext. [phone]). CM returned call; representative requested update from previous visit on 11/03/2020. CM notified visit was an ED visit and pt discharged on 11/04/2020 at 1501.      David Villaseñor RN, BSN  Care Management Department

## 2020-11-05 NOTE — PROGRESS NOTES
CM received call from [facility] representative (ph#: [phone]), Clair Tolliver (ext. [phone]), requesting [facility] screening be sent via fax to [phone]. Chart reviewed. CM sent screening to requested line.      Nani Boone RN, BSN  Care Management Department

## 2021-08-03 PROBLEM — K21.9 GASTROESOPHAGEAL REFLUX DISEASE: Status: RESOLVED | Noted: 2020-08-19 | Resolved: 2021-08-03

## 2021-10-15 ENCOUNTER — OFFICE VISIT (OUTPATIENT)
Dept: SURGERY | Age: 76
End: 2021-10-15
Payer: MEDICARE

## 2021-10-15 VITALS
HEART RATE: 76 BPM | OXYGEN SATURATION: 96 % | DIASTOLIC BLOOD PRESSURE: 64 MMHG | TEMPERATURE: 97.7 F | WEIGHT: 221.25 LBS | HEIGHT: 68 IN | RESPIRATION RATE: 16 BRPM | BODY MASS INDEX: 33.53 KG/M2 | SYSTOLIC BLOOD PRESSURE: 110 MMHG

## 2021-10-15 DIAGNOSIS — I87.309 CHRONIC VENOUS HYPERTENSION, UNSPECIFIED LATERALITY: Primary | ICD-10-CM

## 2021-10-15 PROCEDURE — G8536 NO DOC ELDER MAL SCRN: HCPCS | Performed by: SURGERY

## 2021-10-15 PROCEDURE — G8417 CALC BMI ABV UP PARAM F/U: HCPCS | Performed by: SURGERY

## 2021-10-15 PROCEDURE — 99203 OFFICE O/P NEW LOW 30 MIN: CPT | Performed by: SURGERY

## 2021-10-15 PROCEDURE — G8427 DOCREV CUR MEDS BY ELIG CLIN: HCPCS | Performed by: SURGERY

## 2021-10-15 PROCEDURE — G8510 SCR DEP NEG, NO PLAN REQD: HCPCS | Performed by: SURGERY

## 2021-10-15 PROCEDURE — 1101F PT FALLS ASSESS-DOCD LE1/YR: CPT | Performed by: SURGERY

## 2021-10-15 PROCEDURE — G8752 SYS BP LESS 140: HCPCS | Performed by: SURGERY

## 2021-10-15 PROCEDURE — G8754 DIAS BP LESS 90: HCPCS | Performed by: SURGERY

## 2021-10-15 RX ORDER — ASPIRIN 81 MG/1
81 TABLET ORAL DAILY
COMMUNITY

## 2021-10-15 RX ORDER — MULTIVIT WITH MINERALS/HERBS
1 TABLET ORAL DAILY
COMMUNITY

## 2021-10-15 RX ORDER — FUROSEMIDE 20 MG/1
20 TABLET ORAL
COMMUNITY

## 2021-10-15 NOTE — PROGRESS NOTES
Chief Complaint   Patient presents with    New Patient     Referred per Dr. Tisha Nguyen for sore on left leg. 1. Have you been to the ER, urgent care clinic since your last visit? Hospitalized since your last visit? No    2. Have you seen or consulted any other health care providers outside of the [address] [place] [address] since your last visit? Include any pap smears or colon screening.  No

## 2021-11-16 ENCOUNTER — TELEPHONE (OUTPATIENT)
Dept: BEHAVIORAL/MENTAL HEALTH CLINIC | Age: 76
End: 2021-11-16

## 2021-11-19 ENCOUNTER — OFFICE VISIT (OUTPATIENT)
Dept: SURGERY | Age: 76
End: 2021-11-19
Payer: MEDICARE

## 2021-11-19 VITALS
WEIGHT: 221 LBS | TEMPERATURE: 97.6 F | HEART RATE: 76 BPM | SYSTOLIC BLOOD PRESSURE: 148 MMHG | RESPIRATION RATE: 22 BRPM | OXYGEN SATURATION: 96 % | HEIGHT: 68 IN | DIASTOLIC BLOOD PRESSURE: 80 MMHG | BODY MASS INDEX: 33.49 KG/M2

## 2021-11-19 DIAGNOSIS — M79.605 PAIN OF LEFT LOWER EXTREMITY: ICD-10-CM

## 2021-11-19 DIAGNOSIS — M79.89 LEG SWELLING: Primary | ICD-10-CM

## 2021-11-19 DIAGNOSIS — I87.309 CHRONIC VENOUS HYPERTENSION, UNSPECIFIED LATERALITY: ICD-10-CM

## 2021-11-19 DIAGNOSIS — I73.9 PERIPHERAL VASCULAR DISEASE (HCC): ICD-10-CM

## 2021-11-19 DIAGNOSIS — I10 ESSENTIAL HYPERTENSION: ICD-10-CM

## 2021-11-19 PROCEDURE — G8536 NO DOC ELDER MAL SCRN: HCPCS | Performed by: SURGERY

## 2021-11-19 PROCEDURE — G8417 CALC BMI ABV UP PARAM F/U: HCPCS | Performed by: SURGERY

## 2021-11-19 PROCEDURE — G8427 DOCREV CUR MEDS BY ELIG CLIN: HCPCS | Performed by: SURGERY

## 2021-11-19 PROCEDURE — G8754 DIAS BP LESS 90: HCPCS | Performed by: SURGERY

## 2021-11-19 PROCEDURE — G8753 SYS BP > OR = 140: HCPCS | Performed by: SURGERY

## 2021-11-19 PROCEDURE — G8432 DEP SCR NOT DOC, RNG: HCPCS | Performed by: SURGERY

## 2021-11-19 PROCEDURE — 99213 OFFICE O/P EST LOW 20 MIN: CPT | Performed by: SURGERY

## 2021-11-19 PROCEDURE — 1101F PT FALLS ASSESS-DOCD LE1/YR: CPT | Performed by: SURGERY

## 2021-11-19 NOTE — PROGRESS NOTES
Visit Vitals  BP (!) 148/80 (BP 1 Location: Right upper arm, BP Patient Position: Sitting, BP Cuff Size: Large adult)   Pulse 76   Temp 97.6 °F (36.4 °C)   Resp 22   Ht 5' 8\" (1.727 m)   Wt 221 lb (100.2 kg)   SpO2 96%   BMI 33.60 kg/m²     Chief Complaint   Patient presents with    Follow-up     check left leg

## 2021-11-22 PROBLEM — I87.309 CHRONIC VENOUS HYPERTENSION: Status: ACTIVE | Noted: 2021-11-22

## 2021-11-22 PROBLEM — I73.9 PERIPHERAL VASCULAR DISEASE (HCC): Status: ACTIVE | Noted: 2021-11-22

## 2021-11-22 NOTE — PROGRESS NOTES
VASCULAR FOLLOW UP      Subjective:   CHIEF COMPLAINTS:  Leg pain and swelling. Especially left leg. PRESENTATION OF ILLNESS:  Sabrina Nur is a 64 y.o. very pleasant man with history of peripheral vascular disease and chronic venous hypertension. Patient says left leg swelling is not any better. Patient currently not wearing compression stocking. Last time I wrapped his both legs with Ace wrap. And he has not done so. Patient denies chest pain shortness of breath. Past Medical History:   Diagnosis Date    Acid reflux     Anemia     Diabetes (HCC)     GERD (gastroesophageal reflux disease)     Hypertension     Stroke [facility])       Past Surgical History:   Procedure Laterality Date    HX COLONOSCOPY       Family History   Problem Relation Age of Onset    Heart Disease Father     Hypertension Brother     Diabetes Brother       Social History     Tobacco Use    Smoking status: Never Smoker    Smokeless tobacco: Never Used   Substance Use Topics    Alcohol use: Never       Prior to Admission medications    Medication Sig Start Date End Date Taking? Authorizing Provider   furosemide (LASIX) 20 mg tablet Take 20 mg by mouth daily as needed. Yes Provider, Historical   aspirin delayed-release (Aspir-81) 81 mg tablet Take 81 mg by mouth daily. Yes Provider, Historical   b complex vitamins tablet Take 1 Tablet by mouth daily. Yes Provider, Historical   amLODIPine (NORVASC) 10 mg tablet Take  by mouth daily. Yes Provider, Historical   carvediloL (COREG) 25 mg tablet Take 25 mg by mouth two (2) times daily (with meals). Yes Provider, Historical   glucose blood VI test strips (Contour Next Test Strips) strip by Does Not Apply route See Admin Instructions. Yes Provider, Historical   diclofenac (VOLTAREN) 1 % gel Apply  to affected area four (4) times daily. Yes Provider, Historical   lisinopriL (PRINIVIL, ZESTRIL) 10 mg tablet Take  by mouth daily.    Yes Provider, Historical   metFORMIN (GLUCOPHAGE) 500 mg tablet Take  by mouth two (2) times daily (with meals). Yes Provider, Historical   pantoprazole (PROTONIX) 40 mg tablet Take 40 mg by mouth daily. Yes Provider, Historical   pravastatin (PRAVACHOL) 40 mg tablet Take 40 mg by mouth nightly. Yes Provider, Historical   docusate sodium (Stool Softener) 100 mg capsule Take 100 mg by mouth two (2) times a day. Yes Provider, Historical   lancets (TRUEplus Lancets) 33 gauge misc by Does Not Apply route. Yes Provider, Historical   PEG 3350-Electrolytes (GaviLyte-G) 236-22.74-6.74 -5.86 gram suspension Take  by mouth now. Patient not taking: Reported on 10/15/2021    Provider, Historical     No Known Allergies     Review of Systems:  I reviewed the rest of organ systems personally and they were negative signed by Dr. Rocio Calhoun    Objective:     Visit Vitals  BP (!) 148/80 (BP 1 Location: Right upper arm, BP Patient Position: Sitting, BP Cuff Size: Large adult)   Pulse 76   Temp 97.6 °F (36.4 °C)   Resp 22   Ht 5' 8\" (1.727 m)   Wt 221 lb (100.2 kg)   SpO2 96%   BMI 33.60 kg/m²     VITAL SIGNS REVIEWED. Physical Exam:  Patient is well-nourished pleasant in conversation is appropriate. Head and neck examination atraumatic, normocephalic. Gaze appropriate. Conversation appropriate. Neck examination shows supple. No mass. No obvious carotid bruit. Chest examination shows lungs are clear bilaterally well-expanded, no crackles or wheezes. Cardiovascular system regular rate, no obvious murmur. Skin warm to touch  and moist, no skin lesions. Abdomen is soft ,not tender or distended bowel sounds present. No palpable mass. Neurological examinations, no focal neuro deficits moving all 4 extremities. Cranial nerves intact. Sensation is intact as well. Hematologic: No obvious bruise or swelling or obvious lymphadenopathy. Psychosocial: Appropriate. Has good effect.   Musculoskeletal system: No muscle wasting, appropriate movements upper and lower extremity. Vascular examination: Vascular examination shows swelling is moderate. I wrapped his left leg with Ace wraps. I also did a Doppler interrogation does have a monophasic signal noted on the left DP and PT. Data Review:   No visits with results within 1 Month(s) from this visit. Latest known visit with results is:   No results displayed because visit has over 200 results. Assessment:     Problem List Items Addressed This Visit        Circulatory    Essential hypertension    Peripheral vascular disease ([facility]    Chronic venous hypertension      Other Visit Diagnoses     Leg swelling    -  Primary    Relevant Orders    DUPLEX LOWER EXT VENOUS BILAT    LOWER EXT ART PVR MULT LEVEL SEG PRESSURES    Pain of left lower extremity        Relevant Orders    LOWER EXT ART PVR MULT LEVEL SEG PRESSURES              Plan:     Patient has a combination of both chronic venous hypertension with swelling especially left leg with pain. Doppler interrogation shows monophasic. I will obtain FARRUKH examination bilateral leg and also venous duplex ultrasound as well. I will reevaluate him again after these two tests are complete.         Anurag Roblero MD

## 2021-12-01 NOTE — PROGRESS NOTES
Vascular History and Physical    Patient: Mimi Madrid  MRN: [id number]    YOB: 1945  Age: 64 y.o. Sex: male     Chief Complaint:  Chief Complaint   Patient presents with    New Patient     Referred per Dr. Valdo Calabrese for sore on left leg. History of Present Illness: Mimi Madrid is a 64 y.o. very pleasant man complaining of the leg swelling. Patient has known diagnosis of chronic venous hypertension. There is no previous venous ultrasound examination. Patient complaining of mostly L left leg swelling. Patient currently not on compression stocking. Patient denies chest pain shortness of breath recent trauma or previous history of deep vein thrombosis. Social History:     Social Connections:     Frequency of Communication with Friends and Family: Not on file    Frequency of Social Gatherings with Friends and Family: Not on file    Attends Latter day Services: Not on file    Active Member of Clubs or Organizations: Not on file    Attends Club or Organization Meetings: Not on file    Marital Status: Not on file       Past Medical History:  Past Medical History:   Diagnosis Date    Acid reflux     Anemia     Diabetes ([facility]     GERD (gastroesophageal reflux disease)     Hypertension     Stroke [facility])        Surgical History:  Past Surgical History:   Procedure Laterality Date    HX COLONOSCOPY         Allergies:  No Known Allergies    Current Meds:  Current Outpatient Medications   Medication Sig Dispense Refill    furosemide (LASIX) 20 mg tablet Take 20 mg by mouth daily as needed.  aspirin delayed-release (Aspir-81) 81 mg tablet Take 81 mg by mouth daily.  b complex vitamins tablet Take 1 Tablet by mouth daily.  amLODIPine (NORVASC) 10 mg tablet Take  by mouth daily.  carvediloL (COREG) 25 mg tablet Take 25 mg by mouth two (2) times daily (with meals).       glucose blood VI test strips (Contour Next Test Strips) strip by Does Not Apply route See Admin Instructions.  diclofenac (VOLTAREN) 1 % gel Apply  to affected area four (4) times daily.  lisinopriL (PRINIVIL, ZESTRIL) 10 mg tablet Take  by mouth daily.  metFORMIN (GLUCOPHAGE) 500 mg tablet Take  by mouth two (2) times daily (with meals).  pantoprazole (PROTONIX) 40 mg tablet Take 40 mg by mouth daily.  pravastatin (PRAVACHOL) 40 mg tablet Take 40 mg by mouth nightly.  docusate sodium (Stool Softener) 100 mg capsule Take 100 mg by mouth two (2) times a day.  lancets (TRUEplus Lancets) 33 gauge misc by Does Not Apply route.  PEG 3350-Electrolytes (GaviLyte-G) 236-22.74-6.74 -5.86 gram suspension Take  by mouth now. (Patient not taking: Reported on 10/15/2021)         Review of Systems:  I reviewed the rest of organ systems personally and they are negative. Pertinent findings discussed in HPI. Physical Examination    Visit Vitals  /64 (BP 1 Location: Right upper arm, BP Patient Position: Sitting, BP Cuff Size: Adult)   Pulse 76   Temp 97.7 °F (36.5 °C) (Skin)   Resp 16   Ht 5' 8\" (1.727 m)   Wt 221 lb 4 oz (100.4 kg)   SpO2 96%   BMI 33.64 kg/m²     Gen: Well developed, well nourished 64 y.o. male in no acute distress  Head: normocephalic, atraumatic  Mouth: Clear, no overt lesions, oral mucosa pink and moist  Neck: supple, no masses, no adenopathy or carotid bruits, trachea midline  Resp: clear to auscultation bilaterally, no wheeze, rhonchi or rales, excursions normal and symmetrical  Cardio: Regular rate and rhythm, no murmurs, clicks, gallops or rubs, no edema or varicosities  Abdomen: soft, nontender, nondistended, normoactive bowel sounds, no hernias, no hepatosplenomegaly   Neuro: sensation and strength grossly intact and symmetrical  Psych: alert and oriented to person, place and time  Vascular examination: Patient does have a CEAP C3 conditions bilateral lower extremity with varicosities. Palpable pulses noted.   Skin: warm and moist.    Labs:  No visits with results within 1 Month(s) from this visit. Latest known visit with results is:   No results displayed because visit has over 200 results. Images:                            Alonso Soulier, MD    Assessments:  Patient Active Problem List   Diagnosis Code    Diabetes mellitus type 2, controlled ([facility] E11.9    Dysphagia R13.10    Essential hypertension I10    Genuine stress incontinence, female N39.3    Hemiplegia ([facility] G81.90    History of adenomatous polyp of colon Z86.010    Hyperproteinemia E88.09    Intracranial hemorrhage (HCC) I62.9    Laryngopharyngeal reflux K21.9    Respiratory failure ([facility] J96.90    NSVT (nonsustained ventricular tachycardia) (HCC) I47.2    Peripheral vascular disease (HCC) I73.9    Chronic venous hypertension I87.309       Plans: Patient was advised continue wear some sort of a compression wrap either Ace wraps or compression stocking. Patient was prescribed compression stocking prescription as well with a pressure system of 20 to 30 mmHg. Patient will also need venous duplex examination as well and this will be arranged. Patient will come see me 1 month follow-up.           Alonso Soulier, MD

## 2021-12-07 ENCOUNTER — TELEPHONE (OUTPATIENT)
Dept: SURGERY | Age: 76
End: 2021-12-07

## 2021-12-07 NOTE — TELEPHONE ENCOUNTER
Left message to call back. Patient is scheduled for a follow up on 12/9/2021 - need to see if he's had his imaging done; if not, he will need to get that scheduled and move his follow up appointment to after his imaging is done.

## 2021-12-13 ENCOUNTER — HOSPITAL ENCOUNTER (OUTPATIENT)
Dept: VASCULAR SURGERY | Age: 76
Discharge: HOME | End: 2021-12-13
Attending: SURGERY
Payer: MEDICARE

## 2021-12-13 DIAGNOSIS — M79.89 LEG SWELLING: ICD-10-CM

## 2021-12-13 DIAGNOSIS — I73.9 PERIPHERAL VASCULAR DISEASE (HCC): ICD-10-CM

## 2021-12-13 LAB
LEFT ABI: 1.06
LEFT ARM BP: 136 MMHG
LEFT CALF PRESSURE: 172 MMHG
LEFT HIGH THIGH PRESSURE: 156 MMHG
LEFT LOW THIGH PRESSURE: 151 MMHG
LEFT POSTERIOR TIBIAL: 149 MMHG
LEFT TBI: 0.74
LEFT TOE PRESSURE: 106 MMHG
RIGHT ABI: 1.09
RIGHT ARM BP: 143 MMHG
RIGHT CALF PRESSURE: 200 MMHG
RIGHT HIGH THIGH PRESSURE: 173 MMHG
RIGHT LOW THIGH PRESSURE: 184 MMHG
RIGHT POSTERIOR TIBIAL: 156 MMHG
RIGHT TBI: 0.92
RIGHT TOE PRESSURE: 131 MMHG
VAS LEFT ANKLE BP: 152 MMHG
VAS LEFT DORSALIS PEDIS BP: 152 MMHG
VAS RIGHT ANKLE BP: 156 MMHG
VAS RIGHT DORSALIS PEDIS BP: 149 MMHG

## 2021-12-13 PROCEDURE — 93970 EXTREMITY STUDY: CPT

## 2021-12-13 PROCEDURE — 93923 UPR/LXTR ART STDY 3+ LVLS: CPT

## 2021-12-14 ENCOUNTER — TELEPHONE (OUTPATIENT)
Dept: SURGERY | Age: 76
End: 2021-12-14

## 2021-12-14 NOTE — TELEPHONE ENCOUNTER
Mr. Rolanda Segundo granddaughter Salma Mathias called wanting to the results of his PVR and Doppler test that were done on yesterday 12-. She said that he is due to have PT pending the results of the tests.  Please give Salma Luciaish a call [phone]

## 2021-12-14 NOTE — TELEPHONE ENCOUNTER
Granddaughter states that transportation is difficult with patient and would like to discuss results over the phone.  Will speak with Dr. David Crandall

## 2021-12-15 NOTE — TELEPHONE ENCOUNTER
Spoke with patient's granddaughter and let her know I spoke with Dr. Brittny Lee and he will call in the morning regarding results. She verbalized understanding and stated that someone has continued to call about scheduling an appointment. Advised her I'm not sure who is calling and told her typically we do have patients return to the office for results, but will accommodate those patients who are unable to come to the office like in this situation. Granddaughter was pleasant, but firm stating that whoever is calling is confusing her grandparents about scheduling an appointment and they need to stop calling. She would like a call back at her number: [phone] for the results in the morning. I verbalized understanding and apologized as I'm unsure who is calling.

## 2021-12-17 NOTE — TELEPHONE ENCOUNTER
Patients's granddaughter called again today regarding results of scan. Please give her a call. She has called several days for the same reason.

## 2021-12-22 ENCOUNTER — HOSPITAL ENCOUNTER (OUTPATIENT)
Dept: PHYSICAL THERAPY | Age: 76
Discharge: HOME | End: 2021-12-22
Payer: MEDICARE

## 2021-12-22 PROCEDURE — 97530 THERAPEUTIC ACTIVITIES: CPT

## 2021-12-22 PROCEDURE — 97110 THERAPEUTIC EXERCISES: CPT

## 2021-12-22 PROCEDURE — 97161 PT EVAL LOW COMPLEX 20 MIN: CPT

## 2021-12-22 NOTE — PROGRESS NOTES
[address]  [place], [place]  Ph: [phone]    Fax: [phone]    Physician Communication    Name: Thuan Garza   :    MD: Sula Cockayne, MD       Treatment Diagnosis: Hemiplegia and hemiparesis following unspecified cerebrovascular disease affecting unspecified side [I69.959]    Patient reports difficulty in ADL's, fine motor skills, and use of upper extremities since stroke. Patient would benefit from further assessment by Occupational Therapist.      Please sign attached prescription upon MD approval for patient to receive Occupational Therapy evaluation.    Thank you for your referral.         Deann Fung, PT, DPT 2021

## 2021-12-22 NOTE — PROGRESS NOTES
PT INITIAL EVALUATION NOTE - [facility] 2-15    Patient Name: Rene Berger  Date:2021  : 1945  [x]  Patient  Verified  [place]or: [place] MED[place]ARE / Plan: [place] MEDICARE PART A & B / Product Type: Medicare /    In time:1310  Out time:1420  Total Treatment Time (min): 70  Total Timed Codes (min): 70  1:1 Treatment Time ( only): 79   Visit #: 1    Treatment Area: Hemiplegia and hemiparesis following unspecified cerebrovascular disease affecting unspecified side [I69.959]    SUBJECTIVE  Pain Level (0-10 scale): 0/10  Any medication changes, allergies to medications, adverse drug reactions, diagnosis change, or new procedure performed?: [] No    [x] Yes (see summary sheet for update)  Subjective:    Pt is 64year old  [place] male who presents to physical therapy post CVA. Pt reports CVA was 2015, and no acute episodes. Per pt, last time he was here, he made as much progress as was able and was discharged. Pt reports her grandchild wanted him to come back. Pt reports he has a big sore on his left leg, but it is healing up. Pt reports recent diagnosis of COVID 19 and he got a little bit weak. Pt to return to physical therapy for generalized weakness. Pt is hard of hearing and is only able to provide limited subjective information.       PLOF: Independent with all ADL's; primary use of indra walker for mobility  Mechanism of Injury: history of 1 stroke  Previous Treatment/Compliance: physical therapy one time  Radiographs: none  What increases symptoms: n/a  What decreases symptoms: n/a  Work Hx: none  Living Situation: lives with wife  Pt Goals: not sure, may get stronger  Barriers: chronicity of cva  Motivation: Good  Substance use: None reported  Cognition: A&O x 4  Fall Assessment: TUG Test: 88 seconds  Past Medical History:  Past Medical History:   Diagnosis Date    Acid reflux     Anemia     [facility] ([facility]     GERD (gastroesophageal reflux disease)     Hypertension     Stroke ([facility] Past Surgical History:  Past Surgical History:   Procedure Laterality Date    HX COLONOSCOPY       Current Medications:  Current Outpatient Medications   Medication Instructions    amLODIPine (NORVASC) 10 mg tablet Oral, DAILY    aspirin delayed-release (ASPIR-81) 81 mg, Oral, DAILY    b complex vitamins tablet 1 Tablet, Oral, DAILY    carvediloL (COREG) 25 mg, Oral, 2 TIMES DAILY WITH MEALS    diclofenac (VOLTAREN) 1 % gel Topical, 4 TIMES DAILY    docusate sodium (STOOL SOFTENER) 100 mg, Oral, 2 TIMES DAILY    furosemide (LASIX) 20 mg, Oral, DAILY AS NEEDED    glucose blood VI test strips (Contour Next Test Strips) strip Does Not Apply, SEE ADMIN INSTRUCTIONS    lancets (TRUEplus Lancets) 33 gauge misc Does Not Apply    lisinopriL (PRINIVIL, ZESTRIL) 10 mg tablet Oral, DAILY    metFORMIN (GLUCOPHAGE) 500 mg tablet Oral, 2 TIMES DAILY WITH MEALS    pantoprazole (PROTONIX) 40 mg, Oral, DAILY    PEG 3350-Electrolytes (GaviLyte-G) 236-22.74-6.74 -5.86 gram suspension NOW    pravastatin (PRAVACHOL) 40 mg, Oral, EVERY BEDTIME          OBJECTIVE/EXAMINATION  UE: left, limited AROM; right, full arom and 5/5 strength grossly  Posture: Forward shoulders  Gait and Functional Mobility:  Use of indra-walker; step to gait pattern, CGA; little to no left UE movement with gait  Palpation: no ttp  Sit to stand: min/modA to initiate  DPT assessed reported sore, but there was not anything present on left lower extremity, shin region, only dry skin. Hip:  Strength AROM     Right Left Right Left    Flexion 5/5 4+/5 [facility]   Knee:  Strength AROM     Right Left Right Left    Flexion 5/5 3+/5 [facility]    Extension 5/5 5/5 [facility]   Ankle  Strength AROM     Right Left Right Left    Dorsiflexion 5/5 5/5 [facility]    Platarflexion 5/5 5/5 [facility] WFL   All ROM measurements are measured in degrees.     10 min Therapeutic Exercise:  [x] See flow sheet :   Rationale: increase ROM, increase strength, improve coordination, improve balance and increase proprioception to improve the patients ability to ambulate with less difficulty    15 min Therapeutic Activity:  [x]  See flow sheet :   Rationale: increase ROM, increase strength, improve coordination, improve balance and increase proprioception  to improve the patients ability to complete transfers with less difficulty.            With   [x] TE   [x] TA   [] neuro   [] other: Patient Education: [] Provided HEP    [] Progressed/Changed HEP based on:   [] positioning   [] body mechanics   [x] transfers   [] heat/ice application    [] other:        Pain Level (0-10 scale) post treatment: 0/10    ASSESSMENT/Changes in Function:   [x]  See Plan of Gordon. 49, PT, DPT 12/22/2021

## 2021-12-27 ENCOUNTER — HOSPITAL ENCOUNTER (OUTPATIENT)
Dept: PHYSICAL THERAPY | Age: 76
Discharge: HOME | End: 2021-12-27
Payer: MEDICARE

## 2021-12-27 PROCEDURE — 97110 THERAPEUTIC EXERCISES: CPT

## 2021-12-27 NOTE — PROGRESS NOTES
PT DAILY TREATMENT NOTE - [facility] 2-15    Patient Name: Pari Kearney  Date:2021  : 1945  [x]  Patient  Verified  Payor: [place] MEDICARE / Plan: [place] MEDICARE PART A & B / Product Type: Medicare /    In time:11:00  AM  Out time: 11:53 AM  Total Treatment Time (min): 53  Total Timed Codes (min): 53  1:1 Treatment Time ( [address] only): 48   Visit #:  2    Treatment Area: Hemiplegia and hemiparesis following unspecified cerebrovascular disease affecting unspecified side [I69.959]    SUBJECTIVE  Pain Level (0-10 scale): 0/10  Any medication changes, allergies to medications, adverse drug reactions, diagnosis change, or new procedure performed?: [x] No    [] Yes (see summary sheet for update)  Subjective functional status/changes:   [] No changes reported    I do not perform exercises at home. I do try to walk quite a bit. OBJECTIVE    53 min Therapeutic Exercise:  [x] See flow sheet :   Rationale: increase ROM, increase strength, improve coordination, improve balance and increase proprioception to improve the patients ability   to ambulate safely and independent with hemiwalker. With   [] TE   [] TA   [] neuro   [] other: Patient Education: [x] Review HEP    [] Progressed/Changed HEP based on:   [] positioning   [] body mechanics   [] transfers   [] heat/ice application    [] other:      Pain Level (0-10 scale) post treatment: 0/10  Objective: Correct gait patternwith hemiwalker  Strength: Decreased left hip Adduction and ankle PF     ASSESSMENT/Changes in Function:   The pt tolerated treatment with frequent rest periods. Patient has difficulty getting up from low chairs. Corrected gait pattern and adjusted hemiwalker for safer ambulation. Patient is motivated. Review and perform HEP with supervision for carryover in his home environment.  Patient will continue to benefit from skilled PT services to address functional mobility deficits, address ROM deficits, address strength deficits and analyze and cue movement patterns to attain remaining goals     [x]  See Plan of Care  []  See progress note/recertification  []  See Discharge Summary         Progress towards goals / Updated goals:  Short Term Goals: To be accomplished in 5 treatments. 1. Patient will demonstrate independence and compliance with HEP in order to assist with carryover from PT services. 2.   Patient will be able to ambulate within yard, CGA, indra walker to increase functional mobility. 3.   Patient will be able to complete sit to stand with B UE on first attempt, with supervision to increase functional mobility. 4.   Patient will increase left hip flexion strength to 5/5 in order to improve functional mobility.     Long Term Goals: To be accomplished in 10 treatments. 2.   Patient will be able to ambulate x5min, supervision, with indra walker to increase functional mobility. 3.   Patient will be able to stand from toilet with supervision to increase functional mobility.   4.   Patient will increase left knee flexion strength to 4/5 in order to improve functional mobility.         PLAN  [x]  Upgrade activities as tolerated     [x]  Continue plan of care  []  Update interventions per flow sheet       []  Discharge due to:_  []  Other:_      Lisseth Mao, PT,  12/27/2021

## 2021-12-29 ENCOUNTER — HOSPITAL ENCOUNTER (OUTPATIENT)
Dept: PHYSICAL THERAPY | Age: 76
Discharge: HOME | End: 2021-12-29
Payer: MEDICARE

## 2021-12-29 PROCEDURE — 97110 THERAPEUTIC EXERCISES: CPT

## 2021-12-29 NOTE — PROGRESS NOTES
PT DAILY TREATMENT NOTE - [facility] 2-15    Patient Name: Elli Dailey  Date:2021  : 1945  [x]  Patient  Verified  Payor: [place] MEDICARE / Plan: [place] MEDICARE PART A & B / Product Type: Medicare /    In time:11:00 AM  Out time:11:53 AM  Total Treatment Time (min): 53  Total Timed Codes (min): 53  1:1 Treatment Time ( [address] only): 48   Visit #:  3    Treatment Area: Hemiplegia and hemiparesis following unspecified cerebrovascular disease affecting unspecified side [I69.959]    SUBJECTIVE  Pain Level (0-10 scale): 0/10  Any medication changes, allergies to medications, adverse drug reactions, diagnosis change, or new procedure performed?: [x] No    [] Yes (see summary sheet for update)  Subjective functional status/changes:   [] No changes reported    I did get some exercise sheet from rehab. But I try to do more walking. OBJECTIVE      53 min Therapeutic Exercise:  [x] See flow sheet :   Rationale: increase ROM, increase strength, improve coordination, improve balance and increase proprioception to improve the patients ability to   complete sit to stand with B UE on first attempt, with supervision to increase functional mobility. With   [] TE   [] TA   [] neuro   [] other: Patient Education: [x] Review HEP    [] Progressed/Changed HEP based on:   [] positioning   [] body mechanics   [] transfers   [] heat/ice application    [] other:      Other Objective: sit to stand activities on mat table with no back support with one hand - SBA  Gait with hemiwalker for 252 feet SBA  Bilateral LE exercises in sitting position for strengthening    Pain Level (0-10 scale) post treatment: 0/10    ASSESSMENT/Changes in Function:   The pt tolerated treatment and follow instructions well. Patient has difficulty getting up from regular chair. Moderate Assistance required. Sit to stand exercises were performed on mat table with various heights to improve sit to stand and stand to sitting position with 1 hand support. Decreased weakness of hip adduction and left foot DF and PF. Patient will continue to benefit from skilled PT services to address ROM deficits, address strength deficits, analyze and address soft tissue restrictions and analyze and cue movement patterns to attain remaining goals     [x]  See Plan of Care  []  See progress note/recertification  []  See Discharge Summary         Progress towards goals / Updated goals:  Short Term Goals: To be accomplished in 5 treatments. 1. Patient will demonstrate independence and compliance with HEP in order to assist with carryover from PT services. 2.   Patient will be able to ambulate within yard, CGA, indra walker to increase functional mobility. 3.   Patient will be able to complete sit to stand with B UE on first attempt, with supervision to increase functional mobility. 4.   Patient will increase left hip flexion strength to 5/5 in order to improve functional mobility.     Long Term Goals: To be accomplished in 10 treatments. 1.   Patient will be able to ambulate x5min, supervision, with indra walker to increase functional mobility. 2.   Patient will be able to stand from toilet with supervision to increase functional mobility.   3.   Patient will increase left knee flexion strength to 4/5 in order to improve functional mobility.       PLAN  [x]  Upgrade activities as tolerated     [x]  Continue plan of care  []  Update interventions per flow sheet       []  Discharge due to:_  []  Other:_      Anya Timmons, PT,  12/29/2021

## 2022-01-03 ENCOUNTER — APPOINTMENT (OUTPATIENT)
Dept: PHYSICAL THERAPY | Age: 77
End: 2022-01-03
Payer: MEDICARE

## 2022-01-05 ENCOUNTER — HOSPITAL ENCOUNTER (OUTPATIENT)
Dept: PHYSICAL THERAPY | Age: 77
Discharge: HOME | End: 2022-01-05
Payer: MEDICARE

## 2022-01-05 PROCEDURE — 97110 THERAPEUTIC EXERCISES: CPT

## 2022-01-05 PROCEDURE — 97530 THERAPEUTIC ACTIVITIES: CPT

## 2022-01-05 PROCEDURE — 97116 GAIT TRAINING THERAPY: CPT

## 2022-01-05 NOTE — PROGRESS NOTES
PT DAILY TREATMENT NOTE - [facility] 2-15    Patient Name: Skip Lawrence  Date:2022  : 1945  [x]  Patient  Verified  Payor: [place] MEDICARE / Plan: VA MEDICARE PART A & B / Product Type: Medicare /    In time: 11:15  Out time: 12:02  Total Treatment Time (min): 47  Total Timed Codes (min): 47  1:1 Treatment Time ( only): 52   Visit #:  4    Treatment Area: Hemiplegia and hemiparesis following unspecified cerebrovascular disease affecting unspecified side [I69.959]    SUBJECTIVE  Pain Level (0-10 scale): 0/10  Any medication changes, allergies to medications, adverse drug reactions, diagnosis change, or new procedure performed?: [x] No    [] Yes (see summary sheet for update)  Subjective functional status/changes:   [] No changes reported  Pt 15 minutes late today due to transportation    OBJECTIVE    22 min Therapeutic Exercise:  [x] See flow sheet :   Rationale: increase ROM and increase strength to improve the patients ability to improve functional mobility    10 min Therapeutic Activity:  [x]  See flow sheet :   Rationale: increase strength  to improve the patients ability to be able to perform sit to stands on own     15 min Gait Traininft x4 in clinic area on level floor surfaces with CGA with R side HW   Rationale: improve coordination and improve balance  to improve the patients ability to improve independence with gait     With   [] TE   [x] TA   [] neuro   [] other: Patient Education: [x] Review HEP    [] Progressed/Changed HEP based on:   [] positioning   [] body mechanics   [x] transfers   [] heat/ice application    [] other:      Pain Level (0-10 scale) post treatment: 0/10    ASSESSMENT/Changes in Function: The pt tolerated treatment fairly well. Increased focus on education today on proper sit to stand technique. Also informed the nurse as well about correct technique. Cues today during gait training to keep HW to the side.  Patient will continue to benefit from skilled PT services to address functional mobility deficits, address ROM deficits and address strength deficits to attain remaining goals     [x]  See Plan of Care  []  See progress note/recertification  []  See Discharge Summary         Progress towards goals / Updated goals:  Short Term Goals: To be accomplished in 5 treatments. 1. Patient will demonstrate independence and compliance with HEP in order to assist with carryover from PT services. 2.   Patient will be able to ambulate within yard, CGA, indra walker to increase functional mobility. 3.   Patient will be able to complete sit to stand with B UE on first attempt, with supervision to increase functional mobility. 4.   Patient will increase left hip flexion strength to 5/5 in order to improve functional mobility.     Long Term Goals: To be accomplished in 10 treatments. 1.   Patient will be able to ambulate x5min, supervision, with indra walker to increase functional mobility. 2.   Patient will be able to stand from toilet with supervision to increase functional mobility. 3.   Patient will increase left knee flexion strength to 4/5 in order to improve functional mobility.     PLAN  [x]  Upgrade activities as tolerated     [x]  Continue plan of care  []  Update interventions per flow sheet       []  Discharge due to:_  []  Other:_      Celina Lai PTA, ROSITA 1/5/2022

## 2022-01-10 ENCOUNTER — APPOINTMENT (OUTPATIENT)
Dept: PHYSICAL THERAPY | Age: 77
End: 2022-01-10
Payer: MEDICARE

## 2022-01-12 ENCOUNTER — HOSPITAL ENCOUNTER (OUTPATIENT)
Dept: PHYSICAL THERAPY | Age: 77
End: 2022-01-12
Payer: MEDICARE

## 2022-01-12 ENCOUNTER — APPOINTMENT (OUTPATIENT)
Dept: PHYSICAL THERAPY | Age: 77
End: 2022-01-12
Payer: MEDICARE

## 2022-01-17 ENCOUNTER — APPOINTMENT (OUTPATIENT)
Dept: PHYSICAL THERAPY | Age: 77
End: 2022-01-17
Payer: MEDICARE

## 2022-01-19 ENCOUNTER — HOSPITAL ENCOUNTER (OUTPATIENT)
Dept: PHYSICAL THERAPY | Age: 77
Discharge: HOME | End: 2022-01-19
Payer: MEDICARE

## 2022-01-19 ENCOUNTER — APPOINTMENT (OUTPATIENT)
Dept: PHYSICAL THERAPY | Age: 77
End: 2022-01-19
Payer: MEDICARE

## 2022-01-19 PROCEDURE — 97110 THERAPEUTIC EXERCISES: CPT

## 2022-01-19 PROCEDURE — 97116 GAIT TRAINING THERAPY: CPT

## 2022-01-19 PROCEDURE — 97530 THERAPEUTIC ACTIVITIES: CPT

## 2022-01-19 NOTE — PROGRESS NOTES
PT DAILY TREATMENT NOTE - [facility] 2-15    Patient Name: Sb Rodriguez  Date:2022  : 1945  [x]  Patient  Verified  Payor: [place] MEDICARE / Plan: [place] MEDICARE PART A & B / Product Type: Medicare /    In time:   Out time: 1150  Total Treatment Time (min): 57  Total Timed Codes (min): 57  1:1 Treatment Time ( only): 62   Visit #:  5    Treatment Area: Hemiplegia and hemiparesis following unspecified cerebrovascular disease affecting unspecified side [I69.959]    SUBJECTIVE  Pain Level (0-10 scale): 0/10  Any medication changes, allergies to medications, adverse drug reactions, diagnosis change, or new procedure performed?: [x] No    [] Yes (see summary sheet for update)  Subjective functional status/changes:   [] No changes reported  \"I am walking better. I am getting my strength back. \"    OBJECTIVE    25 min Therapeutic Exercise:  [x] See flow sheet :   Rationale: increase ROM and increase strength to improve the patients ability to improve functional mobility    15 min Therapeutic Activity:  [x]  See flow sheet :   Rationale: increase strength  to improve the patients ability to be able to perform sit to stands on own     10 min Gait Trainin x 126ft, 3 x 60ft in clinic area on level floor surfaces with CGA/S with R side HW   Rationale: improve coordination and improve balance  to improve the patients ability to improve independence with gait     With   [] TE   [x] TA   [] neuro   [] other: Patient Education: [x] Review HEP    [] Progressed/Changed HEP based on:   [] positioning   [] body mechanics   [x] transfers   [] heat/ice application    [] other:    UE: left, limited AROM; right, full arom and 5/5 strength grossly  Posture:   Forward shoulders  Gait and Functional Mobility:  Use of indra-walker; step to gait pattern, CGA; little to no left UE movement with gait  Palpation: no ttp  Sit to stand: min/modA to initiate            Hip:   Strength AROM       Right Left Right Left     [facility]   Knee:   Strength AROM       Right Left Right Left     Flexion 5/5 3+/5 [facility] [place]     Extension 5/5 5/5 [facility] [place]   Ankle   Strength AROM       Right Left Right Left     Dorsiflexion 5/5 5/5 [facility] [place]     Platarflexion 5/5 5/5 [facility]   All ROM measurements are measured in degrees. 1 lap around room: 7m 5s    Pain Level (0-10 scale) post treatment: 0/10    ASSESSMENT/Changes in Function:   Patient presented to physical therapy with left sided hemiparesis post chronic CVA. Pt has received treatment including gait training, strengthening,  balance, and proprioception activities. Pt has demonstrated overall progress in left hip strength, endurance level, and gait assistance. Pt continues to present with functional deficits including weakness, balance deficits, limited endurance, and overall functional limitations. Pt would benefit from further skilled physical therapy interventions to continue to progress range of motion, strength, and balance, allowing for improved function. The pt tolerated treatment fairly well. Increased focus on education today on proper sit to stand technique. Progress report completed today with updated goals and measurements. Cues provided during gait training to keep HW to the side. Patient will continue to benefit from skilled PT services to address functional mobility deficits, address ROM deficits and address strength deficits to attain remaining goals     [x]  See Plan of Care  []  See progress note/recertification  []  See Discharge Summary         Progress towards goals / Updated goals:  Short Term Goals: To be accomplished in 5 treatments. 1. Patient will demonstrate independence and compliance with HEP in order to assist with carryover from PT services. HEP  2.   Patient will be able to ambulate within yard, CGA, indra walker to increase functional mobility.  PARTIALLY MET  3.   Patient will be able to complete sit to stand with B UE on first attempt, with supervision to increase functional mobility. PARTIALLY MET  4.   Patient will increase left hip flexion strength to 5/5 in order to improve functional mobility. PARTIALLY MET     Long Term Goals: To be accomplished in 10 treatments. 1.   Patient will be able to ambulate x5min, supervision, with indra walker to increase functional mobility. MET  2.   Patient will be able to stand from toilet with supervision to increase functional mobility. PARTIALLY MET  3.   Patient will increase left knee flexion strength to 4/5 in order to improve functional mobility.  PARTIALLY MET    PLAN  [x]  Upgrade activities as tolerated     [x]  Continue plan of care  []  Update interventions per flow sheet       []  Discharge due to:_  []  Other:_      Gaye Govea, PT, DPT 1/19/2022

## 2022-01-19 NOTE — PROGRESS NOTES
[address]  [place], [place]  Ph: [phone]    Fax: [phone]    Progress Note    Name: Lydia Buckner   : [id number]   MD: Mitchell Orozco MD       Treatment Diagnosis: Hemiplegia and hemiparesis following unspecified cerebrovascular disease affecting unspecified side [I69.959]  Start of Care: 2021    Visits from Start of Care: 5   Missed Visits: 3    Summary of Care / Assessment / Recommendations:   Patient presented to physical therapy with left sided hemiparesis post chronic CVA. Pt has received treatment including gait training, strengthening,  balance, and proprioception activities. Pt has demonstrated overall progress in left hip strength, endurance level, and gait assistance. Pt continues to present with functional deficits including weakness, balance deficits, limited endurance, and overall functional limitations. Pt would benefit from further skilled physical therapy interventions to continue to progress range of motion, strength, and balance, allowing for improved function. Progress towards goals / Updated goals:  Short Term Goals: To be accomplished in 5 treatments. 1. Patient will demonstrate independence and compliance with HEP in order to assist with carryover from PT services. HEP  2.   Patient will be able to ambulate within yard, CGA, indra walker to increase functional mobility. PARTIALLY MET  3.   Patient will be able to complete sit to stand with B UE on first attempt, with supervision to increase functional mobility. PARTIALLY MET  4.   Patient will increase left hip flexion strength to 5/5 in order to improve functional mobility. PARTIALLY MET     Long Term Goals: To be accomplished in 10 treatments. 1.   Patient will be able to ambulate x5min, supervision, with indra walker to increase functional mobility.  MET  2.   Patient will be able to stand from toilet with supervision to increase functional mobility. PARTIALLY MET  3.   Patient will increase left knee flexion strength to 4/5 in order to improve functional mobility. PARTIALLY MET      Recertification Period: 12/22/2021 to 1/19/2022    Frequency/Duration:  2 treatments per week, for 5 weeks. La Yang, PT, DPT 1/19/2022     ________________________________________________________________________  NOTE TO PHYSICIAN:  Please complete the following and fax to:  [facility]:   Fax: [phone]  . Retain this original for your records. If you are unable to process this request in 24 hours, please contact our office.        ____ I have read the above report and request that my patient continue therapy with the following changes/special instructions:  ____ I have read the above report and request that my patient be discharged from therapy    Physician's Signature:_________________ Date:___________Time:__________

## 2022-01-24 ENCOUNTER — APPOINTMENT (OUTPATIENT)
Dept: PHYSICAL THERAPY | Age: 77
End: 2022-01-24
Payer: MEDICARE

## 2022-01-26 ENCOUNTER — APPOINTMENT (OUTPATIENT)
Dept: PHYSICAL THERAPY | Age: 77
End: 2022-01-26
Payer: MEDICARE

## 2022-01-31 ENCOUNTER — APPOINTMENT (OUTPATIENT)
Dept: PHYSICAL THERAPY | Age: 77
End: 2022-01-31
Payer: MEDICARE

## 2022-02-02 ENCOUNTER — HOSPITAL ENCOUNTER (OUTPATIENT)
Dept: PHYSICAL THERAPY | Age: 77
Discharge: HOME | End: 2022-02-02
Payer: MEDICARE

## 2022-02-02 PROCEDURE — 97530 THERAPEUTIC ACTIVITIES: CPT

## 2022-02-02 PROCEDURE — 97110 THERAPEUTIC EXERCISES: CPT

## 2022-02-02 PROCEDURE — 97165 OT EVAL LOW COMPLEX 30 MIN: CPT

## 2022-02-02 NOTE — PROGRESS NOTES
PT DAILY TREATMENT NOTE - [facility] 2-15    Patient Name: Jorge Arizmendi  Date:2022  : 1945  [x]  Patient  Verified  Payor: [place] MEDICARE / Plan: [place] MEDICARE PART A & B / Product Type: Medicare /    In time: 11:06  Out time: 11:57  Total Treatment Time (min): 51  Total Timed Codes (min): 51  1:1 Treatment Time ([facility] only): 46   Visit #:  6    Treatment Area: Hemiplegia and hemiparesis following unspecified cerebrovascular disease affecting unspecified side [I69.959]    SUBJECTIVE  Pain Level (0-10 scale): 0/10  Any medication changes, allergies to medications, adverse drug reactions, diagnosis change, or new procedure performed?: [x] No    [] Yes (see summary sheet for update)  Subjective functional status/changes:   [] No changes reported  Pt had OT eval before PT today    OBJECTIVE    41 min Therapeutic Exercise:  [x] See flow sheet :   Rationale: increase ROM and increase strength to improve the patients ability to improve functional mobility    10 min Therapeutic Activity:  -  See flow sheet :   Rationale: increase strength and improve coordination  to improve the patients ability to improve ease of performing sit to stands     With   [x] TE   [x] TA   [] neuro   [] other: Patient Education: [x] Review HEP    [] Progressed/Changed HEP based on:   [] positioning   [] body mechanics   [] transfers   [] heat/ice application    [x] other: sit to stands mechanics     Pain Level (0-10 scale) post treatment: 0/10    ASSESSMENT/Changes in Function: The pt tolerated treatment fairly well. Continued with increased education on performing sit to stands. Slow gait speed and continued cues to keep HW to pt's side.  Patient will continue to benefit from skilled PT services to address functional mobility deficits, address ROM deficits and address strength deficits to attain remaining goals     [x]  See Plan of Care  []  See progress note/recertification  []  See Discharge Summary         Progress towards goals / Updated goals:  Short Term Goals: To be accomplished in 5 treatments. 1. Patient will demonstrate independence and compliance with HEP in order to assist with carryover from PT services.  HEP  2.   Patient will be able to ambulate within yard, CGA, indra walker to increase functional mobility. PARTIALLY MET  3.   Patient will be able to complete sit to stand with B UE on first attempt, with supervision to increase functional mobility. PARTIALLY MET  4.   Patient will increase left hip flexion strength to 5/5 in order to improve functional mobility. PARTIALLY MET     Long Term Goals: To be accomplished in 10 treatments. 1.   Patient will be able to ambulate x5min, supervision, with indra walker to increase functional mobility.  MET  2.   Patient will be able to stand from toilet with supervision to increase functional mobility. PARTIALLY MET  3.   Patient will increase left knee flexion strength to 4/5 in order to improve functional mobility. PARTIALLY MET    PLAN  [x]  Upgrade activities as tolerated     [x]  Continue plan of care  []  Update interventions per flow sheet       []  Discharge due to:_  []  Other:_      Meeta Cobb PTA, LPEMMA 2/2/2022

## 2022-02-02 NOTE — PROGRESS NOTES
[address]  [address] [place], [place] [address]S AND [facility], [address]  Ph: [phone]    Fax: [phone]    Plan of Care/Statement of Necessity for Occupational Therapy Services      Patient name: Cheyenne Pulido Start of Care: 2022   Referral source: Wagner Hernandez MD : 1945    Medical Diagnosis: Hemiplegia and hemiparesis following unspecified cerebrovascular disease affecting unspecified side [I69.959]   Onset Date: 2015    Treatment Diagnosis:  Hemiplegia and hemiparesis following unspecified cerebrovascular disease affecting unspecified side [I69.959]   Prior Hospitalization: see medical history Provider#: [id number]   Medications: Verified on Patient summary List    Comorbidities: see medical history   Prior Level of Function:  Independent with all ADL's; primary use of indra walker for mobility     The Plan of Care and following information is based on the information from the initial evaluation. Assessment/ key information: Patient is a pleasant 64 y.o. male who presents to occupational therapy with a hx of CVA with L hemiplegia, which occurred 6 years ago. Patient presents with increased contractures in L hand digits at PIP joint. Patient reports he has a resting hand splint, but he hasn't worn it in the last year. Patient reports his wife assists him with all ADL/IADL tasks. Patient states he currently requires moderate assistance with bathing and dressing tasks. Patient reports he would like to work on getting in/out of the shower, but doesn't have a shower chair at home to get into the shower. Patient demonstrates decreased L UE ROM, decreased L UE strength, decreased L UE fine motor coordination/prehension and decreased ADL/functional abilities.  Patient would benefit from skilled occupational therapy to improve L UE ROM, L UE strength, L UE fine motor coordination/prehension and ADL/functional abilities with therapeutic exercises, therapeutic activities and modalities.        Evaluation Complexity: History LOW Complexity : Brief history review  Examination LOW Complexity : 1-3 performance deficits relating to physical, cognitive , or psychosocial skils that result in activity limitations and / or participation restrictions  Clinical Decision Making LOW Complexity : No comorbidities that affect functional and no verbal or physical assistance needed to complete eval tasks   Overall Complexity Rating: LOW     Problem List: Pain effecting function, Decreased range of motion, Decreased strength, Decreased coordination/prehension, Decreased ADL/functional abilities  and Decreased flexibility/joint mobility     Treatment Plan may include any combination of the following: Therapeutic exercise, Therapeutic activities, Neuromuscular re-education, Physical agent/modality, Patient education and ADLs/IADLs    Patient / Family readiness to learn indicated by: asking questions, trying to perform skills and interest    Persons(s) to be included in education:   patient (P)    Barriers to Learning/Limitations: None    Patient Goal (s):  \"I want to get stronger. \"    Patient Self Reported Health Status: good    Rehabilitation Potential: fair    Short Term Goals: To be accomplished in 5 treatments:  1- Patient will increase L  strength by 10# to improve L hand function. 2- Patient will increase L pinch strength by 2# each direction to improve L hand function. 3- Patient will improve L 9 hole peg by 20 seconds to improve fine motor control. 4- Patient will increase L shoulder flexion/abduction (AROM) by 10 degrees to improve function with ADL/IADL tasks. 5- Patient will increase L wrist flexion/extension (AROM) by 5 degrees to improve function with ADL/IADL tasks.     Long Term Goals: To be accomplished in 10 treatments:     1- Patient will increase L  strength by 20# to improve L hand function.    2- Patient/caregiver will report performing ADL/IADL tasks with Min Assist.  3- Patient/caregiver will be independent with HEP. 4- Patient will increase L shoulder flexion/abduction (AROM) by 20 degrees to improve function with ADL/IADL tasks.       Frequency / Duration: Patient to be seen 2 times per week for 10 treatments:    Patient/ Caregiver education and instruction: Diagnosis, prognosis, brace/ splint application and exercises  [x]  Plan of care has been reviewed with DORANTES      Certification Period: 2/2/2022 to [facility] MSJESSI, OTR/L 2/2/2022     ________________________________________________________________________    I certify that the above Therapy Services are being furnished while the patient is under my care. I agree with the treatment plan and certify that this therapy is necessary.     Physician's Signature:____________________Date:____________Time:__________           Tien Rodas MD        Please sign and return to:   [facility]  [address] [place] [address], [address] [place]  Ph: [phone]    Fax: [phone]

## 2022-02-02 NOTE — PROGRESS NOTES
OT INITIAL EVALUATION NOTE - [facility] 2-15    Patient Name: Celestina Henry  Date:2022  : 1945  [x]  Patient  Verified  Payor: [address],[facility] / Plan: [place] & [place] / Product Type: Medicare /    In time:1015  Out time: 1104  Total Treatment Time (min): 49   Total Timed Codes (min): 49  1:1 Treatment Time ( [address] only): 52   Visit #: 1    Treatment Area: Hemiplegia and hemiparesis following unspecified cerebrovascular disease affecting unspecified side [I69.959]    SUBJECTIVE  Pain Level (0-10 scale): 0/10  Any medication changes, allergies to medications, adverse drug reactions, diagnosis change, or new procedure performed?: [x] No    [] Yes (see summary sheet for update)  Subjective functional status/changes:   [] No changes reported  Patient reports he sustained a CVA in 2015. Patient states he received physical therapy after his stroke and made as much progress as he was able to. Patient reports his grandchild wanted him to come back to therapy. Patient reports he has a big sore on his left leg, but it is healing up. Patient reports recent diagnosis of COVID-19 and he got a little weaker from that. Patient is hard of hearing and is only able to provide limited subjective information. Patient reports he has a resting hand splint, but he hasn't worn it in the last year. Patient reports his wife assists him with all ADL/IADL tasks. Patient reports he currently requires moderate assistance with bathing and dressing tasks. Patient reports he would like to work on getting in/out of the shower, but doesn't have a shower chair at home to get into the shower.        PLOF:  Independent with all ADL's; primary use of indra walker for mobility  Mechanism of Injury: history of 1 stroke  Previous Treatment/Compliance: physical therapy one time  PMHx/Surgical Hx:   Past Medical History:   Diagnosis Date    Acid reflux     Anemia     Diabetes (HCC)     GERD (gastroesophageal reflux disease)     Hypertension  Stroke [facility])      Past Surgical History:   Procedure Laterality Date    HX COLONOSCOPY         Work Hx: none  Living Situation: Lives with wife  Pt Goals: \"I want to get stronger. \"  Barriers: chronicity of CVA  Motivation: Good  Substance use: none reported  Cognition: A & O x 4      OBJECTIVE      10 min Therapeutic Exercise:  [x] See flow sheet :   Rationale: increase ROM and increase strength to improve the patients ability to complete ADL/IADL tasks at Mod I level. With   [x] TE   [] TA   [] neuro   [] other: Patient Education: [x] Review HEP    [] Progressed/Changed HEP based on:   [] positioning   [] body mechanics   [] transfers   [] heat/ice application   [] Splint wear/care   [] Sensory re-education   [] scar management      [] other:            Other Objective/Functional Measures:        Coordination: BUE                WFL          Impaired    Gross Motor  X   Fine Motor   X   Crossing the Midline   X   Bilateral Integration   X   Praxis X    Dexterity  X   Visual Motor X          9 hole peg test:   R- 50 sec. L- not able to  pegs this date; attempted in 3 min 8 sec with no success. Strength: BUE                                                       3pt pinch            2pt pinch              Lateral pinch  Right 49# 15# 11# 19#   Left 20# 4# 3# 9#         Tone & Sensation:  Numbness and tingling in L UE      Range of Motion: BUE     R UE   AROM L UE   AROM   Shoulder flexion  112 44   Shoulder abduction  116 53   Wrist flexion  56 44   Wrist extension  44 34       Pain Level (0-10 scale) post treatment: 0/10    ASSESSMENT/Changes in Function: Patient is a pleasant 64 y.o. male who presents to occupational therapy with a hx of CVA with L hemiplegia, which occurred 6 years ago. Patient presents with increased contractures in L hand digits at PIP joint. Patient reports he has a resting hand splint, but he hasn't worn it in the last year.  Patient reports his wife assists him with all ADL/IADL tasks. Patient states he currently requires moderate assistance with bathing and dressing tasks. Patient reports he would like to work on getting in/out of the shower, but doesn't have a shower chair at home to get into the shower. Patient demonstrates decreased L UE ROM, decreased L UE strength, decreased L UE fine motor coordination/prehension and decreased ADL/functional abilities.  Patient would benefit from skilled occupational therapy to improve L UE ROM, L UE strength, L UE fine motor coordination/prehension and ADL/functional abilities with therapeutic exercises, therapeutic activities and modalities.         [x]  See Plan of LEIA Lion, MARVIN/MARCELO 2/2/2022

## 2022-02-07 ENCOUNTER — HOSPITAL ENCOUNTER (OUTPATIENT)
Dept: PHYSICAL THERAPY | Age: 77
Discharge: HOME | End: 2022-02-07
Payer: MEDICARE

## 2022-02-07 PROCEDURE — 97110 THERAPEUTIC EXERCISES: CPT

## 2022-02-07 NOTE — PROGRESS NOTES
PT DAILY TREATMENT NOTE - [facility] 2-15    Patient Name: Cristobal Wang  ANOT:[id number]  : 1945  [x]  Patient  Verified  Payor: [place] MEDICARE / Plan: [place] MEDICARE PART A & B / Product Type: Medicare /    In time: 10:58  Out time: 12:00  Total Treatment Time (min): 62  Total Timed Codes (min): 62  1:1 Treatment Time ( [address] only): 62   Visit #:  7    Treatment Area: Hemiplegia and hemiparesis following unspecified cerebrovascular disease affecting unspecified side [I69.959]    SUBJECTIVE  Pain Level (0-10 scale): 0/10  Any medication changes, allergies to medications, adverse drug reactions, diagnosis change, or new procedure performed?: [x] No    [] Yes (see summary sheet for update)  Subjective functional status/changes:   [x] No changes reported    OBJECTIVE    62 min Therapeutic Exercise:  [x] See flow sheet :   Rationale: increase ROM and increase strength to improve the patients ability to improve functional mobility    With   [x] TE   [] TA   [] neuro   [] other: Patient Education: [x] Review HEP    [] Progressed/Changed HEP based on:   [] positioning   [] body mechanics   [] transfers   [] heat/ice application    [] other:      Other Objective/Functional Measures: Added leg press and standing exercises    Pain Level (0-10 scale) post treatment: 0/10    ASSESSMENT/Changes in Function: The pt tolerated treatment fairly well. Continued education on sit to stands today for more ease. Adductor band around knees while on leg press to prevent from excessive hip IR and adduction. Patient will continue to benefit from skilled PT services to address functional mobility deficits, address ROM deficits and address strength deficits to attain remaining goals     [x]  See Plan of Care  []  See progress note/recertification  []  See Discharge Summary         Progress towards goals / Updated goals:  Short Term Goals: To be accomplished in 5 treatments.   1. Patient will demonstrate independence and compliance with HEP in order to assist with carryover from PT services.  HEP  2.   Patient will be able to ambulate within yard, CGA, indra walker to increase functional mobility. PARTIALLY MET  3.   Patient will be able to complete sit to stand with B UE on first attempt, with supervision to increase functional mobility. PARTIALLY MET  4.   Patient will increase left hip flexion strength to 5/5 in order to improve functional mobility. PARTIALLY MET     Long Term Goals: To be accomplished in 10 treatments. 1.   Patient will be able to ambulate x5min, supervision, with indra walker to increase functional mobility.  MET  2.   Patient will be able to stand from toilet with supervision to increase functional mobility. PARTIALLY MET  3.   Patient will increase left knee flexion strength to 4/5 in order to improve functional mobility. PARTIALLY MET    PLAN  [x]  Upgrade activities as tolerated     [x]  Continue plan of care  []  Update interventions per flow sheet       []  Discharge due to:_  []  Other:_      Alfred Rebollar PTA, ROSITA 2/7/2022

## 2022-02-09 ENCOUNTER — HOSPITAL ENCOUNTER (OUTPATIENT)
Dept: PHYSICAL THERAPY | Age: 77
Discharge: HOME | End: 2022-02-09
Payer: MEDICARE

## 2022-02-09 PROCEDURE — 97110 THERAPEUTIC EXERCISES: CPT

## 2022-02-09 NOTE — PROGRESS NOTES
PT DAILY TREATMENT NOTE - [facility] 2-15    Patient Name: Darby Leon  Date:2022  : 1945  [x]  Patient  Verified  Payor: [place] MEDICARE / Plan: [place] MEDICARE PART A & B / Product Type: Medicare /    In time: 10:40  Out time: 11:46  Total Treatment Time (min): 66  Total Timed Codes (min): 66  1:1 Treatment Time ([facility] only): 77   Visit #:  8    Treatment Area: Hemiplegia and hemiparesis following unspecified cerebrovascular disease affecting unspecified side [I69.959]    SUBJECTIVE  Pain Level (0-10 scale): 0/10  Any medication changes, allergies to medications, adverse drug reactions, diagnosis change, or new procedure performed?: [x] No    [] Yes (see summary sheet for update)  Subjective functional status/changes:   [x] No changes reported    OBJECTIVE    66 min Therapeutic Exercise:  [x] See flow sheet :   Rationale: increase ROM and increase strength to improve the patients ability to improve functional mobility    With   [x] TE   [] TA   [] neuro   [] other: Patient Education: [x] Review HEP    [] Progressed/Changed HEP based on:   [] positioning   [] body mechanics   [] transfers   [] heat/ice application    [] other:      Pain Level (0-10 scale) post treatment: 0/10    ASSESSMENT/Changes in Function: The pt tolerated treatment fairly well. Pt continues to require mod A most of the time for sit to stands - depending on pt's fatigue level - other times pt is able to stand CGA. Patient will continue to benefit from skilled PT services to address functional mobility deficits, address ROM deficits and address strength deficits to attain remaining goals     [x]  See Plan of Care  []  See progress note/recertification  []  See Discharge Summary         Progress towards goals / Updated goals:  Short Term Goals: To be accomplished in 5 treatments. 1. Patient will demonstrate independence and compliance with HEP in order to assist with carryover from PT services.  HEP  2.   Patient will be able to ambulate within yard, CGA, indra walker to increase functional mobility. PARTIALLY MET  3.   Patient will be able to complete sit to stand with B UE on first attempt, with supervision to increase functional mobility. PARTIALLY MET  4.   Patient will increase left hip flexion strength to 5/5 in order to improve functional mobility. PARTIALLY MET     Long Term Goals: To be accomplished in 10 treatments. 1.   Patient will be able to ambulate x5min, supervision, with indra walker to increase functional mobility.  MET  2.   Patient will be able to stand from toilet with supervision to increase functional mobility. PARTIALLY MET  3.   Patient will increase left knee flexion strength to 4/5 in order to improve functional mobility. PARTIALLY MET    PLAN  [x]  Upgrade activities as tolerated     [x]  Continue plan of care  []  Update interventions per flow sheet       []  Discharge due to:_  []  Other:_      Nimco Hayes PTA, ROSITA 2/9/2022

## 2022-02-14 ENCOUNTER — HOSPITAL ENCOUNTER (OUTPATIENT)
Dept: PHYSICAL THERAPY | Age: 77
Discharge: HOME | End: 2022-02-14
Payer: MEDICARE

## 2022-02-14 PROCEDURE — 97110 THERAPEUTIC EXERCISES: CPT

## 2022-02-14 PROCEDURE — 97530 THERAPEUTIC ACTIVITIES: CPT

## 2022-02-14 NOTE — PROGRESS NOTES
OT DAILY TREATMENT NOTE - [facility]     Patient Name: Celestina Henry  Date:2022  : 1945  [x]  Patient  Verified  Payor: Inocencio Suarez / Plan: [place] MEDICARE PART A & B / Product Type: Medicare /    In time:1000  Out time:1059  Total Treatment Time (min): 59  Total Timed Codes (min): 59  1:1 Treatment Time ( only): 61   Visit #: 2    Treatment Area: Hemiplegia and hemiparesis following unspecified cerebrovascular disease affecting unspecified side [I69.959]    SUBJECTIVE  Pain Level (0-10 scale): 0/10  Any medication changes, allergies to medications, adverse drug reactions, diagnosis change, or new procedure performed?: [x] No    [] Yes (see summary sheet for update)  Subjective functional status/changes:   [] No changes reported  Patient reported \"my resting hand splint at home is all mildewed and nasty, so I will need to get a new one. \"    OBJECTIVE    25 min Therapeutic Exercise:  [x] See flow sheet :   Rationale: increase ROM and increase strength to improve the patients ability to complete ADL/IADL tasks at Mod I level. 34 min Therapeutic Activity:  [x]  See flow sheet :   Rationale: increase strength and improve coordination  to improve the patients ability to complete FM tasks with his L UE at Mod I level. With   [x] TE   [x] TA   [] neuro   [] other: Patient Education: [x] Review HEP    [] Progressed/Changed HEP based on:   [] positioning   [] body mechanics   [] transfers   [] heat/ice application   [] Splint wear/care   [] Sensory re-education   [] scar management      [] other:        Pain Level (0-10 scale) post treatment: 0/10    ASSESSMENT/Changes in Function: Patient tolerated treatment session well with minimal complaints throughout. Stacked large cones with L UE, crossed midline to complete task and required AAROM to complete task. Patient reported increased sob during large cone therapeutic activity.  Patient required AAROM to complete peg board therapeutic activity at table. Patient will continue to benefit from skilled OT services to modify and progress therapeutic interventions, address ROM deficits and address strength deficits to attain remaining goals. [x]  See Plan of Care  []  See progress note/recertification  []  See Discharge Summary         Progress towards goals / Updated goals:  Short Term Goals: To be accomplished in 5 treatments:  1- Patient will increase L  strength by 10# to improve L hand function. 2- Patient will increase L pinch strength by 2# each direction to improve L hand function.    3- Patient will improve L 9 hole peg by 20 seconds to improve fine motor control. 4- Patient will increase L shoulder flexion/abduction (AROM) by 10 degrees to improve function with ADL/IADL tasks. 5- Patient will increase L wrist flexion/extension (AROM) by 5 degrees to improve function with ADL/IADL tasks.     Long Term Goals: To be accomplished in 10 treatments:     1- Patient will increase L  strength by 20# to improve L hand function. 2- Patient/caregiver will report performing ADL/IADL tasks with Min Assist.  3- Patient/caregiver will be independent with HEP.   4- Patient will increase L shoulder flexion/abduction (AROM) by 20 degrees to improve function with ADL/IADL tasks.       PLAN  [x]  Upgrade activities as tolerated     [x]  Continue plan of care  []  Update interventions per flow sheet       []  Discharge due to:_  []  Other:_      LEIA Veras OTAGUSTINA/MARCELO 2/14/2022

## 2022-02-14 NOTE — PROGRESS NOTES
PT DAILY TREATMENT NOTE - [facility] 2-15    Patient Name: Hanh Kovacs  Date:2022  : 1945  [x]  Patient  Verified  Payor: Narayan Hernandez / Plan: VA MEDICARE PART A & B / Product Type: Medicare /    In time: 11:00  Out time: 11:57  Total Treatment Time (min): 57  Total Timed Codes (min): 57  1:1 Treatment Time ([facility] only): 62   Visit #:  9    Treatment Area: Hemiplegia and hemiparesis following unspecified cerebrovascular disease affecting unspecified side [I69.959]    SUBJECTIVE  Pain Level (0-10 scale): 0/10  Any medication changes, allergies to medications, adverse drug reactions, diagnosis change, or new procedure performed?: [x] No    [] Yes (see summary sheet for update)  Subjective functional status/changes:   [] No changes reported  Pt's nurse states that pt is able to do everything at home on his own. States he has a lift chair that he sits in and able to stand on his own. States she's there if he needs her. OBJECTIVE    57 min Therapeutic Exercise:  [x] See flow sheet :   Rationale: increase ROM and increase strength to improve the patients ability to improve functional mobility    With   [x] TE   [] TA   [] neuro   [] other: Patient Education: [x] Review HEP    [] Progressed/Changed HEP based on:   [] positioning   [] body mechanics   [] transfers   [] heat/ice application    [] other:      Pain Level (0-10 scale) post treatment: 0/10    ASSESSMENT/Changes in Function: The pt tolerated treatment fairly well. Pt's personal CNA had to assist pt to restroom at beginning of session. Airex pad added to chair today to raise pt higher for more ease of sit to stands. Pt continues to require assistance from low surfaces.  Patient will continue to benefit from skilled PT services to address functional mobility deficits, address ROM deficits and address strength deficits to attain remaining goals     [x]  See Plan of Care  []  See progress note/recertification  []  See Discharge Summary         Progress towards goals / Updated goals:  Short Term Goals: To be accomplished in 5 treatments. 1. Patient will demonstrate independence and compliance with HEP in order to assist with carryover from PT services.  HEP  2.   Patient will be able to ambulate within yard, CGA, indra walker to increase functional mobility. PARTIALLY MET  3.   Patient will be able to complete sit to stand with B UE on first attempt, with supervision to increase functional mobility. PARTIALLY MET  4.   Patient will increase left hip flexion strength to 5/5 in order to improve functional mobility. PARTIALLY MET     Long Term Goals: To be accomplished in 10 treatments. 1.   Patient will be able to ambulate x5min, supervision, with indra walker to increase functional mobility.  MET  2.   Patient will be able to stand from toilet with supervision to increase functional mobility. PARTIALLY MET  3.   Patient will increase left knee flexion strength to 4/5 in order to improve functional mobility. PARTIALLY MET    PLAN  [x]  Upgrade activities as tolerated     [x]  Continue plan of care  []  Update interventions per flow sheet       []  Discharge due to:_  []  Other:_      Maren Maya PTA, LPTA 2/14/2022

## 2022-02-16 ENCOUNTER — HOSPITAL ENCOUNTER (OUTPATIENT)
Dept: PHYSICAL THERAPY | Age: 77
Discharge: HOME | End: 2022-02-16
Payer: MEDICARE

## 2022-02-16 PROCEDURE — 97530 THERAPEUTIC ACTIVITIES: CPT

## 2022-02-16 PROCEDURE — 97110 THERAPEUTIC EXERCISES: CPT

## 2022-02-16 NOTE — PROGRESS NOTES
OT DAILY TREATMENT NOTE - [facility]     Patient Name: Ayaka Yuan  Date:2022  : 1945  [x]  Patient  Verified  Payor: Juliana Thomas / Plan: [place] / Product Type: Medicare /    In time:1000  Out time: 1058  Total Treatment Time (min): 58  Total Timed Codes (min): 58  1:1 Treatment Time ( only): 62   Visit #: 3    Treatment Area: Hemiplegia and hemiparesis following unspecified cerebrovascular disease affecting unspecified side [I69.959]    SUBJECTIVE  Pain Level (0-10 scale): 0/10  Any medication changes, allergies to medications, adverse drug reactions, diagnosis change, or new procedure performed?: [x] No    [] Yes (see summary sheet for update)  Subjective functional status/changes:   [] No changes reported  Patient reported \"I am feeling pretty good today. \"    OBJECTIVE      33 min Therapeutic Exercise:  [x] See flow sheet :   Rationale: increase ROM and increase strength to improve the patients ability to complete ADL/IADL tasks with Min Assist.     25 min Therapeutic Activity:  [x]  See flow sheet :   Rationale: increase strength and improve coordination  to improve the patients ability to complete FM tasks with his L UE with Min Assist.        With   [x] TE   [x] TA   [] neuro   [] other: Patient Education: [x] Review HEP    [] Progressed/Changed HEP based on:   [] positioning   [] body mechanics   [] transfers   [] heat/ice application   [] Splint wear/care   [] Sensory re-education   [] scar management      [] other:         Pain Level (0-10 scale) post treatment: 2/10    ASSESSMENT/Changes in Function: Patient tolerated treatment session fairly well today. Patient required frequent rest breaks after increasing reps today with L UE therapeutic exercises. Stacked large cones with L UE, crossed midline to complete task and required AAROM to complete task. Patient required AAROM to complete peg board therapeutic activity at table.     Patient will continue to benefit from skilled OT services to modify and progress therapeutic interventions, address ROM deficits and address strength deficits to attain remaining goals. [x]  See Plan of Care  []  See progress note/recertification  []  See Discharge Summary         Progress towards goals / Updated goals:  Short Term Goals: To be accomplished in 5 treatments:  1- Patient will increase L  strength by 10# to improve L hand function. 2- Patient will increase L pinch strength by 2# each direction to improve L hand function.    3- Patient will improve L 9 hole peg by 20 seconds to improve fine motor control. 4- Patient will increase L shoulder flexion/abduction (AROM) by 10 degrees to improve function with ADL/IADL tasks. 5- Patient will increase L wrist flexion/extension (AROM) by 5 degrees to improve function with ADL/IADL tasks.     Long Term Goals: To be accomplished in 10 treatments:     1- Patient will increase L  strength by 20# to improve L hand function. 2- Patient/caregiver will report performing ADL/IADL tasks with Min Assist.  3- Patient/caregiver will be independent with HEP. 4- Patient will increase L shoulder flexion/abduction (AROM) by 20 degrees to improve function with ADL/IADL tasks.     PLAN  [x]  Upgrade activities as tolerated     [x]  Continue plan of care  []  Update interventions per flow sheet       []  Discharge due to:_  []  Other:_      LEIA Alejo, OTR/L 2/16/2022

## 2022-02-16 NOTE — PROGRESS NOTES
PT DAILY TREATMENT NOTE - [facility] 2-15    Patient Name: Celestina Henry  Date:2022  : 1945  [x]  Patient  Verified  Payor: Nicollet Erick / Plan: [place] MEDICARE PART A & B / Product Type: Medicare /    In time: 11:00  Out time: 1150  Total Treatment Time (min): 50  Total Timed Codes (min): 50  1:1 Treatment Time ( only): 50   Visit #:  10    Treatment Area: Hemiplegia and hemiparesis following unspecified cerebrovascular disease affecting unspecified side [I69.959]    SUBJECTIVE  Pain Level (0-10 scale): 0/10  Any medication changes, allergies to medications, adverse drug reactions, diagnosis change, or new procedure performed?: [x] No    [] Yes (see summary sheet for update)  Subjective functional status/changes:   [] No changes reported  Pt's nurse states that pt is able to do everything at home on his own. \"My wife says that I am walking a lot better at home. \"  \"I can walk down the [place] outside at home and back for exercise at home. \" \"I can go up the steps using my right hand\" \"The  told me I was doing great this morning to get out of the car. \" \"I can get up from my lift chair by myself, and other chairs, too. \"    OBJECTIVE  UE: left, limited AROM; right, full arom and 5/5 strength grossly  Posture: Forward shoulders  Gait and Functional Mobility:  Use of indra-walker; step to gait pattern, CGA; little to no left UE movement with gait  Palpation: no ttp  Sit to stand: min/modA to initiate  DPT assessed reported sore, but there was not anything present on left lower extremity, shin region, only dry skin.                Hip:   Strength AROM       Right Left Right Left     Flexion 5/5 5/5 [facility]   Knee:   Strength AROM       Right Left Right Left     Flexion 5/5 3+/5 [facility]     Extension 5/5 5/5 [facility]   Ankle   Strength AROM       Right Left Right Left     Dorsiflexion  [facility]     Platarflexion  [facility]   All ROM measurements are measured in degrees.     50 min Therapeutic Exercise:  [x] See flow sheet :   Rationale: increase ROM and increase strength to improve the patients ability to improve functional mobility    With   [x] TE   [] TA   [] neuro   [] other: Patient Education: [x] Review HEP    [] Progressed/Changed HEP based on:   [] positioning   [] body mechanics   [] transfers   [] heat/ice application    [] other:      Pain Level (0-10 scale) post treatment: 0/10    ASSESSMENT/Changes in Function:     Patient has received skilled physical therapy interventions including endurance, strengthening, balance, and functional related activities. Patient has demonstrated overall progress in mobility and strength, however has reached a therapeutic level of being appropriate for a maintenance program.  Home exercise program reviewed today with no concerns voiced by patient, which was also reviewed with caregiver. Patient to be discharged at this time secondary to reaching maximal level of functional mobility. Thank you for this referral.      []  See Plan of Care  []  See progress note/recertification  [x]  See Discharge Summary          Progress towards goals / Updated goals:  Short Term Goals: To be accomplished in 5 treatments. 1. Patient will demonstrate independence and compliance with HEP in order to assist with carryover from PT services.  HEP  2.   Patient will be able to ambulate within yard, CGA, indra walker to increase functional mobility. PARTIALLY MET  3.   Patient will be able to complete sit to stand with B UE on first attempt, with supervision to increase functional mobility. PARTIALLY MET  4.   Patient will increase left hip flexion strength to 5/5 in order to improve functional mobility. PARTIALLY MET     Long Term Goals: To be accomplished in 10 treatments. 1.   Patient will be able to ambulate x5min, supervision, with indra walker to increase functional mobility.  MET  2.   Patient will be able to stand from toilet with supervision to increase functional mobility. PARTIALLY MET  3.   Patient will increase left knee flexion strength to 4/5 in order to improve functional mobility. PARTIALLY MET    PLAN  []  Upgrade activities as tolerated     []  Continue plan of care  []  Update interventions per flow sheet       [x]  Discharge due to:_ HEP for maintenance program  []  Other:_      Nikki Has, PT, DPT  2/16/2022

## 2022-02-16 NOTE — PROGRESS NOTES
[address]  [place], [place]  Ph: [phone]     Fax: [phone]    Discharge Summary 2-15    Patient name: Celestina Henry  : [birth year]  Provider#: [id number]  Referral source: Ty Hester MD      Medical/Treatment Diagnosis: Hemiplegia and hemiparesis following unspecified cerebrovascular disease affecting unspecified side [I69.959]     Prior Hospitalization: see medical history     Comorbidities: See Plan of Care  Prior Level of Function: See Plan of Care  Medications: Verified on Patient Summary List    Start of Care: 2022      Onset Date:2015   Visits from Start of Care: 10   Missed Visits: 5  Reporting Period : 2022 to 2022    Assessment / Summary of care:   Patient has received skilled physical therapy interventions including endurance, strengthening, balance, and functional related activities. Patient has demonstrated overall progress in mobility and strength, however has reached a therapeutic level of being appropriate for a maintenance program.  Home exercise program reviewed today with no concerns voiced by patient, which was also reviewed with caregiver. Patient to be discharged at this time secondary to reaching maximal level of functional mobility. Thank you for this referral.        Progress towards goals / Updated goals:  Short Term Goals: To be accomplished in 5 treatments. 1. Patient will demonstrate independence and compliance with HEP in order to assist with carryover from PT services.  HEP  2.   Patient will be able to ambulate within yard, CGA, indra walker to increase functional mobility. PARTIALLY MET  3.   Patient will be able to complete sit to stand with B UE on first attempt, with supervision to increase functional mobility. PARTIALLY MET  4.   Patient will increase left hip flexion strength to 5/5 in order to improve functional mobility. PARTIALLY MET     Long Term Goals: To be accomplished in 10 treatments. 1.   Patient will be able to ambulate x5min, supervision, with indra walker to increase functional mobility.  MET  2.   Patient will be able to stand from toilet with supervision to increase functional mobility. PARTIALLY MET  3.   Patient will increase left knee flexion strength to 4/5 in order to improve functional mobility. PARTIALLY MET        RECOMMENDATIONS:  [x]Discontinue therapy: [x]Patient has reached or is progressing toward set goals     []Patient is non-compliant or has abdicated     []Due to lack of appreciable progress towards set goals     []Other    Ihsan Weber, PT, DPT  2/16/2022

## 2022-02-21 ENCOUNTER — HOSPITAL ENCOUNTER (OUTPATIENT)
Dept: PHYSICAL THERAPY | Age: 77
Discharge: HOME | End: 2022-02-21
Payer: MEDICARE

## 2022-02-21 ENCOUNTER — APPOINTMENT (OUTPATIENT)
Dept: PHYSICAL THERAPY | Age: 77
End: 2022-02-21
Payer: MEDICARE

## 2022-02-21 PROCEDURE — 97530 THERAPEUTIC ACTIVITIES: CPT

## 2022-02-21 PROCEDURE — 97110 THERAPEUTIC EXERCISES: CPT

## 2022-02-21 NOTE — PROGRESS NOTES
OT DAILY TREATMENT NOTE - [facility]     Patient Name: Tami Powers  Date:2022  : 1945  [x]  Patient  Verified  Payor: Sarthak Giles / Plan: [place] MEDICARE PART A & B / Product Type: Medicare /    In time:1001  Out time: 1100  Total Treatment Time (min): 59  Total Timed Codes (min): 59  1:1 Treatment Time ( only): 61   Visit #: 4    Treatment Area: Hemiplegia and hemiparesis following unspecified cerebrovascular disease affecting unspecified side [I69.959]    SUBJECTIVE  Pain Level (0-10 scale): 0/10  Any medication changes, allergies to medications, adverse drug reactions, diagnosis change, or new procedure performed?: [x] No    [] Yes (see summary sheet for update)  Subjective functional status/changes:   [x] No changes reported      OBJECTIVE    49 min Therapeutic Exercise:  [x] See flow sheet :   Rationale: increase ROM and increase strength to improve the patients ability to complete ADL/IADL tasks with Min Assist.     10 min Therapeutic Activity:  [x]  See flow sheet :   Rationale: increase strength and improve coordination  to improve the patients ability to complete FM tasks with his L UE with Min Assist.        With   [x] TE   [x] TA   [] neuro   [] other: Patient Education: [x] Review HEP    [] Progressed/Changed HEP based on:   [] positioning   [] body mechanics   [] transfers   [] heat/ice application   [x] Splint wear/care   [] Sensory re-education   [] scar management      [] other:              Pain Level (0-10 scale) post treatment: 0/10    ASSESSMENT/Changes in Function: Patient tolerated treatment session well with no complaints throughout. Instructed patient on use long handled sponge and reacher to assist with bathing & dressing tasks. OTR administered long handled sponge and reacher to patient today.     Patient will continue to benefit from skilled OT services to modify and progress therapeutic interventions, address ROM deficits and address strength deficits to attain remaining goals. [x]  See Plan of Care  []  See progress note/recertification  []  See Discharge Summary         Progress towards goals / Updated goals:  Short Term Goals: To be accomplished in 5 treatments:  1- Patient will increase L  strength by 10# to improve L hand function. 2- Patient will increase L pinch strength by 2# each direction to improve L hand function.    3- Patient will improve L 9 hole peg by 20 seconds to improve fine motor control. 4- Patient will increase L shoulder flexion/abduction (AROM) by 10 degrees to improve function with ADL/IADL tasks. 5- Patient will increase L wrist flexion/extension (AROM) by 5 degrees to improve function with ADL/IADL tasks.     Long Term Goals: To be accomplished in 10 treatments:     1- Patient will increase L  strength by 20# to improve L hand function. 2- Patient/caregiver will report performing ADL/IADL tasks with Min Assist.  3- Patient/caregiver will be independent with HEP.   4- Patient will increase L shoulder flexion/abduction (AROM) by 20 degrees to improve function with ADL/IADL tasks.       PLAN  [x]  Upgrade activities as tolerated     [x]  Continue plan of care  []  Update interventions per flow sheet       []  Discharge due to:_  []  Other:_      LEIA Milian, OTR/L 2/21/2022

## 2022-02-23 ENCOUNTER — HOSPITAL ENCOUNTER (OUTPATIENT)
Dept: PHYSICAL THERAPY | Age: 77
Discharge: HOME | End: 2022-02-23
Payer: MEDICARE

## 2022-02-23 ENCOUNTER — APPOINTMENT (OUTPATIENT)
Dept: PHYSICAL THERAPY | Age: 77
End: 2022-02-23
Payer: MEDICARE

## 2022-02-23 PROCEDURE — 97110 THERAPEUTIC EXERCISES: CPT

## 2022-02-23 PROCEDURE — 97530 THERAPEUTIC ACTIVITIES: CPT

## 2022-02-23 NOTE — PROGRESS NOTES
OT DAILY TREATMENT NOTE - [facility]     Patient Name: George Learn  Date:2022  : 1945  [x]  Patient  Verified  Payor: Saadia Nones / Plan: [place] MEDICARE PART A & B / Product Type: Medicare /    In time:1000  Out time: 1059  Total Treatment Time (min): 59  Total Timed Codes (min): 59  1:1 Treatment Time ( only): 61   Visit #: 5    Treatment Area: Hemiplegia and hemiparesis following unspecified cerebrovascular disease affecting unspecified side [I69.959]    SUBJECTIVE  Pain Level (0-10 scale): 0/10  Any medication changes, allergies to medications, adverse drug reactions, diagnosis change, or new procedure performed?: [x] No    [] Yes (see summary sheet for update)  Subjective functional status/changes:   [] No changes reported  Patient reported \"I liked the reacher and the long handled sponge, but it was hard to get my back. \"    OBJECTIVE    25 min Therapeutic Exercise:  [x] See flow sheet :   Rationale: increase ROM and increase strength to improve the patients ability to complete ADL/IADL tasks with Min Assist.     34 min Therapeutic Activity:  [x]  See flow sheet :   Rationale: increase strength and improve coordination  to improve the patients ability to complete FM tasks with is L UE with Min Assist.        With   [x] TE   [x] TA   [] neuro   [] other: Patient Education: [x] Review HEP    [] Progressed/Changed HEP based on:   [] positioning   [] body mechanics   [] transfers   [] heat/ice application   [] Splint wear/care   [] Sensory re-education   [] scar management      [] other:        Pain Level (0-10 scale) post treatment: 0/10    ASSESSMENT/Changes in Function: Patient tolerated treatment session well today. Patient's caregiver brought in a resting hand splint; OTR adjusted splint to fit patient's L hand. Patient required AAROM with R UE to perform large cone activity with L UE.      Patient will continue to benefit from skilled OT services to modify and progress therapeutic interventions, address ROM deficits and address strength deficits to attain remaining goals. [x]  See Plan of Care  []  See progress note/recertification  []  See Discharge Summary         Progress towards goals / Updated goals:  Short Term Goals: To be accomplished in 5 treatments:  1- Patient will increase L  strength by 10# to improve L hand function. 2- Patient will increase L pinch strength by 2# each direction to improve L hand function.    3- Patient will improve L 9 hole peg by 20 seconds to improve fine motor control. 4- Patient will increase L shoulder flexion/abduction (AROM) by 10 degrees to improve function with ADL/IADL tasks. 5- Patient will increase L wrist flexion/extension (AROM) by 5 degrees to improve function with ADL/IADL tasks.     Long Term Goals: To be accomplished in 10 treatments:     1- Patient will increase L  strength by 20# to improve L hand function. 2- Patient/caregiver will report performing ADL/IADL tasks with Min Assist.  3- Patient/caregiver will be independent with HEP. 4- Patient will increase L shoulder flexion/abduction (AROM) by 20 degrees to improve function with ADL/IADL tasks.     PLAN  [x]  Upgrade activities as tolerated     [x]  Continue plan of care  []  Update interventions per flow sheet       []  Discharge due to:_  []  Other:_      LEIA Velarde, MARVIN/MARCELO 2/23/2022

## 2022-02-28 ENCOUNTER — APPOINTMENT (OUTPATIENT)
Dept: PHYSICAL THERAPY | Age: 77
End: 2022-02-28
Payer: MEDICARE

## 2022-02-28 ENCOUNTER — HOSPITAL ENCOUNTER (OUTPATIENT)
Dept: PHYSICAL THERAPY | Age: 77
Discharge: HOME | End: 2022-02-28
Payer: MEDICARE

## 2022-02-28 ENCOUNTER — TELEPHONE (OUTPATIENT)
Dept: ENT CLINIC | Age: 77
End: 2022-02-28

## 2022-02-28 PROCEDURE — 97110 THERAPEUTIC EXERCISES: CPT

## 2022-02-28 PROCEDURE — 97530 THERAPEUTIC ACTIVITIES: CPT

## 2022-02-28 NOTE — TELEPHONE ENCOUNTER
Attempted to reach Nancy Arenas to confirm next appointment and left a voicemail asking the patient to call back to confirm.

## 2022-02-28 NOTE — PROGRESS NOTES
OT DAILY TREATMENT NOTE - [facility]     Patient Name: Sb Persons  Date:2022  : 1945  [x]  Patient  Verified  Payor: Davidmor Sebastian / Plan: [place] MEDICARE PART A & B / Product Type: Medicare /    In time:1000  Out time: 1059   Total Treatment Time (min): 59  Total Timed Codes (min): 59  1:1 Treatment Time ( only): 61   Visit #: 6    Treatment Area: Hemiplegia and hemiparesis following unspecified cerebrovascular disease affecting unspecified side [I69.959]    SUBJECTIVE  Pain Level (0-10 scale): 0/10  Any medication changes, allergies to medications, adverse drug reactions, diagnosis change, or new procedure performed?: [x] No    [] Yes (see summary sheet for update)  Subjective functional status/changes:   [] No changes reported   Patient reported \"My arm is real stiff today. I try to get someone to do the arm stretches daily, but on the weekends I don't have anyone. \"    OBJECTIVE    24 min Therapeutic Exercise:  [x] See flow sheet :   Rationale: increase ROM and increase strength to improve the patients ability to complete ADL/IADL tasks with Min Assist.     35 min Therapeutic Activity:  [x]  See flow sheet :   Rationale: increase strength and improve coordination  to improve the patients ability to complete FM tasks with his L UE with Min Assist.        With   [x] TE   [x] TA   [] neuro   [] other: Patient Education: [x] Review HEP    [] Progressed/Changed HEP based on:   [] positioning   [] body mechanics   [] transfers   [] heat/ice application   [] Splint wear/care   [] Sensory re-education   [] scar management      [] other:               Pain Level (0-10 scale) post treatment: 3/10    ASSESSMENT/Changes in Function: Patient tolerated treatment session fairly well today. Patient presented with increased stiffness in L UE today with PROM exercises. Patient required AAROM from R UE to grasp pegs from peg board at table with L UE.  Stacked large cones with L UE, crossed midline to complete task and required AAROM from R UE to complete task. Patient will continue to benefit from skilled OT services to modify and progress therapeutic interventions, address ROM deficits and address strength deficits to attain remaining goals. [x]  See Plan of Care  []  See progress note/recertification  []  See Discharge Summary         Progress towards goals / Updated goals:  Short Term Goals: To be accomplished in 5 treatments:  1- Patient will increase L  strength by 10# to improve L hand function. 2- Patient will increase L pinch strength by 2# each direction to improve L hand function.    3- Patient will improve L 9 hole peg by 20 seconds to improve fine motor control. 4- Patient will increase L shoulder flexion/abduction (AROM) by 10 degrees to improve function with ADL/IADL tasks. 5- Patient will increase L wrist flexion/extension (AROM) by 5 degrees to improve function with ADL/IADL tasks.     Long Term Goals: To be accomplished in 10 treatments:     1- Patient will increase L  strength by 20# to improve L hand function. 2- Patient/caregiver will report performing ADL/IADL tasks with Min Assist.  3- Patient/caregiver will be independent with HEP. 4- Patient will increase L shoulder flexion/abduction (AROM) by 20 degrees to improve function with ADL/IADL tasks.     PLAN  [x]  Upgrade activities as tolerated     [x]  Continue plan of care  []  Update interventions per flow sheet       []  Discharge due to:_  []  Other:_      LEIA Iyer, OTR/L 2/28/2022

## 2022-03-01 ENCOUNTER — OFFICE VISIT (OUTPATIENT)
Dept: ENT CLINIC | Age: 77
End: 2022-03-01
Payer: MEDICARE

## 2022-03-01 VITALS
RESPIRATION RATE: 16 BRPM | TEMPERATURE: 97.7 F | WEIGHT: 221 LBS | HEIGHT: 68 IN | BODY MASS INDEX: 33.49 KG/M2 | DIASTOLIC BLOOD PRESSURE: 86 MMHG | SYSTOLIC BLOOD PRESSURE: 134 MMHG | OXYGEN SATURATION: 96 % | HEART RATE: 86 BPM

## 2022-03-01 DIAGNOSIS — H61.23 BILATERAL IMPACTED CERUMEN: Primary | ICD-10-CM

## 2022-03-01 DIAGNOSIS — H93.13 BILATERAL TINNITUS: ICD-10-CM

## 2022-03-01 PROCEDURE — 99204 OFFICE O/P NEW MOD 45 MIN: CPT | Performed by: NURSE PRACTITIONER

## 2022-03-01 PROCEDURE — G8752 SYS BP LESS 140: HCPCS | Performed by: NURSE PRACTITIONER

## 2022-03-01 PROCEDURE — G8536 NO DOC ELDER MAL SCRN: HCPCS | Performed by: NURSE PRACTITIONER

## 2022-03-01 PROCEDURE — G8417 CALC BMI ABV UP PARAM F/U: HCPCS | Performed by: NURSE PRACTITIONER

## 2022-03-01 PROCEDURE — G8754 DIAS BP LESS 90: HCPCS | Performed by: NURSE PRACTITIONER

## 2022-03-01 PROCEDURE — G8427 DOCREV CUR MEDS BY ELIG CLIN: HCPCS | Performed by: NURSE PRACTITIONER

## 2022-03-01 PROCEDURE — 1101F PT FALLS ASSESS-DOCD LE1/YR: CPT | Performed by: NURSE PRACTITIONER

## 2022-03-01 PROCEDURE — G8432 DEP SCR NOT DOC, RNG: HCPCS | Performed by: NURSE PRACTITIONER

## 2022-03-01 PROCEDURE — 69210 REMOVE IMPACTED EAR WAX UNI: CPT | Performed by: NURSE PRACTITIONER

## 2022-03-01 NOTE — PROGRESS NOTES
Otolaryngology-Head and Neck Surgery  New Patient Visit     Patient: Shoshana Hamlin  YOB: 1945  MRN: [id number]  Date of Service: 3/1/2022    Chief Complaint: Bilateral hearing changes    History of Present Illness: Shoshana Hamlin is a 64y.o. 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  No ENT surgical HX  No pertinent family HX    Past Medical History:  Past Medical History:   Diagnosis Date    Acid reflux     Anemia     Diabetes ([facility] [address]     GERD (gastroesophageal reflux disease)     Hypertension     Stroke [facility])        Past Surgical History:   Past Surgical History:   Procedure Laterality Date    HX COLONOSCOPY         Medications:   Current Outpatient Medications   Medication Instructions    amLODIPine (NORVASC) 10 mg tablet Oral, DAILY    aspirin delayed-release (ASPIR-81) 81 mg, Oral, DAILY    b complex vitamins tablet 1 Tablet, Oral, DAILY    carvediloL (COREG) 25 mg, Oral, 2 TIMES DAILY WITH MEALS    diclofenac (VOLTAREN) 1 % gel Topical, 4 TIMES DAILY    docusate sodium (STOOL SOFTENER) 100 mg, Oral, 2 TIMES DAILY    furosemide (LASIX) 20 mg, Oral, DAILY AS NEEDED    glucose blood VI test strips (Contour Next Test Strips) strip Does Not Apply, SEE ADMIN INSTRUCTIONS    lancets (TRUEplus Lancets) 33 gauge misc Does Not Apply    lisinopriL (PRINIVIL, ZESTRIL) 10 mg tablet Oral, DAILY    metFORMIN (GLUCOPHAGE) 500 mg tablet Oral, 2 TIMES DAILY WITH MEALS    pantoprazole (PROTONIX) 40 mg, Oral, DAILY    pravastatin (PRAVACHOL) 40 mg, Oral, EVERY BEDTIME       Allergies:   No Known Allergies    Social History:   Social History Tobacco Use    Smoking status: Never Smoker    Smokeless tobacco: Never Used   Vaping Use    Vaping Use: Never used   Substance Use Topics    Alcohol use: Never    Drug use: Never        Family History:  Family History   Problem Relation Age of Onset    Heart Disease Father     Hypertension Brother     Diabetes Brother        Review of Systems:    Consitutional: denies fever, excessive weight gain or loss. Eyes: denies diplopia, eye pain. Integumentary: denies new concerning skin lesions. Ears, Nose, Mouth, Throat: denies except as per HPI. Endocrine: denies hot or cold intolerance, increased thirst.  Respiratory: denies cough, hemoptysis, wheezing  Gastrointestinal: denies trouble swallowing, nausea, emesis, regurgitation  Musculoskeletal: denies muscle weakness or wasting  Cardiovascular: denies chest pain, shortness of breath  Neurologic: denies seizures, numbness or tingling, syncope  Hematologic: denies easy bleeding or bruising    Physical Examination:   Vitals:    03/01/22 1017   BP: 134/86   BP 1 Location: Left upper arm   BP Patient Position: Sitting   BP Cuff Size: Large adult   Pulse: 86   Temp: 97.7 °F (36.5 °C)   TempSrc: Temporal   Resp: 16   Height: 5' 8\" (1.727 m)   Weight: 221 lb (100.2 kg)   SpO2: 96%        General: Comfortable, pleasant, appears stated age  Voice: Muffled, speaking in full sentences, no stridor. Speech slurred    Face: No masses or lesions, left paresis  Ears: External ears unremarkable. Bilateral ear canal impacted with cerumen. Nose: External nose unremarkable. Dorsum midline. Anterior rhinoscopy demonstrates no lesions. Septum midline. Turbinates without hypertrophy. Oral Cavity / Oropharynx: No trismus. Mucosa pink and moist. No lesions. Tongue is midline and mobile. Palate elevates symmetrically. Uvula midline. Tonsils unremarkable. Base of tongue soft. Floor of mouth soft. Neck: Supple. No adenopathy. Thyroid unremarkable. Palpable laryngeal landmarks. Full neck range of motion   Neurologic: CN II - XI intact. Unsteady gait, uses walker; left sided weakness. Procedure - Removal Impacted Cerumen    Indications: cerumen impaction    Ears are examined under microscope/headlight.  bilateral ears are cleaned using otologic curette, suction, and/or alligator forceps. Hydrogen Peroxide instilled in right ear. Results: Left EC clear, TM intact. Small dry skin noted across TM. Right ear with moderate amount of cerumen remaining. Assessment and Plan:   1. Cerumen impaction of both ears   2. Bilateral tinnitus       -Instructed to instill mineral oil/baby oil 1 week prior to next visit. Handout given. -Will get audiogram at next visit. -Return to office in 4 week.        Leilani Mariscal MSN, FNP-C  [facility]  [address] [place] [address]  Office Phone: [phone]

## 2022-03-02 ENCOUNTER — APPOINTMENT (OUTPATIENT)
Dept: PHYSICAL THERAPY | Age: 77
End: 2022-03-02
Payer: MEDICARE

## 2022-03-07 ENCOUNTER — HOSPITAL ENCOUNTER (OUTPATIENT)
Dept: PHYSICAL THERAPY | Age: 77
Discharge: HOME | End: 2022-03-07
Payer: MEDICARE

## 2022-03-07 ENCOUNTER — APPOINTMENT (OUTPATIENT)
Dept: PHYSICAL THERAPY | Age: 77
End: 2022-03-07
Payer: MEDICARE

## 2022-03-07 PROCEDURE — 97530 THERAPEUTIC ACTIVITIES: CPT

## 2022-03-07 PROCEDURE — 97110 THERAPEUTIC EXERCISES: CPT

## 2022-03-07 NOTE — PROGRESS NOTES
[address] AND [facility], [address]  Ph: [phone]    Fax: [phone]    Progress Note    Name: Ryan Schwab   : 30   MD: Michelle Lai MD       Treatment Diagnosis: Hemiplegia and hemiparesis following unspecified cerebrovascular disease affecting unspecified side [I69.959]  Start of Care: 2022    Visits from Start of Care: 7   Missed Visits: 2    Summary of Care / Assessment / Recommendations: Patient is making slight progress towards goals. Patient demonstrated improvements in L /pinch strength as noted by increased  strength from 20# to 25# and increased 3 pt pinch strength from 9# to 10#. Patient reports compliance with daily HEP when his aide is with him during the week. Patient and wife report patient currently requires maximum assistance with basic ADLs, including bathing and dressing tasks. Patient reports he occasionally utilizes a reacher to assist with LB dressing tasks. Patient also states he wears his resting hand splint on occasion. Patient continues to demonstrate decreased L UE ROM and strength. Patient will continue to benefit from skilled OT services to modify and progress therapeutic interventions, address ROM deficits and address strength deficits to attain remaining goals. Progress Toward Goals:  Short Term Goals:  To be accomplished in 5 treatments:  1- Patient will increase L  strength by 10# to improve L hand function. -Partially Met  2- Patient will increase L pinch strength by 2# each direction to improve L hand function.  -Partially Met  3- Patient will improve L 9 hole peg by 20 seconds to improve fine motor control. -Not Met  4- Patient will increase L shoulder flexion/abduction (AROM) by 10 degrees to improve function with ADL/IADL tasks. -Not Met  5- Patient will increase L wrist flexion/extension (AROM) by 5 degrees to improve function with ADL/IADL tasks. -Not Met     Long Term Goals: To be accomplished in 10 treatments:     1- Patient will increase L  strength by 20# to improve L hand function. -Partially Met  2- Patient/caregiver will report performing ADL/IADL tasks with Min Assist. -Not Met  3- Patient/caregiver will be independent with HEP. -Partially Met  4- Patient will increase L shoulder flexion/abduction (AROM) by 20 degrees to improve function with ADL/IADL tasks. -Not Met    Recertification Period: 2/2/2022 to 6/2/2022    Frequency/Duration:  2 treatments per week, for 4 weeks. LEIA Cha, OTR/L, 3/7/2022     ________________________________________________________________________  NOTE TO PHYSICIAN:  Please complete the following and fax to:  [facility]:   Fax: [phone]  . Retain this original for your records. If you are unable to process this request in 24 hours, please contact our office.        ____ I have read the above report and request that my patient continue therapy with the following changes/special instructions:  ____ I have read the above report and request that my patient be discharged from therapy    Physician's Signature:_________________ Date:___________Time:__________

## 2022-03-07 NOTE — PROGRESS NOTES
OT DAILY TREATMENT NOTE - [facility]     Patient Name: Saman Acevedo  Date:3/7/2022  : 1945  [x]  Patient  Verified  Payor: [place] MED[place]ARE / Plan: [place] MEDICARE PART A & B / Product Type: Medicare /    In time:959  Out time: 1058  Total Treatment Time (min): 59  Total Timed Codes (min): 59  1:1 Treatment Time ( only): 61   Visit #: 7    Treatment Area: Hemiplegia and hemiparesis following unspecified cerebrovascular disease affecting unspecified side [I69.959]    SUBJECTIVE  Pain Level (0-10 scale): 0/10  Any medication changes, allergies to medications, adverse drug reactions, diagnosis change, or new procedure performed?: [x] No    [] Yes (see summary sheet for update)  Subjective functional status/changes:   [] No changes reported  Patient reported \"I feel like therapy is helping me. \"    OBJECTIVE    44 min Therapeutic Exercise:  [x] See flow sheet :   Rationale: increase ROM and increase strength to improve the patients ability to complete ADL/IADL tasks with Min Assist.     15 min Therapeutic Activity:  [x]  See flow sheet :   Rationale: increase strength and improve coordination  to improve the patients ability to complete FM tasks with his L UE with Min Assist.        With   [x] TE   [x] TA   [] neuro   [] other: Patient Education: [x] Review HEP    [] Progressed/Changed HEP based on:   [] positioning   [] body mechanics   [] transfers   [] heat/ice application   [] Splint wear/care   [] Sensory re-education   [] scar management      [] other:            Other Objective/Functional Measures:     9 hole peg test:   L- not able to  pegs this date; attempted in 3 min 15 sec with no success.         Strength: L UE                                                       3pt pinch            2pt pinch              Lateral pinch  Left 25# 2# 2# 10#                  Range of Motion: L UE       L UE   AROM   Shoulder flexion  40   Shoulder abduction  35   Wrist flexion  42   Wrist extension  30         Pain Level (0-10 scale) post treatment: 0/10    ASSESSMENT/Changes in Function: Patient is making slight progress towards goals. Patient demonstrated improvements in L /pinch strength as noted by increased  strength from 20# to 25# and increased 3 pt pinch strength from 9# to 10#. Patient reports compliance with daily HEP when his aide is with him during the week. Patient and wife report patient currently requires maximum assistance with basic ADLs, including bathing and dressing tasks. Patient reports he occasionally utilizes a reacher to assist with LB dressing tasks. Patient also states he wears his resting hand splint on occasion. Patient continues to demonstrate decreased L UE ROM and strength. Patient will continue to benefit from skilled OT services to modify and progress therapeutic interventions, address ROM deficits and address strength deficits to attain remaining goals. []  See Plan of Care  [x]  See progress note/recertification  []  See Discharge Summary         Progress towards goals / Updated goals:  Short Term Goals: To be accomplished in 5 treatments:  1- Patient will increase L  strength by 10# to improve L hand function. -Partially Met  2- Patient will increase L pinch strength by 2# each direction to improve L hand function.  -Partially Met  3- Patient will improve L 9 hole peg by 20 seconds to improve fine motor control. -Not Met  4- Patient will increase L shoulder flexion/abduction (AROM) by 10 degrees to improve function with ADL/IADL tasks. -Not Met  5- Patient will increase L wrist flexion/extension (AROM) by 5 degrees to improve function with ADL/IADL tasks. -Not Met     Long Term Goals: To be accomplished in 10 treatments:     1- Patient will increase L  strength by 20# to improve L hand function. -Partially Met  2- Patient/caregiver will report performing ADL/IADL tasks with Min Assist. -Not Met  3- Patient/caregiver will be independent with HEP. -Partially Met  4- Patient will increase L shoulder flexion/abduction (AROM) by 20 degrees to improve function with ADL/IADL tasks. -Not Met    PLAN  [x]  Upgrade activities as tolerated     [x]  Continue plan of care  []  Update interventions per flow sheet       []  Discharge due to:_  []  Other:_      WillLEIA Rosales, OTR/L 3/7/2022

## 2022-03-09 ENCOUNTER — APPOINTMENT (OUTPATIENT)
Dept: PHYSICAL THERAPY | Age: 77
End: 2022-03-09
Payer: MEDICARE

## 2022-03-09 ENCOUNTER — HOSPITAL ENCOUNTER (OUTPATIENT)
Dept: PHYSICAL THERAPY | Age: 77
Discharge: HOME | End: 2022-03-09
Payer: MEDICARE

## 2022-03-09 PROCEDURE — 97530 THERAPEUTIC ACTIVITIES: CPT

## 2022-03-09 PROCEDURE — 97110 THERAPEUTIC EXERCISES: CPT

## 2022-03-09 NOTE — PROGRESS NOTES
OT DAILY TREATMENT NOTE - [facility]     Patient Name: Yasmany Mascorro  Date:3/9/2022  : 1945  [x]  Patient  Verified  Payor: [place] MEDICARE / Plan: [place] MEDICARE PART A & B / Product Type: Medicare /    In time:955  Out time: 1056  Total Treatment Time (min): 61  Total Timed Codes (min): 61  1:1 Treatment Time ( [address] only): 61   Visit #: 8    Treatment Area: Hemiplegia and hemiparesis following unspecified cerebrovascular disease affecting unspecified side [I69.959]    SUBJECTIVE  Pain Level (0-10 scale): 0/10  Any medication changes, allergies to medications, adverse drug reactions, diagnosis change, or new procedure performed?: [x] No    [] Yes (see summary sheet for update)  Subjective functional status/changes:   [] No changes reported  Patient reported \"I am feeling alright today. \"    OBJECTIVE    36 min Therapeutic Exercise:  [x] See flow sheet :   Rationale: increase ROM and increase strength to improve the patients ability to complete ADL/IADL tasks with Min Assist.     25 min Therapeutic Activity:  [x]  See flow sheet :   Rationale: increase strength and improve coordination  to improve the patients ability to complete FM tasks with his L UE with Min Assist.      With   [x] TE   [x] TA   [] neuro   [] other: Patient Education: [x] Review HEP    [] Progressed/Changed HEP based on:   [] positioning   [] body mechanics   [] transfers   [] heat/ice application   [] Splint wear/care   [] Sensory re-education   [] scar management      [] other:               Pain Level (0-10 scale) post treatment: 0/10    ASSESSMENT/Changes in Function: Patient tolerated treatment session well today. Tolerated increased reps with L hand therapeutic exercises. Patient required AAROM from R UE to grasp pegs from peg board at table with L UE. Stacked large cones with L UE, crossed midline to complete task and required AAROM from R UE to complete task. Patient presented with increased tremors in L hand today.     Patient will continue to benefit from skilled OT services to modify and progress therapeutic interventions, address ROM deficits and address strength deficits to attain remaining goals. [x]  See Plan of Care  []  See progress note/recertification  []  See Discharge Summary         Progress towards goals / Updated goals:  Short Term Goals:  To be accomplished in 5 treatments:  1- Patient will increase L  strength by 10# to improve L hand function. -Partially Met  2- Patient will increase L pinch strength by 2# each direction to improve L hand function.  -Partially Met  3- Patient will improve L 9 hole peg by 20 seconds to improve fine motor control. -Not Met  4- Patient will increase L shoulder flexion/abduction (AROM) by 10 degrees to improve function with ADL/IADL tasks. -Not Met  5- Patient will increase L wrist flexion/extension (AROM) by 5 degrees to improve function with ADL/IADL tasks. -Not Met     Long Term Goals: To be accomplished in 10 treatments:     1- Patient will increase L  strength by 20# to improve L hand function. -Partially Met  2- Patient/caregiver will report performing ADL/IADL tasks with Min Assist. -Not Met  3- Patient/caregiver will be independent with HEP. -Partially Met  4- Patient will increase L shoulder flexion/abduction (AROM) by 20 degrees to improve function with ADL/IADL tasks. -Not Met    PLAN  [x]  Upgrade activities as tolerated     [x]  Continue plan of care  []  Update interventions per flow sheet       []  Discharge due to:_  []  Other:_      LEIA Fuentes, OTR/L 3/9/2022

## 2022-03-14 ENCOUNTER — APPOINTMENT (OUTPATIENT)
Dept: PHYSICAL THERAPY | Age: 77
End: 2022-03-14
Payer: MEDICARE

## 2022-03-14 ENCOUNTER — HOSPITAL ENCOUNTER (OUTPATIENT)
Dept: PHYSICAL THERAPY | Age: 77
Discharge: HOME | End: 2022-03-14
Payer: MEDICARE

## 2022-03-14 PROCEDURE — 97530 THERAPEUTIC ACTIVITIES: CPT

## 2022-03-14 PROCEDURE — 97110 THERAPEUTIC EXERCISES: CPT

## 2022-03-14 NOTE — PROGRESS NOTES
OT DAILY TREATMENT NOTE - [facility]     Patient Name: Justin Guerrero  Date:3/14/2022  : 1945  [x]  Patient  Verified  Payor: Ermias Grew / Plan: [place] MEDICARE PART A & B / Product Type: Medicare /    In time:1001  Out time: 1059  Total Treatment Time (min): 58  Total Timed Codes (min): 58  1:1 Treatment Time ( only): 62   Visit #: 9    Treatment Area: Hemiplegia and hemiparesis following unspecified cerebrovascular disease affecting unspecified side [I69.959]    SUBJECTIVE  Pain Level (0-10 scale): 0/10  Any medication changes, allergies to medications, adverse drug reactions, diagnosis change, or new procedure performed?: [x] No    [] Yes (see summary sheet for update)  Subjective functional status/changes:   [] No changes reported  Patient reported \"I have been wearing my resting hand splint a few hours a day. \"    OBJECTIVE      33 min Therapeutic Exercise:  [x] See flow sheet :   Rationale: increase ROM and increase strength to improve the patients ability to complete ADL/IADL tasks with Min Assist.     25 min Therapeutic Activity:  [x]  See flow sheet :   Rationale: increase strength and improve coordination  to improve the patients ability to complete FM tasks with his L UE with Min Assist.      With   [x] TE   [x] TA   [] neuro   [] other: Patient Education: [x] Review HEP    [] Progressed/Changed HEP based on:   [] positioning   [] body mechanics   [] transfers   [] heat/ice application   [] Splint wear/care   [] Sensory re-education   [] scar management      [] other:        Pain Level (0-10 scale) post treatment: 0/10    ASSESSMENT/Changes in Function: Patient tolerated treatment session well today.  Tolerated increased resistance with digiflex.  Patient required AAROM from R UE to grasp pegs from peg board at table with L UE. Stacked large cones with L UE, crossed midline to complete task and required AAROM from R UE to complete task.     Patient will continue to benefit from skilled OT services to modify and progress therapeutic interventions, address ROM deficits and address strength deficits to attain remaining goals. [x]  See Plan of Care  []  See progress note/recertification  []  See Discharge Summary         Progress towards goals / Updated goals:  Short Term Goals:  To be accomplished in 5 treatments:  1- Patient will increase L  strength by 10# to improve L hand function. -Partially Met  2- Patient will increase L pinch strength by 2# each direction to improve L hand function.  -Partially Met  3- Patient will improve L 9 hole peg by 20 seconds to improve fine motor control. -Not Met  4- Patient will increase L shoulder flexion/abduction (AROM) by 10 degrees to improve function with ADL/IADL tasks. -Not Met  5- Patient will increase L wrist flexion/extension (AROM) by 5 degrees to improve function with ADL/IADL tasks. -Not Met     Long Term Goals: To be accomplished in 10 treatments:     1- Patient will increase L  strength by 20# to improve L hand function. -Partially Met  2- Patient/caregiver will report performing ADL/IADL tasks with Min Assist. -Not Met  3- Patient/caregiver will be independent with HEP. -Partially Met  4- Patient will increase L shoulder flexion/abduction (AROM) by 20 degrees to improve function with ADL/IADL tasks. -Not Met    PLAN  [x]  Upgrade activities as tolerated     [x]  Continue plan of care  []  Update interventions per flow sheet       []  Discharge due to:_  []  Other:_      LEIA Catherine, OTR/L 3/14/2022

## 2022-03-16 ENCOUNTER — APPOINTMENT (OUTPATIENT)
Dept: PHYSICAL THERAPY | Age: 77
End: 2022-03-16
Payer: MEDICARE

## 2022-03-16 ENCOUNTER — HOSPITAL ENCOUNTER (OUTPATIENT)
Dept: PHYSICAL THERAPY | Age: 77
Discharge: HOME | End: 2022-03-16
Payer: MEDICARE

## 2022-03-16 PROCEDURE — 97110 THERAPEUTIC EXERCISES: CPT

## 2022-03-16 PROCEDURE — 97530 THERAPEUTIC ACTIVITIES: CPT

## 2022-03-16 NOTE — PROGRESS NOTES
OT DAILY TREATMENT NOTE - [facility]     Patient Name: Saman Acevedo  Date:3/16/2022  : 1945  [x]  Patient  Verified  Payor: [place] MEDICARE / Plan: VA MEDICARE PART A & B / Product Type: Medicare /    In time: 1000  Out time:1100  Total Treatment Time (min): 60  Total Timed Codes (min): 60  1:1 Treatment Time ([facility] only): 60   Visit #: 10    Treatment Area: Hemiplegia and hemiparesis following unspecified cerebrovascular disease affecting unspecified side [I69.959]    SUBJECTIVE  Pain Level (0-10 scale): 0/10  Any medication changes, allergies to medications, adverse drug reactions, diagnosis change, or new procedure performed?: [x] No    [] Yes (see summary sheet for update)  Subjective functional status/changes:   [] No changes reported  Patient reported \"I am feeling pretty good. \"    OBJECTIVE    30 min Therapeutic Exercise:  [x] See flow sheet :   Rationale: increase ROM and increase strength to improve the patients ability to complete ADL/IADL tasks with Min Assist.     30 min Therapeutic Activity:  [x]  See flow sheet :   Rationale: increase strength and improve coordination  to improve the patients ability to complete FM tasks with his L hand with Min Assist.     With   [x] TE   [x] TA   [] neuro   [] other: Patient Education: [x] Review HEP    [] Progressed/Changed HEP based on:   [] positioning   [] body mechanics   [] transfers   [] heat/ice application   [] Splint wear/care   [] Sensory re-education   [] scar management      [] other:               Pain Level (0-10 scale) post treatment: 0/10    ASSESSMENT/Changes in Function: Patient tolerated treatment session well today.  Patient required AAROM from R UE to grasp pegs from peg board at table with L UE. Stacked large cones with L UE, crossed midline to complete task and required AAROM from R UE to complete task.     Patient will continue to benefit from skilled OT services to modify and progress therapeutic interventions, address ROM deficits and address strength deficits to attain remaining goals. [x]  See Plan of Care  []  See progress note/recertification  []  See Discharge Summary         Progress towards goals / Updated goals:  Short Term Goals:  To be accomplished in 5 treatments:  1- Patient will increase L  strength by 10# to improve L hand function. -Partially Met  2- Patient will increase L pinch strength by 2# each direction to improve L hand function.  -Partially Met  3- Patient will improve L 9 hole peg by 20 seconds to improve fine motor control. -Not Met  4- Patient will increase L shoulder flexion/abduction (AROM) by 10 degrees to improve function with ADL/IADL tasks. -Not Met  5- Patient will increase L wrist flexion/extension (AROM) by 5 degrees to improve function with ADL/IADL tasks. -Not Met     Long Term Goals: To be accomplished in 10 treatments:     1- Patient will increase L  strength by 20# to improve L hand function. -Partially Met  2- Patient/caregiver will report performing ADL/IADL tasks with Min Assist. -Not Met  3- Patient/caregiver will be independent with HEP. -Partially Met  4- Patient will increase L shoulder flexion/abduction (AROM) by 20 degrees to improve function with ADL/IADL tasks. -Not Met    PLAN  [x]  Upgrade activities as tolerated     [x]  Continue plan of care  []  Update interventions per flow sheet       []  Discharge due to:_  []  Other:_      LEIA Sin, OTR/MARCELO 3/16/2022

## 2022-03-18 ENCOUNTER — OFFICE VISIT (OUTPATIENT)
Dept: SURGERY | Age: 77
End: 2022-03-18
Payer: MEDICARE

## 2022-03-18 VITALS
HEART RATE: 77 BPM | BODY MASS INDEX: 33.68 KG/M2 | OXYGEN SATURATION: 97 % | RESPIRATION RATE: 16 BRPM | TEMPERATURE: 97.7 F | DIASTOLIC BLOOD PRESSURE: 76 MMHG | SYSTOLIC BLOOD PRESSURE: 124 MMHG | WEIGHT: 222.2 LBS | HEIGHT: 68 IN

## 2022-03-18 DIAGNOSIS — I87.309 CHRONIC VENOUS HYPERTENSION, UNSPECIFIED LATERALITY: ICD-10-CM

## 2022-03-18 DIAGNOSIS — I73.9 PERIPHERAL VASCULAR DISEASE (HCC): Primary | ICD-10-CM

## 2022-03-18 PROBLEM — I47.29 NSVT (NONSUSTAINED VENTRICULAR TACHYCARDIA) (HCC): Status: ACTIVE | Noted: 2020-09-05

## 2022-03-18 PROBLEM — G81.90 HEMIPLEGIA (HCC): Status: ACTIVE | Noted: 2020-08-19

## 2022-03-18 PROBLEM — E88.09 HYPERPROTEINEMIA: Status: ACTIVE | Noted: 2020-08-19

## 2022-03-18 PROBLEM — N39.3 GENUINE STRESS INCONTINENCE, FEMALE: Status: ACTIVE | Noted: 2020-08-19

## 2022-03-18 PROCEDURE — G8754 DIAS BP LESS 90: HCPCS | Performed by: SURGERY

## 2022-03-18 PROCEDURE — G8427 DOCREV CUR MEDS BY ELIG CLIN: HCPCS | Performed by: SURGERY

## 2022-03-18 PROCEDURE — G8752 SYS BP LESS 140: HCPCS | Performed by: SURGERY

## 2022-03-18 PROCEDURE — G8510 SCR DEP NEG, NO PLAN REQD: HCPCS | Performed by: SURGERY

## 2022-03-18 PROCEDURE — G8417 CALC BMI ABV UP PARAM F/U: HCPCS | Performed by: SURGERY

## 2022-03-18 PROCEDURE — 1101F PT FALLS ASSESS-DOCD LE1/YR: CPT | Performed by: SURGERY

## 2022-03-18 PROCEDURE — 99213 OFFICE O/P EST LOW 20 MIN: CPT | Performed by: SURGERY

## 2022-03-18 PROCEDURE — G8536 NO DOC ELDER MAL SCRN: HCPCS | Performed by: SURGERY

## 2022-03-18 NOTE — PROGRESS NOTES
Chief Complaint   Patient presents with    Follow-up     bilateral leg swelling      Visit Vitals  /76 (BP 1 Location: Right arm, BP Patient Position: Sitting)   Pulse 77   Temp 97.7 °F (36.5 °C) (Temporal)   Resp 16   Ht 5' 8\" (1.727 m)   Wt 222 lb 3.2 oz (100.8 kg)   SpO2 97%   BMI 33.79 kg/m²     1. Have you been to the ER, urgent care clinic since your last visit? Hospitalized since your last visit? No    2. Have you seen or consulted any other health care providers outside of the [address] [place] [address] since your last visit? Include any pap smears or colon screening.  No

## 2022-03-19 PROBLEM — K21.9 LARYNGOPHARYNGEAL REFLUX: Status: ACTIVE | Noted: 2020-08-19

## 2022-03-19 PROBLEM — I87.309 CHRONIC VENOUS HYPERTENSION: Status: ACTIVE | Noted: 2021-11-22

## 2022-03-19 PROBLEM — J96.90 RESPIRATORY FAILURE (HCC): Status: ACTIVE | Noted: 2020-08-30

## 2022-03-19 PROBLEM — Z86.010 HISTORY OF ADENOMATOUS POLYP OF COLON: Status: ACTIVE | Noted: 2020-08-19

## 2022-03-19 PROBLEM — E11.9 DIABETES MELLITUS TYPE 2, CONTROLLED (HCC): Status: ACTIVE | Noted: 2020-08-19

## 2022-03-19 PROBLEM — R13.10 DYSPHAGIA: Status: ACTIVE | Noted: 2020-08-19

## 2022-03-19 PROBLEM — I10 ESSENTIAL HYPERTENSION: Status: ACTIVE | Noted: 2020-08-19

## 2022-03-19 PROBLEM — Z86.0101 HISTORY OF ADENOMATOUS POLYP OF COLON: Status: ACTIVE | Noted: 2020-08-19

## 2022-03-19 PROBLEM — I62.9 INTRACRANIAL HEMORRHAGE (HCC): Status: ACTIVE | Noted: 2020-08-19

## 2022-03-21 ENCOUNTER — APPOINTMENT (OUTPATIENT)
Dept: PHYSICAL THERAPY | Age: 77
End: 2022-03-21
Payer: MEDICARE

## 2022-03-21 ENCOUNTER — HOSPITAL ENCOUNTER (OUTPATIENT)
Dept: PHYSICAL THERAPY | Age: 77
Discharge: HOME | End: 2022-03-21
Payer: MEDICARE

## 2022-03-21 PROCEDURE — 97110 THERAPEUTIC EXERCISES: CPT

## 2022-03-21 PROCEDURE — 97530 THERAPEUTIC ACTIVITIES: CPT

## 2022-03-21 NOTE — PROGRESS NOTES
OT DAILY TREATMENT NOTE - [facility]     Patient Name: Tk Day  Date:3/21/2022  : 1945  [x]  Patient  Verified  [place]or: [place] / Plan: [place] MED[place]ARE PART A & B / Product Type: Medicare /    In time:1000  Out time:1058  Total Treatment Time (min): 58  Total Timed Codes (min): 58  1:1 Treatment Time ( only): 62   Visit #: 11    Treatment Area: Hemiplegia and hemiparesis following unspecified cerebrovascular disease affecting unspecified side [I69.959]    SUBJECTIVE  Pain Level (0-10 scale): 0/10  Any medication changes, allergies to medications, adverse drug reactions, diagnosis change, or new procedure performed?: [x] No    [] Yes (see summary sheet for update)  Subjective functional status/changes:   [] No changes reported  Patient reported \"I have been wearing my resting hand splint some, I wore it yesterday. \"    OBJECTIVE    33 min Therapeutic Exercise:  [x] See flow sheet :   Rationale: increase ROM and increase strength to improve the patients ability to complete ADL/IADL tasks with Min Assist.     25 min Therapeutic Activity:  [x]  See flow sheet :   Rationale: increase strength and improve coordination  to improve the patients ability to complete FM tasks with his L hand with Min Assist.        With   [x] TE   [x] TA   [] neuro   [] other: Patient Education: [x] Review HEP    [] Progressed/Changed HEP based on:   [] positioning   [] body mechanics   [] transfers   [] heat/ice application   [] Splint wear/care   [] Sensory re-education   [] scar management      [] other:               Pain Level (0-10 scale) post treatment: 0/10    ASSESSMENT/Changes in Function: Patient tolerated treatment session well today. Patient tolerated increased reps with L UE therapeutic exercises today. Patient will continue to benefit from skilled OT services to modify and progress therapeutic interventions, address ROM deficits and address strength deficits to attain remaining goals.      [x]  See Plan of Care  []  See progress note/recertification  []  See Discharge Summary         Progress towards goals / Updated goals:  Short Term Goals:  To be accomplished in 5 treatments:  1- Patient will increase L  strength by 10# to improve L hand function. -Partially Met  2- Patient will increase L pinch strength by 2# each direction to improve L hand function.  -Partially Met  3- Patient will improve L 9 hole peg by 20 seconds to improve fine motor control. -Not Met  4- Patient will increase L shoulder flexion/abduction (AROM) by 10 degrees to improve function with ADL/IADL tasks. -Not Met  5- Patient will increase L wrist flexion/extension (AROM) by 5 degrees to improve function with ADL/IADL tasks. -Not Met     Long Term Goals: To be accomplished in 10 treatments:     1- Patient will increase L  strength by 20# to improve L hand function. -Partially Met  2- Patient/caregiver will report performing ADL/IADL tasks with Min Assist. -Not Met  3- Patient/caregiver will be independent with HEP. -Partially Met  4- Patient will increase L shoulder flexion/abduction (AROM) by 20 degrees to improve function with ADL/IADL tasks. -Not Met    PLAN  [x]  Upgrade activities as tolerated     [x]  Continue plan of care  []  Update interventions per flow sheet       []  Discharge due to:_  []  Other:_      LEIA Hooker, OTR/MARCELO 3/21/2022

## 2022-03-21 NOTE — PROGRESS NOTES
VASCULAR FOLLOW UP      Subjective:   CHIEF COMPLAINTS:  Leg swelling  PRESENTATION OF ILLNESS:  Rubi Ocasio is a 64 y.o. very pleasant man follow-up today for surveillance vascular examination. Patient has known history of chronic venous hypertension. Patient wears compression stocking with pressure gradient of 20 to 30 mmHg. Patient says swelling has improved. Patient sometimes use Ace wraps as well. Patient denies any worsening pain on both calf. Past Medical History:   Diagnosis Date    Acid reflux     Anemia     Diabetes (HCC)     GERD (gastroesophageal reflux disease)     Hypertension     Stroke [facility])       Past Surgical History:   Procedure Laterality Date    HX COLONOSCOPY       Family History   Problem Relation Age of Onset    Heart Disease Father     Hypertension Brother     Diabetes Brother       Social History     Tobacco Use    Smoking status: Never Smoker    Smokeless tobacco: Never Used   Substance Use Topics    Alcohol use: Never       Prior to Admission medications    Medication Sig Start Date End Date Taking? Authorizing Provider   furosemide (LASIX) 20 mg tablet Take 20 mg by mouth daily as needed. Yes Provider, Historical   aspirin delayed-release (Aspir-81) 81 mg tablet Take 81 mg by mouth daily. Yes Provider, Historical   b complex vitamins tablet Take 1 Tablet by mouth daily. Yes Provider, Historical   amLODIPine (NORVASC) 10 mg tablet Take  by mouth daily. Yes Provider, Historical   carvediloL (COREG) 25 mg tablet Take 25 mg by mouth two (2) times daily (with meals). Yes Provider, Historical   glucose blood VI test strips (Contour Next Test Strips) strip by Does Not Apply route See Admin Instructions. Yes Provider, Historical   diclofenac (VOLTAREN) 1 % gel Apply  to affected area four (4) times daily. Yes Provider, Historical   lisinopriL (PRINIVIL, ZESTRIL) 10 mg tablet Take  by mouth daily.    Yes Provider, Historical   metFORMIN (GLUCOPHAGE) 500 mg tablet Take  by mouth two (2) times daily (with meals). Yes Provider, Historical   pantoprazole (PROTONIX) 40 mg tablet Take 40 mg by mouth daily. Yes Provider, Historical   pravastatin (PRAVACHOL) 40 mg tablet Take 40 mg by mouth nightly. Yes Provider, Historical   docusate sodium (Stool Softener) 100 mg capsule Take 100 mg by mouth two (2) times a day. Yes Provider, Historical   lancets (TRUEplus Lancets) 33 gauge misc by Does Not Apply route. Yes Provider, Historical     No Known Allergies     Review of Systems:  I reviewed the rest of organ systems personally and they were negative signed by Dr. Oli De Anda    Objective:     Visit Vitals  /76 (BP 1 Location: Right arm, BP Patient Position: Sitting)   Pulse 77   Temp 97.7 °F (36.5 °C) (Temporal)   Resp 16   Ht 5' 8\" (1.727 m)   Wt 222 lb 3.2 oz (100.8 kg)   SpO2 97%   BMI 33.79 kg/m²     VITAL SIGNS REVIEWED. Physical Exam:  Patient is well-nourished pleasant in conversation is appropriate. Head and neck examination atraumatic, normocephalic. Gaze appropriate. Conversation appropriate. Neck examination shows supple. No mass. No obvious carotid bruit. Chest examination shows lungs are clear bilaterally well-expanded, no crackles or wheezes. Cardiovascular system regular rate, no obvious murmur. Skin warm to touch  and moist, no skin lesions. Abdomen is soft ,not tender or distended bowel sounds present. No palpable mass. Neurological examinations, no focal neuro deficits moving all 4 extremities. Cranial nerves intact. Sensation is intact as well. Hematologic: No obvious bruise or swelling or obvious lymphadenopathy. Psychosocial: Appropriate. Has good effect. Musculoskeletal system: No muscle wasting, appropriate movements upper and lower extremity. Vascular examination: Vascular examination shows patient has a CEAP C3 venous disorder.   Patient has palpable pulse noted in the right DP view but nonpalpable pedal pulse left DP and PT however they are 3+ monophasic. Data Review:   No visits with results within 1 Month(s) from this visit. Latest known visit with results is:   Hospital Outpatient Visit on 12/13/2021   Component Date Value Ref Range Status    Right ankle BP 12/13/2021 156  mmHg Final    Left ankle BP 12/13/2021 152  mmHg Final    Right dorsalis pedis BP 12/13/2021 149  mmHg Final    Left dorsalis pedis BP 12/13/2021 152  mmHg Final    Left arm BP 12/13/2021 136  mmHg Final    Right arm BP 12/13/2021 143  mmHg Final    Left posterior tibial 12/13/2021 149  mmHg Final    Right posterior tibial 12/13/2021 156  mmHg Final    Right FARRUKH 12/13/2021 1.09   Final    Left high thigh pressure 12/13/2021 156  mmHg Final    Right high thigh pressure 12/13/2021 173  mmHg Final    Left toe pressure 12/13/2021 106  mmHg Final    Right toe pressure 12/13/2021 131  mmHg Final    Left TBI 12/13/2021 0.74   Final    Right TBI 12/13/2021 0.92   Final    Right low thigh pressure 12/13/2021 184  mmHg Final    Right calf pressure 12/13/2021 200  mmHg Final    Left low thigh pressure 12/13/2021 151  mmHg Final    Left calf pressure 12/13/2021 172  mmHg Final    Left FARRUKH 12/13/2021 1.06   Final        Assessment:     Problem List Items Addressed This Visit        Circulatory    Peripheral vascular disease ([address] - Primary    Chronic venous hypertension              Plan:     Patient was informed about peripheral artery disease. We will keep an eye on patient's left leg vascular status closely. We talked about risk factor modification for peripheral vascular disease. Patient was also encouraged to continue her compression stocking for leg swelling and chronic venous hypertension. Patient be reassessed in 8 month follow-up.         Belkys Flores MD

## 2022-03-23 ENCOUNTER — APPOINTMENT (OUTPATIENT)
Dept: PHYSICAL THERAPY | Age: 77
End: 2022-03-23
Payer: MEDICARE

## 2022-03-23 ENCOUNTER — HOSPITAL ENCOUNTER (OUTPATIENT)
Dept: PHYSICAL THERAPY | Age: 77
Discharge: HOME | End: 2022-03-23
Payer: MEDICARE

## 2022-03-23 PROCEDURE — 97530 THERAPEUTIC ACTIVITIES: CPT

## 2022-03-23 PROCEDURE — 97110 THERAPEUTIC EXERCISES: CPT

## 2022-03-23 NOTE — PROGRESS NOTES
OT DAILY TREATMENT NOTE - [facility]     Patient Name: Saman Acevedo  Date:3/23/2022  : 1945  [x]  Patient  Verified  Payor: Lucas Frances / Plan: [place] MEDICARE PART A & B / Product Type: Medicare /    In time:1000  Out time: 1059  Total Treatment Time (min): 59  Total Timed Codes (min): 59  1:1 Treatment Time ( only): 61   Visit #: 12    Treatment Area: Hemiplegia and hemiparesis following unspecified cerebrovascular disease affecting unspecified side [I69.959]    SUBJECTIVE  Pain Level (0-10 scale): 0/10  Any medication changes, allergies to medications, adverse drug reactions, diagnosis change, or new procedure performed?: [x] No    [] Yes (see summary sheet for update)  Subjective functional status/changes:   [] No changes reported  Patient reported \"I am feeling pretty good today. \"      OBJECTIVE    44 min Therapeutic Exercise:  [x] See flow sheet :   Rationale: increase ROM and increase strength to improve the patients ability to complete ADL/IADL tasks with Min Assist.     15 min Therapeutic Activity:  [x]  See flow sheet :   Rationale: increase strength and improve coordination  to improve the patients ability to complete FM tasks with his L hand with Min Assist.       With   [x] TE   [x] TA   [] neuro   [] other: Patient Education: [x] Review HEP    [] Progressed/Changed HEP based on:   [] positioning   [] body mechanics   [] transfers   [] heat/ice application   [] Splint wear/care   [] Sensory re-education   [] scar management      [] other:              Pain Level (0-10 scale) post treatment: 0/10    ASSESSMENT/Changes in Function: Patient tolerated treatment session well today. Patient tolerated B UE therapeutic exercises utilizing 1# bar. Patient required 1-2 rest breaks during therapeutic exercises. Patient will continue to benefit from skilled OT services to modify and progress therapeutic interventions, address ROM deficits and address strength deficits to attain remaining goals. [x]  See Plan of Care  []  See progress note/recertification  []  See Discharge Summary         Progress towards goals / Updated goals:  Short Term Goals:  To be accomplished in 5 treatments:  1- Patient will increase L  strength by 10# to improve L hand function. -Partially Met  2- Patient will increase L pinch strength by 2# each direction to improve L hand function.  -Partially Met  3- Patient will improve L 9 hole peg by 20 seconds to improve fine motor control. -Not Met  4- Patient will increase L shoulder flexion/abduction (AROM) by 10 degrees to improve function with ADL/IADL tasks. -Not Met  5- Patient will increase L wrist flexion/extension (AROM) by 5 degrees to improve function with ADL/IADL tasks. -Not Met     Long Term Goals: To be accomplished in 10 treatments:     1- Patient will increase L  strength by 20# to improve L hand function. -Partially Met  2- Patient/caregiver will report performing ADL/IADL tasks with Min Assist. -Not Met  3- Patient/caregiver will be independent with HEP. -Partially Met  4- Patient will increase L shoulder flexion/abduction (AROM) by 20 degrees to improve function with ADL/IADL tasks. -Not Met    PLAN  [x]  Upgrade activities as tolerated     [x]  Continue plan of care  []  Update interventions per flow sheet       []  Discharge due to:_  []  Other:_      LEIA Villegas, MARVIN/MARCELO 3/23/2022

## 2022-03-28 ENCOUNTER — APPOINTMENT (OUTPATIENT)
Dept: PHYSICAL THERAPY | Age: 77
End: 2022-03-28
Payer: MEDICARE

## 2022-03-29 ENCOUNTER — OFFICE VISIT (OUTPATIENT)
Dept: ENT CLINIC | Age: 77
End: 2022-03-29
Payer: MEDICARE

## 2022-03-29 ENCOUNTER — OFFICE VISIT (OUTPATIENT)
Dept: ENT CLINIC | Age: 77
End: 2022-03-29

## 2022-03-29 VITALS
HEIGHT: 68 IN | TEMPERATURE: 98 F | SYSTOLIC BLOOD PRESSURE: 134 MMHG | OXYGEN SATURATION: 98 % | RESPIRATION RATE: 18 BRPM | DIASTOLIC BLOOD PRESSURE: 70 MMHG | BODY MASS INDEX: 33.65 KG/M2 | HEART RATE: 75 BPM | WEIGHT: 222 LBS

## 2022-03-29 DIAGNOSIS — H90.3 SENSORINEURAL HEARING LOSS (SNHL) OF BOTH EARS: Primary | ICD-10-CM

## 2022-03-29 DIAGNOSIS — H61.21 IMPACTED CERUMEN OF RIGHT EAR: ICD-10-CM

## 2022-03-29 DIAGNOSIS — H90.3 SENSORINEURAL HEARING LOSS (SNHL) OF BOTH EARS: ICD-10-CM

## 2022-03-29 DIAGNOSIS — H93.13 BILATERAL TINNITUS: Primary | ICD-10-CM

## 2022-03-29 PROCEDURE — 69210 REMOVE IMPACTED EAR WAX UNI: CPT | Performed by: NURSE PRACTITIONER

## 2022-03-29 PROCEDURE — 99214 OFFICE O/P EST MOD 30 MIN: CPT | Performed by: NURSE PRACTITIONER

## 2022-03-29 PROCEDURE — G8432 DEP SCR NOT DOC, RNG: HCPCS | Performed by: NURSE PRACTITIONER

## 2022-03-29 PROCEDURE — G8417 CALC BMI ABV UP PARAM F/U: HCPCS | Performed by: NURSE PRACTITIONER

## 2022-03-29 PROCEDURE — G8752 SYS BP LESS 140: HCPCS | Performed by: NURSE PRACTITIONER

## 2022-03-29 PROCEDURE — G8427 DOCREV CUR MEDS BY ELIG CLIN: HCPCS | Performed by: NURSE PRACTITIONER

## 2022-03-29 PROCEDURE — 1101F PT FALLS ASSESS-DOCD LE1/YR: CPT | Performed by: NURSE PRACTITIONER

## 2022-03-29 PROCEDURE — G8754 DIAS BP LESS 90: HCPCS | Performed by: NURSE PRACTITIONER

## 2022-03-29 PROCEDURE — 92557 COMPREHENSIVE HEARING TEST: CPT | Performed by: AUDIOLOGIST

## 2022-03-29 PROCEDURE — G8536 NO DOC ELDER MAL SCRN: HCPCS | Performed by: NURSE PRACTITIONER

## 2022-03-29 PROCEDURE — 92567 TYMPANOMETRY: CPT | Performed by: AUDIOLOGIST

## 2022-03-29 NOTE — PROGRESS NOTES
Otolaryngology-Head and Neck Surgery  Follow Up Patient Visit     Patient: Lorenzo Diego  YOB: 1945  MRN: [id number]  Date of Service:  3/29/2022    Chief Complaint: Bilateral hearing changes    History of Present Illness: Lorenzo Diego is a 64y.o. year old male who was last seen 3/1/22 for bilateral hearing changes.  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  No ENT surgical HX  No pertinent family HX    Today reports no changes or new complaints    Past Medical History:  Past Medical History:   Diagnosis Date    Acid reflux     Anemia     Diabetes [facility] [address]     GERD (gastroesophageal reflux disease)     Hypertension     Stroke [facility])        Past Surgical History:   Past Surgical History:   Procedure Laterality Date    HX COLONOSCOPY         Medications:   Current Outpatient Medications   Medication Instructions    amLODIPine (NORVASC) 10 mg tablet Oral, DAILY    aspirin delayed-release (ASPIR-81) 81 mg, Oral, DAILY    b complex vitamins tablet 1 Tablet, Oral, DAILY    carvediloL (COREG) 25 mg, Oral, 2 TIMES DAILY WITH MEALS    diclofenac (VOLTAREN) 1 % gel Topical, 4 TIMES DAILY    docusate sodium (STOOL SOFTENER) 100 mg, Oral, 2 TIMES DAILY    furosemide (LASIX) 20 mg, Oral, DAILY AS NEEDED    glucose blood VI test strips (Contour Next Test Strips) strip Does Not Apply, SEE ADMIN INSTRUCTIONS    lancets (TRUEplus Lancets) 33 gauge misc Does Not Apply    lisinopriL (PRINIVIL, ZESTRIL) 10 mg tablet Oral, DAILY    metFORMIN (GLUCOPHAGE) 500 mg tablet Oral, 2 TIMES DAILY WITH MEALS    pantoprazole (PROTONIX) 40 mg, Oral, DAILY    pravastatin (PRAVACHOL) 40 mg, Oral, EVERY BEDTIME       Allergies:   No Known Allergies    Social History:   Social History     Tobacco Use    Smoking status: Never Smoker    Smokeless tobacco: Never Used   Vaping Use    Vaping Use: Never used   Substance Use Topics    Alcohol use: Never    Drug use: Never       Family History:  Family History   Problem Relation Age of Onset    Heart Disease Father     Hypertension Brother     Diabetes Brother        Review of Systems:  Consitutional: denies fever, excessive weight gain or loss. Eyes: denies diplopia, eye pain. Integumentary: denies new concerning skin lesions. Ears, Nose, Mouth, Throat: denies except as per HPI. Endocrine: denies hot or cold intolerance, increased thirst.  Respiratory: denies cough, hemoptysis, wheezing  Gastrointestinal: denies trouble swallowing, nausea, emesis, regurgitation  Musculoskeletal: denies muscle weakness or wasting  Cardiovascular: denies chest pain, shortness of breath  Neurologic: denies seizures, numbness or tingling, syncope  Hematologic: denies easy bleeding or bruising    Physical Examination:   Vitals:    03/29/22 1008   BP: 134/70   BP 1 Location: Left upper arm   BP Patient Position: Sitting   BP Cuff Size: Large adult   Pulse: 75   Temp: 98 °F (36.7 °C)   TempSrc: Temporal   Resp: 18   Height: 5' 8\" (1.727 m)   Weight: 222 lb (100.7 kg)   SpO2: 98%         General: Comfortable, pleasant, appears stated age. Hard of hearing. Voice: Strong, speaking in full sentences, no stridor    Face: No masses or lesions, facial strength symmetric   Ears: External ears unremarkable. Left ear canal with minimal cerumen. Tympanic membrane clear and intact, with visible landmarks. Clear middle ear space. Right EC impacted with cerumen. Nose: External nose unremarkable. Dorsum midline. Anterior rhinoscopy demonstrates no lesions. Septum midline. Turbinates without hypertrophy. Oral Cavity / Oropharynx: No trismus. Mucosa pink and moist. No lesions. Tongue is midline and mobile. Palate elevates symmetrically. Uvula midline. Tonsils unremarkable. Base of tongue soft. Floor of mouth soft. Neck: Supple. No adenopathy. Thyroid unremarkable. Palpable laryngeal landmarks. Full neck range of motion   Neurologic: CN II - XI intact. Normal gait       Procedure - Removal Impacted Cerumen    Indications: cerumen impaction    Ears are examined under microscope/headlight. Right ear is cleaned using otologic curette, suction, and/or alligator forceps. Mineral oil is instilled. Results: Scant amount of cerumen remains of lateral right EC, TM intact. Wallace: lateralizes to right  Rinne: AC>BC in both ears    Assessment and Plan:   1. Cerumen impaction of right ear   2. SNHL; bilateral   3. Bilateral tinnitus      -Audiogram in office revealed mild sloping to profound HL in both ears. Results reviewed with patient and daughter.   -Return to office for hearing aid consultation.        Fan Dillard MSN, FNP-C  [facility]  [address] [place] [address]  Office Phone: [phone]

## 2022-03-29 NOTE — PROGRESS NOTES
Ryan Schwab, a 64y.o. year old male, was seen in clinic today for a hearing evaluation on referral from Delvis Rodriguez NP. Patient was previously seen on 3-1-2022 for ear cleaning with NP Jesenia Rios and reported today for completion of debridement of both ears prior to audiogram today. Patient reports improved hearing following ear cleaning but still complains of hearing loss. No recent audiogram.     Otoscopy: normal external ear canals, bilaterally. Cannot visualize right tympanic membrane due to cerumen     Tympanometry: RE Type B, flat  LE Type A, normal    SRT: RE Speech Reception Threshold (SRT) was obtained at 50 dBHL LE Speech Reception Threshold (SRT) was obtained at 60 dBHL    WRS: RE Poor in quiet when words were presented at 95 dBHL. WRS: LE Poor in quiet when words were presented at 95 dBHL. Pure tone audiometry:  RE: Mild sloping to profound sensorineural hearing loss  LE: Mild sloping to profound sensorineural hearing loss    Sensorineural hearing loss, bilaterally    Impressions:  hearing loss requiring medical/otologic and audiologic follow-up  Discussed results of today's testing with patient. Patient is a good candidate for amplification. Discussed benefits of hearing aids and recommended hearing aid evaluation to discuss options. Patient indicated understanding. Provided hearing aid evaluation handout today. Plan:  Follow-up with NP/ENT.   Repeat audiogram upon request.  Hearing aid evaluation upon request.    Pacheco Chapman   Doctor of Audiology

## 2022-03-30 ENCOUNTER — HOSPITAL ENCOUNTER (OUTPATIENT)
Dept: PHYSICAL THERAPY | Age: 77
Discharge: HOME | End: 2022-03-30
Payer: MEDICARE

## 2022-03-30 PROCEDURE — 97110 THERAPEUTIC EXERCISES: CPT

## 2022-03-30 NOTE — PROGRESS NOTES
OT DAILY TREATMENT NOTE - [facility]     Patient Name: Shawn Leyva  Date:3/30/2022  : 1945  [x]  Patient  Verified  Payor: [place] MEDICARE / Plan: [place] MEDICARE PART A & B / Product Type: Medicare /    In time: 1003  Out time: 1100  Total Treatment Time (min): 57  Total Timed Codes (min): 57  1:1 Treatment Time ( only): 62   Visit #: 13    Treatment Area: Hemiplegia and hemiparesis following unspecified cerebrovascular disease affecting unspecified side [I69.959]    SUBJECTIVE  Pain Level (0-10 scale): 0/10  Any medication changes, allergies to medications, adverse drug reactions, diagnosis change, or new procedure performed?: [x] No    [] Yes (see summary sheet for update)  Subjective functional status/changes:   [] No changes reported  Patient reported \"I am feeling a little stiff today. \"    OBJECTIVE    57 min Therapeutic Exercise:  [x] See flow sheet :   Rationale: increase ROM and increase strength to improve the patients ability to complete ADL/IADL tasks with Min Assist.       With   [x] TE   [] TA   [] neuro   [] other: Patient Education: [x] Review HEP    [] Progressed/Changed HEP based on:   [] positioning   [] body mechanics   [] transfers   [] heat/ice application   [] Splint wear/care   [] Sensory re-education   [] scar management      [] other:        Pain Level (0-10 scale) post treatment: 0/10    ASSESSMENT/Changes in Function: Patient tolerated treatment session well with minimal complaints throughout. Patient tolerated 2 sets of 10 reps B UE therapeutic exercises utilizing 1# bar. Patient will continue to benefit from skilled OT services to modify and progress therapeutic interventions, address ROM deficits and address strength deficits to attain remaining goals. [x]  See Plan of Care  []  See progress note/recertification  []  See Discharge Summary         Progress towards goals / Updated goals:  Short Term Goals:  To be accomplished in 5 treatments:  1- Patient will increase L  strength by 10# to improve L hand function. -Partially Met  2- Patient will increase L pinch strength by 2# each direction to improve L hand function.  -Partially Met  3- Patient will improve L 9 hole peg by 20 seconds to improve fine motor control. -Not Met  4- Patient will increase L shoulder flexion/abduction (AROM) by 10 degrees to improve function with ADL/IADL tasks. -Not Met  5- Patient will increase L wrist flexion/extension (AROM) by 5 degrees to improve function with ADL/IADL tasks. -Not Met     Long Term Goals: To be accomplished in 10 treatments:     1- Patient will increase L  strength by 20# to improve L hand function. -Partially Met  2- Patient/caregiver will report performing ADL/IADL tasks with Min Assist. -Not Met  3- Patient/caregiver will be independent with HEP. -Partially Met  4- Patient will increase L shoulder flexion/abduction (AROM) by 20 degrees to improve function with ADL/IADL tasks. -Not Met       PLAN  [x]  Upgrade activities as tolerated     [x]  Continue plan of care  []  Update interventions per flow sheet       []  Discharge due to:_  []  Other:_      LEIA Gaspar, OTR/L 3/30/2022

## 2022-04-04 ENCOUNTER — APPOINTMENT (OUTPATIENT)
Dept: PHYSICAL THERAPY | Age: 77
End: 2022-04-04
Payer: MEDICARE

## 2022-04-06 ENCOUNTER — APPOINTMENT (OUTPATIENT)
Dept: PHYSICAL THERAPY | Age: 77
End: 2022-04-06
Payer: MEDICARE

## 2022-04-12 ENCOUNTER — HOSPITAL ENCOUNTER (OUTPATIENT)
Dept: PHYSICAL THERAPY | Age: 77
Discharge: HOME | End: 2022-04-12
Payer: MEDICARE

## 2022-04-12 PROCEDURE — 97110 THERAPEUTIC EXERCISES: CPT

## 2022-04-12 PROCEDURE — 97530 THERAPEUTIC ACTIVITIES: CPT

## 2022-04-12 NOTE — PROGRESS NOTES
[address]  [place], [place]  Ph: [phone]     Fax: [phone]    Discharge Summary 2-15    Patient name: Brandi Marion  :   Provider#: [id number]  Referral source: Bev Keen MD      Medical/Treatment Diagnosis: Hemiplegia and hemiparesis following unspecified cerebrovascular disease affecting unspecified side [I69.959]     Prior Hospitalization: see medical history     Comorbidities: See Plan of Care  Prior Level of Function: See Plan of Care  Medications: Verified on Patient Summary List    Start of Care: 2022      Onset Date:2015   Visits from Start of Care: 14   Missed Visits: 8  Reporting Period : 2022 to 2022    Assessment / Summary of care:   Patient is a 64 y.o. male who presented to occupational therapy with hx of CVA with L hemiplegia, which occurred 6.5 years ago. Patient has completed 14 visits.  Patient initially demonstrated decreased L UE strength, decreased L UE ROM, decreased L UE fine motor coordination/prehension and decreased ADL/functional abilities. Patient has made minimal progress with L UE strength and AROM. Patient has demonstrated improvement in L  strength from 20# to 27# since the initial evaluation. Patient continues to demonstrate L 3 pt, 2 pt and lateral pinch strength at 2-4# each since the last dated progress note on 3/7/2022. Patient continues to demonstrate increased difficulty utilizing his L UE during functional tasks. Patient reports he continues to require moderate to maximum assistance with basic ADLs, including bathing and dressing tasks. Patient and caregiver instructed in [address] [place] [address],[address] verbalized understanding. Patient to be discharged from occupational therapy due to lack of appreciable progress towards set goals. Thank you for this referral.        Progress Toward Goals:  Short Term Goals:  To be accomplished in 5 treatments:  1- Patient will increase L  strength by 10# to improve L hand function. -Partially Met  2- Patient will increase L pinch strength by 2# each direction to improve L hand function.  -Partially Met  3- Patient will improve L 9 hole peg by 20 seconds to improve fine motor control. -Not Met  4- Patient will increase L shoulder flexion/abduction (AROM) by 10 degrees to improve function with ADL/IADL tasks. -Not Met  5- Patient will increase L wrist flexion/extension (AROM) by 5 degrees to improve function with ADL/IADL tasks. -Not Met     Long Term Goals: To be accomplished in 10 treatments:     1- Patient will increase L  strength by 20# to improve L hand function. -Partially Met  2- Patient/caregiver will report performing ADL/IADL tasks with Min Assist. -Not Met  3- Patient/caregiver will be independent with HEP.  -Met  4- Patient will increase L shoulder flexion/abduction (AROM) by 20 degrees to improve function with ADL/IADL tasks. -Not Met    RECOMMENDATIONS:  [x]Discontinue therapy: []Patient has reached or is progressing toward set goals     []Patient is non-compliant or has abdicated     [x]Due to lack of appreciable progress towards set goals     []Other    Austin Gain, MSOT, OTR/L,  4/12/2022

## 2022-04-12 NOTE — PROGRESS NOTES
OT DAILY TREATMENT NOTE - [facility]     Patient Name: Jeannine Grande  Date:2022  : 1945  [x]  Patient  Verified  Payor: Beulah Chavira / Plan: [place] / Product Type: Medicare /    In time: 1100  Out time:1156  Total Treatment Time (min): 56  Total Timed Codes (min): 56  1:1 Treatment Time ( only): 64   Visit #: 14    Treatment Area: Hemiplegia and hemiparesis following unspecified cerebrovascular disease affecting unspecified side [I69.959]    SUBJECTIVE  Pain Level (0-10 scale): 0/10  Any medication changes, allergies to medications, adverse drug reactions, diagnosis change, or new procedure performed?: [x] No    [] Yes (see summary sheet for update)  Subjective functional status/changes:   [] No changes reported  Patient reported \"I have been working on my exercises. \"    OBJECTIVE    41 min Therapeutic Exercise:  [x] See flow sheet :   Rationale: increase ROM and increase strength to improve the patients ability to complete ADL/IADL tasks with Min Assist.     15 min Therapeutic Activity:  [x]  See flow sheet :   Rationale: increase strength and improve coordination  to improve the patients ability to complete ADL/IADL tasks with Min Assist.        With   [x] TE   [x] TA   [] neuro   [] other: Patient Education: [x] Review HEP    [] Progressed/Changed HEP based on:   [] positioning   [] body mechanics   [] transfers   [] heat/ice application   [] Splint wear/care   [] Sensory re-education   [] scar management      [] other:            Other Objective/Functional Measures:     9 hole peg test:   L- not able to  pegs this date; attempted in 3 min 15 sec with no success.         Strength: L UE                                                       3pt pinch            2pt pinch              Lateral pinch  Left 27# 2# 4# 8#                  Range of Motion: L UE       L UE   AROM   Shoulder flexion  25   Shoulder abduction  40   Wrist flexion  46   Wrist extension  36           Pain Level (0-10 scale) post treatment: 0/10    ASSESSMENT/Changes in Function:   Patient is a 64 y.o. male who presented to occupational therapy with hx of CVA with L hemiplegia, which occurred 6.5 years ago. Patient has completed 14 visits.  Patient initially demonstrated decreased L UE strength, decreased L UE ROM, decreased L UE fine motor coordination/prehension and decreased ADL/functional abilities. Patient has made minimal progress with L UE strength and AROM. Patient has demonstrated improvement in L  strength from 20# to 27# since the initial evaluation. Patient continues to demonstrate L 3 pt, 2 pt and lateral pinch strength at 2-4# each since the last dated progress note on 3/7/2022. Patient continues to demonstrate increased difficulty utilizing his L UE during functional tasks. Patient reports he continues to require moderate to maximum assistance with basic ADLs, including bathing and dressing tasks. Patient and caregiver instructed in [address] [place] [address],[address] verbalized understanding. Patient to be discharged from occupational therapy due to lack of appreciable progress towards set goals.         []  See Plan of Care  []  See progress note/recertification  [x]  See Discharge Summary         Progress towards goals / Updated goals:  Short Term Goals:  To be accomplished in 5 treatments:  1- Patient will increase L  strength by 10# to improve L hand function. -Partially Met  2- Patient will increase L pinch strength by 2# each direction to improve L hand function.  -Partially Met  3- Patient will improve L 9 hole peg by 20 seconds to improve fine motor control. -Not Met  4- Patient will increase L shoulder flexion/abduction (AROM) by 10 degrees to improve function with ADL/IADL tasks. -Not Met  5- Patient will increase L wrist flexion/extension (AROM) by 5 degrees to improve function with ADL/IADL tasks. -Not Met     Long Term Goals: To be accomplished in 10 treatments:     1- Patient will increase L  strength by 20# to improve L hand function. -Partially Met  2- Patient/caregiver will report performing ADL/IADL tasks with Min Assist. -Not Met  3- Patient/caregiver will be independent with HEP.  -Met  4- Patient will increase L shoulder flexion/abduction (AROM) by 20 degrees to improve function with ADL/IADL tasks. -Not Met    PLAN  []  Upgrade activities as tolerated     []  Continue plan of care  []  Update interventions per flow sheet       [x]  Discharge due to: lack of appreciable progress towards set goals  []  Other:_      LEIA Contreras, OTR/L 4/12/2022

## 2022-04-22 ENCOUNTER — OFFICE VISIT (OUTPATIENT)
Dept: ENT CLINIC | Age: 77
End: 2022-04-22
Payer: MEDICARE

## 2022-04-22 DIAGNOSIS — H90.3 SENSORINEURAL HEARING LOSS (SNHL) OF BOTH EARS: Primary | ICD-10-CM

## 2022-04-22 PROCEDURE — V5010 ASSESSMENT FOR HEARING AID: HCPCS | Performed by: AUDIOLOGIST

## 2022-04-22 NOTE — PROGRESS NOTES
Pt. Name: Lyle Lomas   :   MRN: [id number]    Appointment type: Hearing Aid Evaluation  Patient is a very pleasant 64y.o. year old male referred by Brea Zafar NP for a hearing aid evaluation. Patient's last hearing test was 3- which shows a bilateral mild sloping to profound sensorineural hearing loss. Reviewed audiogram with patient and discussed hearing aid candidacy. Went over hearing aid style, technology and cost. Recommended trial with hearing aids. Did demo Oticon RICs with  tips today and patient was very pleased. Provided pricing information for Oticon Zircon 2 miniBTE-R hearing aids with custom molds today. Hearing Aid Bettyjane Spar form can be found under Media. Did discuss insurance coverage with patient today. Patient has a hearing aid discount available through his secondary insurance plan with a third-party . Did inform patient that [facility] does not participate with this third-party ; however he is welcome to contact them for more information if he is interested in taking advantage of discounts. Provided phone number for [employer] today. If patient chooses to obtain hearing aids through our office, earmold impressions should be taken prior to fitting. Patient has a great deal fo difficulty hearing on the telephone. Did discuss option for [employer] telephone to be installed; patient is interested in this option. Will complete [employer] referral form on patient's behalf. Plan: Patient to call with decision and RTC for HAF.      Pacheco Mosley  Doctor of Audiology

## 2022-06-09 ENCOUNTER — TELEPHONE (OUTPATIENT)
Dept: ENT CLINIC | Age: 77
End: 2022-06-09

## 2022-06-09 NOTE — TELEPHONE ENCOUNTER
Called pt's number and emergency contact Kaity Castro, unable to LVM. Per Dr. Alma Suresh he is unable to sign FMLA form due to the reasons of why. \"The reasons for FMLA are far beyond his hearing loss and involving many other medical problems.  This paperwork will have to be completed by PCP\"

## 2022-10-06 ENCOUNTER — APPOINTMENT (OUTPATIENT)
Dept: CT IMAGING | Age: 77
End: 2022-10-06
Attending: EMERGENCY MEDICINE
Payer: MEDICARE

## 2022-10-06 ENCOUNTER — APPOINTMENT (OUTPATIENT)
Dept: GENERAL RADIOLOGY | Age: 77
End: 2022-10-06
Attending: EMERGENCY MEDICINE
Payer: MEDICARE

## 2022-10-06 ENCOUNTER — HOSPITAL ENCOUNTER (EMERGENCY)
Age: 77
Discharge: ANOTHER HOSPITAL | End: 2022-10-06
Attending: EMERGENCY MEDICINE | Admitting: EMERGENCY MEDICINE
Payer: MEDICARE

## 2022-10-06 ENCOUNTER — HOSPITAL ENCOUNTER (INPATIENT)
Age: 77
LOS: 4 days | Discharge: NURSING, REHAB OR LONG-TERM CARE | DRG: 563 | End: 2022-10-10
Attending: EMERGENCY MEDICINE | Admitting: SURGERY
Payer: MEDICARE

## 2022-10-06 VITALS
HEART RATE: 81 BPM | OXYGEN SATURATION: 93 % | SYSTOLIC BLOOD PRESSURE: 169 MMHG | RESPIRATION RATE: 18 BRPM | HEIGHT: 68 IN | BODY MASS INDEX: 33.65 KG/M2 | WEIGHT: 222 LBS | TEMPERATURE: 98.6 F | DIASTOLIC BLOOD PRESSURE: 88 MMHG

## 2022-10-06 DIAGNOSIS — S22.42XA CLOSED FRACTURE OF MULTIPLE RIBS OF LEFT SIDE, INITIAL ENCOUNTER: Primary | ICD-10-CM

## 2022-10-06 DIAGNOSIS — S42.295A OTHER CLOSED NONDISPLACED FRACTURE OF PROXIMAL END OF LEFT HUMERUS, INITIAL ENCOUNTER: ICD-10-CM

## 2022-10-06 DIAGNOSIS — S42.302A CLOSED FRACTURE OF SHAFT OF LEFT HUMERUS, UNSPECIFIED FRACTURE MORPHOLOGY, INITIAL ENCOUNTER: ICD-10-CM

## 2022-10-06 PROBLEM — S42.309A HUMERUS SHAFT FRACTURE: Status: ACTIVE | Noted: 2022-10-06

## 2022-10-06 PROBLEM — S22.49XA MULTIPLE RIB FRACTURES: Status: ACTIVE | Noted: 2022-10-06

## 2022-10-06 LAB
ALBUMIN SERPL-MCNC: 3.4 G/DL (ref 3.5–5)
ALBUMIN/GLOB SERPL: 0.8 {RATIO} (ref 1.1–2.2)
ALP SERPL-CCNC: 72 U/L (ref 45–117)
ALT SERPL-CCNC: 16 U/L (ref 12–78)
ANION GAP SERPL CALC-SCNC: 9 MMOL/L (ref 5–15)
AST SERPL W P-5'-P-CCNC: 11 U/L (ref 15–37)
BASOPHILS # BLD: 0 K/UL (ref 0–0.1)
BASOPHILS NFR BLD: 0 % (ref 0–1)
BILIRUB SERPL-MCNC: 0.4 MG/DL (ref 0.2–1)
BUN SERPL-MCNC: 4 MG/DL (ref 6–20)
BUN/CREAT SERPL: 4 (ref 12–20)
CA-I BLD-MCNC: 8.9 MG/DL (ref 8.5–10.1)
CHLORIDE SERPL-SCNC: 99 MMOL/L (ref 97–108)
CO2 SERPL-SCNC: 32 MMOL/L (ref 21–32)
CREAT SERPL-MCNC: 0.97 MG/DL (ref 0.7–1.3)
DIFFERENTIAL METHOD BLD: ABNORMAL
EOSINOPHIL # BLD: 0.1 K/UL (ref 0–0.4)
EOSINOPHIL NFR BLD: 1 % (ref 0–7)
ERYTHROCYTE [DISTWIDTH] IN BLOOD BY AUTOMATED COUNT: 12.7 % (ref 11.5–14.5)
GLOBULIN SER CALC-MCNC: 4.4 G/DL (ref 2–4)
GLUCOSE SERPL-MCNC: 197 MG/DL (ref 65–100)
HCT VFR BLD AUTO: 38.8 % (ref 36.6–50.3)
HGB BLD-MCNC: 13.7 G/DL (ref 12.1–17)
IMM GRANULOCYTES # BLD AUTO: 0.1 K/UL (ref 0–0.04)
IMM GRANULOCYTES NFR BLD AUTO: 1 % (ref 0–0.5)
LYMPHOCYTES # BLD: 1.7 K/UL (ref 0.8–3.5)
LYMPHOCYTES NFR BLD: 14 % (ref 12–49)
MCH RBC QN AUTO: 32.8 PG (ref 26–34)
MCHC RBC AUTO-ENTMCNC: 35.3 G/DL (ref 30–36.5)
MCV RBC AUTO: 92.8 FL (ref 80–99)
MONOCYTES # BLD: 1 K/UL (ref 0–1)
MONOCYTES NFR BLD: 8 % (ref 5–13)
NEUTS SEG # BLD: 9.2 K/UL (ref 1.8–8)
NEUTS SEG NFR BLD: 76 % (ref 32–75)
NRBC # BLD: 0 K/UL (ref 0–0.01)
NRBC BLD-RTO: 0 PER 100 WBC
PLATELET # BLD AUTO: 278 K/UL (ref 150–400)
PMV BLD AUTO: 11.2 FL (ref 8.9–12.9)
POTASSIUM SERPL-SCNC: 2.7 MMOL/L (ref 3.5–5.1)
PROT SERPL-MCNC: 7.8 G/DL (ref 6.4–8.2)
RBC # BLD AUTO: 4.18 M/UL (ref 4.1–5.7)
SODIUM SERPL-SCNC: 140 MMOL/L (ref 136–145)
TROPONIN-HIGH SENSITIVITY: 28 NG/L (ref 0–76)
WBC # BLD AUTO: 12 K/UL (ref 4.1–11.1)

## 2022-10-06 PROCEDURE — 99285 EMERGENCY DEPT VISIT HI MDM: CPT

## 2022-10-06 PROCEDURE — 74011250637 HC RX REV CODE- 250/637: Performed by: EMERGENCY MEDICINE

## 2022-10-06 PROCEDURE — 74011250636 HC RX REV CODE- 250/636: Performed by: SURGERY

## 2022-10-06 PROCEDURE — 70450 CT HEAD/BRAIN W/O DYE: CPT

## 2022-10-06 PROCEDURE — 72125 CT NECK SPINE W/O DYE: CPT

## 2022-10-06 PROCEDURE — 74011250636 HC RX REV CODE- 250/636: Performed by: EMERGENCY MEDICINE

## 2022-10-06 PROCEDURE — 73502 X-RAY EXAM HIP UNI 2-3 VIEWS: CPT

## 2022-10-06 PROCEDURE — 36415 COLL VENOUS BLD VENIPUNCTURE: CPT

## 2022-10-06 PROCEDURE — 73030 X-RAY EXAM OF SHOULDER: CPT

## 2022-10-06 PROCEDURE — 71250 CT THORAX DX C-: CPT

## 2022-10-06 PROCEDURE — 74177 CT ABD & PELVIS W/CONTRAST: CPT

## 2022-10-06 PROCEDURE — 84484 ASSAY OF TROPONIN QUANT: CPT

## 2022-10-06 PROCEDURE — 93005 ELECTROCARDIOGRAM TRACING: CPT

## 2022-10-06 PROCEDURE — 73060 X-RAY EXAM OF HUMERUS: CPT

## 2022-10-06 PROCEDURE — 73070 X-RAY EXAM OF ELBOW: CPT

## 2022-10-06 PROCEDURE — 96374 THER/PROPH/DIAG INJ IV PUSH: CPT

## 2022-10-06 PROCEDURE — 85025 COMPLETE CBC W/AUTO DIFF WBC: CPT

## 2022-10-06 PROCEDURE — 74011000636 HC RX REV CODE- 636: Performed by: EMERGENCY MEDICINE

## 2022-10-06 PROCEDURE — 80053 COMPREHEN METABOLIC PANEL: CPT

## 2022-10-06 PROCEDURE — 99285 EMERGENCY DEPT VISIT HI MDM: CPT | Performed by: EMERGENCY MEDICINE

## 2022-10-06 PROCEDURE — 65270000029 HC RM PRIVATE

## 2022-10-06 PROCEDURE — 71045 X-RAY EXAM CHEST 1 VIEW: CPT

## 2022-10-06 RX ORDER — KETOROLAC TROMETHAMINE 15 MG/ML
15 INJECTION, SOLUTION INTRAMUSCULAR; INTRAVENOUS ONCE
Status: DISCONTINUED | OUTPATIENT
Start: 2022-10-06 | End: 2022-10-06 | Stop reason: HOSPADM

## 2022-10-06 RX ORDER — ACETAMINOPHEN 500 MG
500 TABLET ORAL
Status: COMPLETED | OUTPATIENT
Start: 2022-10-06 | End: 2022-10-06

## 2022-10-06 RX ORDER — POTASSIUM CHLORIDE 20 MEQ/1
40 TABLET, EXTENDED RELEASE ORAL
Status: COMPLETED | OUTPATIENT
Start: 2022-10-06 | End: 2022-10-06

## 2022-10-06 RX ORDER — MORPHINE SULFATE 2 MG/ML
2 INJECTION, SOLUTION INTRAMUSCULAR; INTRAVENOUS
Status: DISCONTINUED | OUTPATIENT
Start: 2022-10-06 | End: 2022-10-06

## 2022-10-06 RX ORDER — MORPHINE SULFATE 2 MG/ML
2 INJECTION, SOLUTION INTRAMUSCULAR; INTRAVENOUS
Status: DISCONTINUED | OUTPATIENT
Start: 2022-10-06 | End: 2022-10-07

## 2022-10-06 RX ORDER — ENOXAPARIN SODIUM 100 MG/ML
40 INJECTION SUBCUTANEOUS EVERY 24 HOURS
Status: DISCONTINUED | OUTPATIENT
Start: 2022-10-06 | End: 2022-10-10 | Stop reason: HOSPADM

## 2022-10-06 RX ORDER — SODIUM CHLORIDE, SODIUM LACTATE, POTASSIUM CHLORIDE, CALCIUM CHLORIDE 600; 310; 30; 20 MG/100ML; MG/100ML; MG/100ML; MG/100ML
75 INJECTION, SOLUTION INTRAVENOUS CONTINUOUS
Status: DISCONTINUED | OUTPATIENT
Start: 2022-10-06 | End: 2022-10-09

## 2022-10-06 RX ORDER — MORPHINE SULFATE 2 MG/ML
2 INJECTION, SOLUTION INTRAMUSCULAR; INTRAVENOUS
Status: DISCONTINUED | OUTPATIENT
Start: 2022-10-06 | End: 2022-10-06 | Stop reason: HOSPADM

## 2022-10-06 RX ORDER — MORPHINE SULFATE 4 MG/ML
4 INJECTION INTRAVENOUS
Status: COMPLETED | OUTPATIENT
Start: 2022-10-06 | End: 2022-10-06

## 2022-10-06 RX ADMIN — ACETAMINOPHEN 500 MG: 500 TABLET ORAL at 12:36

## 2022-10-06 RX ADMIN — SODIUM CHLORIDE, POTASSIUM CHLORIDE, SODIUM LACTATE AND CALCIUM CHLORIDE 75 ML/HR: 600; 310; 30; 20 INJECTION, SOLUTION INTRAVENOUS at 21:04

## 2022-10-06 RX ADMIN — ACETAMINOPHEN 500 MG: 500 TABLET ORAL at 17:47

## 2022-10-06 RX ADMIN — MORPHINE SULFATE 4 MG: 4 INJECTION INTRAVENOUS at 19:46

## 2022-10-06 RX ADMIN — ENOXAPARIN SODIUM 40 MG: 100 INJECTION SUBCUTANEOUS at 21:04

## 2022-10-06 RX ADMIN — IOPAMIDOL 100 ML: 755 INJECTION, SOLUTION INTRAVENOUS at 13:41

## 2022-10-06 RX ADMIN — POTASSIUM CHLORIDE 40 MEQ: 1500 TABLET, EXTENDED RELEASE ORAL at 15:46

## 2022-10-06 NOTE — ED NOTES
Went in to give pt pain med- pt's daughter states she does not want pt to have any \"morphine\". No allergy to med- she just does not want him to have any.  Pain is controlled per pt

## 2022-10-06 NOTE — ED NOTES
Spoke with Manuel Cohen RN with transfer center regarding the transfer of this patient to [facility].

## 2022-10-06 NOTE — ED PROVIDER NOTES
EMERGENCY DEPARTMENT HISTORY AND PHYSICAL EXAM      Date: 10/6/2022  Patient Name: Russel Okeefe    History of Presenting Illness     Chief Complaint   Patient presents with    Abdominal Pain       History Provided By: Patient and Patient's Daughter    HPI: Russel Okeefe, 76 y.o. male with a history of anemia, diabetes, hypertension and CVA presents to the emergency department for evaluation of left-sided lower rib and left-sided abdominal pain as well as left hip and left arm pain and some question of possible confusion at home after a fall last night. Patient states he got up to use the bathroom and tripped over his feet landing onto his left side. This happened overnight and patient states throughout the day he had persistent pain and had trouble getting out of bed this morning and so was brought in for evaluation. Patient did also report some shortness of breath earlier in the day. He notes pain mostly with taking deep breaths. There are no other complaints, changes, or physical findings at this time. PCP: Sia Garvey MD    No current facility-administered medications on file prior to encounter. Current Outpatient Medications on File Prior to Encounter   Medication Sig Dispense Refill    furosemide (LASIX) 20 mg tablet Take 20 mg by mouth daily as needed. aspirin delayed-release (Aspir-81) 81 mg tablet Take 81 mg by mouth daily. b complex vitamins tablet Take 1 Tablet by mouth daily. amLODIPine (NORVASC) 10 mg tablet Take  by mouth daily. carvediloL (COREG) 25 mg tablet Take 25 mg by mouth two (2) times daily (with meals). glucose blood VI test strips (Contour Next Test Strips) strip by Does Not Apply route See Admin Instructions. diclofenac (VOLTAREN) 1 % gel Apply  to affected area four (4) times daily. lisinopriL (PRINIVIL, ZESTRIL) 10 mg tablet Take  by mouth daily.       metFORMIN (GLUCOPHAGE) 500 mg tablet Take  by mouth two (2) times daily (with meals). pantoprazole (PROTONIX) 40 mg tablet Take 40 mg by mouth daily. pravastatin (PRAVACHOL) 40 mg tablet Take 40 mg by mouth nightly. docusate sodium (Stool Softener) 100 mg capsule Take 100 mg by mouth two (2) times a day. lancets (TRUEplus Lancets) 33 gauge misc by Does Not Apply route. Past History     Past Medical History:  Past Medical History:   Diagnosis Date    Acid reflux     Anemia     Diabetes (HCC)     GERD (gastroesophageal reflux disease)     Hypertension     Stroke [facility])        Past Surgical History:  Past Surgical History:   Procedure Laterality Date    HX COLONOSCOPY         Family History:  Family History   Problem Relation Age of Onset    Heart Disease Father     Hypertension Brother     Diabetes Brother        Social History:  Social History     Tobacco Use    Smoking status: Never    Smokeless tobacco: Never   Vaping Use    Vaping Use: Never used   Substance Use Topics    Alcohol use: Never    Drug use: Never       Allergies:  No Known Allergies    Review of Systems   Review of Systems   Constitutional:  Positive for activity change, appetite change and fatigue. HENT: Negative. Negative for congestion and sinus pain. Eyes: Negative. Respiratory: Negative. Negative for cough, chest tightness and shortness of breath. Cardiovascular: Negative. Negative for chest pain and palpitations. Gastrointestinal: Negative. Negative for abdominal distention, nausea and vomiting. Endocrine: Negative. Genitourinary: Negative. Negative for difficulty urinating and dysuria. Musculoskeletal:  Positive for arthralgias, back pain, gait problem and myalgias. Negative for joint swelling, neck pain and neck stiffness. Skin: Negative. Negative for color change, rash and wound. Neurological:  Negative for dizziness, facial asymmetry, speech difficulty, light-headedness, numbness and headaches. Psychiatric/Behavioral:  Positive for confusion.  Negative for agitation, hallucinations and sleep disturbance. Physical Exam   Physical Exam  Vitals and nursing note reviewed. Constitutional:       General: He is not in acute distress. Appearance: He is well-developed. He is not ill-appearing. HENT:      Head: Normocephalic and atraumatic. Cardiovascular:      Rate and Rhythm: Normal rate. Heart sounds: Normal heart sounds. Pulmonary:      Effort: Pulmonary effort is normal.      Breath sounds: Normal breath sounds. Comments: Tenderness left lateral lower ribs from around rib 6 down to rib 12. Chest:      Chest wall: Tenderness present. Abdominal:      General: Abdomen is protuberant. Bowel sounds are normal. There is no distension or abdominal bruit. Palpations: Abdomen is soft. Tenderness: There is abdominal tenderness in the left upper quadrant and left lower quadrant. There is left CVA tenderness and guarding. There is no rebound. Negative signs include Sutherland's sign, Rovsing's sign and psoas sign. Hernia: No hernia is present. Skin:     General: Skin is warm and dry. Capillary Refill: Capillary refill takes less than 2 seconds. Coloration: Skin is not mottled. Findings: No erythema or rash. Neurological:      Mental Status: He is alert.    Psychiatric:         Mood and Affect: Mood normal.         Behavior: Behavior normal.       Lab and Diagnostic Study Results   Labs -     Recent Results (from the past 12 hour(s))   CBC WITH AUTOMATED DIFF    Collection Time: 10/06/22 12:51 PM   Result Value Ref Range    WBC 12.0 (H) 4.1 - 11.1 K/uL    RBC 4.18 4.10 - 5.70 M/uL    HGB 13.7 12.1 - 17.0 g/dL    HCT 38.8 36.6 - 50.3 %    MCV 92.8 80.0 - 99.0 FL    MCH 32.8 26.0 - 34.0 PG    MCHC 35.3 30.0 - 36.5 g/dL    RDW 12.7 11.5 - 14.5 %    PLATELET 801 682 - 087 K/uL    MPV 11.2 8.9 - 12.9 FL    NRBC 0.0 0.0  WBC    ABSOLUTE NRBC 0.00 0.00 - 0.01 K/uL    NEUTROPHILS 76 (H) 32 - 75 %    LYMPHOCYTES 14 12 - 49 % MONOCYTES 8 5 - 13 %    EOSINOPHILS 1 0 - 7 %    BASOPHILS 0 0 - 1 %    IMMATURE GRANULOCYTES 1 (H) 0 - 0.5 %    ABS. NEUTROPHILS 9.2 (H) 1.8 - 8.0 K/UL    ABS. LYMPHOCYTES 1.7 0.8 - 3.5 K/UL    ABS. MONOCYTES 1.0 0.0 - 1.0 K/UL    ABS. EOSINOPHILS 0.1 0.0 - 0.4 K/UL    ABS. BASOPHILS 0.0 0.0 - 0.1 K/UL    ABS. IMM. GRANS. 0.1 (H) 0.00 - 0.04 K/UL    DF AUTOMATED     METABOLIC PANEL, COMPREHENSIVE    Collection Time: 10/06/22 12:51 PM   Result Value Ref Range    Sodium 140 136 - 145 mmol/L    Potassium 2.7 (LL) 3.5 - 5.1 mmol/L    Chloride 99 97 - 108 mmol/L    CO2 32 21 - 32 mmol/L    Anion gap 9 5 - 15 mmol/L    Glucose 197 (H) 65 - 100 mg/dL    BUN 4 (L) 6 - 20 mg/dL    Creatinine 0.97 0.70 - 1.30 mg/dL    BUN/Creatinine ratio 4 (L) 12 - 20      eGFR >60 >60 ml/min/1.73m2    Calcium 8.9 8.5 - 10.1 mg/dL    Bilirubin, total 0.4 0.2 - 1.0 mg/dL    AST (SGOT) 11 (L) 15 - 37 U/L    ALT (SGPT) 16 12 - 78 U/L    Alk. phosphatase 72 45 - 117 U/L    Protein, total 7.8 6.4 - 8.2 g/dL    Albumin 3.4 (L) 3.5 - 5.0 g/dL    Globulin 4.4 (H) 2.0 - 4.0 g/dL    A-G Ratio 0.8 (L) 1.1 - 2.2     TROPONIN-HIGH SENSITIVITY    Collection Time: 10/06/22 12:51 PM   Result Value Ref Range    Troponin-High Sensitivity 28 0 - 76 ng/L   EKG, 12 LEAD, SUBSEQUENT    Collection Time: 10/06/22  1:57 PM   Result Value Ref Range    Ventricular Rate 81 BPM    Atrial Rate 82 BPM    P-R Interval 195 ms    QRS Duration 95 ms    Q-T Interval 458 ms    QTC Calculation (Bezet) 532 ms    Calculated P Axis 13 degrees    Calculated R Axis -14 degrees    Calculated T Axis -20 degrees    Diagnosis       Sinus rhythm  Multiple premature complexes, vent & supraven  Left ventricular hypertrophy  Borderline T abnormalities, diffuse leads  Prolonged QT interval  Baseline wander in lead(s) V6         Radiologic Studies -   @lastxrresult@  CT Results  (Last 48 hours)                 10/06/22 1341  CT ABD PELV W CONT Final result    Impression:  1.   Study limited by motion. 2.  Acute left 7th-12th rib fractures. 3.  Bilateral lower lobe patchy dependent atelectasis/airspace disease and trace   bilateral pleural effusions. 4.  10 mm enhancing focus in the pancreatic tail (201-28), too small accurately   characterize, but cannot exclude a tiny neuroendocrine tumor. Nonemergent   contrast enhanced MRI/MRCP abdomen recommended for further characterization. 5.  Ovoid high-density focus in the gastric fundus. Correlate with recent   ingestion and consider nonemergent endoscopic evaluation to assess for blood   products or mass lesion if indicated. Narrative:  EXAM: CT ABD PELV W CONT       INDICATION: LUQ, left lower chest wall pain after fall last night 1       COMPARISON: None        CONTRAST: 100 mL of Isovue-370. ORAL CONTRAST: None       TECHNIQUE:    Following the uneventful intravenous administration of contrast, thin axial   images were obtained through the abdomen and pelvis. Coronal and sagittal   reconstructions were generated. CT dose reduction was achieved through use of a   standardized protocol tailored for this examination and automatic exposure   control for dose modulation. FINDINGS: Study limited by motion. LOWER THORAX: Cardiomegaly. Trace bilateral pleural effusions. Bilateral lower   lobe patchy dependent consolidations, left larger than right. LIVER: No mass. BILIARY TREE: Gallbladder is within normal limits. CBD is not dilated. SPLEEN: within normal limits. PANCREAS: 10 mm enhancing focus in the tail (201-28), too small accurately   characterize, but cannot exclude a tiny neuroendocrine tumor. ADRENALS: Unremarkable. KIDNEYS: No mass, calculus, or hydronephrosis. Several bilateral cysts and   additional subcentimeter hypodensities which are too small to accurately   characterize; no dedicated follow-up recommended. STOMACH: 17 mm ovoid high density focus in the fundus (201-23).    SMALL BOWEL: No dilatation or wall thickening. COLON: No dilatation or wall thickening. Diverticulosis. APPENDIX: Normal.   PERITONEUM: No ascites or pneumoperitoneum. RETROPERITONEUM: No lymphadenopathy or aortic aneurysm. REPRODUCTIVE ORGANS: Prostatectomy. URINARY BLADDER: No mass or calculus. BONES: Acute left 7th-12th rib fractures. Degenerative changes. ABDOMINAL WALL: No mass or hernia. Ventral midline scarring. ADDITIONAL COMMENTS: N/A           10/06/22 1330  CT SPINE CERV WO CONT Final result    Impression:  No acute fracture nor dislocation. Narrative:  EXAM:  CT CERVICAL SPINE WITHOUT CONTRAST       INDICATION: fall overnight,. COMPARISON: CT cervical spine 8/13/2015       CONTRAST:  None. TECHNIQUE: Multislice helical CT of the cervical spine was performed without   intravenous contrast administration. Sagittal and coronal reformats were   generated. CT dose reduction was achieved through use of a standardized   protocol tailored for this examination and automatic exposure control for dose   modulation. FINDINGS:       The alignment is within normal limits. There is no fracture or compression   deformity. The odontoid process is intact. The craniocervical junction is within   normal limits. Multilevel degenerative changes. C2-C4 right facet ankylosis. Chronic right clavicle deformity. Atherosclerosis. 10/06/22 1330  CT HEAD WO CONT Final result    Impression:  No acute intracranial hemorrhage, mass or infarct. Narrative:  INDICATION: fall last night        Exam: Noncontrast CT of the brain is performed with 5 mm collimation. CT dose reduction was achieved with the use of the standardized protocol   tailored for this examination and automatic exposure control for dose   modulation. Direct comparison is made to prior CT dated August 2020. FINDINGS: There is appropriate diffuse cortical atrophy.  There is stable   periventricular white matter hypodensities, consistent with chronic   microvascular ischemic changes. There is no evidence of acute territorial   infarct. There is no acute intracranial hemorrhage, mass, mass effect or   herniation. Ventricular system is normal. The mastoid air cells are well   pneumatized. The visualized paranasal sinuses are normal.                 CXR Results  (Last 48 hours)                 10/06/22 1341  XR CHEST PORT Final result    Impression:      Decrease mild interstitial lung disease. In the acute setting, differential   diagnosis is pulmonary edema and interstitial pneumonia. No pneumothorax. Recommend PA and lateral chest views when the patient can better tolerate. Narrative:  EXAM:  XR CHEST PORT       INDICATION: Left chest wall pain and crepitus. COMPARISON: Portable chest on 9/4/2020 and 8/30/2020. CT chest on 8/26/2020. TECHNIQUE: Upright portable chest AP view       FINDINGS: Cardiac monitoring wires overlie the thorax. Cardiomegaly is   unchanged. Aortic arch is atherosclerotic but unchanged. Right deviation of the   patent trachea is unchanged. The pulmonary vasculature is within normal limits. Reticular interstitial opacities are mild and decreased. No focal airspace   opacity. Low lung volumes. No pneumothorax. Osteopenia is unchanged. Medical Decision Making and ED Course   Differential Diagnosis & Medical Decision Making Provider Note:   Patient is a 33-year-old male presenting to the emergency department with multiple physical complaints after a fall last night. He states that he tripped over his feet with work-up reveals a hyperkalemia as well as multiple rib fractures a total of 6 on the left side as well as a humeral fracture. Given his age and the number of fractures he has high risk for mortality and morbidity and so will admit to trauma service for observation.   Spoke with trauma surgery Dr. Keyona Herron who agrees to see patient will transfer to [facility] ED for evaluation. And observation. Patient was also advised of incidental CT findings of a pancreatic possible lesion and need for follow-up regarding this. - I am the first provider for this patient. I reviewed the vital signs, available nursing notes, past medical history, past surgical history, family history and social history. The patients presenting problems have been discussed, and they are in agreement with the care plan formulated and outlined with them. I have encouraged them to ask questions as they arise throughout their visit. Vital Signs-Reviewed the patient's vital signs. Patient Vitals for the past 12 hrs:   Temp Pulse Resp BP SpO2   10/06/22 1215 98.6 °F (37 °C) 83 19 (!) 148/79 95 %       ED Course:        Procedures   Performed by: Ken Mcfadden MD  Procedures  CRITICAL CARE NOTE :  7:44 AM  Amount of Critical Care Time:  _25(minutes)__    IMPENDING DETERIORATION -Airway and Respiratory  ASSOCIATED RISK FACTORS - Bleeding, Trauma, and Metabolic changes  MANAGEMENT- Bedside Assessment, Supervision of Care, and Transfer  INTERPRETATION -  Xrays, CT Scan, and ECG  INTERVENTIONS - hemodynamic mngmt,and Metobolic interventions  CASE REVIEW - Hospitalist/Intensivist, Medical Sub-Specialist, Nursing, and Family  TREATMENT RESPONSE -Improved and Stable  PERFORMED BY - Self    NOTES   :  I have spent critical care time involved in lab review, consultations with specialist, family decision- making, bedside attention and documentation. This time excludes time spent in any separate billed procedures. During this entire length of time I was immediately available to the patient . Lis Roth MD       Disposition   Disposition: Transferred to [facility] patient verbally agreed to transfer and understand the risks involved as outlined in the EMTALA form. Diagnosis/Clinical Impression     Clinical Impression:   1.  Closed fracture of multiple ribs of left side, initial encounter    2. Other closed nondisplaced fracture of proximal end of left humerus, initial encounter        Attestations: ITimothy MD, am the primary clinician of record. Please note that this dictation was completed with [email or web address], the computer voice recognition software. Quite often unanticipated grammatical, syntax, homophones, and other interpretive errors are inadvertently transcribed by the computer software. Please disregard these errors. Please excuse any errors that have escaped final proofreading. Thank you.

## 2022-10-06 NOTE — CONSULTS
Consult Date: 10/6/2022     CONSULT TO ORTHOPEDIC SURGERY  Consult performed by: Rajni Stevenson MD  Consult ordered by: Costa Vidales MD  Reason for consult: Left humerus fracture  Assessment/Recommendations:     IMPRESSION:    1) Closed displaced fracture of midshaft of left humerus  2) chronic dense left hemiplegia, due to remote stroke  3) Advanced DJD of left shoulder  4) Concomitant multiple acute left-sided rib fractures  5) chronic Limited ambulation with difficulty in walking, due to left hemiplegia/stroke    RECOMMENDATIONS:    I had a detailed discussion with the patient and his family about the nature of the injury to the left humerus, its natural history and treatment options. Benefits and drawbacks of surgical fixation of the left humerus fracture with open reduction and internal fixation, was compared and contrasted with nonsurgical treatment in the form of a coaptation splint for comfort, support and external immobilization. At this point I have advised them to think over the options. There is no indication for any immediate or urgent orthopedic surgical intervention at this time. Orthopedics will follow peripherally, and will be available for further counseling and consultation as needed. If the patient chooses to continue with conservative management, the coaptation splint and sling should continue. Follow-up will be at [address] [phone] in 1 week following discharge to home versus rehab facility. Subjective   Hanh Kovacs, 76 y.o. male with a history of anemia, diabetes, hypertension and CVA presents to the emergency department for evaluation of left-sided lower rib and left-sided abdominal pain as well as left hip and left arm pain and some question of possible confusion at home after a fall last night. Patient states he got up to use the bathroom and tripped over his feet landing onto his left side.   This happened overnight and patient states throughout the day he had persistent pain and had trouble getting out of bed this morning and so was brought in for evaluation. Patient did also report some shortness of breath earlier in the day. He notes pain mostly with taking deep breaths. On my interview and examination of the patient in the ED module, the patient's 2 daughters where by the bedside. History was obtained from the patient, the patient's family as well as from the medical record. The patient's children were good historians, and corroborated elements of history as noted above. Past Medical History:   Diagnosis Date    Acid reflux     Anemia     Diabetes (HCC)     GERD (gastroesophageal reflux disease)     Hypertension     Stroke [facility])       Past Surgical History:   Procedure Laterality Date    HX COLONOSCOPY       Family History   Problem Relation Age of Onset    Heart Disease Father     Hypertension Brother     Diabetes Brother       Social History     Tobacco Use    Smoking status: Never    Smokeless tobacco: Never   Substance Use Topics    Alcohol use: Never       Current Facility-Administered Medications   Medication Dose Route Frequency Provider Last Rate Last Admin    morphine injection 4 mg  4 mg IntraVENous NOW Elvia Hodges MD         Current Outpatient Medications   Medication Sig Dispense Refill    furosemide (LASIX) 20 mg tablet Take 20 mg by mouth daily as needed. aspirin delayed-release (Aspir-81) 81 mg tablet Take 81 mg by mouth daily. b complex vitamins tablet Take 1 Tablet by mouth daily. amLODIPine (NORVASC) 10 mg tablet Take  by mouth daily. carvediloL (COREG) 25 mg tablet Take 25 mg by mouth two (2) times daily (with meals). glucose blood VI test strips (Contour Next Test Strips) strip by Does Not Apply route See Admin Instructions. diclofenac (VOLTAREN) 1 % gel Apply  to affected area four (4) times daily. lisinopriL (PRINIVIL, ZESTRIL) 10 mg tablet Take  by mouth daily. metFORMIN (GLUCOPHAGE) 500 mg tablet Take  by mouth two (2) times daily (with meals). pantoprazole (PROTONIX) 40 mg tablet Take 40 mg by mouth daily. pravastatin (PRAVACHOL) 40 mg tablet Take 40 mg by mouth nightly. docusate sodium (Stool Softener) 100 mg capsule Take 100 mg by mouth two (2) times a day. lancets (TRUEplus Lancets) 33 gauge misc by Does Not Apply route. Review of Systems  Constitutional:  Positive for activity change, appetite change and fatigue. HENT: Negative. Negative for congestion and sinus pain. Eyes: Negative. Respiratory: Negative. Negative for cough, chest tightness and shortness of breath. Cardiovascular: Negative. Negative for chest pain and palpitations. Gastrointestinal: Negative. Negative for abdominal distention, nausea and vomiting. Endocrine: Negative. Genitourinary: Negative. Negative for difficulty urinating and dysuria. Musculoskeletal:  Positive for arthralgias, back pain, gait problem and myalgias. Negative for joint swelling, neck pain and neck stiffness. Skin: Negative. Negative for color change, rash and wound. Neurological: Positive for moderate weakness of left lower and upper extremities following a remote stroke. Psychiatric/Behavioral:  Positive for confusion. Negative for agitation, hallucinations and sleep disturbance. Objective     Vital signs for last 24 hours:  Visit Vitals  /72   Pulse 75   Temp 98.2 °F (36.8 °C)   Resp 16   Ht 5' 7\" (1.702 m)   Wt 90.7 kg (200 lb)   SpO2 95%   BMI 31.32 kg/m²       Intake/Output this shift:  Current Shift: No intake/output data recorded. Last 3 Shifts: No intake/output data recorded. Data Review:   IMAGING:  X-rays of the left humerus were available for review. Note was made of a displaced midshaft fracture of the left humerus in acceptable alignment.   Severe generalized degenerative arthritis was noted in the left glenohumeral joint and left shoulder, with obliteration of acromiohumeral space. Recent Results (from the past 24 hour(s))   CBC WITH AUTOMATED DIFF    Collection Time: 10/06/22 12:51 PM   Result Value Ref Range    WBC 12.0 (H) 4.1 - 11.1 K/uL    RBC 4.18 4.10 - 5.70 M/uL    HGB 13.7 12.1 - 17.0 g/dL    HCT 38.8 36.6 - 50.3 %    MCV 92.8 80.0 - 99.0 FL    MCH 32.8 26.0 - 34.0 PG    MCHC 35.3 30.0 - 36.5 g/dL    RDW 12.7 11.5 - 14.5 %    PLATELET 851 656 - 866 K/uL    MPV 11.2 8.9 - 12.9 FL    NRBC 0.0 0.0  WBC    ABSOLUTE NRBC 0.00 0.00 - 0.01 K/uL    NEUTROPHILS 76 (H) 32 - 75 %    LYMPHOCYTES 14 12 - 49 %    MONOCYTES 8 5 - 13 %    EOSINOPHILS 1 0 - 7 %    BASOPHILS 0 0 - 1 %    IMMATURE GRANULOCYTES 1 (H) 0 - 0.5 %    ABS. NEUTROPHILS 9.2 (H) 1.8 - 8.0 K/UL    ABS. LYMPHOCYTES 1.7 0.8 - 3.5 K/UL    ABS. MONOCYTES 1.0 0.0 - 1.0 K/UL    ABS. EOSINOPHILS 0.1 0.0 - 0.4 K/UL    ABS. BASOPHILS 0.0 0.0 - 0.1 K/UL    ABS. IMM. GRANS. 0.1 (H) 0.00 - 0.04 K/UL    DF AUTOMATED     METABOLIC PANEL, COMPREHENSIVE    Collection Time: 10/06/22 12:51 PM   Result Value Ref Range    Sodium 140 136 - 145 mmol/L    Potassium 2.7 (LL) 3.5 - 5.1 mmol/L    Chloride 99 97 - 108 mmol/L    CO2 32 21 - 32 mmol/L    Anion gap 9 5 - 15 mmol/L    Glucose 197 (H) 65 - 100 mg/dL    BUN 4 (L) 6 - 20 mg/dL    Creatinine 0.97 0.70 - 1.30 mg/dL    BUN/Creatinine ratio 4 (L) 12 - 20      eGFR >60 >60 ml/min/1.73m2    Calcium 8.9 8.5 - 10.1 mg/dL    Bilirubin, total 0.4 0.2 - 1.0 mg/dL    AST (SGOT) 11 (L) 15 - 37 U/L    ALT (SGPT) 16 12 - 78 U/L    Alk.  phosphatase 72 45 - 117 U/L    Protein, total 7.8 6.4 - 8.2 g/dL    Albumin 3.4 (L) 3.5 - 5.0 g/dL    Globulin 4.4 (H) 2.0 - 4.0 g/dL    A-G Ratio 0.8 (L) 1.1 - 2.2     TROPONIN-HIGH SENSITIVITY    Collection Time: 10/06/22 12:51 PM   Result Value Ref Range    Troponin-High Sensitivity 28 0 - 76 ng/L   EKG, 12 LEAD, SUBSEQUENT    Collection Time: 10/06/22  1:57 PM   Result Value Ref Range    Ventricular Rate 81 BPM    Atrial Rate 82 BPM    P-R Interval 195 ms    QRS Duration 95 ms    Q-T Interval 458 ms    QTC Calculation (Bezet) 532 ms    Calculated P Axis 13 degrees    Calculated R Axis -14 degrees    Calculated T Axis -20 degrees    Diagnosis       Sinus rhythm  Multiple premature complexes, vent & supraven  Left ventricular hypertrophy  Borderline T abnormalities, diffuse leads  Prolonged QT interval  Baseline wander in lead(s) V6         Physical Exam  Constitutional:       General: He is not in acute distress. Appearance: He is well-developed. He is not ill-appearing. HENT:      Head: Normocephalic and atraumatic. Cardiovascular:      Rate and Rhythm: Normal rate. Heart sounds: Normal heart sounds. Pulmonary:      Effort: Pulmonary effort is normal.      Breath sounds: Normal breath sounds. Comments: Tenderness left lateral lower ribs from around rib 6 down to rib 12. Chest:      Chest wall: Tenderness present. Abdominal:      General: Abdomen is protuberant. Bowel sounds are normal. There is no distension or abdominal bruit. Palpations: Abdomen is soft. Musculoskeletal: Generalized motor weakness of left upper and lower extremities was noted, consistent with left hemiplegia with history of remote stroke. The left arm showed generalized swelling with tenderness, with no gross deformity. Attempts at movement of the left arm were painful. Skin:     General: Skin is warm and dry. Capillary Refill: Capillary refill takes less than 2 seconds. Coloration: Skin is not mottled. Findings: No erythema or rash. Neurological:      Mental Status: He is alert. Profound loss of motor function was noted in the left upper and lower extremities. Minimal voluntary movement could be elicited in the left upper extremity and hand, with poor coordination.   Psychiatric:         Mood and Affect: Mood normal.         Behavior: Behavior normal.

## 2022-10-06 NOTE — Clinical Note
Status[de-identified] INPATIENT [101]   Type of Bed: Surgical [18]   Inpatient Hospitalization Certified Necessary for the Following Reasons: 1.  Patient Failed outpatient treatment (further clarification in H&P documentation)   Admitting Diagnosis: Multiple rib fractures [[id number]]   Admitting Physician: Lucy Bryan [[id number]]   Attending Physician: Lucy Bryan [[id number]]   Estimated Length of Stay: 2 Midnights   Discharge Plan[de-identified] Home with Office Follow-up

## 2022-10-06 NOTE — H&P
[facility] SURGERY H&P         Chief Complaint: none    History of Present Illness:    Mr. Nanda Fox is a 76y.o. year old * male presents brought to [address] [place] [address] ER following a fall last night in his bathroom. No LOC. He went over there and transferred here to [facility] ER for further management. Has some left chest pain and left arm pain. No shortness of breath. CT chest showed left side multiple rib fractures and left arm Xray showed left humerus fracture. Past Medical History:   Past Medical History:   Diagnosis Date    Acid reflux     Anemia     Diabetes (HCC)     GERD (gastroesophageal reflux disease)     Hypertension     Stroke [facility])        Past Surgical History:   Past Surgical History:   Procedure Laterality Date    HX COLONOSCOPY          Allergy:No Known Allergies    Social History:  reports that he has never smoked. He has never used smokeless tobacco. He reports that he does not drink alcohol and does not use drugs. Family History:  Family History   Problem Relation Age of Onset    Heart Disease Father     Hypertension Brother     Diabetes Brother         Current Medications:No current facility-administered medications for this encounter. Current Outpatient Medications:     furosemide (LASIX) 20 mg tablet, Take 20 mg by mouth daily as needed. , Disp: , Rfl:     aspirin delayed-release (Aspir-81) 81 mg tablet, Take 81 mg by mouth daily. , Disp: , Rfl:     b complex vitamins tablet, Take 1 Tablet by mouth daily. , Disp: , Rfl:     amLODIPine (NORVASC) 10 mg tablet, Take  by mouth daily. , Disp: , Rfl:     carvediloL (COREG) 25 mg tablet, Take 25 mg by mouth two (2) times daily (with meals). , Disp: , Rfl:     glucose blood VI test strips (Contour Next Test Strips) strip, by Does Not Apply route See Admin Instructions. , Disp: , Rfl:     diclofenac (VOLTAREN) 1 % gel, Apply  to affected area four (4) times daily. , Disp: , Rfl:     lisinopriL (PRINIVIL, ZESTRIL) 10 mg tablet, Take  by mouth daily. , Disp: , Rfl: metFORMIN (GLUCOPHAGE) 500 mg tablet, Take  by mouth two (2) times daily (with meals). , Disp: , Rfl:     pantoprazole (PROTONIX) 40 mg tablet, Take 40 mg by mouth daily. , Disp: , Rfl:     pravastatin (PRAVACHOL) 40 mg tablet, Take 40 mg by mouth nightly., Disp: , Rfl:     docusate sodium (Stool Softener) 100 mg capsule, Take 100 mg by mouth two (2) times a day., Disp: , Rfl:     lancets (TRUEplus Lancets) 33 gauge misc, by Does Not Apply route., Disp: , Rfl:      Immunization History: There is no immunization history on file for this patient. Complete    Review of Systems:     Constitutional:  no fever,  no chills,  no sweats, No weakness, No fatigue, No decreased activity. Eye: No recent visual problem, No icterus, No discharge, No double vision. Ear/Nose/Mouth/Throat: No decreased hearing, No ear pain, No nasal congestion, No sore throat. Chest: Mild left side chest pain. Respiratory: No shortness of breath, No cough, No sputum production, No hemoptysis, No wheezing, No cyanosis. Cardiovascular: No chest pain, No palpitations, No bradycardia, No tachycardia, No peripheral edema, No syncope. Gastrointestinal: No nausea,  No vomiting, No diarrhea, No constipation, No heartburn,  No abdominal pain. Genitourinary: No dysuria, No hematuria, No change in urine stream, No urethral discharge, No lesions. Hematology/Lymphatics: No bruising tendency, No bleeding tendency, No petechiae, No swollen lymph glands. Endocrine: No excessive thirst, No polyuria, No cold intolerance, No heat intolerance, No excessive hunger. Immunologic: Not immunocompromised, No recurrent fevers, No recurrent infections. Musculoskeletal: No back pain, No neck pain, No joint pain, No muscle pain, No claudication, No decreased range of motion,  trauma, left arm pain. Integumentary: No rash, No pruritus, No abrasions.   Neurologic: Alert and oriented X4, No abnormal balance, No headache, No confusion, No numbness, No tingling. Psychiatric: No anxiety, No depression, No paola. Physical Exam:     Vitals & Measurements: Wt Readings from Last 3 Encounters:   10/06/22 90.7 kg (200 lb)   10/06/22 100.7 kg (222 lb)   03/29/22 100.7 kg (222 lb)     Temp Readings from Last 3 Encounters:   10/06/22 98.2 °F (36.8 °C)   10/06/22 98.6 °F (37 °C)   03/29/22 98 °F (36.7 °C) (Temporal)     BP Readings from Last 3 Encounters:   10/06/22 122/72   10/06/22 (!) 169/88   03/29/22 134/70     Pulse Readings from Last 3 Encounters:   10/06/22 75   10/06/22 81   03/29/22 75      Ht Readings from Last 3 Encounters:   10/06/22 5' 7\" (1.702 m)   10/06/22 5' 8\" (1.727 m)   03/29/22 5' 8\" (1.727 m)          General: well appearing, no acute distress  Head: Normal  Face: Nornal  HEENT: atraumatic, PERRLA, moist mucosa, normal pharynx, normal tonsils and adenoids, normal tongue, no fluid in sinuses  Neck: Trachea midline, no carotid bruit, no masses  Chest: Normal, left side lower rib tenderness. Respiratory: Normal chest wall expansion, CTA B, no r/r/w, no rubs  Cardiovascular: RRR, no m/r/g, Normal S1 and S2  Abdomen: Soft, non tender, non-distended, normal bowel sounds in all quadrants, no hepatosplenomegaly, no tympany. Genitourinary: No inguinal hernia, normal external gentalia, Testis & scrotum normal, no renal angle tenderness  Rectal: deferred  Musculoskeletal: normal ROM in right upper and bilateral lower extremities, No joint swelling, left arm deformity, excellent radial pulse, no obvious sensory motor deformity.   Integumentary: Warm, dry, and pink, with no rash, purpura, or petechia  Heme/Lymph: No lymphadenopathy, no bruises  Neurological:Cranial Nerves II-XII grossly intact, no gross motor or sensory deficit  Psychiatric: Cooperative with normal mood, affect, and cognition      Laboratory Values:   Recent Results (from the past 24 hour(s))   CBC WITH AUTOMATED DIFF    Collection Time: 10/06/22 12:51 PM   Result Value Ref Range    WBC 12.0 (H) 4.1 - 11.1 K/uL    RBC 4.18 4.10 - 5.70 M/uL    HGB 13.7 12.1 - 17.0 g/dL    HCT 38.8 36.6 - 50.3 %    MCV 92.8 80.0 - 99.0 FL    MCH 32.8 26.0 - 34.0 PG    MCHC 35.3 30.0 - 36.5 g/dL    RDW 12.7 11.5 - 14.5 %    PLATELET 467 974 - 738 K/uL    MPV 11.2 8.9 - 12.9 FL    NRBC 0.0 0.0  WBC    ABSOLUTE NRBC 0.00 0.00 - 0.01 K/uL    NEUTROPHILS 76 (H) 32 - 75 %    LYMPHOCYTES 14 12 - 49 %    MONOCYTES 8 5 - 13 %    EOSINOPHILS 1 0 - 7 %    BASOPHILS 0 0 - 1 %    IMMATURE GRANULOCYTES 1 (H) 0 - 0.5 %    ABS. NEUTROPHILS 9.2 (H) 1.8 - 8.0 K/UL    ABS. LYMPHOCYTES 1.7 0.8 - 3.5 K/UL    ABS. MONOCYTES 1.0 0.0 - 1.0 K/UL    ABS. EOSINOPHILS 0.1 0.0 - 0.4 K/UL    ABS. BASOPHILS 0.0 0.0 - 0.1 K/UL    ABS. IMM. GRANS. 0.1 (H) 0.00 - 0.04 K/UL    DF AUTOMATED     METABOLIC PANEL, COMPREHENSIVE    Collection Time: 10/06/22 12:51 PM   Result Value Ref Range    Sodium 140 136 - 145 mmol/L    Potassium 2.7 (LL) 3.5 - 5.1 mmol/L    Chloride 99 97 - 108 mmol/L    CO2 32 21 - 32 mmol/L    Anion gap 9 5 - 15 mmol/L    Glucose 197 (H) 65 - 100 mg/dL    BUN 4 (L) 6 - 20 mg/dL    Creatinine 0.97 0.70 - 1.30 mg/dL    BUN/Creatinine ratio 4 (L) 12 - 20      eGFR >60 >60 ml/min/1.73m2    Calcium 8.9 8.5 - 10.1 mg/dL    Bilirubin, total 0.4 0.2 - 1.0 mg/dL    AST (SGOT) 11 (L) 15 - 37 U/L    ALT (SGPT) 16 12 - 78 U/L    Alk.  phosphatase 72 45 - 117 U/L    Protein, total 7.8 6.4 - 8.2 g/dL    Albumin 3.4 (L) 3.5 - 5.0 g/dL    Globulin 4.4 (H) 2.0 - 4.0 g/dL    A-G Ratio 0.8 (L) 1.1 - 2.2     TROPONIN-HIGH SENSITIVITY    Collection Time: 10/06/22 12:51 PM   Result Value Ref Range    Troponin-High Sensitivity 28 0 - 76 ng/L   EKG, 12 LEAD, SUBSEQUENT    Collection Time: 10/06/22  1:57 PM   Result Value Ref Range    Ventricular Rate 81 BPM    Atrial Rate 82 BPM    P-R Interval 195 ms    QRS Duration 95 ms    Q-T Interval 458 ms    QTC Calculation (Bezet) 532 ms    Calculated P Axis 13 degrees    Calculated R Axis -14 degrees Calculated T Axis -20 degrees    Diagnosis       Sinus rhythm  Multiple premature complexes, vent & supraven  Left ventricular hypertrophy  Borderline T abnormalities, diffuse leads  Prolonged QT interval  Baseline wander in lead(s) V6           Assessment:  Problem List Items Addressed This Visit          Skeletal    Multiple rib fractures - Primary     Other Visit Diagnoses       Closed fracture of shaft of left humerus, unspecified fracture morphology, initial encounter               Left side ribe fractures (multiple)    Plan:    Admission  Diet: Regular  IV fluids  SCD  IS  Pain medications  Nausea medication  Labs in am  Consult: Ortho- Dr. Jayda Corbett has already given instruction to place left arm splint. Thank you for the consultation & allowing me to participate in the care of this patient.

## 2022-10-06 NOTE — ED PROVIDER NOTES
EMERGENCY DEPARTMENT HISTORY AND PHYSICAL EXAM      Date: 10/6/2022  Patient Name: Alfredo Scott    History of Presenting Illness     Chief Complaint   Patient presents with    Fall       History Provided By: Patient, EMS, and referring physician    HPI: Alfredo Scott, 76 y.o. male presents to the emergency department with complaint of multiple rib fractures. Patient was seen at an outside facility for the same, work-up revealed multiple rib fractures on the left side. Patient reports the pain medicines have improved his symptoms, pain is only currently 3 out of 10. Patient also reports left arm pain and states he was diagnosed with a left arm fracture. Patient states he does not want any pain medications at this time. Patient denies any other somatic complaints. There are no other complaints, changes, or physical findings at this time. PCP: Emeli Tuttle MD    Current Facility-Administered Medications on File Prior to Encounter   Medication Dose Route Frequency Provider Last Rate Last Admin    [COMPLETED] acetaminophen (TYLENOL) tablet 500 mg  500 mg Oral NOW Rolf Blackmon MD   500 mg at 10/06/22 1236    [COMPLETED] iopamidoL (ISOVUE-370) 76 % injection 100 mL  100 mL IntraVENous RAD ONCE Rolf Blackmon MD   100 mL at 10/06/22 1341    [COMPLETED] potassium chloride (K-DUR, KLOR-CON M20) SR tablet 40 mEq  40 mEq Oral NOW Rolf Blackmon MD   40 mEq at 10/06/22 1546    [DISCONTINUED] morphine injection 2 mg  2 mg IntraVENous NOW Rolf Blackmon MD        [DISCONTINUED] ketorolac (TORADOL) injection 15 mg  15 mg IntraVENous ONCE Rolf Blackmon MD         Current Outpatient Medications on File Prior to Encounter   Medication Sig Dispense Refill    furosemide (LASIX) 20 mg tablet Take 20 mg by mouth daily as needed. aspirin delayed-release (Aspir-81) 81 mg tablet Take 81 mg by mouth daily. b complex vitamins tablet Take 1 Tablet by mouth daily. amLODIPine (NORVASC) 10 mg tablet Take  by mouth daily. carvediloL (COREG) 25 mg tablet Take 25 mg by mouth two (2) times daily (with meals). glucose blood VI test strips (Contour Next Test Strips) strip by Does Not Apply route See Admin Instructions. diclofenac (VOLTAREN) 1 % gel Apply  to affected area four (4) times daily. lisinopriL (PRINIVIL, ZESTRIL) 10 mg tablet Take  by mouth daily. metFORMIN (GLUCOPHAGE) 500 mg tablet Take  by mouth two (2) times daily (with meals). pantoprazole (PROTONIX) 40 mg tablet Take 40 mg by mouth daily. pravastatin (PRAVACHOL) 40 mg tablet Take 40 mg by mouth nightly. docusate sodium (Stool Softener) 100 mg capsule Take 100 mg by mouth two (2) times a day. lancets (TRUEplus Lancets) 33 gauge misc by Does Not Apply route. Past History     Past Medical History:  Past Medical History:   Diagnosis Date    Acid reflux     Anemia     Diabetes (HCC)     GERD (gastroesophageal reflux disease)     Hypertension     Stroke [facility])        Past Surgical History:  Past Surgical History:   Procedure Laterality Date    HX COLONOSCOPY         Family History:  Family History   Problem Relation Age of Onset    Heart Disease Father     Hypertension Brother     Diabetes Brother        Social History:  Social History     Tobacco Use    Smoking status: Never    Smokeless tobacco: Never   Vaping Use    Vaping Use: Never used   Substance Use Topics    Alcohol use: Never    Drug use: Never       Allergies:  No Known Allergies    Review of Systems   Review of Systems  Review of Systems   Constitutional: Negative for chills and fever. HENT: Negative for sinus pressure and sinus pain. Eyes: Negative for photophobia and redness. Respiratory: Negative for shortness of breath and wheezing. Cardiovascular: Positive for left-sided chest wall pain, negative for palpitations. Gastrointestinal: Negative for abdominal pain and nausea.    Genitourinary: Negative for flank pain and hematuria. Musculoskeletal: Negative for arthralgias and gait problem. Skin: Negative for color change and pallor. Neurological: Negative for dizziness and weakness. Physical Exam   Physical Exam  Physical Exam  Constitutional:       General: No acute distress. Appearance: Normal appearance. Not toxic-appearing. HENT:      Head: Normocephalic and atraumatic. Nose: Nose normal.      Mouth/Throat:      Mouth: Mucous membranes are moist.   Eyes:      Extraocular Movements: Extraocular movements intact. Pupils: Pupils are equal, round, and reactive to light. Cardiovascular:      Rate and Rhythm: Normal rate. Pulses: Normal pulses. Pulmonary:      Effort: Pulmonary effort is normal.      Breath sounds: No stridor. Abdominal:      General: Abdomen is flat. There is no distension. Musculoskeletal:         General: Normal range of motion. Cervical back: Normal range of motion and neck supple. Skin:     General: Skin is warm and dry. Capillary Refill: Capillary refill takes less than 2 seconds. Neurological:      General: No focal deficit present. Mental Status: Aert and oriented to person, place, and time.    Psychiatric:         Mood and Affect: Mood normal.         Behavior: Behavior normal.     Lab and Diagnostic Study Results   Labs -     Recent Results (from the past 12 hour(s))   CBC WITH AUTOMATED DIFF    Collection Time: 10/06/22 12:51 PM   Result Value Ref Range    WBC 12.0 (H) 4.1 - 11.1 K/uL    RBC 4.18 4.10 - 5.70 M/uL    HGB 13.7 12.1 - 17.0 g/dL    HCT 38.8 36.6 - 50.3 %    MCV 92.8 80.0 - 99.0 FL    MCH 32.8 26.0 - 34.0 PG    MCHC 35.3 30.0 - 36.5 g/dL    RDW 12.7 11.5 - 14.5 %    PLATELET 491 297 - 307 K/uL    MPV 11.2 8.9 - 12.9 FL    NRBC 0.0 0.0  WBC    ABSOLUTE NRBC 0.00 0.00 - 0.01 K/uL    NEUTROPHILS 76 (H) 32 - 75 %    LYMPHOCYTES 14 12 - 49 %    MONOCYTES 8 5 - 13 %    EOSINOPHILS 1 0 - 7 %    BASOPHILS 0 0 - 1 %    IMMATURE GRANULOCYTES 1 (H) 0 - 0.5 %    ABS. NEUTROPHILS 9.2 (H) 1.8 - 8.0 K/UL    ABS. LYMPHOCYTES 1.7 0.8 - 3.5 K/UL    ABS. MONOCYTES 1.0 0.0 - 1.0 K/UL    ABS. EOSINOPHILS 0.1 0.0 - 0.4 K/UL    ABS. BASOPHILS 0.0 0.0 - 0.1 K/UL    ABS. IMM. GRANS. 0.1 (H) 0.00 - 0.04 K/UL    DF AUTOMATED     METABOLIC PANEL, COMPREHENSIVE    Collection Time: 10/06/22 12:51 PM   Result Value Ref Range    Sodium 140 136 - 145 mmol/L    Potassium 2.7 (LL) 3.5 - 5.1 mmol/L    Chloride 99 97 - 108 mmol/L    CO2 32 21 - 32 mmol/L    Anion gap 9 5 - 15 mmol/L    Glucose 197 (H) 65 - 100 mg/dL    BUN 4 (L) 6 - 20 mg/dL    Creatinine 0.97 0.70 - 1.30 mg/dL    BUN/Creatinine ratio 4 (L) 12 - 20      eGFR >60 >60 ml/min/1.73m2    Calcium 8.9 8.5 - 10.1 mg/dL    Bilirubin, total 0.4 0.2 - 1.0 mg/dL    AST (SGOT) 11 (L) 15 - 37 U/L    ALT (SGPT) 16 12 - 78 U/L    Alk. phosphatase 72 45 - 117 U/L    Protein, total 7.8 6.4 - 8.2 g/dL    Albumin 3.4 (L) 3.5 - 5.0 g/dL    Globulin 4.4 (H) 2.0 - 4.0 g/dL    A-G Ratio 0.8 (L) 1.1 - 2.2     TROPONIN-HIGH SENSITIVITY    Collection Time: 10/06/22 12:51 PM   Result Value Ref Range    Troponin-High Sensitivity 28 0 - 76 ng/L   EKG, 12 LEAD, SUBSEQUENT    Collection Time: 10/06/22  1:57 PM   Result Value Ref Range    Ventricular Rate 81 BPM    Atrial Rate 82 BPM    P-R Interval 195 ms    QRS Duration 95 ms    Q-T Interval 458 ms    QTC Calculation (Bezet) 532 ms    Calculated P Axis 13 degrees    Calculated R Axis -14 degrees    Calculated T Axis -20 degrees    Diagnosis       Sinus rhythm  Multiple premature complexes, vent & supraven  Left ventricular hypertrophy  Borderline T abnormalities, diffuse leads  Prolonged QT interval  Baseline wander in lead(s) V6         Radiologic Studies -   @lastxrresult@  CT Results  (Last 48 hours)                 10/06/22 1528  CT CHEST WO CONT Final result    Impression:      1. Nondisplaced left seventh, eighth, ninth, 10th, 11th, and possibly 12th ribs. Increased trace left pleural effusion. No pneumothorax. 2. Combination of pulmonary edema and atelectasis. Superimposed pneumonia is   possible. However, the COVID19  pneumonia from 2020 is no longer present. 3. Tracheal debris may represent aspiration. 4. Old granulomatous disease. Narrative:  INDICATION: Left chest wall pain and crepitus. Pulmonary edema versus   interstitial pneumonia on chest x-ray. COMPARISON: Portable chest earlier this afternoon and on 9/4/2020. CT chest on   8/26/2020. CONTRAST: None. TECHNIQUE:  5 mm axial images were obtained through the chest. Coronal and   sagittal reformats were generated. CT dose reduction was achieved through use   of a standardized protocol tailored for this examination and automatic exposure   control for dose modulation. The absence of intravenous contrast reduces the sensitivity for evaluation of   the mediastinum, tara, vasculature, and upper abdominal organs. FINDINGS:       CHEST WALL: No mass or axillary lymphadenopathy. No soft tissue gas. Diffuse   mild muscle atrophy. THYROID: No nodule. MEDIASTINUM: There are small calcified normal sized lymph nodes. TARA: Calcified normal sized lymph nodes. THORACIC AORTA: Atherosclerosis without aneurysm. MAIN PULMONARY ARTERY: Normal in caliber. TRACHEA/BRONCHI: Calcified wall. Debris in the distal right trachea represent   secretions versus aspiration. ESOPHAGUS: Small sliding-type hiatal hernia. HEART: Normal in size. PLEURA: Increased trace left pleural effusion. New trace right pleural effusion. LUNGS: Smooth interlobular septal thickening and patchy heterogeneous   groundglass opacities, decreased when compared with 2020. Increased bilateral   lower lobe subsegmental atelectasis. No lung mass or suspicious nodule. INCIDENTALLY IMAGED UPPER ABDOMEN: No acute process. Symmetric excretion of   contrast from the kidneys.    BONES: Nondisplaced fractures of left seventh, eighth, ninth, 10th, and 11th   fractures. Possible fracture of the distal left 12th rib. No spine fracture. No   sternum fracture. 10/06/22 1341  CT ABD PELV W CONT Final result    Impression:  1. Study limited by motion. 2.  Acute left 7th-12th rib fractures. 3.  Bilateral lower lobe patchy dependent atelectasis/airspace disease and trace   bilateral pleural effusions. 4.  10 mm enhancing focus in the pancreatic tail (201-28), too small accurately   characterize, but cannot exclude a tiny neuroendocrine tumor. Nonemergent   contrast enhanced MRI/MRCP abdomen recommended for further characterization. 5.  Ovoid high-density focus in the gastric fundus. Correlate with recent   ingestion and consider nonemergent endoscopic evaluation to assess for blood   products or mass lesion if indicated. Narrative:  EXAM: CT ABD PELV W CONT       INDICATION: LUQ, left lower chest wall pain after fall last night 1       COMPARISON: None        CONTRAST: 100 mL of Isovue-370. ORAL CONTRAST: None       TECHNIQUE:    Following the uneventful intravenous administration of contrast, thin axial   images were obtained through the abdomen and pelvis. Coronal and sagittal   reconstructions were generated. CT dose reduction was achieved through use of a   standardized protocol tailored for this examination and automatic exposure   control for dose modulation. FINDINGS: Study limited by motion. LOWER THORAX: Cardiomegaly. Trace bilateral pleural effusions. Bilateral lower   lobe patchy dependent consolidations, left larger than right. LIVER: No mass. BILIARY TREE: Gallbladder is within normal limits. CBD is not dilated. SPLEEN: within normal limits. PANCREAS: 10 mm enhancing focus in the tail (201-28), too small accurately   characterize, but cannot exclude a tiny neuroendocrine tumor. ADRENALS: Unremarkable. KIDNEYS: No mass, calculus, or hydronephrosis.  Several bilateral cysts and   additional subcentimeter hypodensities which are too small to accurately   characterize; no dedicated follow-up recommended. STOMACH: 17 mm ovoid high density focus in the fundus (201-23). SMALL BOWEL: No dilatation or wall thickening. COLON: No dilatation or wall thickening. Diverticulosis. APPENDIX: Normal.   PERITONEUM: No ascites or pneumoperitoneum. RETROPERITONEUM: No lymphadenopathy or aortic aneurysm. REPRODUCTIVE ORGANS: Prostatectomy. URINARY BLADDER: No mass or calculus. BONES: Acute left 7th-12th rib fractures. Degenerative changes. ABDOMINAL WALL: No mass or hernia. Ventral midline scarring. ADDITIONAL COMMENTS: N/A           10/06/22 1330  CT SPINE CERV WO CONT Final result    Impression:  No acute fracture nor dislocation. Narrative:  EXAM:  CT CERVICAL SPINE WITHOUT CONTRAST       INDICATION: fall overnight,. COMPARISON: CT cervical spine 8/13/2015       CONTRAST:  None. TECHNIQUE: Multislice helical CT of the cervical spine was performed without   intravenous contrast administration. Sagittal and coronal reformats were   generated. CT dose reduction was achieved through use of a standardized   protocol tailored for this examination and automatic exposure control for dose   modulation. FINDINGS:       The alignment is within normal limits. There is no fracture or compression   deformity. The odontoid process is intact. The craniocervical junction is within   normal limits. Multilevel degenerative changes. C2-C4 right facet ankylosis. Chronic right clavicle deformity. Atherosclerosis. 10/06/22 1330  CT HEAD WO CONT Final result    Impression:  No acute intracranial hemorrhage, mass or infarct. Narrative:  INDICATION: fall last night        Exam: Noncontrast CT of the brain is performed with 5 mm collimation.        CT dose reduction was achieved with the use of the standardized protocol   tailored for this examination and automatic exposure control for dose   modulation. Direct comparison is made to prior CT dated August 2020. FINDINGS: There is appropriate diffuse cortical atrophy. There is stable   periventricular white matter hypodensities, consistent with chronic   microvascular ischemic changes. There is no evidence of acute territorial   infarct. There is no acute intracranial hemorrhage, mass, mass effect or   herniation. Ventricular system is normal. The mastoid air cells are well   pneumatized. The visualized paranasal sinuses are normal.                 CXR Results  (Last 48 hours)                 10/06/22 1341  XR CHEST PORT Final result    Impression:      Decrease mild interstitial lung disease. In the acute setting, differential   diagnosis is pulmonary edema and interstitial pneumonia. No pneumothorax. Recommend PA and lateral chest views when the patient can better tolerate. Narrative:  EXAM:  XR CHEST PORT       INDICATION: Left chest wall pain and crepitus. COMPARISON: Portable chest on 9/4/2020 and 8/30/2020. CT chest on 8/26/2020. TECHNIQUE: Upright portable chest AP view       FINDINGS: Cardiac monitoring wires overlie the thorax. Cardiomegaly is   unchanged. Aortic arch is atherosclerotic but unchanged. Right deviation of the   patent trachea is unchanged. The pulmonary vasculature is within normal limits. Reticular interstitial opacities are mild and decreased. No focal airspace   opacity. Low lung volumes. No pneumothorax. Osteopenia is unchanged. Medical Decision Making and ED Course   Differential Diagnosis & Medical Decision Making Provider Note:       - I am the first provider for this patient. I reviewed the vital signs, available nursing notes, past medical history, past surgical history, family history and social history.  The patients presenting problems have been discussed, and they are in agreement with the care plan formulated and outlined with them. I have encouraged them to ask questions as they arise throughout their visit. Vital Signs-Reviewed the patient's vital signs. Patient Vitals for the past 12 hrs:   Temp Pulse Resp BP SpO2   10/06/22 1703 98.2 °F (36.8 °C) 75 16 122/72 95 %       Disposition   Disposition: Admitted to Floor Surgical Floor the case was discussed with the admitting physician Dr Gagan Reddy      Diagnosis/Clinical Impression     Clinical Impression:   1. Closed fracture of multiple ribs of left side, initial encounter    2. Closed fracture of shaft of left humerus, unspecified fracture morphology, initial encounter        Attestations: IBrian MD, am the primary clinician of record. Please note that this dictation was completed with [employer], the computer voice recognition software. Quite often unanticipated grammatical, syntax, homophones, and other interpretive errors are inadvertently transcribed by the computer software. Please disregard these errors. Please excuse any errors that have escaped final proofreading. Thank you.

## 2022-10-06 NOTE — ED TRIAGE NOTES
Pt brought in by EMS c/o LUQ pain since last night after a trip and fall.  Denies nausea, vomiting or diarrhea

## 2022-10-07 LAB
ATRIAL RATE: 82 BPM
CALCULATED P AXIS, ECG09: 13 DEGREES
CALCULATED R AXIS, ECG10: -14 DEGREES
CALCULATED T AXIS, ECG11: -20 DEGREES
DIAGNOSIS, 93000: NORMAL
GLUCOSE BLD STRIP.AUTO-MCNC: 148 MG/DL (ref 65–100)
GLUCOSE BLD STRIP.AUTO-MCNC: 174 MG/DL (ref 65–100)
GLUCOSE BLD STRIP.AUTO-MCNC: 186 MG/DL (ref 65–100)
GLUCOSE BLD STRIP.AUTO-MCNC: 219 MG/DL (ref 65–100)
P-R INTERVAL, ECG05: 195 MS
PERFORMED BY, TECHID: ABNORMAL
Q-T INTERVAL, ECG07: 458 MS
QRS DURATION, ECG06: 95 MS
QTC CALCULATION (BEZET), ECG08: 532 MS
VENTRICULAR RATE, ECG03: 81 BPM

## 2022-10-07 PROCEDURE — 82962 GLUCOSE BLOOD TEST: CPT

## 2022-10-07 PROCEDURE — 97162 PT EVAL MOD COMPLEX 30 MIN: CPT

## 2022-10-07 PROCEDURE — 97530 THERAPEUTIC ACTIVITIES: CPT

## 2022-10-07 PROCEDURE — 65270000029 HC RM PRIVATE

## 2022-10-07 PROCEDURE — 74011250636 HC RX REV CODE- 250/636: Performed by: SURGERY

## 2022-10-07 PROCEDURE — 97161 PT EVAL LOW COMPLEX 20 MIN: CPT

## 2022-10-07 RX ORDER — KETOROLAC TROMETHAMINE 15 MG/ML
15 INJECTION, SOLUTION INTRAMUSCULAR; INTRAVENOUS EVERY 6 HOURS
Status: DISCONTINUED | OUTPATIENT
Start: 2022-10-08 | End: 2022-10-07

## 2022-10-07 RX ORDER — HYDROCODONE BITARTRATE AND ACETAMINOPHEN 5; 325 MG/1; MG/1
1 TABLET ORAL
Status: DISCONTINUED | OUTPATIENT
Start: 2022-10-07 | End: 2022-10-10 | Stop reason: HOSPADM

## 2022-10-07 RX ORDER — KETOROLAC TROMETHAMINE 15 MG/ML
15 INJECTION, SOLUTION INTRAMUSCULAR; INTRAVENOUS
Status: DISCONTINUED | OUTPATIENT
Start: 2022-10-07 | End: 2022-10-10 | Stop reason: HOSPADM

## 2022-10-07 RX ADMIN — MORPHINE SULFATE 2 MG: 2 INJECTION, SOLUTION INTRAMUSCULAR; INTRAVENOUS at 06:26

## 2022-10-07 RX ADMIN — MORPHINE SULFATE 2 MG: 2 INJECTION, SOLUTION INTRAMUSCULAR; INTRAVENOUS at 00:25

## 2022-10-07 RX ADMIN — ENOXAPARIN SODIUM 40 MG: 100 INJECTION SUBCUTANEOUS at 22:19

## 2022-10-07 NOTE — PROGRESS NOTES
Comprehensive Nutrition Assessment    Type and Reason for Visit: Initial, Positive nutrition screen (MST 2)    Nutrition Recommendations/Plan:   Modify diet to 4 Carb. Continue to monitor and document PO intake, weights, BMs in I/Os. Malnutrition Assessment:  Malnutrition Status:  No malnutrition (10/07/22 1155)    Context:  Acute illness     Findings of the 6 clinical characteristics of malnutrition:   Energy Intake:  No significant decrease in energy intake  Weight Loss:  No significant weight loss     Body Fat Loss:  No significant body fat loss,     Muscle Mass Loss:  No significant muscle mass loss,    Fluid Accumulation:  No significant fluid accumulation,     Strength:  Not performed     Nutrition Assessment:    Transferred form [facility] for multiple rib fx. Noted MST 2 d/t clerical error aeb stated \"no wt loss, unsure wt loss amount\". Pt endorsed no recent wt loss nor poor intake PTA. XVC=599# per Pt, RZU=920# at bedside today- RD to trend while IP. Observed ~75% of Breakfast consumed. Modify diet for DM management. Labs: K 2.7, , BUN 4, AST 11. Meds: LCR @ 75 mL/hr, morphine. Nutrition Related Findings:    NFPE w/o acute findings- nourished. No N/V/D/C reported per Pt. Denies chewing/swallowing hx. +dysphagia hx, previously on Pureed diet during last admit >1 yr ago; currently tolerating Regular/Thin Liq diet. Last BM 10/5. No edema. Wound Type: None    Current Nutrition Intake & Therapies:  Average Meal Intake: %  Average Supplement Intake: None ordered  ADULT DIET Regular    Anthropometric Measures:  Height: 5' 7\" (170.2 cm)  Ideal Body Weight (IBW): 148 lbs (67 kg)  Current Body Wt:  99.3 kg (219 lb) (10/7, RD obtained.), 148 % IBW. Bed scale  Current BMI (kg/m2): 34.3  Usual Body Weight: 96.6 kg (213 lb) (per Pt.)  % Weight Change (Calculated): 2.8  BMI Category: Obese class 2 (BMI 35.0-39. 9)  Weight Metrics 10/6/2022 10/6/2022 3/29/2022 3/18/2022 3/1/2022 11/19/2021 10/15/2021 Weight 200 lb 222 lb 222 lb 222 lb 3.2 oz 221 lb 221 lb 221 lb 4 oz   BMI 31.32 kg/m2 33.75 kg/m2 33.75 kg/m2 33.79 kg/m2 33.6 kg/m2 33.6 kg/m2 33.64 kg/m2     Nutrition Diagnosis:   No nutrition diagnosis at this time     Nutrition Interventions:   Food and/or Nutrient Delivery: Continue current diet, Modify current diet  Nutrition Education/Counseling: Education not indicated  Coordination of Nutrition Care: Continue to monitor while inpatient, Feeding assistance/environmental change  Plan of Care discussed with: RN    Goals:  Goals: Meet at least 75% of estimated needs, PO intake 75% or greater, within 7 days    Nutrition Monitoring and Evaluation:   Behavioral-Environmental Outcomes: None identified  Food/Nutrient Intake Outcomes: Diet advancement/tolerance, Food and nutrient intake  Physical Signs/Symptoms Outcomes: Biochemical data, Chewing or swallowing, Meal time behavior, Weight    Discharge Planning:    No discharge needs at this time    Bushra Trammell RD  Contact: ext. [phone].

## 2022-10-07 NOTE — PROGRESS NOTES
Problem: Mobility Impaired (Adult and Pediatric)  Goal: *Acute Goals and Plan of Care (Insert Text)  Description: FUNCTIONAL STATUS PRIOR TO ADMISSION: Patient was modified independent using a rolling walker for functional mobility. HOME SUPPORT PRIOR TO ADMISSION: The patient lived with spouse but did not require assist with mobility. Spouse assisted with ADL's. Physical Therapy Goals  Initiated 10/7/2022  Patient/family stated goal: To be able to walk  1. Patient will move from supine to sit and sit to supine  in bed with modified independence within 7 day(s). 2.  Patient will transfer from bed to chair and chair to bed with minimal assistance/contact guard assist using the least restrictive device within 7 day(s). 3.  Patient will perform sit to stand with minimal assistance/contact guard assist within 7 day(s). 4.  Patient will ambulate with minimal assistance/contact guard assist for 25 feet with the least restrictive device within 7 day(s). 5.  Patient will participate in lower extremity therapeutic exercise/activities with independence  within 7 day(s). Outcome: Not Met    PHYSICAL THERAPY EVALUATION  Patient: Alfredo Scott (63 y.o. male)  Date: 10/7/2022  Primary Diagnosis: Multiple rib fractures [S22.49XA]  Humerus shaft fracture [S42.309A]       Precautions:falls, NWB L UE  ASSESSMENT  Pt is a 76 y.o. male  who came to [facility] ER following a fall last night in his bathroom, transferred to [facility] for further management, admitted on 10/6//2022 for multiple rib fractures and L humerus shaft fracture. CT chest shows non-displaced fracture of 7th, 8th, 9th, 10th, 11th and possibly 12 th ribs. X-ray L shoulder shows 2cm L displaced humerus mid diaphyseal  fracture. Currently, pt's L shoulder immobilized and supported in sling.  Per ortho, pt's decision pending for treatment options of conservative management versus surgical intervention     Pt received in semi-supine upon PT arrival, agreeable to evaluation. Pt A&O x 3, needs cues for month, able to tell year. Based on the objective data described, the patient presents with c/o pain L rib cage - 3/10, generalized weakness, impaired functional mobility, impaired amb, and impaired balance. (See below for objective details and assist levels). Pt apprehensive of falls, requires reassurance to attempt standing, x 3 attempts, requires maxA, unable to clear bed. Overall pt tolerated session fair today with encouragement. Pt will benefit from continued skilled PT to address above deficits and return to PLOF. Current PT DC recommendation Inpatient Rehabilitation Facility  once medically appropriate. Current Level of Function Impacting Discharge (mobility/balance): Pt requires modA for bed mobility and maxA x 2 for transfers. Other factors to consider for discharge: PLOF, severity of deficits, decline from functional baseline      PLAN :  Recommendations and Planned Interventions: bed mobility training, transfer training, gait training, therapeutic exercises, neuromuscular re-education, patient and family training/education, and therapeutic activities      Recommend for staff: Encourage HEP in prep for ADLs/mobility    Frequency/Duration: Patient will be followed by physical therapy:  3-5x/week to address goals.     Recommendation for discharge: (in order for the patient to meet his/her long term goals)  [facility]     This discharge recommendation:  Has been made in collaboration with the attending provider and/or case management    IF patient discharges home will need the following DME: patient owns DME required for discharge         SUBJECTIVE:   Patient stated My ribs hurt    OBJECTIVE DATA SUMMARY:   HISTORY:    Past Medical History:   Diagnosis Date    Acid reflux     Anemia     Diabetes ([facility] [address]     GERD (gastroesophageal reflux disease)     Hypertension     Stroke [facility])      Past Surgical History:   Procedure Laterality Date    HX COLONOSCOPY         Home Situation  Home Environment: Private residence  # Steps to Enter: 3  Rails to Enter: Yes  Hand Rails : Bilateral  Wheelchair Ramp: No  One/Two Story Residence: One story  Living Alone: No  Support Systems: Child(avtar), Other Family Member(s)  Patient Expects to be Discharged to[de-identified] Home  Current DME Used/Available at Home: Walker    EXAMINATION/PRESENTATION/DECISION MAKING:   Critical Behavior:  Neurologic State: Alert  Orientation Level: Oriented to person, Oriented to place, Oriented to time (need cues to tell month)  Cognition: Follows commands     Hearing: Auditory  Auditory Impairment: Hard of hearing, bilateral  Skin:  L Ue in sling    Range Of Motion:  AROM: Generally decreased, functional    Strength:    Strength: Generally decreased, functional    Tone & Sensation:   Tone: Normal    Sensation: Intact    Coordination:  Coordination: Generally decreased, functional    Functional Mobility:  Bed Mobility:  Rolling: Moderate assistance  Supine to Sit: Moderate assistance  Sit to Supine: Moderate assistance;Maximum assistance  Scooting: Moderate assistance  Transfers:  Sit to Stand: Maximum assistance (Unable to clear bed)  Stand to Sit: Maximum assistance    Balance:   Sitting: Impaired  Sitting - Static: Good (unsupported)  Sitting - Dynamic: Fair (occasional)  Standing: Impaired; With support  Standing - Static: Poor;Constant support  Standing - Dynamic : Poor;Constant support    Functional Measure:  [employer] AM-PAC 6 Clicks         Basic Mobility Inpatient Short Form  How much difficulty does the patient currently have. .. Unable A Lot A Little None   1. Turning over in bed (including adjusting bedclothes, sheets and blankets)? [] 1   [x] 2   [] 3   [] 4   2. Sitting down on and standing up from a chair with arms ( e.g., wheelchair, bedside commode, etc.)   [] 1   [x] 2   [] 3   [] 4   3. Moving from lying on back to sitting on the side of the bed?    [] 1   [x] 2   [] 3 [] 4          How much help from another person does the patient currently need. .. Total A Lot A Little None   4. Moving to and from a bed to a chair (including a wheelchair)? [x] 1   [] 2   [] 3   [] 4   5. Need to walk in hospital room? [x] 1   [] 2   [] 3   [] 4   6. Climbing 3-5 steps with a railing? [x] 1   [] 2   [] 3   [] 4   © 2007, Trustees of [employer], under license to [employer]. All rights reserved     Score:  Initial: 8 Most Recent: X (Date: 10/7/22 )   Interpretation of Tool:  Represents activities that are increasingly more difficult (i.e. Bed mobility, Transfers, Gait). Score 24 23 22-20 19-15 14-10 9-7 6   Modifier CH CI CJ CK CL CM CN         Physical Therapy Evaluation Charge Determination   History Examination Presentation Decision-Making   MEDIUM  Complexity : 1-2 comorbidities / personal factors will impact the outcome/ POC  MEDIUM Complexity : 3 Standardized tests and measures addressing body structure, function, activity limitation and / or participation in recreation  MEDIUM Complexity : Evolving with changing characteristics  Other outcome measures [facility] 6  high complexity      Based on the above components, the patient evaluation is determined to be of the following complexity level: MEDIUM    Pain Rating:  3/10 ribs    Activity Tolerance:   Fair    After treatment patient left in no apparent distress:   Bed locked and in lowest position Supine in bed, Call bell within reach, and Bed / chair alarm activated and nsg updated. COMMUNICATION/EDUCATION:   The patients plan of care was discussed with: Registered nurse. Fall prevention education was provided and the patient/caregiver indicated understanding. and Patient/family agree to work toward stated goals and plan of care.          Thank you for this referral.  Meenu uBstos, PT   Time Calculation: 38 mins

## 2022-10-07 NOTE — PROGRESS NOTES
Orthopedic progress note    Date:10/7/2022       Room:[facility]  Patient Name:Jacob Villafuerte     YOB: 1945     Age:77 y.o. Subjective    Follow-up on this 58-year-old male with left humerus fracture. Patient sitting comfortably in bed. He has no complaints of pain at this time. He states he is feeling pretty well this morning. He is currently eating breakfast.  No other complaints at this time. Objective           Vitals Last 24 Hours:  TEMPERATURE:  Temp  Av.3 °F (36.8 °C)  Min: 98.2 °F (36.8 °C)  Max: 98.6 °F (37 °C)  RESPIRATIONS RANGE: Resp  Av.8  Min: 16  Max: 19  PULSE OXIMETRY RANGE: SpO2  Av.8 %  Min: 93 %  Max: 97 %  PULSE RANGE: Pulse  Av.5  Min: 66  Max: 85  BLOOD PRESSURE RANGE: Systolic (97QEZ), FNR:818 , Min:122 , XRC:024   ; Diastolic (47WBP), XYP:44, Min:71, Max:88    Current Facility-Administered Medications   Medication Dose Route Frequency    enoxaparin (LOVENOX) injection 40 mg  40 mg SubCUTAneous Q24H    lactated Ringers infusion  75 mL/hr IntraVENous CONTINUOUS    morphine injection 2 mg  2 mg IntraVENous Q4H PRN          I/O (24Hr): No intake or output data in the 24 hours ending 10/07/22 1002  Objective:  Vital signs: (most recent): Blood pressure 138/71, pulse 85, temperature 98.3 °F (36.8 °C), resp. rate 18, height 5' 7\" (1.702 m), weight 90.7 kg (200 lb), SpO2 97 %. Labs/Imaging/Diagnostics    Labs:  CBC:  Recent Labs     10/06/22  1251   WBC 12.0*   RBC 4.18   HGB 13.7   HCT 38.8   MCV 92.8   RDW 12.7        CHEMISTRIES:  Recent Labs     10/06/22  1251      K 2.7*   CL 99   CO2 32   BUN 4*   CREA 0.97   CA 8.9   PT/INR:No results for input(s): INR, INREXT in the last 72 hours. No lab exists for component: PROTIME  APTT:No results for input(s): APTT in the last 72 hours.   LIVER PROFILE:  Recent Labs     10/06/22  1251   AST 11*   ALT 16     Lab Results   Component Value Date/Time    ALT (SGPT) 16 10/06/2022 12:51 PM    AST (SGOT) 11 (L) 10/06/2022 12:51 PM    Alk. phosphatase 72 10/06/2022 12:51 PM    Bilirubin, direct 0.1 09/01/2020 06:25 AM    Bilirubin, total 0.4 10/06/2022 12:51 PM       Physical Exam:  Left upper extremity: Splint in place. Shoulder immobilizer in place. Sensation intact throughout left upper extremity. Radial pulse palpable. EPL intact. Cap refill is 2 seconds.  strength 2 out of 5. Full range of motion of the fingers of his left hand without tenderness. Left upper extremity appears neurovascular intact. Assessment//Plan           Patient Active Problem List    Diagnosis Date Noted    Multiple rib fractures 10/06/2022    Humerus shaft fracture 10/06/2022    Peripheral vascular disease ([address] 11/22/2021    Chronic venous hypertension 11/22/2021    NSVT (nonsustained ventricular tachycardia) 09/05/2020    Respiratory failure ([address] 08/30/2020    Diabetes mellitus type 2, controlled ([address] 08/19/2020    Dysphagia 08/19/2020    Essential hypertension 08/19/2020    Genuine stress incontinence, female 08/19/2020    Hemiplegia ([address] 08/19/2020    History of adenomatous polyp of colon 08/19/2020    Hyperproteinemia 08/19/2020    Intracranial hemorrhage ([address] 08/19/2020    Laryngopharyngeal reflux 08/19/2020     Left humerus fracture  Patient was seen by Dr. Hanna Raymundo. They discussed treatment options of conservative management versus surgical intervention. Patient will still like time to decide how to proceed. At this point we will continue with conservative management. If patient decides to proceed with surgical intervention we will discussed this further and he can be scheduled as an inpatient or as an outpatient if he is being discharged.       Electronically signed by Mary De Paz PA-C on 10/7/2022 at 10:02 AM

## 2022-10-07 NOTE — PROGRESS NOTES
Reason for Admission: Rib Fx                      RUR Score:   N/A                  Plan for utilizing home health:   None       PCP: First and Last name:  Fransisco Castillo MD     Name of Practice:    Are you a current patient: Yes/No:    Approximate date of last visit:    Can you participate in a virtual visit with your PCP:                     Current Advanced Directive/Advance Care Plan: Prior      Healthcare Decision Maker:   Click here to complete [address] including selection of the Healthcare Decision Maker Relationship (ie \"Primary\")             Primary Decision MakerSharlogan Sullivan - [phone]    Secondary Decision Maker: Daisy Bean" - Daughter - [phone]    Secondary Decision Maker: Vandana Lopez - Daughter - [phone]                  Transition of Care Plan:        Cm discussed discharge planning with patient at bedside. Patient will return home self care. Patient lives in a one story home with his spouse. Patient has three steps to enter. Patient has HHA seven days/wk for 4hrs to assist with ADL/IADL care and medication cueing. Patient requires medicaid transport to MD appt. Patient has quad cane and BSC in home. Pharmacy: [facility] [address] [facility]  [phone]. Patient will require Medicaid transport at discharge.

## 2022-10-08 LAB
ALBUMIN SERPL-MCNC: 3 G/DL (ref 3.5–5)
ALBUMIN/GLOB SERPL: 0.7 {RATIO} (ref 1.1–2.2)
ALP SERPL-CCNC: 61 U/L (ref 45–117)
ALT SERPL-CCNC: 19 U/L (ref 12–78)
ANION GAP SERPL CALC-SCNC: 7 MMOL/L (ref 5–15)
AST SERPL W P-5'-P-CCNC: 12 U/L (ref 15–37)
BILIRUB SERPL-MCNC: 0.5 MG/DL (ref 0.2–1)
BUN SERPL-MCNC: 9 MG/DL (ref 6–20)
BUN/CREAT SERPL: 12 (ref 12–20)
CA-I BLD-MCNC: 8.8 MG/DL (ref 8.5–10.1)
CHLORIDE SERPL-SCNC: 100 MMOL/L (ref 97–108)
CO2 SERPL-SCNC: 32 MMOL/L (ref 21–32)
CREAT SERPL-MCNC: 0.77 MG/DL (ref 0.7–1.3)
CRP SERPL-MCNC: 3.83 MG/DL (ref 0–0.6)
ERYTHROCYTE [DISTWIDTH] IN BLOOD BY AUTOMATED COUNT: 12.7 % (ref 11.5–14.5)
EST. AVERAGE GLUCOSE BLD GHB EST-MCNC: 131 MG/DL
GLOBULIN SER CALC-MCNC: 4.4 G/DL (ref 2–4)
GLUCOSE BLD STRIP.AUTO-MCNC: 133 MG/DL (ref 65–100)
GLUCOSE BLD STRIP.AUTO-MCNC: 145 MG/DL (ref 65–100)
GLUCOSE BLD STRIP.AUTO-MCNC: 201 MG/DL (ref 65–100)
GLUCOSE SERPL-MCNC: 174 MG/DL (ref 65–100)
HBA1C MFR BLD: 6.2 % (ref 4–5.6)
HCT VFR BLD AUTO: 39.8 % (ref 36.6–50.3)
HGB BLD-MCNC: 14 G/DL (ref 12.1–17)
MAGNESIUM SERPL-MCNC: 1.6 MG/DL (ref 1.6–2.4)
MCH RBC QN AUTO: 33.2 PG (ref 26–34)
MCHC RBC AUTO-ENTMCNC: 35.2 G/DL (ref 30–36.5)
MCV RBC AUTO: 94.3 FL (ref 80–99)
NRBC # BLD: 0 K/UL (ref 0–0.01)
NRBC BLD-RTO: 0 PER 100 WBC
PERFORMED BY, TECHID: ABNORMAL
PLATELET # BLD AUTO: 262 K/UL (ref 150–400)
PMV BLD AUTO: 11.3 FL (ref 8.9–12.9)
POTASSIUM SERPL-SCNC: 2.6 MMOL/L (ref 3.5–5.1)
PROT SERPL-MCNC: 7.4 G/DL (ref 6.4–8.2)
RBC # BLD AUTO: 4.22 M/UL (ref 4.1–5.7)
SODIUM SERPL-SCNC: 139 MMOL/L (ref 136–145)
WBC # BLD AUTO: 9.9 K/UL (ref 4.1–11.1)

## 2022-10-08 PROCEDURE — 83036 HEMOGLOBIN GLYCOSYLATED A1C: CPT

## 2022-10-08 PROCEDURE — 83735 ASSAY OF MAGNESIUM: CPT

## 2022-10-08 PROCEDURE — 82962 GLUCOSE BLOOD TEST: CPT

## 2022-10-08 PROCEDURE — 65270000029 HC RM PRIVATE

## 2022-10-08 PROCEDURE — 74011250636 HC RX REV CODE- 250/636: Performed by: STUDENT IN AN ORGANIZED HEALTH CARE EDUCATION/TRAINING PROGRAM

## 2022-10-08 PROCEDURE — 36415 COLL VENOUS BLD VENIPUNCTURE: CPT

## 2022-10-08 PROCEDURE — 85027 COMPLETE CBC AUTOMATED: CPT

## 2022-10-08 PROCEDURE — 80053 COMPREHEN METABOLIC PANEL: CPT

## 2022-10-08 PROCEDURE — 74011250636 HC RX REV CODE- 250/636: Performed by: SURGERY

## 2022-10-08 PROCEDURE — 93005 ELECTROCARDIOGRAM TRACING: CPT

## 2022-10-08 PROCEDURE — 74011250637 HC RX REV CODE- 250/637: Performed by: SURGERY

## 2022-10-08 PROCEDURE — 86140 C-REACTIVE PROTEIN: CPT

## 2022-10-08 PROCEDURE — 74011636637 HC RX REV CODE- 636/637: Performed by: STUDENT IN AN ORGANIZED HEALTH CARE EDUCATION/TRAINING PROGRAM

## 2022-10-08 PROCEDURE — 97530 THERAPEUTIC ACTIVITIES: CPT

## 2022-10-08 PROCEDURE — 74011250637 HC RX REV CODE- 250/637: Performed by: STUDENT IN AN ORGANIZED HEALTH CARE EDUCATION/TRAINING PROGRAM

## 2022-10-08 RX ORDER — POTASSIUM CHLORIDE 20 MEQ/1
40 TABLET, EXTENDED RELEASE ORAL
Status: COMPLETED | OUTPATIENT
Start: 2022-10-08 | End: 2022-10-08

## 2022-10-08 RX ORDER — DOCUSATE SODIUM 100 MG/1
100 CAPSULE, LIQUID FILLED ORAL 2 TIMES DAILY
Status: DISCONTINUED | OUTPATIENT
Start: 2022-10-08 | End: 2022-10-10 | Stop reason: HOSPADM

## 2022-10-08 RX ORDER — ATORVASTATIN CALCIUM 10 MG/1
10 TABLET, FILM COATED ORAL
Status: DISCONTINUED | OUTPATIENT
Start: 2022-10-08 | End: 2022-10-10 | Stop reason: HOSPADM

## 2022-10-08 RX ORDER — CARVEDILOL 12.5 MG/1
25 TABLET ORAL 2 TIMES DAILY WITH MEALS
Status: DISCONTINUED | OUTPATIENT
Start: 2022-10-08 | End: 2022-10-10 | Stop reason: HOSPADM

## 2022-10-08 RX ORDER — INSULIN LISPRO 100 [IU]/ML
INJECTION, SOLUTION INTRAVENOUS; SUBCUTANEOUS
Status: DISCONTINUED | OUTPATIENT
Start: 2022-10-08 | End: 2022-10-10 | Stop reason: HOSPADM

## 2022-10-08 RX ORDER — PANTOPRAZOLE SODIUM 40 MG/1
40 TABLET, DELAYED RELEASE ORAL
Status: DISCONTINUED | OUTPATIENT
Start: 2022-10-08 | End: 2022-10-10 | Stop reason: HOSPADM

## 2022-10-08 RX ORDER — POTASSIUM CHLORIDE 750 MG/1
40 TABLET, FILM COATED, EXTENDED RELEASE ORAL DAILY
Status: DISCONTINUED | OUTPATIENT
Start: 2022-10-09 | End: 2022-10-10 | Stop reason: HOSPADM

## 2022-10-08 RX ORDER — ASPIRIN 81 MG/1
81 TABLET ORAL DAILY
Status: DISCONTINUED | OUTPATIENT
Start: 2022-10-08 | End: 2022-10-10 | Stop reason: HOSPADM

## 2022-10-08 RX ORDER — POTASSIUM CHLORIDE 7.45 MG/ML
10 INJECTION INTRAVENOUS
Status: COMPLETED | OUTPATIENT
Start: 2022-10-08 | End: 2022-10-08

## 2022-10-08 RX ORDER — HYDRALAZINE HYDROCHLORIDE 20 MG/ML
20 INJECTION INTRAMUSCULAR; INTRAVENOUS
Status: DISCONTINUED | OUTPATIENT
Start: 2022-10-08 | End: 2022-10-10 | Stop reason: HOSPADM

## 2022-10-08 RX ORDER — MAGNESIUM SULFATE 100 %
4 CRYSTALS MISCELLANEOUS AS NEEDED
Status: DISCONTINUED | OUTPATIENT
Start: 2022-10-08 | End: 2022-10-10 | Stop reason: HOSPADM

## 2022-10-08 RX ORDER — ONDANSETRON 4 MG/1
4 TABLET, FILM COATED ORAL
Status: DISCONTINUED | OUTPATIENT
Start: 2022-10-08 | End: 2022-10-10 | Stop reason: HOSPADM

## 2022-10-08 RX ORDER — AMLODIPINE BESYLATE 5 MG/1
10 TABLET ORAL DAILY
Status: DISCONTINUED | OUTPATIENT
Start: 2022-10-08 | End: 2022-10-10 | Stop reason: HOSPADM

## 2022-10-08 RX ORDER — POTASSIUM CHLORIDE 7.45 MG/ML
10 INJECTION INTRAVENOUS ONCE
Status: COMPLETED | OUTPATIENT
Start: 2022-10-08 | End: 2022-10-08

## 2022-10-08 RX ORDER — CYCLOBENZAPRINE HCL 10 MG
5 TABLET ORAL 3 TIMES DAILY
Status: DISCONTINUED | OUTPATIENT
Start: 2022-10-08 | End: 2022-10-10 | Stop reason: HOSPADM

## 2022-10-08 RX ORDER — LISINOPRIL 10 MG/1
10 TABLET ORAL DAILY
Status: DISCONTINUED | OUTPATIENT
Start: 2022-10-08 | End: 2022-10-10 | Stop reason: HOSPADM

## 2022-10-08 RX ADMIN — ATORVASTATIN CALCIUM 10 MG: 10 TABLET, FILM COATED ORAL at 21:00

## 2022-10-08 RX ADMIN — KETOROLAC TROMETHAMINE 15 MG: 15 INJECTION, SOLUTION INTRAMUSCULAR; INTRAVENOUS at 10:24

## 2022-10-08 RX ADMIN — ASPIRIN 81 MG: 81 TABLET, COATED ORAL at 10:22

## 2022-10-08 RX ADMIN — DOCUSATE SODIUM 100 MG: 100 CAPSULE, LIQUID FILLED ORAL at 21:00

## 2022-10-08 RX ADMIN — AMLODIPINE BESYLATE 10 MG: 5 TABLET ORAL at 10:22

## 2022-10-08 RX ADMIN — ENOXAPARIN SODIUM 40 MG: 100 INJECTION SUBCUTANEOUS at 21:00

## 2022-10-08 RX ADMIN — LISINOPRIL 10 MG: 10 TABLET ORAL at 10:22

## 2022-10-08 RX ADMIN — CYCLOBENZAPRINE 5 MG: 10 TABLET, FILM COATED ORAL at 10:22

## 2022-10-08 RX ADMIN — DOCUSATE SODIUM 100 MG: 100 CAPSULE, LIQUID FILLED ORAL at 10:22

## 2022-10-08 RX ADMIN — POTASSIUM CHLORIDE 10 MEQ: 7.46 INJECTION, SOLUTION INTRAVENOUS at 10:22

## 2022-10-08 RX ADMIN — CARVEDILOL 25 MG: 12.5 TABLET, FILM COATED ORAL at 18:55

## 2022-10-08 RX ADMIN — HYDROCODONE BITARTRATE AND ACETAMINOPHEN 1 TABLET: 5; 325 TABLET ORAL at 00:18

## 2022-10-08 RX ADMIN — CARVEDILOL 25 MG: 12.5 TABLET, FILM COATED ORAL at 10:22

## 2022-10-08 RX ADMIN — POTASSIUM CHLORIDE 40 MEQ: 1500 TABLET, EXTENDED RELEASE ORAL at 14:11

## 2022-10-08 RX ADMIN — POTASSIUM CHLORIDE 10 MEQ: 7.46 INJECTION, SOLUTION INTRAVENOUS at 11:00

## 2022-10-08 RX ADMIN — POTASSIUM CHLORIDE 10 MEQ: 7.46 INJECTION, SOLUTION INTRAVENOUS at 12:00

## 2022-10-08 RX ADMIN — POTASSIUM CHLORIDE 10 MEQ: 7.46 INJECTION, SOLUTION INTRAVENOUS at 09:00

## 2022-10-08 RX ADMIN — PANTOPRAZOLE SODIUM 40 MG: 40 TABLET, DELAYED RELEASE ORAL at 10:22

## 2022-10-08 RX ADMIN — INSULIN LISPRO 6 UNITS: 100 INJECTION, SOLUTION INTRAVENOUS; SUBCUTANEOUS at 14:09

## 2022-10-08 RX ADMIN — CYCLOBENZAPRINE 5 MG: 10 TABLET, FILM COATED ORAL at 17:05

## 2022-10-08 RX ADMIN — POTASSIUM CHLORIDE 10 MEQ: 7.46 INJECTION, SOLUTION INTRAVENOUS at 14:11

## 2022-10-08 RX ADMIN — SODIUM CHLORIDE, POTASSIUM CHLORIDE, SODIUM LACTATE AND CALCIUM CHLORIDE 75 ML/HR: 600; 310; 30; 20 INJECTION, SOLUTION INTRAVENOUS at 10:24

## 2022-10-08 RX ADMIN — HYDROCODONE BITARTRATE AND ACETAMINOPHEN 1 TABLET: 5; 325 TABLET ORAL at 14:11

## 2022-10-08 RX ADMIN — CYCLOBENZAPRINE 5 MG: 10 TABLET, FILM COATED ORAL at 21:00

## 2022-10-08 NOTE — PROGRESS NOTES
Chief Complaint:none      Subjective:  Mr. Lisa Chino is a 76y.o. year old * male admitted for s/p fall with multiple left side rib fractures with left humerus fracture. No complaints. Has not decided regarding surgery for left humeral fracture. Review of Systems:   Constitutional:  no fever, no chills,  no sweats, No weakness, No fatigue, No decreased activity. Respiratory: No shortness of breath, No cough, No sputum production, No hemoptysis, No wheezing, No cyanosis. Cardiovascular: No chest pain, No palpitations, No bradycardia, No tachycardia, No peripheral edema, No syncope. Gastrointestinal: No nausea, No vomiting, No diarrhea, No constipation, No heartburn, No abdominal pain. Genitourinary: No dysuria, No hematuria, No change in urine stream, No urethral discharge, No lesions. Hematology/Lymphatics: No bruising tendency, No bleeding tendency, No petechiae, No swollen lymph glands. Endocrine: No excessive thirst, No polyuria, No cold intolerance, No heat intolerance, No excessive hunger. Musculoskeletal: No back pain, No neck pain, No joint pain, No muscle pain, No claudication, No decreased range of motion, No trauma. Integumentary: No rash, No pruritus, No abrasions. Neurologic: Alert and oriented X4, No abnormal balance, No headache, No confusion, No numbness, No tingling. Psychiatric: No anxiety, No depression, No paola. Physical Exam:     Vitals & Measurements:     Wt Readings from Last 3 Encounters:   10/06/22 90.7 kg (200 lb)   10/06/22 100.7 kg (222 lb)   03/29/22 100.7 kg (222 lb)     Temp Readings from Last 3 Encounters:   10/07/22 99.3 °F (37.4 °C)   10/06/22 98.6 °F (37 °C)   03/29/22 98 °F (36.7 °C) (Temporal)     BP Readings from Last 3 Encounters:   10/07/22 (!) 161/85   10/06/22 (!) 169/88   03/29/22 134/70     Pulse Readings from Last 3 Encounters:   10/07/22 90   10/06/22 81   03/29/22 75      Ht Readings from Last 3 Encounters:   10/07/22 5' 7\" (1.702 m)   10/06/22 5' 8\" (1.727 m)   03/29/22 5' 8\" (1.727 m)      Date 10/06/22 1900 - 10/07/22 0659 10/07/22 0700 - 10/08/22 0659   Shift 9537-3384 24 Hour Total 3220-2694 0920-3408 24 Hour Total   INTAKE   P.O.   400  400     P. O.   400  400   Shift Total(mL/kg)   400(4.4)  400(4.4)   OUTPUT   Shift Total(mL/kg)        NET   400  400   Weight (kg) 90.7 90.7 90.7 90.7 90.7       General: well appearing, no acute distress  Head: Normal  Face: Nornal  HEENT: atraumatic, PERRLA, moist mucosa, normal pharynx, normal tonsils and adenoids, normal tongue, no fluid in sinuses  Neck: Trachea midline, no carotid bruit, no masses  Chest: Normal, left side tenderness. Respiratory: normal chest wall expansion, CTA B, no r/r/w, no rubs  Cardiovascular: RRR, no m/r/g, Normal S1 and S2  Abdomen: Soft, non tender, non-distended, normal bowel sounds in all quadrants, no hepatosplenomegaly, no tympany. Incision scar:   Genitourinary: No inguinal hernia, normal external gentalia, Testis & scrotum normal, no renal angle tenderness  Rectal: deferred  Musculoskeletal: Normal ROM in upper and lower extremities, No joint swelling, left side upper extremity limited ROM. Integumentary: Warm, dry, and pink, with no rash, purpura, or petechia  Heme/Lymph: No lymphadenopathy, no bruises  Neurological: Cranial Nerves II-XII grossly intact, No gross sensory or motor deficit.   Psychiatric: Cooperative with normal mood, affect, and cognition    Laboratory Values:   Recent Results (from the past 24 hour(s))   GLUCOSE, POC    Collection Time: 10/07/22  1:50 AM   Result Value Ref Range    Glucose (POC) 174 (H) 65 - 100 mg/dL    Performed by Ro Force, POC    Collection Time: 10/07/22  7:45 AM   Result Value Ref Range    Glucose (POC) 148 (H) 65 - 100 mg/dL    Performed by Peg Bandwidth, POC    Collection Time: 10/07/22 12:12 PM   Result Value Ref Range    Glucose (POC) 186 (H) 65 - 100 mg/dL    Performed by [address] [place] [address] Collection Time: 10/07/22  7:33 PM   Result Value Ref Range    Glucose (POC) 219 (H) 65 - 100 mg/dL    Performed by Iva November            Assessment:  Problem List Items Addressed This Visit          Skeletal    Multiple rib fractures - Primary    Humerus shaft fracture        Plan:    Admission  Diet: regular  IV fluids  SCD  IS  Pain medications  Antibiotics  Nausea medication  Labs & Radiology  Consult: Ortho  11. Awaiting for patient & family decision regarding management of left humerus fracture. 12. Plan discussed with patient and family and answered all their questions. Thank you for allowing me to participate in the care of this patient.

## 2022-10-08 NOTE — PROGRESS NOTES
Chief Complaint:none      Subjective:  Mr. Yasmany Mascorro is a 76y.o. year old * male admitted for s/p fall with multiple left side rib fractures with left humerus fracture. No complaints. Has decided regarding surgery for left humeral fracture & wants to have it done here before discharge. Review of Systems:   Constitutional:  no fever, no chills,  no sweats, No weakness, No fatigue, No decreased activity. Respiratory: No shortness of breath, No cough, No sputum production, No hemoptysis, No wheezing, No cyanosis. Cardiovascular: No chest pain, No palpitations, No bradycardia, No tachycardia, No peripheral edema, No syncope. Gastrointestinal: No nausea, No vomiting, No diarrhea, No constipation, No heartburn, No abdominal pain. Genitourinary: No dysuria, No hematuria, No change in urine stream, No urethral discharge, No lesions. Hematology/Lymphatics: No bruising tendency, No bleeding tendency, No petechiae, No swollen lymph glands. Endocrine: No excessive thirst, No polyuria, No cold intolerance, No heat intolerance, No excessive hunger. Musculoskeletal: No back pain, No neck pain, No joint pain, No muscle pain, No claudication, No decreased range of motion, No trauma. Integumentary: No rash, No pruritus, No abrasions. Neurologic: Alert and oriented X4, No abnormal balance, No headache, No confusion, No numbness, No tingling. Psychiatric: No anxiety, No depression, No paola. Physical Exam:     Vitals & Measurements:     Wt Readings from Last 3 Encounters:   10/06/22 90.7 kg (200 lb)   10/06/22 100.7 kg (222 lb)   03/29/22 100.7 kg (222 lb)     Temp Readings from Last 3 Encounters:   10/08/22 98.3 °F (36.8 °C)   10/06/22 98.6 °F (37 °C)   03/29/22 98 °F (36.7 °C) (Temporal)     BP Readings from Last 3 Encounters:   10/08/22 (!) 157/84   10/06/22 (!) 169/88   03/29/22 134/70     Pulse Readings from Last 3 Encounters:   10/08/22 70   10/06/22 81   03/29/22 75      Ht Readings from Last 3 Encounters:   10/07/22 5' 7\" (1.702 m)   10/06/22 5' 8\" (1.727 m)   03/29/22 5' 8\" (1.727 m)      Date 10/07/22 0700 - 10/08/22 0659 10/08/22 0700 - 10/09/22 0659   Shift 9773-9445 8695-5340 24 Hour Total 7513-4938 8608-6395 24 Hour Total   INTAKE   P.O. 400  400        P. O. 400  400      Shift Total(mL/kg) 400(4.4)  400(4.4)      OUTPUT   Shift Total(mL/kg)           400      Weight (kg) 90.7 90.7 90.7 90.7 90.7 90.7         General: well appearing, no acute distress  Head: Normal  Face: Nornal  HEENT: atraumatic, PERRLA, moist mucosa, normal pharynx, normal tonsils and adenoids, normal tongue, no fluid in sinuses  Neck: Trachea midline, no carotid bruit, no masses  Chest: Normal, left side tenderness. Respiratory: normal chest wall expansion, CTA B, no r/r/w, no rubs  Cardiovascular: RRR, no m/r/g, Normal S1 and S2  Abdomen: Soft, non tender, non-distended, normal bowel sounds in all quadrants, no hepatosplenomegaly, no tympany. Incision scar:   Genitourinary: No inguinal hernia, normal external gentalia, Testis & scrotum normal, no renal angle tenderness  Rectal: deferred  Musculoskeletal: Normal ROM in upper and lower extremities, No joint swelling, left side upper extremity limited ROM. Integumentary: Warm, dry, and pink, with no rash, purpura, or petechia  Heme/Lymph: No lymphadenopathy, no bruises  Neurological: Cranial Nerves II-XII grossly intact, No gross sensory or motor deficit.   Psychiatric: Cooperative with normal mood, affect, and cognition    Laboratory Values:   Recent Results (from the past 24 hour(s))   GLUCOSE, POC    Collection Time: 10/07/22 12:12 PM   Result Value Ref Range    Glucose (POC) 186 (H) 65 - 100 mg/dL    Performed by [email or web address], POC    Collection Time: 10/07/22  7:33 PM   Result Value Ref Range    Glucose (POC) 219 (H) 65 - 100 mg/dL    Performed by Richard Mcdonnell, POC    Collection Time: 10/08/22  8:38 AM   Result Value Ref Range    Glucose (POC) 145 (H) 65 - 100 mg/dL    Performed by [address] [place] [address] Pickup Services    Collection Time: 10/08/22 10:09 AM   Result Value Ref Range    Magnesium 1.6 1.6 - 2.4 mg/dL   METABOLIC PANEL, COMPREHENSIVE    Collection Time: 10/08/22 10:09 AM   Result Value Ref Range    Sodium 139 136 - 145 mmol/L    Potassium 2.6 (LL) 3.5 - 5.1 mmol/L    Chloride 100 97 - 108 mmol/L    CO2 32 21 - 32 mmol/L    Anion gap 7 5 - 15 mmol/L    Glucose 174 (H) 65 - 100 mg/dL    BUN 9 6 - 20 mg/dL    Creatinine 0.77 0.70 - 1.30 mg/dL    BUN/Creatinine ratio 12 12 - 20      eGFR >60 >60 ml/min/1.73m2    Calcium 8.8 8.5 - 10.1 mg/dL    Bilirubin, total 0.5 0.2 - 1.0 mg/dL    AST (SGOT) 12 (L) 15 - 37 U/L    ALT (SGPT) 19 12 - 78 U/L    Alk. phosphatase 61 45 - 117 U/L    Protein, total 7.4 6.4 - 8.2 g/dL    Albumin 3.0 (L) 3.5 - 5.0 g/dL    Globulin 4.4 (H) 2.0 - 4.0 g/dL    A-G Ratio 0.7 (L) 1.1 - 2.2     CBC W/O DIFF    Collection Time: 10/08/22 10:09 AM   Result Value Ref Range    WBC 9.9 4.1 - 11.1 K/uL    RBC 4.22 4.10 - 5.70 M/uL    HGB 14.0 12.1 - 17.0 g/dL    HCT 39.8 36.6 - 50.3 %    MCV 94.3 80.0 - 99.0 FL    MCH 33.2 26.0 - 34.0 PG    MCHC 35.2 30.0 - 36.5 g/dL    RDW 12.7 11.5 - 14.5 %    PLATELET 859 843 - 212 K/uL    MPV 11.3 8.9 - 12.9 FL    NRBC 0.0 0.0  WBC    ABSOLUTE NRBC 0.00 0.00 - 0.01 K/uL   C REACTIVE PROTEIN, QT    Collection Time: 10/08/22 10:09 AM   Result Value Ref Range    C-Reactive protein 3.83 (H) 0.00 - 0.60 mg/dL   GLUCOSE, POC    Collection Time: 10/08/22 11:10 AM   Result Value Ref Range    Glucose (POC) 201 (H) 65 - 100 mg/dL    Performed by Zahira Auguste            Assessment:  Problem List Items Addressed This Visit          Skeletal    Multiple rib fractures - Primary    Humerus shaft fracture        Plan:    Admission  Diet: regular  IV fluids  SCD  IS  Pain medications  Antibiotics  Nausea medication  Labs & Radiology  Consult: Ortho  11. Ortho to decide date for surgery. 12. Replace K   13.  Plan discussed with patient and family and answered all their questions. Thank you for allowing me to participate in the care of this patient.

## 2022-10-08 NOTE — PROGRESS NOTES
PHYSICAL THERAPY TREATMENT  Patient: Mahin Panchal (43 y.o. male)  Date: 10/8/2022  Diagnosis: Multiple rib fractures [S22.49XA]  Humerus shaft fracture [S42.309A] <principal problem not specified>      Precautions:    Chart, physical therapy assessment, plan of care and goals were reviewed. ASSESSMENT  Patient continues with skilled PT services and is progressing towards goals. Pt semi supine in bed upon PT arrival, agreeable to session. (See below for objective details and assist levels). Overall pt tolerated session well today with only complaints on nausea and increased pain 5/10 at. Will continue to benefit from skilled PT services, and will continue to progress as tolerated. Current Level of Function Impacting Discharge (mobility/balance): pmh    Other factors to consider for discharge: pmh         PLAN :  Patient continues to benefit from skilled intervention to address the above impairments. Continue treatment per established plan of care to address goals. Recommend with staff: Out of bed to chair for meals, Encourage HEP in prep for ADLs/mobility, Frequent repositioning to prevent skin breakdown, Use of bed/chair alarm for safety, and LE elevation for management of edema    Recommendation for discharge: (in order for the patient to meet his/her long term goals)  Inpatient Rehabilitation Facility     This discharge recommendation:  Has been made in collaboration with the attending provider and/or case management    IF patient discharges home will need the following DME: rolling walker       SUBJECTIVE:   Patient stated im nauseous and hurting right now.     OBJECTIVE DATA SUMMARY:   Critical Behavior:  Neurologic State: Alert  Orientation Level: Oriented X4  Cognition: Appropriate decision making, Appropriate for age attention/concentration, Appropriate safety awareness     Functional Mobility Training:  Bed Mobility:                    Transfers:                                   Balance: Ambulation/Gait Training:                                                        Stairs: Therapeutic Exercises:       EXERCISE   Sets   Reps   Active Active Assist   Passive Self ROM   Comments   Ankle Pumps  2 min [x] [x] [] [] AAROM LLE   Quad Sets/Glut Sets   [] [] [] []    Hamstring Sets   [] [] [] []    Short Arc Quads  3 MIN [x] [x] [] []    Heel Slides  3 MIN [x] [x] [] []    Straight Leg Raises   [] [] [] []    Hip abd/add  3 MIN [x] [x] [] []    Long Arc Quads   [] [] [] []    Marching   [] [] [] []       [] [] [] []       Pain Ratin    Activity Tolerance:   Fair    After treatment patient left in no apparent distress:   Bed locked and returned to lowest position, Supine in bed    GOALS:  Bed mobility, transfer, 25' ambulation, HEP    COMMUNICATION/COLLABORATION:   The patients plan of care was discussed with: Physical therapy assistant.          James Austin PTA, PT   Time Calculation: 17 mins

## 2022-10-08 NOTE — CONSULTS
Consult Hospitalist History and Physical    Patient: Raymond Alexandre MRN: [id number]  SSN: [id number]    YOB: 1945  Age: 76 y.o. Sex: male      Subjective:      Raymond Alexandre is a 76 y.o. male with a PMHx of diabetes mellitus, hypertension, stroke, GERD, and anemia presenting to the ED on 10/06 for ground-level fall in his bathroom. Patient denies hitting his head or any LOC. CT chest showed left side multiple rib fractures and left arm X-ray showed left humerus fracture. General surgery admitted the patient. Conservative management of rib fractures. Orthopedics consulted for left humerus fracture. Internal medicine consult for management of patient's diabetes and hypertension. Past Medical History:   Diagnosis Date    Acid reflux     Anemia     Diabetes (HCC)     GERD (gastroesophageal reflux disease)     Hypertension     Stroke [facility])      Past Surgical History:   Procedure Laterality Date    HX COLONOSCOPY        Family History   Problem Relation Age of Onset    Heart Disease Father     Hypertension Brother     Diabetes Brother      Social History     Tobacco Use    Smoking status: Never    Smokeless tobacco: Never   Substance Use Topics    Alcohol use: Never      Prior to Admission medications    Medication Sig Start Date End Date Taking? Authorizing Provider   aspirin delayed-release 81 mg tablet Take 81 mg by mouth daily. Yes Provider, Historical   amLODIPine (NORVASC) 10 mg tablet Take  by mouth daily. Yes Provider, Historical   carvediloL (COREG) 25 mg tablet Take 25 mg by mouth two (2) times daily (with meals). Yes Provider, Historical   lisinopriL (PRINIVIL, ZESTRIL) 10 mg tablet Take  by mouth daily. Yes Provider, Historical   metFORMIN (GLUCOPHAGE) 500 mg tablet Take  by mouth two (2) times daily (with meals). Yes Provider, Historical   pantoprazole (PROTONIX) 40 mg tablet Take 40 mg by mouth daily.    Yes Provider, Historical   pravastatin (PRAVACHOL) 40 mg tablet Take 40 mg by mouth nightly. Yes Provider, Historical   docusate sodium (COLACE) 100 mg capsule Take 100 mg by mouth two (2) times a day. Yes Provider, Historical   furosemide (LASIX) 20 mg tablet Take 20 mg by mouth daily as needed. Patient not taking: Reported on 10/7/2022    Provider, Historical   b complex vitamins tablet Take 1 Tablet by mouth daily. Provider, Historical   glucose blood VI test strips (Contour Next Test Strips) strip by Does Not Apply route See Admin Instructions. Provider, Historical   diclofenac (VOLTAREN) 1 % gel Apply  to affected area four (4) times daily. Provider, Historical   lancets (TRUEplus Lancets) 33 gauge misc by Does Not Apply route. Provider, Historical        No Known Allergies    Review of Systems:  Constitutional: No fevers, No chills, No fatigue, No weakness  Eyes: No visual disturbance  ENT: No nasal congestion, No sore throat  Respiratory: No cough, No sputum, No wheezing, No SOB  Cardiovascular: No chest pain, No lower extremity edema, No Palpitations   Gastrointestinal: No nausea, No vomiting, No diarrhea, No constipation, No abdominal pain  Genitourinary: No frequency, No dysuria, No hematuria  Integument/Breast: No rash, No skin lesion(s), No dryness  Musculoskeletal: No arthralgias, No neck pain, + left rib pain, + left arm pain  Neurological: No headaches, No dizziness, No confusion,  No seizures  Behavioral/Psychiatric: No anxiety, No depression      Objective:     Vitals:    10/07/22 1549 10/07/22 1925 10/08/22 0146 10/08/22 0846   BP: (!) 155/76 (!) 161/85 (!) 145/83 (!) 157/84   Pulse: 89 90 82 70   Resp: 17 16 18 20   Temp: 98.5 °F (36.9 °C) 99.3 °F (37.4 °C) 99.6 °F (37.6 °C) 98.3 °F (36.8 °C)   SpO2: 96% 94% 97% 92%   Weight:       Height:            Physical Exam:  General: alert, cooperative, no distress  Eye: conjunctivae/corneas clear. PERRL, EOM's intact. Throat and Neck: normal and no erythema or exudates noted.  No mass   Lung: clear to auscultation bilaterally. Room air. Heart: regular rate and rhythm, +S1/S2. No murmur or gallop. Abdomen: soft, non-tender. Bowel sounds normal. No masses,  Extremities: Left upper extremity in sling. Able to move all extremities normal, atraumatic  Skin: Warm, dry, and intact. Neurologic: AOx3. Cranial nerves 2-12 and sensation grossly intact. Psychiatric: non focal    Recent Results (from the past 24 hour(s))   GLUCOSE, POC    Collection Time: 10/07/22 12:12 PM   Result Value Ref Range    Glucose (POC) 186 (H) 65 - 100 mg/dL    Performed by Simin Holbrook    GLUCOSE, POC    Collection Time: 10/07/22  7:33 PM   Result Value Ref Range    Glucose (POC) 219 (H) 65 - 100 mg/dL    Performed by Candance Jasmine, POC    Collection Time: 10/08/22  8:38 AM   Result Value Ref Range    Glucose (POC) 145 (H) 65 - 100 mg/dL    Performed by Apryl Oakley      No orders to display     Hospital Problems  Date Reviewed: 3/29/2022            Codes Class Noted POA    Multiple rib fractures ICD-10-CM: S22.49XA  ICD-9-CM: 807.09  10/6/2022 Unknown        Humerus shaft fracture ICD-10-CM: S42.309A  ICD-9-CM: 812.21  10/6/2022 Unknown           Assessment/Plan:          Diabetes mellitus  - ISS and accu-check  - Hold home metformin    Hypertension  - Continue Norvasc 10 mg daily, Coreg 25 mg BID, and lisinopril 10 mg daily  - IV hydralazine PRN    Hypokalemia  - IV and oral repletion as needed    Hx CVA  - Continue ASA and statin    Multiple left-sided rib fractures  - General surgery  - Conservative management  - Family does not wish for patient to have morphine. PRN [place] and Toradol. Left humeral shaft fx  - Orthopedic surgery consult  - Left upper ext in sling        Thank you for the consult and for allowing us to participate in the care of this patient. Please do not hesitate to call with any questions or concerns.      Total time >45 minutes    Signed By: Laverne Castrejon PA-C     October 8, 2022

## 2022-10-08 NOTE — PROGRESS NOTES
Vascular Access Consult - 20g 1.75 inch PIV placed with US-guidance x 1 attempt in the right proxmial, lateral anterior forearm. Brisk blood return noted and flushes easily with 10 mL NS. Patient tolerated well. Patient continues to benefit from US-guided PIV placement for IV infusions. Lateral PIV previously placed, failed PIV and hyperechoic, non-compressible vein above PIV site noted upon ultrasound assessment by Sara Vasquez [facility]. Notified primary care nurse, Joel Segura. Recommendations made to primary care nurse, Joel Segura, for IR to evaluate client if future IV access is needed this admission due to left arm limb alert and inability to place any more PIV's in right upper arm due to non-suitable vessels upon ultrasound assessment at this time.      Britany Renee RN charting for Sara Vasquez [facility]

## 2022-10-08 NOTE — PROGRESS NOTES
Patient assessment completed for this shift. Bed in lowest position. Call bell within reach. Patient voiced  understanding to call for assistance as needed. No c/o pain voiced by patient at start of shift. C/o pain at this time and medicated per [facility]. No falls reported at this time. Patient remains stable. Progressing in plan of care. Will continue to monitor during this admission. Problem: Pressure Injury - Risk of  Goal: *Prevention of pressure injury  Description: Document William Scale and appropriate interventions in the flowsheet. Outcome: Progressing Towards Goal  Note: Pressure Injury Interventions:  Sensory Interventions: Minimize linen layers    Moisture Interventions: Absorbent underpads, Internal/External urinary devices    Activity Interventions: Pressure redistribution bed/mattress(bed type), PT/OT evaluation    Mobility Interventions: Pressure redistribution bed/mattress (bed type), PT/OT evaluation    Nutrition Interventions: Document food/fluid/supplement intake    Friction and Shear Interventions: Foam dressings/transparent film/skin sealants                Problem: Patient Education: Go to Patient Education Activity  Goal: Patient/Family Education  Outcome: Progressing Towards Goal     Problem: Falls - Risk of  Goal: *Absence of Falls  Description: Document Devin Fall Risk and appropriate interventions in the flowsheet.   Outcome: Progressing Towards Goal  Note: Fall Risk Interventions:  Mobility Interventions: PT Consult for mobility concerns, Patient to call before getting OOB         Medication Interventions: Patient to call before getting OOB    Elimination Interventions: Call light in reach    History of Falls Interventions: Room close to nurse's station         Problem: Patient Education: Go to Patient Education Activity  Goal: Patient/Family Education  Outcome: Progressing Towards Goal     Problem: Patient Education: Go to Patient Education Activity  Goal: Patient/Family Education  Outcome: Progressing Towards Goal

## 2022-10-09 LAB
ANION GAP SERPL CALC-SCNC: 7 MMOL/L (ref 5–15)
APPEARANCE UR: ABNORMAL
ATRIAL RATE: 125 BPM
BACTERIA URNS QL MICRO: NEGATIVE /HPF
BILIRUB UR QL: NEGATIVE
BUN SERPL-MCNC: 12 MG/DL (ref 6–20)
BUN/CREAT SERPL: 19 (ref 12–20)
CA-I BLD-MCNC: 8.6 MG/DL (ref 8.5–10.1)
CALCULATED R AXIS, ECG10: -5 DEGREES
CALCULATED T AXIS, ECG11: -28 DEGREES
CHLORIDE SERPL-SCNC: 103 MMOL/L (ref 97–108)
CO2 SERPL-SCNC: 30 MMOL/L (ref 21–32)
COLOR UR: ABNORMAL
CREAT SERPL-MCNC: 0.64 MG/DL (ref 0.7–1.3)
CRP SERPL-MCNC: 4.41 MG/DL (ref 0–0.6)
DIAGNOSIS, 93000: NORMAL
ERYTHROCYTE [DISTWIDTH] IN BLOOD BY AUTOMATED COUNT: 12.7 % (ref 11.5–14.5)
GLUCOSE BLD STRIP.AUTO-MCNC: 108 MG/DL (ref 65–100)
GLUCOSE BLD STRIP.AUTO-MCNC: 146 MG/DL (ref 65–100)
GLUCOSE BLD STRIP.AUTO-MCNC: 164 MG/DL (ref 65–100)
GLUCOSE BLD STRIP.AUTO-MCNC: 172 MG/DL (ref 65–100)
GLUCOSE SERPL-MCNC: 143 MG/DL (ref 65–100)
GLUCOSE UR STRIP.AUTO-MCNC: NEGATIVE MG/DL
HCT VFR BLD AUTO: 36.4 % (ref 36.6–50.3)
HGB BLD-MCNC: 12.8 G/DL (ref 12.1–17)
HGB UR QL STRIP: ABNORMAL
KETONES UR QL STRIP.AUTO: 5 MG/DL
LEUKOCYTE ESTERASE UR QL STRIP.AUTO: NEGATIVE
MCH RBC QN AUTO: 33.2 PG (ref 26–34)
MCHC RBC AUTO-ENTMCNC: 35.2 G/DL (ref 30–36.5)
MCV RBC AUTO: 94.3 FL (ref 80–99)
MUCOUS THREADS URNS QL MICRO: ABNORMAL /LPF
NITRITE UR QL STRIP.AUTO: NEGATIVE
NRBC # BLD: 0 K/UL (ref 0–0.01)
NRBC BLD-RTO: 0 PER 100 WBC
P-R INTERVAL, ECG05: 200 MS
PERFORMED BY, TECHID: ABNORMAL
PH UR STRIP: 7 [PH] (ref 5–8)
PLATELET # BLD AUTO: 244 K/UL (ref 150–400)
PMV BLD AUTO: 11.4 FL (ref 8.9–12.9)
POTASSIUM SERPL-SCNC: 3 MMOL/L (ref 3.5–5.1)
PROT UR STRIP-MCNC: NEGATIVE MG/DL
Q-T INTERVAL, ECG07: 278 MS
QRS DURATION, ECG06: 86 MS
QTC CALCULATION (BEZET), ECG08: 376 MS
RBC # BLD AUTO: 3.86 M/UL (ref 4.1–5.7)
RBC #/AREA URNS HPF: ABNORMAL /HPF (ref 0–5)
SODIUM SERPL-SCNC: 140 MMOL/L (ref 136–145)
SP GR UR REFRACTOMETRY: 1.01 (ref 1–1.03)
UA: UC IF INDICATED,UAUC: ABNORMAL
UROBILINOGEN UR QL STRIP.AUTO: 0.1 EU/DL (ref 0.1–1)
VENTRICULAR RATE, ECG03: 110 BPM
WBC # BLD AUTO: 9.3 K/UL (ref 4.1–11.1)
WBC URNS QL MICRO: ABNORMAL /HPF (ref 0–4)

## 2022-10-09 PROCEDURE — 86140 C-REACTIVE PROTEIN: CPT

## 2022-10-09 PROCEDURE — 65270000029 HC RM PRIVATE

## 2022-10-09 PROCEDURE — 82962 GLUCOSE BLOOD TEST: CPT

## 2022-10-09 PROCEDURE — 74011636637 HC RX REV CODE- 636/637: Performed by: STUDENT IN AN ORGANIZED HEALTH CARE EDUCATION/TRAINING PROGRAM

## 2022-10-09 PROCEDURE — 80048 BASIC METABOLIC PNL TOTAL CA: CPT

## 2022-10-09 PROCEDURE — 74011250636 HC RX REV CODE- 250/636: Performed by: SURGERY

## 2022-10-09 PROCEDURE — 36415 COLL VENOUS BLD VENIPUNCTURE: CPT

## 2022-10-09 PROCEDURE — 81001 URINALYSIS AUTO W/SCOPE: CPT

## 2022-10-09 PROCEDURE — 85027 COMPLETE CBC AUTOMATED: CPT

## 2022-10-09 PROCEDURE — 74011250636 HC RX REV CODE- 250/636: Performed by: STUDENT IN AN ORGANIZED HEALTH CARE EDUCATION/TRAINING PROGRAM

## 2022-10-09 PROCEDURE — 97530 THERAPEUTIC ACTIVITIES: CPT

## 2022-10-09 PROCEDURE — 74011250637 HC RX REV CODE- 250/637: Performed by: STUDENT IN AN ORGANIZED HEALTH CARE EDUCATION/TRAINING PROGRAM

## 2022-10-09 RX ORDER — ADHESIVE BANDAGE
30 BANDAGE TOPICAL DAILY PRN
Status: DISCONTINUED | OUTPATIENT
Start: 2022-10-09 | End: 2022-10-10 | Stop reason: HOSPADM

## 2022-10-09 RX ORDER — POTASSIUM CHLORIDE 7.45 MG/ML
10 INJECTION INTRAVENOUS
Status: COMPLETED | OUTPATIENT
Start: 2022-10-09 | End: 2022-10-09

## 2022-10-09 RX ORDER — POTASSIUM CHLORIDE AND SODIUM CHLORIDE 450; 150 MG/100ML; MG/100ML
INJECTION, SOLUTION INTRAVENOUS CONTINUOUS
Status: DISCONTINUED | OUTPATIENT
Start: 2022-10-09 | End: 2022-10-10 | Stop reason: HOSPADM

## 2022-10-09 RX ADMIN — POTASSIUM CHLORIDE 40 MEQ: 750 TABLET, FILM COATED, EXTENDED RELEASE ORAL at 09:02

## 2022-10-09 RX ADMIN — CYCLOBENZAPRINE 5 MG: 10 TABLET, FILM COATED ORAL at 21:15

## 2022-10-09 RX ADMIN — POTASSIUM CHLORIDE 10 MEQ: 7.46 INJECTION, SOLUTION INTRAVENOUS at 10:05

## 2022-10-09 RX ADMIN — CARVEDILOL 25 MG: 12.5 TABLET, FILM COATED ORAL at 17:32

## 2022-10-09 RX ADMIN — INSULIN LISPRO 3 UNITS: 100 INJECTION, SOLUTION INTRAVENOUS; SUBCUTANEOUS at 12:58

## 2022-10-09 RX ADMIN — POTASSIUM CHLORIDE 10 MEQ: 7.46 INJECTION, SOLUTION INTRAVENOUS at 11:18

## 2022-10-09 RX ADMIN — POTASSIUM CHLORIDE 10 MEQ: 7.46 INJECTION, SOLUTION INTRAVENOUS at 13:00

## 2022-10-09 RX ADMIN — CYCLOBENZAPRINE 5 MG: 10 TABLET, FILM COATED ORAL at 09:02

## 2022-10-09 RX ADMIN — POTASSIUM CHLORIDE AND SODIUM CHLORIDE: 450; 150 INJECTION, SOLUTION INTRAVENOUS at 21:19

## 2022-10-09 RX ADMIN — AMLODIPINE BESYLATE 10 MG: 5 TABLET ORAL at 09:02

## 2022-10-09 RX ADMIN — POTASSIUM CHLORIDE 10 MEQ: 7.46 INJECTION, SOLUTION INTRAVENOUS at 09:03

## 2022-10-09 RX ADMIN — LISINOPRIL 10 MG: 10 TABLET ORAL at 09:02

## 2022-10-09 RX ADMIN — PANTOPRAZOLE SODIUM 40 MG: 40 TABLET, DELAYED RELEASE ORAL at 09:03

## 2022-10-09 RX ADMIN — CYCLOBENZAPRINE 5 MG: 10 TABLET, FILM COATED ORAL at 17:32

## 2022-10-09 RX ADMIN — CARVEDILOL 25 MG: 12.5 TABLET, FILM COATED ORAL at 09:02

## 2022-10-09 RX ADMIN — DOCUSATE SODIUM 100 MG: 100 CAPSULE, LIQUID FILLED ORAL at 09:02

## 2022-10-09 RX ADMIN — ATORVASTATIN CALCIUM 10 MG: 10 TABLET, FILM COATED ORAL at 21:15

## 2022-10-09 RX ADMIN — DOCUSATE SODIUM 100 MG: 100 CAPSULE, LIQUID FILLED ORAL at 21:16

## 2022-10-09 RX ADMIN — SODIUM CHLORIDE, POTASSIUM CHLORIDE, SODIUM LACTATE AND CALCIUM CHLORIDE 75 ML/HR: 600; 310; 30; 20 INJECTION, SOLUTION INTRAVENOUS at 04:00

## 2022-10-09 RX ADMIN — ASPIRIN 81 MG: 81 TABLET, COATED ORAL at 09:02

## 2022-10-09 RX ADMIN — ENOXAPARIN SODIUM 40 MG: 100 INJECTION SUBCUTANEOUS at 21:15

## 2022-10-09 RX ADMIN — POTASSIUM CHLORIDE AND SODIUM CHLORIDE: 450; 150 INJECTION, SOLUTION INTRAVENOUS at 08:55

## 2022-10-09 NOTE — PROGRESS NOTES
Hospitalist Progress Note    Subjective:   Daily Progress Note: 10/9/2022 10:35 AM    Hospital Course:  Hernesto Atwood is a 76 y.o. male with a PMHx of diabetes mellitus, hypertension, stroke, GERD, and anemia presenting to the ED on 10/06 for ground-level fall in his bathroom. Patient denies hitting his head or any LOC. CT chest showed left side multiple rib fractures and left arm X-ray showed left humerus fracture. General surgery admitted the patient. Conservative management of rib fractures. Orthopedics consulted for left humerus fracture. Internal medicine consult for management of patient's diabetes and hypertension. Pending ORIF left humerus fx. Subjective:    Patient seen and examined at bedside. States pain is well-controlled. Potassium improving.      Current Facility-Administered Medications   Medication Dose Route Frequency    potassium chloride 10 mEq in 100 ml IVPB  10 mEq IntraVENous Q1H    0.45% sodium chloride with KCl 20 mEq/L infusion   IntraVENous CONTINUOUS    amLODIPine (NORVASC) tablet 10 mg  10 mg Oral DAILY    insulin lispro (HUMALOG) injection   SubCUTAneous TIDAC    glucose chewable tablet 16 g  4 Tablet Oral PRN    glucagon (GLUCAGEN) injection 1 mg  1 mg IntraMUSCular PRN    aspirin delayed-release tablet 81 mg  81 mg Oral DAILY    carvediloL (COREG) tablet 25 mg  25 mg Oral BID WITH MEALS    docusate sodium (COLACE) capsule 100 mg  100 mg Oral BID    lisinopriL (PRINIVIL, ZESTRIL) tablet 10 mg  10 mg Oral DAILY    pantoprazole (PROTONIX) tablet 40 mg  40 mg Oral ACB    atorvastatin (LIPITOR) tablet 10 mg  10 mg Oral QHS    hydrALAZINE (APRESOLINE) 20 mg/mL injection 20 mg  20 mg IntraVENous Q6H PRN    cyclobenzaprine (FLEXERIL) tablet 5 mg  5 mg Oral TID    potassium chloride SR (KLOR-CON 10) tablet 40 mEq  40 mEq Oral DAILY    ondansetron hcl (ZOFRAN) tablet 4 mg  4 mg Oral Q6H PRN    HYDROcodone-acetaminophen (NORCO) 5-325 mg per tablet 1 Tablet  1 Tablet Oral Q4H PRN ketorolac (TORADOL) injection 15 mg  15 mg IntraVENous Q6H PRN    enoxaparin (LOVENOX) injection 40 mg  40 mg SubCUTAneous Q24H        Review of Systems  Constitutional: No fevers, No chills, No sweats, No fatigue, No Weakness  Eyes: No redness  Ears, nose, mouth, throat, and face: No nasal congestion, No sore throat, No voice change  Respiratory: No Shortness of Breath, No cough, No wheezing  Cardiovascular: No chest pain, No palpitations, No extremity edema  Gastrointestinal: No nausea, No vomiting, No diarrhea, No abdominal pain  Genitourinary: No frequency, No dysuria, No hematuria  Musculoskeletal: No arthralgias, No neck pain, + left rib pain, + left arm pain  Neurological: No Confusion, No headaches, No dizziness      Objective:     Visit Vitals  /74 (BP 1 Location: Right upper arm, BP Patient Position: At rest)   Pulse 72   Temp 98.7 °F (37.1 °C)   Resp 16   Ht 5' 7\" (1.702 m)   Wt 90.7 kg (200 lb)   SpO2 93%   BMI 31.32 kg/m²      O2 Device: None (Room air)    Temp (24hrs), Av.9 °F (37.2 °C), Min:98.7 °F (37.1 °C), Max:99.2 °F (37.3 °C)      No intake/output data recorded. 10/07 1901 - 10/09 0700  In: 3200 [P.O.:200; I.V.:3000]  Out: 800 [Urine:800]    PHYSICAL EXAM:  General: alert, cooperative, no distress  Eye: conjunctivae/corneas clear. PERRL, EOM's intact. Throat and Neck: normal and no erythema or exudates noted. No mass   Lung: clear to auscultation bilaterally. Room air. Heart: regular rate and rhythm, +S1/S2. No murmur or gallop. Abdomen: soft, non-tender. Bowel sounds normal. No masses,  Extremities: Left upper extremity in sling. Able to move all extremities normal, atraumatic  Skin: Warm, dry, and intact. Neurologic: AOx3. Cranial nerves 2-12 and sensation grossly intact.   Psychiatric: non focal       Data Review    Recent Results (from the past 24 hour(s))   GLUCOSE, POC    Collection Time: 10/08/22 11:10 AM   Result Value Ref Range    Glucose (POC) 201 (H) 65 - 100 mg/dL Performed by Kayleigh Keene    GLUCOSE, POC    Collection Time: 10/08/22  4:37 PM   Result Value Ref Range    Glucose (POC) 133 (H) 65 - 100 mg/dL    Performed by Vasyl Frey    CBC W/O DIFF    Collection Time: 10/09/22  6:25 AM   Result Value Ref Range    WBC 9.3 4.1 - 11.1 K/uL    RBC 3.86 (L) 4.10 - 5.70 M/uL    HGB 12.8 12.1 - 17.0 g/dL    HCT 36.4 (L) 36.6 - 50.3 %    MCV 94.3 80.0 - 99.0 FL    MCH 33.2 26.0 - 34.0 PG    MCHC 35.2 30.0 - 36.5 g/dL    RDW 12.7 11.5 - 14.5 %    PLATELET 218 267 - 073 K/uL    MPV 11.4 8.9 - 12.9 FL    NRBC 0.0 0.0  WBC    ABSOLUTE NRBC 0.00 0.00 - 2.02 K/uL   METABOLIC PANEL, BASIC    Collection Time: 10/09/22  6:25 AM   Result Value Ref Range    Sodium 140 136 - 145 mmol/L    Potassium 3.0 (L) 3.5 - 5.1 mmol/L    Chloride 103 97 - 108 mmol/L    CO2 30 21 - 32 mmol/L    Anion gap 7 5 - 15 mmol/L    Glucose 143 (H) 65 - 100 mg/dL    BUN 12 6 - 20 mg/dL    Creatinine 0.64 (L) 0.70 - 1.30 mg/dL    BUN/Creatinine ratio 19 12 - 20      eGFR >60 >60 ml/min/1.73m2    Calcium 8.6 8.5 - 10.1 mg/dL   C REACTIVE PROTEIN, QT    Collection Time: 10/09/22  6:25 AM   Result Value Ref Range    C-Reactive protein 4.41 (H) 0.00 - 0.60 mg/dL   GLUCOSE, POC    Collection Time: 10/09/22  7:54 AM   Result Value Ref Range    Glucose (POC) 146 (H) 65 - 100 mg/dL    Performed by Kayleigh Keene        No orders to display       Active Problems:    Multiple rib fractures (10/6/2022)      Humerus shaft fracture (10/6/2022)        Assessment/Plan:     Diabetes mellitus  - ISS and accu-check  - Hold home metformin     Hypertension  - Continue Norvasc 10 mg daily, Coreg 25 mg BID, and lisinopril 10 mg daily  - IV hydralazine PRN     Hypokalemia  - IV and oral repletion as needed  - Start on 0.45% NS and Kcl 20 mEq at 75 mL/hr     Hx CVA  - Continue ASA and statin     Multiple left-sided rib fractures  - General surgery  - Conservative management  - Family does not wish for patient to have morphine.  PRN Norco and Toradol. Left humeral shaft fx  - Orthopedic surgery consult  - Pending surgery   - Left upper ext in sling        Thank you for the consult and for allowing us to participate in the care of this patient. Please do not hesitate to call with any questions or concerns. Care Plan discussed with: patient and nursing    Total time spent with patient: >35 minutes.

## 2022-10-09 NOTE — PROGRESS NOTES
Patient assessment completed for this shift. Bed in lowest position. Call bell within reach. Patient voiced  understanding to call for assistance as needed. c/o pain voiced by patient, at this time. Patient repositioned and ice applied to left shoulder. No falls reported at this time. Patient remains stable. Progressing in plan of care. Will continue to monitor during this admission. Problem: Pressure Injury - Risk of  Goal: *Prevention of pressure injury  Description: Document William Scale and appropriate interventions in the flowsheet. Outcome: Progressing Towards Goal  Note: Pressure Injury Interventions:  Sensory Interventions: Minimize linen layers    Moisture Interventions: Absorbent underpads, Apply protective barrier, creams and emollients, Internal/External urinary devices    Activity Interventions: Pressure redistribution bed/mattress(bed type), PT/OT evaluation    Mobility Interventions: Pressure redistribution bed/mattress (bed type), PT/OT evaluation    Nutrition Interventions: Document food/fluid/supplement intake    Friction and Shear Interventions: Minimize layers                Problem: Patient Education: Go to Patient Education Activity  Goal: Patient/Family Education  Outcome: Progressing Towards Goal     Problem: Falls - Risk of  Goal: *Absence of Falls  Description: Document Devin Fall Risk and appropriate interventions in the flowsheet.   Outcome: Progressing Towards Goal  Note: Fall Risk Interventions:  Mobility Interventions: PT Consult for mobility concerns         Medication Interventions: Patient to call before getting OOB    Elimination Interventions: Call light in reach, Patient to call for help with toileting needs    History of Falls Interventions: Room close to nurse's station         Problem: Patient Education: Go to Patient Education Activity  Goal: Patient/Family Education  Outcome: Progressing Towards Goal     Problem: Patient Education: Go to Patient Education Activity  Goal: Patient/Family Education  Outcome: Progressing Towards Goal

## 2022-10-09 NOTE — PROGRESS NOTES
Problem: Mobility Impaired (Adult and Pediatric)  Goal: *Acute Goals and Plan of Care (Insert Text)  Description: FUNCTIONAL STATUS PRIOR TO ADMISSION: Patient was modified independent using a rolling walker for functional mobility. HOME SUPPORT PRIOR TO ADMISSION: The patient lived with spouse but did not require assist with mobility. Spouse assisted with ADL's. Physical Therapy Goals  Initiated 10/7/2022  Patient/family stated goal: To be able to walk  1. Patient will move from supine to sit and sit to supine  in bed with modified independence within 7 day(s). 2.  Patient will transfer from bed to chair and chair to bed with minimal assistance/contact guard assist using the least restrictive device within 7 day(s). 3.  Patient will perform sit to stand with minimal assistance/contact guard assist within 7 day(s). 4.  Patient will ambulate with minimal assistance/contact guard assist for 25 feet with the least restrictive device within 7 day(s). 5.  Patient will participate in lower extremity therapeutic exercise/activities with independence  within 7 day(s). Outcome: Progressing Towards Goal   PHYSICAL THERAPY TREATMENT  Patient: Celestina Henry (14 y.o. male)  Date: 10/9/2022  Diagnosis: Multiple rib fractures [S22.49XA]  Humerus shaft fracture [S42.309A] <principal problem not specified>      Precautions:    Chart, physical therapy assessment, plan of care and goals were reviewed. ASSESSMENT  Patient continues with skilled PT services and is progressing towards goals. Pt semi-supine in bed upon PT arrival, agreeable to session. (See below for objective details and assist levels). Overall pt tolerated session fair today with modA to come to sitting on EOB and patient sat for ~15 minutes to work on exercises and upright tolerance. Poor sitting balance noted and constant cues to improve. Will continue to benefit from skilled PT services, and will continue to progress as tolerated. Current Level of Function Impacting Discharge (mobility/balance): decr strength, decr balance, decr endurance     Other factors to consider for discharge: level of assist, medical hx, ortho/rib fractures, LUE in sling          PLAN :  Patient continues to benefit from skilled intervention to address the above impairments. Continue treatment per established plan of care to address goals. Recommend with staff: Out of bed to chair for meals, Encourage HEP in prep for ADLs/mobility, and Use of bed/chair alarm for safety    Recommendation for discharge: (in order for the patient to meet his/her long term goals)  [facility]     This discharge recommendation:  Has been made in collaboration with the attending provider and/or case management    IF patient discharges home will need the following DME: to be determined (TBD)       SUBJECTIVE:   Patient stated I can try to sit up.     OBJECTIVE DATA SUMMARY:   Critical Behavior:  Neurologic State: Alert  Orientation Level: Oriented X4  Cognition: Appropriate decision making, Follows commands     Functional Mobility Training:  Bed Mobility:     Supine to Sit: Moderate assistance; Additional time  Sit to Supine: Moderate assistance  Scooting: Maximum assistance;Assist x1    Balance:  Sitting: Impaired; With support  Sitting - Static: Fair (occasional)  Sitting - Dynamic: Poor (constant support)  Patient able to hold himself in sitting for approx 10 seconds before losing balance posteriorally requiring use of bed rails to maintain sitting balance.  ~10 minutes of session patient positioned in prop sitting to perform LE exercises in sitting       Therapeutic Exercises:       EXERCISE   Sets   Reps   Active Active Assist   Passive Self ROM   Comments   Ankle Pumps 2 20 [x] [] [] []    Quad Sets/Glut Sets   [] [] [] []    Hamstring Sets   [] [] [] []    Short Arc Quads   [] [] [] []    Heel Slides   [] [] [] []    Straight Leg Raises   [] [] [] []    Hip abd/add   [] [] [] []    Long Arc Quads 2 20 [x] [] [] []    Marching   [] [] [] []    RUE shldr flexion, elbow flex  20 [x] [] [] []       Patient performed while seated EOB in prop sitting for support     Pain Rating:  No pain     Activity Tolerance:   Fair, SpO2 stable on RA, requires frequent rest breaks, and observed SOB with activity    After treatment patient left in no apparent distress:   Bed locked and returned to lowest position, Supine in bed, Call bell within reach, and Bed / chair alarm activated    GOALS:  Improve strength for bed mobility and transfers  Improve sitting balance     COMMUNICATION/COLLABORATION:   The patients plan of care was discussed with: Physical therapist and Registered nurse.      Diane Hilario, PT   Time Calculation: 27 mins

## 2022-10-09 NOTE — PROGRESS NOTES
Problem: Pressure Injury - Risk of  Goal: *Prevention of pressure injury  Description: Document William Scale and appropriate interventions in the flowsheet. Outcome: Progressing Towards Goal  Note: Pressure Injury Interventions:  Sensory Interventions: Minimize linen layers    Moisture Interventions: Internal/External urinary devices, Minimize layers    Activity Interventions: PT/OT evaluation, Pressure redistribution bed/mattress(bed type)    Mobility Interventions: Pressure redistribution bed/mattress (bed type), PT/OT evaluation    Nutrition Interventions: Offer support with meals,snacks and hydration    Friction and Shear Interventions: Minimize layers, HOB 30 degrees or less                Problem: Falls - Risk of  Goal: *Absence of Falls  Description: Document Devin Fall Risk and appropriate interventions in the flowsheet.   Outcome: Progressing Towards Goal  Note: Fall Risk Interventions:  Mobility Interventions: Bed/chair exit alarm         Medication Interventions: Bed/chair exit alarm    Elimination Interventions: Bed/chair exit alarm, Call light in reach    History of Falls Interventions: Bed/chair exit alarm

## 2022-10-09 NOTE — PROGRESS NOTES
Chief Complaint:none      Subjective:  Mr. Epi Silva is a 76y.o. year old * male admitted for s/p fall with multiple left side rib fractures with left humerus fracture. No complaints. Has decided regarding surgery for left humeral fracture & wants to have it done here before discharge. I am waiting for Ortho to decide. Low K      Review of Systems:   Constitutional:  no fever, no chills,  no sweats, No weakness, No fatigue, No decreased activity. Respiratory: No shortness of breath, No cough, No sputum production, No hemoptysis, No wheezing, No cyanosis. Cardiovascular: No chest pain, No palpitations, No bradycardia, No tachycardia, No peripheral edema, No syncope. Gastrointestinal: No nausea, No vomiting, No diarrhea, No constipation, No heartburn, No abdominal pain. Genitourinary: No dysuria, No hematuria, No change in urine stream, No urethral discharge, No lesions. Hematology/Lymphatics: No bruising tendency, No bleeding tendency, No petechiae, No swollen lymph glands. Endocrine: No excessive thirst, No polyuria, No cold intolerance, No heat intolerance, No excessive hunger. Musculoskeletal: No back pain, No neck pain, No joint pain, No muscle pain, No claudication, No decreased range of motion, No trauma. Integumentary: No rash, No pruritus, No abrasions. Neurologic: Alert and oriented X4, No abnormal balance, No headache, No confusion, No numbness, No tingling. Psychiatric: No anxiety, No depression, No paola. Physical Exam:     Vitals & Measurements:     Wt Readings from Last 3 Encounters:   10/06/22 90.7 kg (200 lb)   10/06/22 100.7 kg (222 lb)   03/29/22 100.7 kg (222 lb)     Temp Readings from Last 3 Encounters:   10/09/22 98.4 °F (36.9 °C)   10/06/22 98.6 °F (37 °C)   03/29/22 98 °F (36.7 °C) (Temporal)     BP Readings from Last 3 Encounters:   10/09/22 116/68   10/06/22 (!) 169/88   03/29/22 134/70     Pulse Readings from Last 3 Encounters:   10/09/22 69   10/06/22 81   03/29/22 75      Ht Readings from Last 3 Encounters:   10/07/22 5' 7\" (1.702 m)   10/06/22 5' 8\" (1.727 m)   03/29/22 5' 8\" (1.727 m)      Date 10/08/22 0700 - 10/09/22 0659 10/09/22 0700 - 10/10/22 0659   Shift 5263-6969 2237-9903 24 Hour Total 3791-8153 1243-6909 24 Hour Total   INTAKE   P.O.  200 200        P. O.  200 200      I. V.(mL/kg/hr)  3000(2.8) 3000(1.4)        Volume (lactated Ringers infusion)  3000 3000      Shift Total(mL/kg)  3200(35.3) 3200(35.3)      OUTPUT   Urine(mL/kg/hr)  800(0.7) 800(0.4)        Urine Voided  800 800        Urine Occurrence(s)  1 x 1 x      Shift Total(mL/kg)  800(8.8) 800(8.8)      NET  2400 2400      Weight (kg) 90.7 90.7 90.7 90.7 90.7 90.7         General: well appearing, no acute distress  Head: Normal  Face: Nornal  HEENT: atraumatic, PERRLA, moist mucosa, normal pharynx, normal tonsils and adenoids, normal tongue, no fluid in sinuses  Neck: Trachea midline, no carotid bruit, no masses  Chest: Normal, left side tenderness. Respiratory: normal chest wall expansion, CTA B, no r/r/w, no rubs  Cardiovascular: RRR, no m/r/g, Normal S1 and S2  Abdomen: Soft, non tender, non-distended, normal bowel sounds in all quadrants, no hepatosplenomegaly, no tympany. Incision scar:   Genitourinary: No inguinal hernia, normal external gentalia, Testis & scrotum normal, no renal angle tenderness  Rectal: deferred  Musculoskeletal: Normal ROM in upper and lower extremities, No joint swelling, left side upper extremity limited ROM. Integumentary: Warm, dry, and pink, with no rash, purpura, or petechia  Heme/Lymph: No lymphadenopathy, no bruises  Neurological: Cranial Nerves II-XII grossly intact, No gross sensory or motor deficit.   Psychiatric: Cooperative with normal mood, affect, and cognition    Laboratory Values:   Recent Results (from the past 24 hour(s))   CBC W/O DIFF    Collection Time: 10/09/22  6:25 AM   Result Value Ref Range    WBC 9.3 4.1 - 11.1 K/uL    RBC 3.86 (L) 4.10 - 5.70 M/uL HGB 12.8 12.1 - 17.0 g/dL    HCT 36.4 (L) 36.6 - 50.3 %    MCV 94.3 80.0 - 99.0 FL    MCH 33.2 26.0 - 34.0 PG    MCHC 35.2 30.0 - 36.5 g/dL    RDW 12.7 11.5 - 14.5 %    PLATELET 488 731 - 159 K/uL    MPV 11.4 8.9 - 12.9 FL    NRBC 0.0 0.0  WBC    ABSOLUTE NRBC 0.00 0.00 - 9.20 K/uL   METABOLIC PANEL, BASIC    Collection Time: 10/09/22  6:25 AM   Result Value Ref Range    Sodium 140 136 - 145 mmol/L    Potassium 3.0 (L) 3.5 - 5.1 mmol/L    Chloride 103 97 - 108 mmol/L    CO2 30 21 - 32 mmol/L    Anion gap 7 5 - 15 mmol/L    Glucose 143 (H) 65 - 100 mg/dL    BUN 12 6 - 20 mg/dL    Creatinine 0.64 (L) 0.70 - 1.30 mg/dL    BUN/Creatinine ratio 19 12 - 20      eGFR >60 >60 ml/min/1.73m2    Calcium 8.6 8.5 - 10.1 mg/dL   C REACTIVE PROTEIN, QT    Collection Time: 10/09/22  6:25 AM   Result Value Ref Range    C-Reactive protein 4.41 (H) 0.00 - 0.60 mg/dL   GLUCOSE, POC    Collection Time: 10/09/22  7:54 AM   Result Value Ref Range    Glucose (POC) 146 (H) 65 - 100 mg/dL    Performed by [facility]    URINALYSIS W/ REFLEX CULTURE    Collection Time: 10/09/22  8:15 AM    Specimen: Urine   Result Value Ref Range    Color Yellow/Straw      Appearance Turbid (A) Clear      Specific gravity 1.010 1.003 - 1.030      pH (UA) 7.0 5.0 - 8.0      Protein Negative Negative mg/dL    Glucose Negative Negative mg/dL    Ketone 5 (A) Negative mg/dL    Bilirubin Negative Negative      Blood Small (A) Negative      Urobilinogen 0.1 0.1 - 1.0 EU/dL    Nitrites Negative Negative      Leukocyte Esterase Negative Negative      UA:UC IF INDICATED Culture not indicated by UA result Culture not indicated by UA result      WBC 0-4 0 - 4 /hpf    RBC 0-5 0 - 5 /hpf    Bacteria Negative Negative /hpf    Mucus Trace /lpf   GLUCOSE, POC    Collection Time: 10/09/22 10:56 AM   Result Value Ref Range    Glucose (POC) 164 (H) 65 - 100 mg/dL    Performed by Pura Salmon    GLUCOSE, POC    Collection Time: 10/09/22  4:12 PM   Result Value Ref Range Glucose (POC) 108 (H) 65 - 100 mg/dL    Performed by Miladys Archibald            Assessment:  Problem List Items Addressed This Visit          Skeletal    Multiple rib fractures - Primary    Humerus shaft fracture        Plan:    Admission  Diet: regular  IV fluids  SCD  IS  Pain medications  Antibiotics  Nausea medication  Labs & Radiology  Consult: Ortho  11. Waiting for Ortho to decide date for surgery. 12. Replace K   13. Plan discussed with patient and family and answered all their questions. Thank you for allowing me to participate in the care of this patient.

## 2022-10-10 VITALS
TEMPERATURE: 99.5 F | BODY MASS INDEX: 31.39 KG/M2 | WEIGHT: 200 LBS | RESPIRATION RATE: 17 BRPM | HEART RATE: 83 BPM | OXYGEN SATURATION: 95 % | SYSTOLIC BLOOD PRESSURE: 121 MMHG | DIASTOLIC BLOOD PRESSURE: 69 MMHG | HEIGHT: 67 IN

## 2022-10-10 LAB
ANION GAP SERPL CALC-SCNC: 11 MMOL/L (ref 5–15)
BUN SERPL-MCNC: 14 MG/DL (ref 6–20)
BUN/CREAT SERPL: 18 (ref 12–20)
CA-I BLD-MCNC: 9.1 MG/DL (ref 8.5–10.1)
CHLORIDE SERPL-SCNC: 105 MMOL/L (ref 97–108)
CO2 SERPL-SCNC: 25 MMOL/L (ref 21–32)
COVID-19 RAPID TEST, COVR: NOT DETECTED
CREAT SERPL-MCNC: 0.78 MG/DL (ref 0.7–1.3)
CRP SERPL-MCNC: 5.45 MG/DL (ref 0–0.6)
ERYTHROCYTE [DISTWIDTH] IN BLOOD BY AUTOMATED COUNT: 13 % (ref 11.5–14.5)
GLUCOSE BLD STRIP.AUTO-MCNC: 150 MG/DL (ref 65–100)
GLUCOSE BLD STRIP.AUTO-MCNC: 173 MG/DL (ref 65–100)
GLUCOSE BLD STRIP.AUTO-MCNC: 191 MG/DL (ref 65–100)
GLUCOSE SERPL-MCNC: 173 MG/DL (ref 65–100)
HCT VFR BLD AUTO: 39.1 % (ref 36.6–50.3)
HGB BLD-MCNC: 13.3 G/DL (ref 12.1–17)
MCH RBC QN AUTO: 32.8 PG (ref 26–34)
MCHC RBC AUTO-ENTMCNC: 34 G/DL (ref 30–36.5)
MCV RBC AUTO: 96.5 FL (ref 80–99)
NRBC # BLD: 0 K/UL (ref 0–0.01)
NRBC BLD-RTO: 0 PER 100 WBC
PERFORMED BY, TECHID: ABNORMAL
PLATELET # BLD AUTO: 224 K/UL (ref 150–400)
PMV BLD AUTO: 11.6 FL (ref 8.9–12.9)
POTASSIUM SERPL-SCNC: 3.5 MMOL/L (ref 3.5–5.1)
RBC # BLD AUTO: 4.05 M/UL (ref 4.1–5.7)
SODIUM SERPL-SCNC: 141 MMOL/L (ref 136–145)
WBC # BLD AUTO: 9.5 K/UL (ref 4.1–11.1)

## 2022-10-10 PROCEDURE — 80048 BASIC METABOLIC PNL TOTAL CA: CPT

## 2022-10-10 PROCEDURE — 87635 SARS-COV-2 COVID-19 AMP PRB: CPT

## 2022-10-10 PROCEDURE — 74011250637 HC RX REV CODE- 250/637: Performed by: STUDENT IN AN ORGANIZED HEALTH CARE EDUCATION/TRAINING PROGRAM

## 2022-10-10 PROCEDURE — 82962 GLUCOSE BLOOD TEST: CPT

## 2022-10-10 PROCEDURE — 86140 C-REACTIVE PROTEIN: CPT

## 2022-10-10 PROCEDURE — 74011636637 HC RX REV CODE- 636/637: Performed by: STUDENT IN AN ORGANIZED HEALTH CARE EDUCATION/TRAINING PROGRAM

## 2022-10-10 PROCEDURE — 74011250636 HC RX REV CODE- 250/636: Performed by: SURGERY

## 2022-10-10 PROCEDURE — 36415 COLL VENOUS BLD VENIPUNCTURE: CPT

## 2022-10-10 PROCEDURE — 85027 COMPLETE CBC AUTOMATED: CPT

## 2022-10-10 RX ORDER — TRAMADOL HYDROCHLORIDE 50 MG/1
50 TABLET ORAL
Qty: 12 TABLET | Refills: 0 | Status: SHIPPED | OUTPATIENT
Start: 2022-10-10 | End: 2022-10-13

## 2022-10-10 RX ADMIN — CYCLOBENZAPRINE 5 MG: 10 TABLET, FILM COATED ORAL at 08:49

## 2022-10-10 RX ADMIN — INSULIN LISPRO 3 UNITS: 100 INJECTION, SOLUTION INTRAVENOUS; SUBCUTANEOUS at 08:47

## 2022-10-10 RX ADMIN — AMLODIPINE BESYLATE 10 MG: 5 TABLET ORAL at 08:48

## 2022-10-10 RX ADMIN — KETOROLAC TROMETHAMINE 15 MG: 15 INJECTION, SOLUTION INTRAMUSCULAR; INTRAVENOUS at 08:48

## 2022-10-10 RX ADMIN — LISINOPRIL 10 MG: 10 TABLET ORAL at 08:48

## 2022-10-10 NOTE — PROGRESS NOTES
Hospitalist Progress Note            Daily Progress Note: 10/10/2022 8:50 AM  Hospital course:     Hospital Course:  Mahin Panchal is a 76 y.o. male with a PMHx of diabetes mellitus, hypertension, stroke, GERD, and anemia presenting to the ED on 10/06 for ground-level fall in his bathroom. Patient denies hitting his head or any LOC. CT chest showed left side multiple rib fractures and left arm X-ray showed left humerus fracture. General surgery admitted the patient. Conservative management of rib fractures. Orthopedics consulted for left humerus fracture. Internal medicine consult for management of patient's diabetes and hypertension. Pending ORIF left humerus fx. Subjective:     Examined patient at bedside. Family at the bedside as well. He is stable for discharge today. Assessment/Plan:   Active Problems:    Multiple rib fractures (10/6/2022)      Humerus shaft fracture (10/6/2022)      Diabetes mellitus  - ISS and accu-check  - Hold home metformin     Hypertension  - Continue Norvasc 10 mg daily, Coreg 25 mg BID, and lisinopril 10 mg daily  - IV hydralazine PRN     Hypokalemia  - IV and oral repletion as needed  - Start on 0.45% NS and Kcl 20 mEq at 75 mL/hr     Hx CVA  - Continue ASA and statin     Multiple left-sided rib fractures  - General surgery  - Conservative management  - Family does not wish for patient to have morphine. PRN Plover and Toradol.       Left humeral shaft fx  - Orthopedic surgery consult  - Pending surgery   - Left upper ext in sling         DVT Prophylaxis: Lovenox  Code Status: Full Code  POA/NOK:    Disposition and discharge barriers:   PT OT recommending IRF-discharge order in place  Care Plan discussed with: Patient, staff, IDR team    Current Facility-Administered Medications   Medication Dose Route Frequency    0.45% sodium chloride with KCl 20 mEq/L infusion   IntraVENous CONTINUOUS    magnesium hydroxide (MILK OF MAGNESIA) 400 mg/5 mL oral suspension 30 mL  30 mL Oral DAILY PRN    amLODIPine (NORVASC) tablet 10 mg  10 mg Oral DAILY    insulin lispro (HUMALOG) injection   SubCUTAneous TIDAC    glucose chewable tablet 16 g  4 Tablet Oral PRN    glucagon (GLUCAGEN) injection 1 mg  1 mg IntraMUSCular PRN    aspirin delayed-release tablet 81 mg  81 mg Oral DAILY    carvediloL (COREG) tablet 25 mg  25 mg Oral BID WITH MEALS    docusate sodium (COLACE) capsule 100 mg  100 mg Oral BID    lisinopriL (PRINIVIL, ZESTRIL) tablet 10 mg  10 mg Oral DAILY    pantoprazole (PROTONIX) tablet 40 mg  40 mg Oral ACB    atorvastatin (LIPITOR) tablet 10 mg  10 mg Oral QHS    hydrALAZINE (APRESOLINE) 20 mg/mL injection 20 mg  20 mg IntraVENous Q6H PRN    cyclobenzaprine (FLEXERIL) tablet 5 mg  5 mg Oral TID    potassium chloride SR (KLOR-CON 10) tablet 40 mEq  40 mEq Oral DAILY    ondansetron hcl (ZOFRAN) tablet 4 mg  4 mg Oral Q6H PRN    HYDROcodone-acetaminophen (NORCO) 5-325 mg per tablet 1 Tablet  1 Tablet Oral Q4H PRN    ketorolac (TORADOL) injection 15 mg  15 mg IntraVENous Q6H PRN    enoxaparin (LOVENOX) injection 40 mg  40 mg SubCUTAneous Q24H        REVIEW OF SYSTEMS    Review of Systems   Constitutional:  Positive for malaise/fatigue. Musculoskeletal:  Positive for myalgias. Neurological:  Positive for focal weakness and weakness. Psychiatric/Behavioral:  The patient is nervous/anxious. Objective:     Visit Vitals  /75 (BP 1 Location: Right upper arm, BP Patient Position: At rest)   Pulse (!) 59   Temp 97.8 °F (36.6 °C)   Resp 18   Ht 5' 7\" (1.702 m)   Wt 90.7 kg (200 lb)   SpO2 98%   BMI 31.32 kg/m²      O2 Device: None (Room air)    Temp (24hrs), Av.1 °F (36.7 °C), Min:97.8 °F (36.6 °C), Max:98.4 °F (36.9 °C)      No intake/output data recorded. 10/08 1901 - 10/10 0700  In: 4006.3 [P.O.:200; I.V.:3806.3]  Out: 2000 [Urine:2000]    PHYSICAL EXAM:    Physical Exam  Constitutional:       Appearance: He is obese. He is ill-appearing.    Neurological:      Motor: Weakness present. Gait: Gait abnormal.        Data Review    Recent Results (from the past 24 hour(s))   GLUCOSE, POC    Collection Time: 10/09/22 10:56 AM   Result Value Ref Range    Glucose (POC) 164 (H) 65 - 100 mg/dL    Performed by Karen Asif, POC    Collection Time: 10/09/22  4:12 PM   Result Value Ref Range    Glucose (POC) 108 (H) 65 - 100 mg/dL    Performed by [address], POC    Collection Time: 10/09/22  7:37 PM   Result Value Ref Range    Glucose (POC) 172 (H) 65 - 100 mg/dL    Performed by Jennifer Yuen    GLUCOSE, POC    Collection Time: 10/10/22  7:46 AM   Result Value Ref Range    Glucose (POC) 173 (H) 65 - 100 mg/dL    Performed by Rosa Gilbert        No orders to display             _____________________________________________________________________________  Time spent in direct care including coordination of service, review of data and examination: > 35 minutes    ______________________________________________________________________________    Yves Cool NP    This is dictation was done by dragon, computer voice recognition software. Quite often unanticipated grammatical, syntax, homophones and other interpretive errors or inadvertently transcribed by the computer software. Please excuse errors that have escaped final proofreading. Thank you.

## 2022-10-10 NOTE — PROGRESS NOTES
Orthopedic progress note    Date:10/10/2022       Room:[facility]  Patient Name:Jacob Villafuerte     YOB: 1945     Age:77 y.o. Subjective    Follow up on this 76year old male for his left humerus fracture. Patient is sitting up in chair at bedside. He complains of minimal pain of his left upper extremity. No other complaints at this time.   Objective           Vitals Last 24 Hours:  TEMPERATURE:  Temp  Av.1 °F (36.7 °C)  Min: 97.8 °F (36.6 °C)  Max: 98.4 °F (36.9 °C)  RESPIRATIONS RANGE: Resp  Av.8  Min: 18  Max: 20  PULSE OXIMETRY RANGE: SpO2  Av.8 %  Min: 92 %  Max: 100 %  PULSE RANGE: Pulse  Av.3  Min: 59  Max: 85  BLOOD PRESSURE RANGE: Systolic (84XBQ), ZFX:918 , Min:116 , JIU:559   ; Diastolic (84XPY), KYS:82, Min:58, Max:86    Current Facility-Administered Medications   Medication Dose Route Frequency    0.45% sodium chloride with KCl 20 mEq/L infusion   IntraVENous CONTINUOUS    magnesium hydroxide (MILK OF MAGNESIA) 400 mg/5 mL oral suspension 30 mL  30 mL Oral DAILY PRN    amLODIPine (NORVASC) tablet 10 mg  10 mg Oral DAILY    insulin lispro (HUMALOG) injection   SubCUTAneous TIDAC    glucose chewable tablet 16 g  4 Tablet Oral PRN    glucagon (GLUCAGEN) injection 1 mg  1 mg IntraMUSCular PRN    aspirin delayed-release tablet 81 mg  81 mg Oral DAILY    carvediloL (COREG) tablet 25 mg  25 mg Oral BID WITH MEALS    docusate sodium (COLACE) capsule 100 mg  100 mg Oral BID    lisinopriL (PRINIVIL, ZESTRIL) tablet 10 mg  10 mg Oral DAILY    pantoprazole (PROTONIX) tablet 40 mg  40 mg Oral ACB    atorvastatin (LIPITOR) tablet 10 mg  10 mg Oral QHS    hydrALAZINE (APRESOLINE) 20 mg/mL injection 20 mg  20 mg IntraVENous Q6H PRN    cyclobenzaprine (FLEXERIL) tablet 5 mg  5 mg Oral TID    potassium chloride SR (KLOR-CON 10) tablet 40 mEq  40 mEq Oral DAILY    ondansetron hcl (ZOFRAN) tablet 4 mg  4 mg Oral Q6H PRN    HYDROcodone-acetaminophen (NORCO) 5-325 mg per tablet 1 Tablet 1 Tablet Oral Q4H PRN    ketorolac (TORADOL) injection 15 mg  15 mg IntraVENous Q6H PRN    enoxaparin (LOVENOX) injection 40 mg  40 mg SubCUTAneous Q24H          I/O (24Hr): Intake/Output Summary (Last 24 hours) at 10/10/2022 1014  Last data filed at 10/9/2022 1940  Gross per 24 hour   Intake 806.25 ml   Output 1200 ml   Net -393.75 ml     Objective:  Vital signs: (most recent): Blood pressure 136/75, pulse (!) 59, temperature 97.8 °F (36.6 °C), resp. rate 18, height 5' 7\" (1.702 m), weight 90.7 kg (200 lb), SpO2 98 %. Labs/Imaging/Diagnostics    Labs:  CBC:  Recent Labs     10/09/22  0625 10/08/22  1009   WBC 9.3 9.9   RBC 3.86* 4.22   HGB 12.8 14.0   HCT 36.4* 39.8   MCV 94.3 94.3   RDW 12.7 12.7    262     CHEMISTRIES:  Recent Labs     10/09/22  0625 10/08/22  1009    139   K 3.0* 2.6*    100   CO2 30 32   BUN 12 9   CREA 0.64* 0.77   CA 8.6 8.8   MG  --  1.6   PT/INR:No results for input(s): INR, INREXT in the last 72 hours. No lab exists for component: PROTIME  APTT:No results for input(s): APTT in the last 72 hours. LIVER PROFILE:  Recent Labs     10/08/22  1009   AST 12*   ALT 19     Lab Results   Component Value Date/Time    ALT (SGPT) 19 10/08/2022 10:09 AM    AST (SGOT) 12 (L) 10/08/2022 10:09 AM    Alk. phosphatase 61 10/08/2022 10:09 AM    Bilirubin, direct 0.1 09/01/2020 06:25 AM    Bilirubin, total 0.5 10/08/2022 10:09 AM       Physical Exam:  Left upper extremity: Splint in good position. Minimal swelling seen the left hand. Good range of motion of fingers left hand without discomfort.  strength 3 out of 5. EPL was intact. Radial pulse palpable. Left upper extremity neurovascular intact.     Assessment//Plan           Patient Active Problem List    Diagnosis Date Noted    Multiple rib fractures 10/06/2022    Humerus shaft fracture 10/06/2022    Peripheral vascular disease ([address] 11/22/2021    Chronic venous hypertension 11/22/2021    NSVT (nonsustained ventricular tachycardia) 09/05/2020    Respiratory failure ([facility] [address] 08/30/2020    Diabetes mellitus type 2, controlled ([facility] [address] 08/19/2020    Dysphagia 08/19/2020    Essential hypertension 08/19/2020    Genuine stress incontinence, female 08/19/2020    Hemiplegia ([facility] [address] 08/19/2020    History of adenomatous polyp of colon 08/19/2020    Hyperproteinemia 08/19/2020    Intracranial hemorrhage ([facility] [address] 08/19/2020    Laryngopharyngeal reflux 08/19/2020     Left humerus fracture  I discussed with the patient as well as his granddaughter Steve Perez by phone that at this time they want to proceed with nonoperative therapy. No plans for surgery at this admission. Continue with splint and shoulder sling. Okay for patient be discharged from orthopedics when placement has been made. Patient is requesting discharge to [facility]. Patient will need to follow-up with Dr. Jayda Corbett or with orthopedic doctor of preference within 7 to 10 days for new x-rays. If patient and family continues with nonoperative therapy the patient will need to be fitted for a Zavala brace as an outpatient.       Electronically signed by Birdie Leahy PA-C on 10/10/2022 at 10:14 AM

## 2022-10-10 NOTE — DISCHARGE SUMMARY
[id number] [facility] SURGERY DISCHARGE SUMMARY         Chief Complaint: none    History of Present Illness:    Mr. Lanny Eddy is a 76y.o. year old * male presents brought to [facility] ER following a fall last night in his bathroom. No LOC. He went over there and transferred here to [facility] ER for further management. Has some left chest pain and left arm pain. No shortness of breath. CT chest showed left side multiple rib fractures and left arm Xray showed left humerus fracture. Past Medical History:   Past Medical History:   Diagnosis Date    Acid reflux     Anemia     Diabetes (HCC)     GERD (gastroesophageal reflux disease)     Hypertension     Stroke [facility])        Past Surgical History:   Past Surgical History:   Procedure Laterality Date    HX COLONOSCOPY          Allergy:No Known Allergies    Social History:  reports that he has never smoked. He has never used smokeless tobacco. He reports that he does not drink alcohol and does not use drugs.      Family History:  Family History   Problem Relation Age of Onset    Heart Disease Father     Hypertension Brother     Diabetes Brother         Current Medications:  Current Facility-Administered Medications:     0.45% sodium chloride with KCl 20 mEq/L infusion, , IntraVENous, CONTINUOUS, Blair Graham PA-C, Last Rate: 75 mL/hr at 10/09/22 2119, New Bag at 10/09/22 2119    magnesium hydroxide (MILK OF MAGNESIA) 400 mg/5 mL oral suspension 30 mL, 30 mL, Oral, DAILY PRN, Blair Graham PA-C    amLODIPine (NORVASC) tablet 10 mg, 10 mg, Oral, DAILY, Blair Graham PA-C, 10 mg at 10/10/22 0848    insulin lispro (HUMALOG) injection, , SubCUTAneous, TIDAC, Blair Graham PA-C, 3 Units at 10/10/22 0847    glucose chewable tablet 16 g, 4 Tablet, Oral, PRN, Blair Graham PA-C    glucagon (GLUCAGEN) injection 1 mg, 1 mg, IntraMUSCular, PRN, Blair Graham PA-C    aspirin delayed-release tablet 81 mg, 81 mg, Oral, DAILY, Blair Graham PA-C, 81 mg at 10/09/22 0902    carvediloL (COREG) tablet 25 mg, 25 mg, Oral, BID WITH MEALS, Herve Distel, PA-C, 25 mg at 10/09/22 1732    docusate sodium (COLACE) capsule 100 mg, 100 mg, Oral, BID, Herve Distel, PA-C, 100 mg at 10/09/22 2116    lisinopriL (PRINIVIL, ZESTRIL) tablet 10 mg, 10 mg, Oral, DAILY, Herve Distel, PA-C, 10 mg at 10/10/22 0848    pantoprazole (PROTONIX) tablet 40 mg, 40 mg, Oral, ACB, Herve Distel, PA-C, 40 mg at 10/09/22 1649    atorvastatin (LIPITOR) tablet 10 mg, 10 mg, Oral, QHS, Herve Distel, PA-C, 10 mg at 10/09/22 2115    hydrALAZINE (APRESOLINE) 20 mg/mL injection 20 mg, 20 mg, IntraVENous, Q6H PRN, Herve Distel, PA-C    cyclobenzaprine (FLEXERIL) tablet 5 mg, 5 mg, Oral, TID, Herve Distel, PA-C, 5 mg at 10/10/22 0849    potassium chloride SR (KLOR-CON 10) tablet 40 mEq, 40 mEq, Oral, DAILY, Herve Distel, PA-C, 40 mEq at 10/09/22 0902    ondansetron hcl (ZOFRAN) tablet 4 mg, 4 mg, Oral, Q6H PRN, Herve Distel, PA-C    HYDROcodone-acetaminophen (NORCO) 5-325 mg per tablet 1 Tablet, 1 Tablet, Oral, Q4H PRN, Garry Nobles MD, 1 Tablet at 10/08/22 1411    ketorolac (TORADOL) injection 15 mg, 15 mg, IntraVENous, Q6H PRN, Garry Nobles MD, 15 mg at 10/10/22 0848    enoxaparin (LOVENOX) injection 40 mg, 40 mg, SubCUTAneous, Q24H, Basker, Laurance Phoenix, MD, 40 mg at 10/09/22 2115     Immunization History: There is no immunization history on file for this patient. Complete    Review of Systems:     Constitutional:  no fever,  no chills,  no sweats, No weakness, No fatigue, No decreased activity. Eye: No recent visual problem, No icterus, No discharge, No double vision. Ear/Nose/Mouth/Throat: No decreased hearing, No ear pain, No nasal congestion, No sore throat. Chest: Mild left side chest pain. Respiratory: No shortness of breath, No cough, No sputum production, No hemoptysis, No wheezing, No cyanosis.   Cardiovascular: No chest pain, No palpitations, No bradycardia, No tachycardia, No peripheral edema, No syncope. Gastrointestinal: No nausea,  No vomiting, No diarrhea, No constipation, No heartburn,  No abdominal pain. Genitourinary: No dysuria, No hematuria, No change in urine stream, No urethral discharge, No lesions. Hematology/Lymphatics: No bruising tendency, No bleeding tendency, No petechiae, No swollen lymph glands. Endocrine: No excessive thirst, No polyuria, No cold intolerance, No heat intolerance, No excessive hunger. Immunologic: Not immunocompromised, No recurrent fevers, No recurrent infections. Musculoskeletal: No back pain, No neck pain, No joint pain, No muscle pain, No claudication, No decreased range of motion,  trauma, left arm pain. Integumentary: No rash, No pruritus, No abrasions. Neurologic: Alert and oriented X4, No abnormal balance, No headache, No confusion, No numbness, No tingling. Psychiatric: No anxiety, No depression, No paola. Physical Exam:     Vitals & Measurements: Wt Readings from Last 3 Encounters:   10/06/22 90.7 kg (200 lb)   10/06/22 100.7 kg (222 lb)   03/29/22 100.7 kg (222 lb)     Temp Readings from Last 3 Encounters:   10/10/22 97.8 °F (36.6 °C)   10/06/22 98.6 °F (37 °C)   03/29/22 98 °F (36.7 °C) (Temporal)     BP Readings from Last 3 Encounters:   10/10/22 136/75   10/06/22 (!) 169/88   03/29/22 134/70     Pulse Readings from Last 3 Encounters:   10/10/22 (!) 59   10/06/22 81   03/29/22 75      Ht Readings from Last 3 Encounters:   10/07/22 5' 7\" (1.702 m)   10/06/22 5' 8\" (1.727 m)   03/29/22 5' 8\" (1.727 m)          General: well appearing, no acute distress  Head: Normal  Face: Nornal  HEENT: atraumatic, PERRLA, moist mucosa, normal pharynx, normal tonsils and adenoids, normal tongue, no fluid in sinuses  Neck: Trachea midline, no carotid bruit, no masses  Chest: Normal, left side lower rib tenderness.   Respiratory: Normal chest wall expansion, CTA B, no r/r/w, no rubs  Cardiovascular: RRR, no m/r/g, Normal S1 and S2  Abdomen: Soft, non tender, non-distended, normal bowel sounds in all quadrants, no hepatosplenomegaly, no tympany. Genitourinary: No inguinal hernia, normal external gentalia, Testis & scrotum normal, no renal angle tenderness  Rectal: deferred  Musculoskeletal: normal ROM in right upper and bilateral lower extremities, No joint swelling, left arm deformity, excellent radial pulse, no obvious sensory motor deformity.   Integumentary: Warm, dry, and pink, with no rash, purpura, or petechia  Heme/Lymph: No lymphadenopathy, no bruises  Neurological:Cranial Nerves II-XII grossly intact, no gross motor or sensory deficit  Psychiatric: Cooperative with normal mood, affect, and cognition      Laboratory Values:   Recent Results (from the past 24 hour(s))   GLUCOSE, POC    Collection Time: 10/09/22  4:12 PM   Result Value Ref Range    Glucose (POC) 108 (H) 65 - 100 mg/dL    Performed by Alber SNIDER    GLUCOSE, POC    Collection Time: 10/09/22  7:37 PM   Result Value Ref Range    Glucose (POC) 172 (H) 65 - 100 mg/dL    Performed by Jennifer Yuen    GLUCOSE, POC    Collection Time: 10/10/22  7:46 AM   Result Value Ref Range    Glucose (POC) 173 (H) 65 - 100 mg/dL    Performed by Rosa Gilbert    CBC W/O DIFF    Collection Time: 10/10/22 10:48 AM   Result Value Ref Range    WBC 9.5 4.1 - 11.1 K/uL    RBC 4.05 (L) 4.10 - 5.70 M/uL    HGB 13.3 12.1 - 17.0 g/dL    HCT 39.1 36.6 - 50.3 %    MCV 96.5 80.0 - 99.0 FL    MCH 32.8 26.0 - 34.0 PG    MCHC 34.0 30.0 - 36.5 g/dL    RDW 13.0 11.5 - 14.5 %    PLATELET 367 779 - 587 K/uL    MPV 11.6 8.9 - 12.9 FL    NRBC 0.0 0.0  WBC    ABSOLUTE NRBC 0.00 0.00 - 2.27 K/uL   METABOLIC PANEL, BASIC    Collection Time: 10/10/22 10:48 AM   Result Value Ref Range    Sodium 141 136 - 145 mmol/L    Potassium 3.5 3.5 - 5.1 mmol/L    Chloride 105 97 - 108 mmol/L    CO2 25 21 - 32 mmol/L    Anion gap 11 5 - 15 mmol/L    Glucose 173 (H) 65 - 100 mg/dL    BUN 14 6 - 20 mg/dL    Creatinine 0.78 0.70 - 1.30 mg/dL    BUN/Creatinine ratio 18 12 - 20      eGFR >60 >60 ml/min/1.73m2    Calcium 9.1 8.5 - 10.1 mg/dL   C REACTIVE PROTEIN, QT    Collection Time: 10/10/22 10:48 AM   Result Value Ref Range    C-Reactive protein 5.45 (H) 0.00 - 0.60 mg/dL         Assessment:  Problem List Items Addressed This Visit          Skeletal    Multiple rib fractures - Primary    Humerus shaft fracture      Left side rib fractures (multiple)    Plan:    Admission  Diet: Regular  IV fluids  SCD  IS  Pain medications  Nausea medication  Labs in am  Consult: Ortho- Dr. Sarah Hernandez has already given instruction to place left arm splint. HOSPITAL COURSE:  Admitted for pain control and management of left humerus fracture. He had a left arm splint. Patient initially wanted to have the left humerus fracture fixed over the weekend. Now he wants to have it fixed as outpatient. Ortho is ok with discharge and follow up as outpatient. Recommendation:  Discharged on 10/10/22  No weight bearing on left arm. Continue with left arm splint. Follow up with Tomasz Hernandez as outpatient. Pain medication will be sent to Pharmacy by Orthopedic service. Thank you for the consultation & allowing me to participate in the care of this patient.

## 2022-10-10 NOTE — PROGRESS NOTES
Patient assessment completed for this shift. Bed in lowest position. Call bell within reach. Patient voiced  understanding to call for assistance as needed. No c/o pain voiced by patient, at this time. No falls reported at this time. Patient remains stable. Periods of confusion noted but patient able to be reoriented. Progressing in plan of care. Patient NPO after midnight for procedure. Patient's family stated that \" he will not have surgery and he doesn't know what he wants\". Will have surgeon notified in the am regarding family dynamics. Will continue to monitor during this admission. Problem: Pressure Injury - Risk of  Goal: *Prevention of pressure injury  Description: Document William Scale and appropriate interventions in the flowsheet. Outcome: Progressing Towards Goal  Note: Pressure Injury Interventions:  Sensory Interventions: Keep linens dry and wrinkle-free, Minimize linen layers, Pressure redistribution bed/mattress (bed type)    Moisture Interventions: Internal/External urinary devices    Activity Interventions: Pressure redistribution bed/mattress(bed type), PT/OT evaluation    Mobility Interventions: Pressure redistribution bed/mattress (bed type), PT/OT evaluation    Nutrition Interventions: Document food/fluid/supplement intake    Friction and Shear Interventions: Minimize layers                Problem: Patient Education: Go to Patient Education Activity  Goal: Patient/Family Education  Outcome: Progressing Towards Goal     Problem: Falls - Risk of  Goal: *Absence of Falls  Description: Document Devin Fall Risk and appropriate interventions in the flowsheet.   Outcome: Progressing Towards Goal  Note: Fall Risk Interventions:  Mobility Interventions: Bed/chair exit alarm    Mentation Interventions: Bed/chair exit alarm, Door open when patient unattended, Room close to nurse's station    Medication Interventions: Bed/chair exit alarm    Elimination Interventions: Bed/chair exit alarm, Call light in reach    History of Falls Interventions: Bed/chair exit alarm         Problem: Patient Education: Go to Patient Education Activity  Goal: Patient/Family Education  Outcome: Progressing Towards Goal     Problem: Patient Education: Go to Patient Education Activity  Goal: Patient/Family Education  Outcome: Progressing Towards Goal

## 2022-10-10 NOTE — PROGRESS NOTES
Spoke with [facility]. They stated family wants patient to come to them. Explained I was not  but I can relay message to proper  and that we would need to hear from his wife since she is primary decision maker. Received call from granddaughter of patient and patient wife stating they want him discharged today and they want referral sent to [facility]. Again told them I was not  but I can send referral for them. They also want a complete medical update when they arrive. Will relay to nurse and inform nurse family on the way up.

## 2022-10-10 NOTE — PROGRESS NOTES
Chief Complaint:none      Subjective:  Mr. Cheyenne Pulido is a 76y.o. year old * male admitted for s/p fall with multiple left side rib fractures with left humerus fracture. No complaints. Has decided to have surgery as outpatient surgery. Review of Systems:   Constitutional:  no fever, no chills,  no sweats, No weakness, No fatigue, No decreased activity. Respiratory: No shortness of breath, No cough, No sputum production, No hemoptysis, No wheezing, No cyanosis. Cardiovascular: No chest pain, No palpitations, No bradycardia, No tachycardia, No peripheral edema, No syncope. Gastrointestinal: No nausea, No vomiting, No diarrhea, No constipation, No heartburn, No abdominal pain. Genitourinary: No dysuria, No hematuria, No change in urine stream, No urethral discharge, No lesions. Hematology/Lymphatics: No bruising tendency, No bleeding tendency, No petechiae, No swollen lymph glands. Endocrine: No excessive thirst, No polyuria, No cold intolerance, No heat intolerance, No excessive hunger. Musculoskeletal: No back pain, No neck pain, No joint pain, No muscle pain, No claudication, No decreased range of motion, No trauma. Integumentary: No rash, No pruritus, No abrasions. Neurologic: Alert and oriented X4, No abnormal balance, No headache, No confusion, No numbness, No tingling. Psychiatric: No anxiety, No depression, No paola. Physical Exam:     Vitals & Measurements:     Wt Readings from Last 3 Encounters:   10/06/22 90.7 kg (200 lb)   10/06/22 100.7 kg (222 lb)   03/29/22 100.7 kg (222 lb)     Temp Readings from Last 3 Encounters:   10/10/22 97.8 °F (36.6 °C)   10/06/22 98.6 °F (37 °C)   03/29/22 98 °F (36.7 °C) (Temporal)     BP Readings from Last 3 Encounters:   10/10/22 136/75   10/06/22 (!) 169/88   03/29/22 134/70     Pulse Readings from Last 3 Encounters:   10/10/22 (!) 59   10/06/22 81   03/29/22 75      Ht Readings from Last 3 Encounters:   10/07/22 5' 7\" (1.702 m)   10/06/22 5' 8\" (1.727 m)   03/29/22 5' 8\" (1.727 m)      Date 10/09/22 0700 - 10/10/22 0659 10/10/22 0700 - 10/11/22 0659   Shift 2025-3544 8024-3417 24 Hour Total 7095-0511 9896-7372 24 Hour Total   INTAKE   I.V.(mL/kg/hr)  806.3(0.7) 806.3(0.4)        Volume (0.45% sodium chloride with KCl 20 mEq/L infusion)  806.3 806.3      Shift Total(mL/kg)  806.3(8.9) 806.3(8.9)      OUTPUT   Urine(mL/kg/hr) 200(0.2) 1000(0.9) 1200(0.6)        Urine Voided 200 1000 1200        Urine Occurrence(s)  1 x 1 x      Shift Total(mL/kg) 200(2.2) 1000(11) 1200(13.2)      NET -200 -193.8 -393.8      Weight (kg) 90.7 90.7 90.7 90.7 90.7 90.7         General: well appearing, no acute distress  Head: Normal  Face: Nornal  HEENT: atraumatic, PERRLA, moist mucosa, normal pharynx, normal tonsils and adenoids, normal tongue, no fluid in sinuses  Neck: Trachea midline, no carotid bruit, no masses  Chest: Normal, left side tenderness. Respiratory: normal chest wall expansion, CTA B, no r/r/w, no rubs  Cardiovascular: RRR, no m/r/g, Normal S1 and S2  Abdomen: Soft, non tender, non-distended, normal bowel sounds in all quadrants, no hepatosplenomegaly, no tympany. Incision scar:   Genitourinary: No inguinal hernia, normal external gentalia, Testis & scrotum normal, no renal angle tenderness  Rectal: deferred  Musculoskeletal: Normal ROM in upper and lower extremities, No joint swelling, left side upper extremity limited ROM. Integumentary: Warm, dry, and pink, with no rash, purpura, or petechia  Heme/Lymph: No lymphadenopathy, no bruises  Neurological: Cranial Nerves II-XII grossly intact, No gross sensory or motor deficit.   Psychiatric: Cooperative with normal mood, affect, and cognition    Laboratory Values:   Recent Results (from the past 24 hour(s))   GLUCOSE, POC    Collection Time: 10/09/22  4:12 PM   Result Value Ref Range    Glucose (POC) 108 (H) 65 - 100 mg/dL    Performed by [address], POC    Collection Time: 10/09/22  7:37 PM Result Value Ref Range    Glucose (POC) 172 (H) 65 - 100 mg/dL    Performed by Tavo Tsang    GLUCOSE, POC    Collection Time: 10/10/22  7:46 AM   Result Value Ref Range    Glucose (POC) 173 (H) 65 - 100 mg/dL    Performed by Raad Oscar    CBC W/O DIFF    Collection Time: 10/10/22 10:48 AM   Result Value Ref Range    WBC 9.5 4.1 - 11.1 K/uL    RBC 4.05 (L) 4.10 - 5.70 M/uL    HGB 13.3 12.1 - 17.0 g/dL    HCT 39.1 36.6 - 50.3 %    MCV 96.5 80.0 - 99.0 FL    MCH 32.8 26.0 - 34.0 PG    MCHC 34.0 30.0 - 36.5 g/dL    RDW 13.0 11.5 - 14.5 %    PLATELET 443 221 - 434 K/uL    MPV 11.6 8.9 - 12.9 FL    NRBC 0.0 0.0  WBC    ABSOLUTE NRBC 0.00 0.00 - 0.01 K/uL   METABOLIC PANEL, BASIC    Collection Time: 10/10/22 10:48 AM   Result Value Ref Range    Sodium 141 136 - 145 mmol/L    Potassium 3.5 3.5 - 5.1 mmol/L    Chloride 105 97 - 108 mmol/L    CO2 25 21 - 32 mmol/L    Anion gap 11 5 - 15 mmol/L    Glucose 173 (H) 65 - 100 mg/dL    BUN 14 6 - 20 mg/dL    Creatinine 0.78 0.70 - 1.30 mg/dL    BUN/Creatinine ratio 18 12 - 20      eGFR >60 >60 ml/min/1.73m2    Calcium 9.1 8.5 - 10.1 mg/dL   C REACTIVE PROTEIN, QT    Collection Time: 10/10/22 10:48 AM   Result Value Ref Range    C-Reactive protein 5.45 (H) 0.00 - 0.60 mg/dL           Assessment:  Problem List Items Addressed This Visit          Skeletal    Multiple rib fractures - Primary    Humerus shaft fracture        Plan:    Admission  Diet: regular  IV fluids  SCD  IS  Pain medications  Antibiotics  Nausea medication  Labs & Radiology  Consult: Ortho  11. Discharge back to facility. 12. Follow up with Ortho as outpatient  13. Plan discussed with patient and family and answered all their questions. Thank you for allowing me to participate in the care of this patient.

## 2022-10-10 NOTE — PROGRESS NOTES
Problem: Pressure Injury - Risk of  Goal: *Prevention of pressure injury  Description: Document William Scale and appropriate interventions in the flowsheet. 10/10/2022 1332 by Barbi Soni RN  Outcome: Resolved/Met  10/10/2022 1331 by Barbi Soni RN  Note: Pressure Injury Interventions:  Sensory Interventions: Minimize linen layers, Keep linens dry and wrinkle-free    Moisture Interventions: Internal/External urinary devices, Minimize layers    Activity Interventions: PT/OT evaluation, Pressure redistribution bed/mattress(bed type)    Mobility Interventions: Pressure redistribution bed/mattress (bed type), PT/OT evaluation    Nutrition Interventions: Offer support with meals,snacks and hydration    Friction and Shear Interventions: Minimize layers, HOB 30 degrees or less                Problem: Patient Education: Go to Patient Education Activity  Goal: Patient/Family Education  Outcome: Resolved/Met     Problem: Falls - Risk of  Goal: *Absence of Falls  Description: Document Devin Fall Risk and appropriate interventions in the flowsheet.   Outcome: Resolved/Met     Problem: Patient Education: Go to Patient Education Activity  Goal: Patient/Family Education  Outcome: Resolved/Met     Problem: Patient Education: Go to Patient Education Activity  Goal: Patient/Family Education  Outcome: Resolved/Met

## 2022-10-10 NOTE — PROGRESS NOTES
Family request patient go to [facility]. Referral sent. Patient accepted. He will go to [address]. Nurse to call report to [phone]. Updated clinicals sent. Family is in room and aware.

## 2022-11-08 ENCOUNTER — HOSPITAL ENCOUNTER (EMERGENCY)
Age: 77
Discharge: HOME | End: 2022-11-08
Attending: EMERGENCY MEDICINE
Payer: MEDICARE

## 2022-11-08 ENCOUNTER — APPOINTMENT (OUTPATIENT)
Dept: GENERAL RADIOLOGY | Age: 77
End: 2022-11-08
Attending: EMERGENCY MEDICINE
Payer: MEDICARE

## 2022-11-08 VITALS
SYSTOLIC BLOOD PRESSURE: 153 MMHG | HEART RATE: 93 BPM | TEMPERATURE: 98 F | BODY MASS INDEX: 29.2 KG/M2 | RESPIRATION RATE: 22 BRPM | HEIGHT: 72 IN | DIASTOLIC BLOOD PRESSURE: 75 MMHG | OXYGEN SATURATION: 93 % | WEIGHT: 215.6 LBS

## 2022-11-08 DIAGNOSIS — R53.1 EPISODE OF GENERALIZED WEAKNESS: Primary | ICD-10-CM

## 2022-11-08 LAB
ALBUMIN SERPL-MCNC: 2.8 G/DL (ref 3.5–5)
ALBUMIN/GLOB SERPL: 0.7 {RATIO} (ref 1.1–2.2)
ALP SERPL-CCNC: 109 U/L (ref 45–117)
ALT SERPL-CCNC: 27 U/L (ref 12–78)
ANION GAP SERPL CALC-SCNC: 9 MMOL/L (ref 5–15)
AST SERPL W P-5'-P-CCNC: 15 U/L (ref 15–37)
BASOPHILS # BLD: 0 K/UL (ref 0–0.1)
BASOPHILS NFR BLD: 0 % (ref 0–1)
BILIRUB SERPL-MCNC: 0.5 MG/DL (ref 0.2–1)
BNP SERPL-MCNC: 191 PG/ML
BUN SERPL-MCNC: 11 MG/DL (ref 6–20)
BUN/CREAT SERPL: 12 (ref 12–20)
CA-I BLD-MCNC: 8.6 MG/DL (ref 8.5–10.1)
CHLORIDE SERPL-SCNC: 103 MMOL/L (ref 97–108)
CO2 SERPL-SCNC: 28 MMOL/L (ref 21–32)
CREAT SERPL-MCNC: 0.94 MG/DL (ref 0.7–1.3)
DIFFERENTIAL METHOD BLD: ABNORMAL
EOSINOPHIL # BLD: 0.3 K/UL (ref 0–0.4)
EOSINOPHIL NFR BLD: 4 % (ref 0–7)
ERYTHROCYTE [DISTWIDTH] IN BLOOD BY AUTOMATED COUNT: 12.2 % (ref 11.5–14.5)
GLOBULIN SER CALC-MCNC: 4.3 G/DL (ref 2–4)
GLUCOSE SERPL-MCNC: 124 MG/DL (ref 65–100)
HCT VFR BLD AUTO: 37.2 % (ref 36.6–50.3)
HGB BLD-MCNC: 12.6 G/DL (ref 12.1–17)
IMM GRANULOCYTES # BLD AUTO: 0 K/UL (ref 0–0.04)
IMM GRANULOCYTES NFR BLD AUTO: 0 % (ref 0–0.5)
LYMPHOCYTES # BLD: 2.1 K/UL (ref 0.8–3.5)
LYMPHOCYTES NFR BLD: 22 % (ref 12–49)
MCH RBC QN AUTO: 32.3 PG (ref 26–34)
MCHC RBC AUTO-ENTMCNC: 33.9 G/DL (ref 30–36.5)
MCV RBC AUTO: 95.4 FL (ref 80–99)
MONOCYTES # BLD: 1.5 K/UL (ref 0–1)
MONOCYTES NFR BLD: 15 % (ref 5–13)
NEUTS SEG # BLD: 5.8 K/UL (ref 1.8–8)
NEUTS SEG NFR BLD: 59 % (ref 32–75)
NRBC # BLD: 0 K/UL (ref 0–0.01)
NRBC BLD-RTO: 0 PER 100 WBC
PLATELET # BLD AUTO: 312 K/UL (ref 150–400)
PMV BLD AUTO: 10.5 FL (ref 8.9–12.9)
POTASSIUM SERPL-SCNC: 3.4 MMOL/L (ref 3.5–5.1)
PROT SERPL-MCNC: 7.1 G/DL (ref 6.4–8.2)
RBC # BLD AUTO: 3.9 M/UL (ref 4.1–5.7)
SODIUM SERPL-SCNC: 140 MMOL/L (ref 136–145)
TROPONIN-HIGH SENSITIVITY: 16 NG/L (ref 0–76)
WBC # BLD AUTO: 9.8 K/UL (ref 4.1–11.1)

## 2022-11-08 PROCEDURE — 85025 COMPLETE CBC W/AUTO DIFF WBC: CPT

## 2022-11-08 PROCEDURE — 80053 COMPREHEN METABOLIC PANEL: CPT

## 2022-11-08 PROCEDURE — 71045 X-RAY EXAM CHEST 1 VIEW: CPT

## 2022-11-08 PROCEDURE — 83880 ASSAY OF NATRIURETIC PEPTIDE: CPT

## 2022-11-08 PROCEDURE — 36415 COLL VENOUS BLD VENIPUNCTURE: CPT

## 2022-11-08 PROCEDURE — 99284 EMERGENCY DEPT VISIT MOD MDM: CPT

## 2022-11-08 PROCEDURE — 84484 ASSAY OF TROPONIN QUANT: CPT

## 2022-11-08 NOTE — ED TRIAGE NOTES
Patient was doing his physical therapy and started to drool, Oxygen dropped and right hand started to tremble.  All symptoms have subsided at this time but patient wants to be evaluated

## 2022-11-08 NOTE — ED NOTES
Very pleasant elderly male that is temporarily residing in a local rehab- states staff sent him here because he was drooling while trying to walk. Pt states he feels fine and has no complaints. No drooling and no tremors present.  Pt states his speech is normal. Pt is alert and oriented x3

## 2022-11-08 NOTE — ED PROVIDER NOTES
HPI 75-year-old male was seen rehab today apparently had a spell when he began drooling and had transiently low oxygen no other history patient has no complaints here in the ED vital signs were stable on arrival oxygen saturation 95%. Left arm is in a sling for humerus fracture approximately 1 month ago also with multiple rib fractures. Past Medical History:   Diagnosis Date    Acid reflux     Anemia     Diabetes (HCC)     GERD (gastroesophageal reflux disease)     Hypertension     Stroke [facility])        Past Surgical History:   Procedure Laterality Date    HX COLONOSCOPY           Family History:   Problem Relation Age of Onset    Heart Disease Father     Hypertension Brother     Diabetes Brother        Social History     Socioeconomic History    Marital status:      Spouse name: Not on file    Number of children: Not on file    Years of education: Not on file    Highest education level: Not on file   Occupational History    Not on file   Tobacco Use    Smoking status: Never    Smokeless tobacco: Never   Vaping Use    Vaping Use: Never used   Substance and Sexual Activity    Alcohol use: Never    Drug use: Never    Sexual activity: Not on file   Other Topics Concern    Not on file   Social History Narrative    Not on file     Social Determinants of Health     Financial Resource Strain: Not on file   Food Insecurity: Not on file   Transportation Needs: Not on file   Physical Activity: Not on file   Stress: Not on file   Social Connections: Not on file   Intimate Partner Violence: Not on file   Housing Stability: Not on file         ALLERGIES: Patient has no known allergies. Review of Systems   Constitutional:  Negative for appetite change, chills, diaphoresis and fever. HENT:  Negative for ear pain, facial swelling, sinus pain and trouble swallowing. Eyes:  Negative for redness and visual disturbance.    Respiratory:  Negative for cough, choking, chest tightness, shortness of breath, wheezing and stridor. Cardiovascular:  Negative for chest pain, palpitations and leg swelling. Gastrointestinal:  Negative for abdominal distention, abdominal pain, blood in stool, diarrhea, nausea and vomiting. Endocrine: Negative for polydipsia and polyuria. Genitourinary:  Negative for difficulty urinating, dysuria, flank pain, frequency, hematuria and urgency. Musculoskeletal: Negative. Allergic/Immunologic: Negative. Neurological: Negative. Psychiatric/Behavioral: Negative. Vitals:    11/08/22 1206   BP: 117/66   Pulse: 83   Resp: 20   Temp: 98 °F (36.7 °C)   SpO2: 95%   Weight: 97.8 kg (215 lb 9.6 oz)   Height: 6' (1.829 m)            Physical Exam  Vitals and nursing note reviewed. Constitutional:       General: He is not in acute distress. Appearance: Normal appearance. He is not ill-appearing, toxic-appearing or diaphoretic. HENT:      Head: Normocephalic and atraumatic. Nose: Nose normal.      Mouth/Throat:      Mouth: Mucous membranes are moist.   Eyes:      Extraocular Movements: Extraocular movements intact. Conjunctiva/sclera: Conjunctivae normal.   Cardiovascular:      Rate and Rhythm: Normal rate and regular rhythm. Pulses: Normal pulses. Heart sounds: Normal heart sounds. No murmur heard. Pulmonary:      Effort: Pulmonary effort is normal. No respiratory distress. Breath sounds: Normal breath sounds. No wheezing, rhonchi or rales. Abdominal:      General: Bowel sounds are normal. There is no distension. Palpations: Abdomen is soft. There is no mass. Tenderness: There is no abdominal tenderness. There is no right CVA tenderness, left CVA tenderness, guarding or rebound. Hernia: No hernia is present. Musculoskeletal:         General: Tenderness present. No swelling, deformity or signs of injury. Cervical back: Normal range of motion and neck supple. No rigidity or tenderness. Right lower leg: No edema.       Left lower leg: No edema. Comments: Decreased range of motion left upper extremity due to recent fracture chest wall somewhat tender still the left side of her rib fractures   Lymphadenopathy:      Cervical: No cervical adenopathy. Skin:     General: Skin is warm and dry. Capillary Refill: Capillary refill takes less than 2 seconds. Findings: No erythema, lesion or rash. Neurological:      General: No focal deficit present. Mental Status: He is alert and oriented to person, place, and time. Mental status is at baseline. Cranial Nerves: No cranial nerve deficit. Sensory: No sensory deficit. Motor: No weakness. Coordination: Coordination normal.   Psychiatric:         Mood and Affect: Mood normal.         Behavior: Behavior normal.         Thought Content: Thought content normal.        MDM    Seen her labs are essentially normal.  Observed here in the ED without further incident vital signs remained stable.   Will return to residence    Procedures

## 2023-01-27 ENCOUNTER — OFFICE VISIT (OUTPATIENT)
Dept: SURGERY | Age: 78
End: 2023-01-27
Payer: MEDICARE

## 2023-01-27 VITALS
DIASTOLIC BLOOD PRESSURE: 72 MMHG | BODY MASS INDEX: 29.24 KG/M2 | HEIGHT: 72 IN | OXYGEN SATURATION: 97 % | HEART RATE: 97 BPM | SYSTOLIC BLOOD PRESSURE: 130 MMHG | RESPIRATION RATE: 18 BRPM | TEMPERATURE: 98.1 F

## 2023-01-27 DIAGNOSIS — I73.9 PERIPHERAL VASCULAR DISEASE (HCC): Primary | ICD-10-CM

## 2023-01-27 DIAGNOSIS — I87.309 CHRONIC VENOUS HYPERTENSION, UNSPECIFIED LATERALITY: ICD-10-CM

## 2023-01-27 PROCEDURE — G8510 SCR DEP NEG, NO PLAN REQD: HCPCS | Performed by: SURGERY

## 2023-01-27 PROCEDURE — G8417 CALC BMI ABV UP PARAM F/U: HCPCS | Performed by: SURGERY

## 2023-01-27 PROCEDURE — G8427 DOCREV CUR MEDS BY ELIG CLIN: HCPCS | Performed by: SURGERY

## 2023-01-27 PROCEDURE — 1101F PT FALLS ASSESS-DOCD LE1/YR: CPT | Performed by: SURGERY

## 2023-01-27 PROCEDURE — G8536 NO DOC ELDER MAL SCRN: HCPCS | Performed by: SURGERY

## 2023-01-27 PROCEDURE — 3074F SYST BP LT 130 MM HG: CPT | Performed by: SURGERY

## 2023-01-27 PROCEDURE — 1123F ACP DISCUSS/DSCN MKR DOCD: CPT | Performed by: SURGERY

## 2023-01-27 PROCEDURE — 99213 OFFICE O/P EST LOW 20 MIN: CPT | Performed by: SURGERY

## 2023-01-27 PROCEDURE — 3078F DIAST BP <80 MM HG: CPT | Performed by: SURGERY

## 2023-01-27 NOTE — PROGRESS NOTES
Identified pt with two pt identifiers (name and ). Reviewed chart in preparation for visit and have obtained necessary documentation. Ryan Schwab is a 76 y.o. male  Chief Complaint   Patient presents with    Follow-up     Left Leg      Visit Vitals  /72 (BP 1 Location: Right arm, BP Patient Position: Sitting)   Pulse 97   Temp 98.1 °F (36.7 °C) (Temporal)   Resp 18   Ht 6' (1.829 m)   SpO2 97%   BMI 29.24 kg/m²       1. Have you been to the ER, urgent care clinic since your last visit? Hospitalized since your last visit? Yes [address] [place] [address] for weakness     2. Have you seen or consulted any other health care providers outside of the [address] [place] [address] since your last visit? Include any pap smears or colon screening.  No

## 2023-01-30 NOTE — PROGRESS NOTES
VASCULAR FOLLOW UP      Subjective:   CHIEF COMPLAINTS:  Surveillance vascular examination  PRESENTATION OF ILLNESS:  Dmitri Gutierrez is a 76 y.o. very pleasant gentleman currently at the rehab recovering from broken arm. Patient is doing otherwise well. Patient denies any leg pain. Other than swelling. Patient has known history of PAD and also chronic venous hypertension. Patient was recommended compression stocking and he is not wearing it. Patient denies any chest pain shortness breath, denies recent MI or stroke symptoms. Patient denies abdominal pain appetite is excellent. Past Medical History:   Diagnosis Date    Acid reflux     Anemia     Diabetes (HCC)     GERD (gastroesophageal reflux disease)     Hypertension     Stroke [facility])       Past Surgical History:   Procedure Laterality Date    HX COLONOSCOPY       Family History   Problem Relation Age of Onset    Heart Disease Father     Hypertension Brother     Diabetes Brother       Social History     Tobacco Use    Smoking status: Never    Smokeless tobacco: Never   Substance Use Topics    Alcohol use: Never       Prior to Admission medications    Medication Sig Start Date End Date Taking? Authorizing Provider   aspirin delayed-release 81 mg tablet Take 81 mg by mouth daily. Yes Provider, Historical   b complex vitamins tablet Take 1 Tablet by mouth daily. Yes Provider, Historical   amLODIPine (NORVASC) 10 mg tablet Take  by mouth daily. Yes Provider, Historical   carvediloL (COREG) 25 mg tablet Take 25 mg by mouth two (2) times daily (with meals). Yes Provider, Historical   glucose blood VI test strips (Contour Next Test Strips) strip by Does Not Apply route See Admin Instructions. Yes Provider, Historical   diclofenac (VOLTAREN) 1 % gel Apply  to affected area four (4) times daily. Yes Provider, Historical   lisinopriL (PRINIVIL, ZESTRIL) 10 mg tablet Take  by mouth daily.    Yes Provider, Historical   metFORMIN (GLUCOPHAGE) 500 mg tablet Take  by mouth two (2) times daily (with meals). Yes Provider, Historical   pantoprazole (PROTONIX) 40 mg tablet Take 40 mg by mouth daily. Yes Provider, Historical   pravastatin (PRAVACHOL) 40 mg tablet Take 40 mg by mouth nightly. Yes Provider, Historical   docusate sodium (COLACE) 100 mg capsule Take 100 mg by mouth two (2) times a day. Yes Provider, Historical   lancets 33 gauge misc by Does Not Apply route. Yes Provider, Historical   furosemide (LASIX) 20 mg tablet Take 20 mg by mouth daily as needed. Patient not taking: No sig reported    Provider, Historical     No Known Allergies     Review of Systems:  I reviewed the rest of organ systems personally and they were negative signed by Dr. Viji Espinosa    Objective:   Visit Vitals  /72 (BP 1 Location: Right arm, BP Patient Position: Sitting)   Pulse 97   Temp 98.1 °F (36.7 °C) (Temporal)   Resp 18   Ht 6' (1.829 m)   SpO2 97%   BMI 29.24 kg/m²     VITAL SIGNS REVIEWED. Physical Exam:  Patient is well-nourished pleasant in conversation is appropriate. Head and neck examination atraumatic, normocephalic. Gaze appropriate. Conversation appropriate. Neck examination shows supple. No mass. No obvious carotid bruit. Chest examination shows lungs are clear bilaterally well-expanded, no crackles or wheezes. Cardiovascular system regular rate, no obvious murmur. Skin warm to touch  and moist, no skin lesions. Abdomen is soft ,not tender or distended bowel sounds present. No palpable mass. Neurological examinations, no focal neuro deficits moving all 4 extremities. Cranial nerves intact. Sensation is intact as well. Hematologic: No obvious bruise or swelling or obvious lymphadenopathy. Psychosocial: Appropriate. Has good effect. Musculoskeletal system: No muscle wasting, appropriate movements upper and lower extremity. Vascular examination: Patient does have a biphasic signal noted bilateral DP and PT. Swelling is moderate.   Patient has CEAP C3 venous disorder. Data Review:   No visits with results within 1 Month(s) from this visit. Latest known visit with results is:   Admission on 11/08/2022, Discharged on 11/08/2022   Component Date Value Ref Range Status    WBC 11/08/2022 9.8  4.1 - 11.1 K/uL Final    RBC 11/08/2022 3.90 (A)  4.10 - 5.70 M/uL Final    HGB 11/08/2022 12.6  12.1 - 17.0 g/dL Final    HCT 11/08/2022 37.2  36.6 - 50.3 % Final    MCV 11/08/2022 95.4  80.0 - 99.0 FL Final    MCH 11/08/2022 32.3  26.0 - 34.0 PG Final    MCHC 11/08/2022 33.9  30.0 - 36.5 g/dL Final    RDW 11/08/2022 12.2  11.5 - 14.5 % Final    PLATELET 36/69/9793 670  150 - 400 K/uL Final    MPV 11/08/2022 10.5  8.9 - 12.9 FL Final    NRBC 11/08/2022 0.0  0.0  WBC Final    ABSOLUTE NRBC 11/08/2022 0.00  0.00 - 0.01 K/uL Final    NEUTROPHILS 11/08/2022 59  32 - 75 % Final    LYMPHOCYTES 11/08/2022 22  12 - 49 % Final    MONOCYTES 11/08/2022 15 (A)  5 - 13 % Final    EOSINOPHILS 11/08/2022 4  0 - 7 % Final    BASOPHILS 11/08/2022 0  0 - 1 % Final    IMMATURE GRANULOCYTES 11/08/2022 0  0 - 0.5 % Final    ABS. NEUTROPHILS 11/08/2022 5.8  1.8 - 8.0 K/UL Final    ABS. LYMPHOCYTES 11/08/2022 2.1  0.8 - 3.5 K/UL Final    ABS. MONOCYTES 11/08/2022 1.5 (A)  0.0 - 1.0 K/UL Final    ABS. EOSINOPHILS 11/08/2022 0.3  0.0 - 0.4 K/UL Final    ABS. BASOPHILS 11/08/2022 0.0  0.0 - 0.1 K/UL Final    ABS. IMM. GRANS.  11/08/2022 0.0  0.00 - 0.04 K/UL Final    DF 11/08/2022 AUTOMATED    Final    Sodium 11/08/2022 140  136 - 145 mmol/L Final    Potassium 11/08/2022 3.4 (A)  3.5 - 5.1 mmol/L Final    Chloride 11/08/2022 103  97 - 108 mmol/L Final    CO2 11/08/2022 28  21 - 32 mmol/L Final    Anion gap 11/08/2022 9  5 - 15 mmol/L Final    Glucose 11/08/2022 124 (A)  65 - 100 mg/dL Final    BUN 11/08/2022 11  6 - 20 mg/dL Final    Creatinine 11/08/2022 0.94  0.70 - 1.30 mg/dL Final    BUN/Creatinine ratio 11/08/2022 12  12 - 20   Final    eGFR 11/08/2022 >60  >60 ml/min/1.73m2 Final    Calcium 11/08/2022 8.6  8.5 - 10.1 mg/dL Final    Bilirubin, total 11/08/2022 0.5  0.2 - 1.0 mg/dL Final    AST (SGOT) 11/08/2022 15  15 - 37 U/L Final    ALT (SGPT) 11/08/2022 27  12 - 78 U/L Final    Alk. phosphatase 11/08/2022 109  45 - 117 U/L Final    Protein, total 11/08/2022 7.1  6.4 - 8.2 g/dL Final    Albumin 11/08/2022 2.8 (A)  3.5 - 5.0 g/dL Final    Globulin 11/08/2022 4.3 (A)  2.0 - 4.0 g/dL Final    A-G Ratio 11/08/2022 0.7 (A)  1.1 - 2.2   Final    NT pro-BNP 11/08/2022 191  <450 pg/mL Final    Troponin-High Sensitivity 11/08/2022 16  0 - 76 ng/L Final        Assessment:     Problem List Items Addressed This Visit          Circulatory    Peripheral vascular disease ([address] - Primary    Chronic venous hypertension           Plan:     I have wrapped both legs today with Ace wrap. Also prescribed him compression stocking with a pressure gradient of 15 to 20 mmHg. Patient also noted to have arrhythmia today and the referral to cardiology. Patient will be reassessed for surveillance vascular examination in 2-month follow-up.         Bharat Mireles MD

## 2023-02-01 ENCOUNTER — TRANSCRIBE ORDER (OUTPATIENT)
Dept: SCHEDULING | Age: 78
End: 2023-02-01

## 2023-02-01 DIAGNOSIS — F03.90 DEMENTIA, UNSPECIFIED DEMENTIA SEVERITY, UNSPECIFIED DEMENTIA TYPE, UNSPECIFIED WHETHER BEHAVIORAL, PSYCHOTIC, OR MOOD DISTURBANCE OR ANXIETY (HCC): Primary | ICD-10-CM

## 2023-02-17 ENCOUNTER — HOSPITAL ENCOUNTER (OUTPATIENT)
Dept: CT IMAGING | Age: 78
Discharge: HOME | End: 2023-02-17
Attending: PSYCHIATRY & NEUROLOGY
Payer: MEDICARE

## 2023-02-17 DIAGNOSIS — F03.90 DEMENTIA, UNSPECIFIED DEMENTIA SEVERITY, UNSPECIFIED DEMENTIA TYPE, UNSPECIFIED WHETHER BEHAVIORAL, PSYCHOTIC, OR MOOD DISTURBANCE OR ANXIETY (HCC): ICD-10-CM

## 2023-02-17 PROCEDURE — 70450 CT HEAD/BRAIN W/O DYE: CPT

## 2023-03-24 ENCOUNTER — OFFICE VISIT (OUTPATIENT)
Dept: SURGERY | Age: 78
End: 2023-03-24
Payer: MEDICARE

## 2023-03-24 VITALS
HEIGHT: 72 IN | OXYGEN SATURATION: 97 % | HEART RATE: 84 BPM | TEMPERATURE: 97.3 F | WEIGHT: 193.5 LBS | RESPIRATION RATE: 18 BRPM | SYSTOLIC BLOOD PRESSURE: 122 MMHG | DIASTOLIC BLOOD PRESSURE: 68 MMHG | BODY MASS INDEX: 26.21 KG/M2

## 2023-03-24 DIAGNOSIS — I87.309 CHRONIC VENOUS HYPERTENSION, UNSPECIFIED LATERALITY: Primary | ICD-10-CM

## 2023-03-24 PROCEDURE — G8536 NO DOC ELDER MAL SCRN: HCPCS | Performed by: SURGERY

## 2023-03-24 PROCEDURE — 3074F SYST BP LT 130 MM HG: CPT | Performed by: SURGERY

## 2023-03-24 PROCEDURE — G8427 DOCREV CUR MEDS BY ELIG CLIN: HCPCS | Performed by: SURGERY

## 2023-03-24 PROCEDURE — 1123F ACP DISCUSS/DSCN MKR DOCD: CPT | Performed by: SURGERY

## 2023-03-24 PROCEDURE — 99213 OFFICE O/P EST LOW 20 MIN: CPT | Performed by: SURGERY

## 2023-03-24 PROCEDURE — 3078F DIAST BP <80 MM HG: CPT | Performed by: SURGERY

## 2023-03-24 PROCEDURE — G8510 SCR DEP NEG, NO PLAN REQD: HCPCS | Performed by: SURGERY

## 2023-03-24 PROCEDURE — 1101F PT FALLS ASSESS-DOCD LE1/YR: CPT | Performed by: SURGERY

## 2023-03-24 PROCEDURE — G8417 CALC BMI ABV UP PARAM F/U: HCPCS | Performed by: SURGERY

## 2023-03-24 NOTE — PROGRESS NOTES
Identified pt with two pt identifiers (name and ). Reviewed chart in preparation for visit and have obtained necessary documentation. Uma Hampton is a 76 y.o. male  Chief Complaint   Patient presents with    Follow-up     2 mth followup     Visit Vitals  /68 (BP 1 Location: Right upper arm, BP Patient Position: Sitting, BP Cuff Size: Large adult)   Pulse 84   Temp 97.3 °F (36.3 °C) (Temporal)   Resp 18   Ht 6' (1.829 m)   Wt 193 lb 8 oz (87.8 kg)   SpO2 97%   BMI 26.24 kg/m²       1. Have you been to the ER, urgent care clinic since your last visit? Hospitalized since your last visit? No    2. Have you seen or consulted any other health care providers outside of the [address] [place] [address] since your last visit? Include any pap smears or colon screening.  No

## 2023-03-28 ENCOUNTER — TRANSCRIBE ORDER (OUTPATIENT)
Dept: SCHEDULING | Age: 78
End: 2023-03-28

## 2023-03-28 DIAGNOSIS — R00.9 UNSPECIFIED ABNORMALITIES OF HEART BEAT: Primary | ICD-10-CM

## 2023-03-30 NOTE — PROGRESS NOTES
VASCULAR FOLLOW UP      Subjective:   CHIEF COMPLAINTS:  Leg swelling  PRESENTATION OF ILLNESS:  Benita Hopper is a 76 y.o. very pleasant gentleman is here today with his wife present for follow-up chronic venous hypertension. Patient has a chronic bilateral leg swelling. Patient currently not wearing compression stocking. Patient however limited with exercise due to pain and swelling both legs. Patient denies chest pain shortness of breath. Last time when she was here I wrapped both legs with Ace wrap. Past Medical History:   Diagnosis Date    Acid reflux     Anemia     Diabetes (HCC)     GERD (gastroesophageal reflux disease)     Hypertension     Stroke [facility])       Past Surgical History:   Procedure Laterality Date    HX COLONOSCOPY       Family History   Problem Relation Age of Onset    Heart Disease Father     Hypertension Brother     Diabetes Brother       Social History     Tobacco Use    Smoking status: Never    Smokeless tobacco: Never   Substance Use Topics    Alcohol use: Never       Prior to Admission medications    Medication Sig Start Date End Date Taking? Authorizing Provider   aspirin delayed-release 81 mg tablet Take 81 mg by mouth daily. Yes Provider, Historical   b complex vitamins tablet Take 1 Tablet by mouth daily. Yes Provider, Historical   amLODIPine (NORVASC) 10 mg tablet Take  by mouth daily. Yes Provider, Historical   carvediloL (COREG) 25 mg tablet Take 25 mg by mouth two (2) times daily (with meals). Yes Provider, Historical   glucose blood VI test strips (Contour Next Test Strips) strip by Does Not Apply route See Admin Instructions. Yes Provider, Historical   diclofenac (VOLTAREN) 1 % gel Apply  to affected area four (4) times daily. Yes Provider, Historical   lisinopriL (PRINIVIL, ZESTRIL) 10 mg tablet Take  by mouth daily. Yes Provider, Historical   metFORMIN (GLUCOPHAGE) 500 mg tablet Take  by mouth two (2) times daily (with meals).    Yes Provider, Historical pantoprazole (PROTONIX) 40 mg tablet Take 40 mg by mouth daily. Yes Provider, Historical   pravastatin (PRAVACHOL) 40 mg tablet Take 40 mg by mouth nightly. Yes Provider, Historical   docusate sodium (COLACE) 100 mg capsule Take 100 mg by mouth two (2) times a day. Yes Provider, Historical   lancets 33 gauge misc by Does Not Apply route. Yes Provider, Historical   furosemide (LASIX) 20 mg tablet Take 20 mg by mouth daily as needed. Patient not taking: No sig reported    Provider, Historical     No Known Allergies     Review of Systems:  I reviewed the rest of organ systems personally and they were negative signed by Dr. Angelo Lambert    Objective:   Visit Vitals  /68 (BP 1 Location: Right upper arm, BP Patient Position: Sitting, BP Cuff Size: Large adult)   Pulse 84   Temp 97.3 °F (36.3 °C) (Temporal)   Resp 18   Ht 6' (1.829 m)   Wt 193 lb 8 oz (87.8 kg)   SpO2 97%   BMI 26.24 kg/m²     VITAL SIGNS REVIEWED. Physical Exam:  Patient is well-nourished pleasant in conversation is appropriate. Head and neck examination atraumatic, normocephalic. Gaze appropriate. Conversation appropriate. Neck examination shows supple. No mass. No obvious carotid bruit. Chest examination shows lungs are clear bilaterally well-expanded, no crackles or wheezes. Cardiovascular system regular rate, no obvious murmur. Skin warm to touch  and moist, no skin lesions. Abdomen is soft ,not tender or distended bowel sounds present. No palpable mass. Neurological examinations, no focal neuro deficits moving all 4 extremities. Cranial nerves intact. Sensation is intact as well. Hematologic: No obvious bruise or swelling or obvious lymphadenopathy. Psychosocial: Appropriate. Has good effect. Musculoskeletal system: No muscle wasting, appropriate movements upper and lower extremity. Vascular examination: Patient has a CEAP C3 venous disorder with moderate swelling.   Arterial system shows biphasic signal noted on DP and PT.        Data Review:   No visits with results within 1 Month(s) from this visit. Latest known visit with results is:   Admission on 11/08/2022, Discharged on 11/08/2022   Component Date Value Ref Range Status    WBC 11/08/2022 9.8  4.1 - 11.1 K/uL Final    RBC 11/08/2022 3.90 (A)  4.10 - 5.70 M/uL Final    HGB 11/08/2022 12.6  12.1 - 17.0 g/dL Final    HCT 11/08/2022 37.2  36.6 - 50.3 % Final    MCV 11/08/2022 95.4  80.0 - 99.0 FL Final    MCH 11/08/2022 32.3  26.0 - 34.0 PG Final    MCHC 11/08/2022 33.9  30.0 - 36.5 g/dL Final    RDW 11/08/2022 12.2  11.5 - 14.5 % Final    PLATELET 03/37/3533 607  150 - 400 K/uL Final    MPV 11/08/2022 10.5  8.9 - 12.9 FL Final    NRBC 11/08/2022 0.0  0.0  WBC Final    ABSOLUTE NRBC 11/08/2022 0.00  0.00 - 0.01 K/uL Final    NEUTROPHILS 11/08/2022 59  32 - 75 % Final    LYMPHOCYTES 11/08/2022 22  12 - 49 % Final    MONOCYTES 11/08/2022 15 (A)  5 - 13 % Final    EOSINOPHILS 11/08/2022 4  0 - 7 % Final    BASOPHILS 11/08/2022 0  0 - 1 % Final    IMMATURE GRANULOCYTES 11/08/2022 0  0 - 0.5 % Final    ABS. NEUTROPHILS 11/08/2022 5.8  1.8 - 8.0 K/UL Final    ABS. LYMPHOCYTES 11/08/2022 2.1  0.8 - 3.5 K/UL Final    ABS. MONOCYTES 11/08/2022 1.5 (A)  0.0 - 1.0 K/UL Final    ABS. EOSINOPHILS 11/08/2022 0.3  0.0 - 0.4 K/UL Final    ABS. BASOPHILS 11/08/2022 0.0  0.0 - 0.1 K/UL Final    ABS. IMM. GRANS.  11/08/2022 0.0  0.00 - 0.04 K/UL Final    DF 11/08/2022 AUTOMATED    Final    Sodium 11/08/2022 140  136 - 145 mmol/L Final    Potassium 11/08/2022 3.4 (A)  3.5 - 5.1 mmol/L Final    Chloride 11/08/2022 103  97 - 108 mmol/L Final    CO2 11/08/2022 28  21 - 32 mmol/L Final    Anion gap 11/08/2022 9  5 - 15 mmol/L Final    Glucose 11/08/2022 124 (A)  65 - 100 mg/dL Final    BUN 11/08/2022 11  6 - 20 mg/dL Final    Creatinine 11/08/2022 0.94  0.70 - 1.30 mg/dL Final    BUN/Creatinine ratio 11/08/2022 12  12 - 20   Final    eGFR 11/08/2022 >60  >60 ml/min/1.73m2 Final    Calcium 11/08/2022 8.6  8.5 - 10.1 mg/dL Final    Bilirubin, total 11/08/2022 0.5  0.2 - 1.0 mg/dL Final    AST (SGOT) 11/08/2022 15  15 - 37 U/L Final    ALT (SGPT) 11/08/2022 27  12 - 78 U/L Final    Alk. phosphatase 11/08/2022 109  45 - 117 U/L Final    Protein, total 11/08/2022 7.1  6.4 - 8.2 g/dL Final    Albumin 11/08/2022 2.8 (A)  3.5 - 5.0 g/dL Final    Globulin 11/08/2022 4.3 (A)  2.0 - 4.0 g/dL Final    A-G Ratio 11/08/2022 0.7 (A)  1.1 - 2.2   Final    NT pro-BNP 11/08/2022 191  <450 pg/mL Final    Troponin-High Sensitivity 11/08/2022 16  0 - 76 ng/L Final        Assessment:     Problem List Items Addressed This Visit          Circulatory    Chronic venous hypertension - Primary           Plan:     Patient was strongly advised to wear compression stocking daily. I prescribed him compression stocking with a pressure gradient of 15 to 20 mmHg. Today's vascular examination also shows arrhythmia. Patient will be sent to cardiology for this evaluation. I wrapped both legs today and patient was advised to wrap both legs until he gets new compression stocking. Patient be reassessed in 3 months follow-up.         Rylee Jeffries MD

## 2023-04-13 ENCOUNTER — HOSPITAL ENCOUNTER (OUTPATIENT)
Dept: NON INVASIVE DIAGNOSTICS | Age: 78
Discharge: HOME | End: 2023-04-13
Attending: INTERNAL MEDICINE
Payer: MEDICARE

## 2023-04-13 DIAGNOSIS — R00.9 UNSPECIFIED ABNORMALITIES OF HEART BEAT: ICD-10-CM

## 2023-04-13 PROCEDURE — 93225 XTRNL ECG REC<48 HRS REC: CPT

## 2023-04-14 ENCOUNTER — APPOINTMENT (OUTPATIENT)
Dept: CT IMAGING | Age: 78
End: 2023-04-14
Attending: EMERGENCY MEDICINE
Payer: MEDICARE

## 2023-04-14 ENCOUNTER — APPOINTMENT (OUTPATIENT)
Dept: GENERAL RADIOLOGY | Age: 78
End: 2023-04-14
Attending: EMERGENCY MEDICINE
Payer: MEDICARE

## 2023-04-14 ENCOUNTER — HOSPITAL ENCOUNTER (EMERGENCY)
Age: 78
Discharge: HOME | End: 2023-04-14
Attending: EMERGENCY MEDICINE
Payer: MEDICARE

## 2023-04-14 VITALS
RESPIRATION RATE: 16 BRPM | TEMPERATURE: 97.5 F | HEIGHT: 72 IN | HEART RATE: 77 BPM | WEIGHT: 193 LBS | DIASTOLIC BLOOD PRESSURE: 74 MMHG | SYSTOLIC BLOOD PRESSURE: 131 MMHG | OXYGEN SATURATION: 97 % | BODY MASS INDEX: 26.14 KG/M2

## 2023-04-14 DIAGNOSIS — J90 PLEURAL EFFUSION, LEFT: Primary | ICD-10-CM
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
## 2023-04-14 DIAGNOSIS — T07.XXXA MULTIPLE CONTUSIONS: ICD-10-CM

## 2023-04-14 PROCEDURE — 99284 EMERGENCY DEPT VISIT MOD MDM: CPT

## 2023-04-14 PROCEDURE — 73030 X-RAY EXAM OF SHOULDER: CPT

## 2023-04-14 PROCEDURE — 71250 CT THORAX DX C-: CPT

## 2023-04-14 PROCEDURE — 73700 CT LOWER EXTREMITY W/O DYE: CPT

## 2023-04-14 NOTE — ED PROVIDER NOTES
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 Patient a fall in October 2022 and suffered rib fractures and left humerus fracture. He declines pain medication    Past Medical History:   Diagnosis Date    Acid reflux     Anemia     Diabetes (HCC)     GERD (gastroesophageal reflux disease)     Hypertension     Stroke [facility])        Past Surgical History:   Procedure Laterality Date    HX COLONOSCOPY           Family History:   Problem Relation Age of Onset    Heart Disease Father     Hypertension Brother     Diabetes Brother        Social History     Socioeconomic History    Marital status:      Spouse name: Not on file    Number of children: Not on file    Years of education: Not on file    Highest education level: Not on file   Occupational History    Not on file   Tobacco Use    Smoking status: Never    Smokeless tobacco: Never   Vaping Use    Vaping Use: Never used   Substance and Sexual Activity    Alcohol use: Never    Drug use: Never    Sexual activity: Not on file   Other Topics Concern    Not on file   Social History Narrative    Not on file     Social Determinants of Health     Financial Resource Strain: Not on file   Food Insecurity: Not on file   Transportation Needs: Not on file   Physical Activity: Not on file   Stress: Not on file   Social Connections: Not on file   Intimate Partner Violence: Not on file   Housing Stability: Not on file         ALLERGIES: Patient has no known allergies.     Review of Systems   Unable to perform ROS: Dementia     Vitals:    04/14/23 1456   BP: 127/68   Pulse: 83   Resp: 18   Temp: 97.5 °F (36.4 °C)   SpO2: 97%   Weight: 87.5 kg (193 lb)   Height: 6' (1.829 m)          Elderly AA male awake alert oriented to person and place only appears chronically ill obvious left hemiparesis with spasticity of movement  Physical Exam  Vitals and nursing note reviewed. Constitutional:       General: He is not in acute distress. Appearance: Normal appearance. He is not ill-appearing, toxic-appearing or diaphoretic. HENT:      Head: Normocephalic and atraumatic. Nose: Nose normal.      Mouth/Throat:      Mouth: Mucous membranes are moist.   Eyes:      Extraocular Movements: Extraocular movements intact. Conjunctiva/sclera: Conjunctivae normal.   Cardiovascular:      Rate and Rhythm: Normal rate and regular rhythm. Pulses: Normal pulses. Heart sounds: Normal heart sounds. No murmur heard. Pulmonary:      Effort: Pulmonary effort is normal. No respiratory distress. Breath sounds: Normal breath sounds. No wheezing, rhonchi or rales. Abdominal:      General: Bowel sounds are normal. There is no distension. Palpations: Abdomen is soft. There is no mass. Tenderness: There is no abdominal tenderness. There is no right CVA tenderness, left CVA tenderness, guarding or rebound. Hernia: No hernia is present. Musculoskeletal:         General: No swelling, tenderness, deformity or signs of injury. Normal range of motion. Cervical back: Normal range of motion and neck supple. No rigidity or tenderness. Right lower leg: No edema. Left lower leg: No edema. Comments: Mild tenderness to the left hip though near full range of motion for age. Also mild tenderness to the left shoulder without obvious deformity   Lymphadenopathy:      Cervical: No cervical adenopathy. Skin:     General: Skin is warm and dry. Capillary Refill: Capillary refill takes less than 2 seconds. Findings: No erythema, lesion or rash. Neurological:      General: No focal deficit present. Mental Status: He is alert. Mental status is at baseline. Cranial Nerves: No cranial nerve deficit. Sensory: No sensory deficit. Motor: Weakness present.       Comments: Oriented to person and place only; left hemiparesis with spasticity of movement   Psychiatric:         Mood and Affect: Mood normal.         Behavior: Behavior normal.         Thought Content: Thought content normal.      Comments: Mentation and behavior at baseline per family        Medical Decision Making  Amount and/or Complexity of Data Reviewed  Radiology: ordered. X-ray of the left shoulder shows extensive arthritic changes and partial healing of the midshaft humerus fracture from October there are rib fractures noted on the x-ray now with a pleural effusion. Chest is nontender to exam suspect the rib fractures are from October 2022 there now with pleural effusion on that side. Rib fractures are old and healing; left pleural effusion is causing no pulmonary compromise and most likely longstanding possibly due to rib fractures in October . this will need evaluation by pulmonology medicine and probable drainage-as this is been present some time may contain  some blood from the rib fractures may be a complicated process given pulmonary medicine to follow-up with [facility] to make an appointment next week.        Procedures

## 2023-04-14 NOTE — ED TRIAGE NOTES
Pt fell today around 1230 onto eft side and back. Pt denies hitting head or LOC. Pt reports left sided paralysis due to previous stroke and loss balance. Pt denies any pain at this time, but stated his left arm did hurt.

## 2023-04-15 NOTE — ED NOTES
Patient stable at time of discharge. Education and discharge paperwork reviewed with patient and family members, all verbalize understanding of same. Patient moved from ED via wheelchair with all belongings.

## 2023-05-24 RX ORDER — PRAVASTATIN SODIUM 40 MG
40 TABLET ORAL NIGHTLY
COMMUNITY

## 2023-05-24 RX ORDER — ASPIRIN 81 MG/1
81 TABLET ORAL DAILY
COMMUNITY

## 2023-05-24 RX ORDER — FUROSEMIDE 20 MG/1
20 TABLET ORAL DAILY PRN
COMMUNITY

## 2023-05-24 RX ORDER — CARVEDILOL 25 MG/1
25 TABLET ORAL 2 TIMES DAILY WITH MEALS
COMMUNITY

## 2023-05-24 RX ORDER — PANTOPRAZOLE SODIUM 40 MG/1
40 TABLET, DELAYED RELEASE ORAL DAILY
COMMUNITY

## 2023-05-24 RX ORDER — AMLODIPINE BESYLATE 10 MG/1
TABLET ORAL DAILY
COMMUNITY

## 2023-05-24 RX ORDER — PSEUDOEPHEDRINE HCL 30 MG
100 TABLET ORAL 2 TIMES DAILY
COMMUNITY

## 2023-05-24 RX ORDER — LISINOPRIL 10 MG/1
TABLET ORAL DAILY
COMMUNITY

## 2023-06-26 NOTE — PROGRESS NOTES
[address]  [place], [place]  Ph: [phone]    Fax: [phone]    Plan of Care/Statement of Necessity for Physical Therapy Services  2-15    Patient name: Mars Gotti  :   Provider#: [id number]  Referral source: Urbano Theodore MD      Medical/Treatment Diagnosis: Hemiplegia and hemiparesis following unspecified cerebrovascular disease affecting unspecified side [I69.959]     Prior Hospitalization: see medical history     Comorbidities: see medical history  Prior Level of Function: Independent with all ADL's; primary use of indra-walker for mobility  Medications: Verified on Patient Summary List  Start of Care: 2021      Onset Date: 2015   The [address] [place] [address] and following information is based on the information from the initial evaluation. Assessment/ key information:   Patient presents to physical therapy with limited left sided ROM, decreased balance and lower extremity weakness limiting functional activities. The patient would benefit from physical therapy to utilize modalities to increase ROM, gait stability, and strength to maximize function. Pt will receive treatment including lower extremity flexibility, ROM, strengthening, modalities for pain control, functional activities, balance, and proprioception activities. Patient's symptoms are consistent with chronic left sided strength, rom, and functional deficits post cva. DPT communicated multiple times with granddaughter, per pt and pt's wife request, and are in agreeance to initiate occupational therapy at this time to address ue, bathing, and dressing deficits, upon MD approval.  DPT educated pt and family on possibility of limited improvements with physical therapy due to chronicity of stroke, and will re-evaluate progress in 9-10 visits. Patient would benefit from skilled physical therapy to progress toward goals and maximize function.   Thank you for this Report given to Tracey ODELL   referral.        Evaluation Complexity History LOW Complexity : Zero comorbidities / personal factors that will impact the outcome / POC; Examination MEDIUM Complexity : 3 Standardized tests and measures addressing body structure, function, activity limitation and / or participation in recreation  ;Presentation LOW Complexity : Stable, uncomplicated  ;Clinical Decision Making TUG Score: 98 seconds  Overall Complexity Rating: LOW     Problem List: pain affecting function, decrease ROM, decrease strength, impaired gait/ balance, decrease ADL/ functional abilitiies, decrease activity tolerance and decrease transfer abilities   Treatment Plan may include any combination of the following: Therapeutic exercise, Therapeutic activities, Neuromuscular re-education, Physical agent/modality, Gait/balance training, Manual therapy, Patient education, Functional mobility training, Home safety training and Stair training  Patient / Family readiness to learn indicated by: asking questions, trying to perform skills and interest  Persons(s) to be included in education: patient (P)  Barriers to Learning/Limitations: None  Patient Goal (s): to get back stronger  Patient Self Reported Health Status: fair  Rehabilitation Potential: fair    Short Term Goals: To be accomplished in 5 treatments. 1. Patient will demonstrate independence and compliance with HEP in order to assist with carryover from PT services. 2.   Patient will be able to ambulate within yard, CGA, indra walker to increase functional mobility. 3.   Patient will be able to complete sit to stand with B UE on first attempt, with supervision to increase functional mobility. 4.   Patient will increase left hip flexion strength to 5/5 in order to improve functional mobility. Long Term Goals: To be accomplished in 10 treatments. 2.   Patient will be able to ambulate x5min, supervision, with indra walker to increase functional mobility.   3.   Patient will be able to stand from toilet with supervision to increase functional mobility. 4.   Patient will increase left knee flexion strength to 4/5 in order to improve functional mobility. Frequency / Duration: Patient to be seen 2 times per week for 10 treatments. Patient/ Caregiver education and instruction: self care and exercises    [x]  Plan of care has been reviewed with PTA        Certification Period: 2021 to 3/19/2021    Cheyenne Burroughs, PT, DPT 2021     ________________________________________________________________________    I certify that the above Therapy Services are being furnished while the patient is under my care. I agree with the treatment plan and certify that this therapy is necessary.     [de-identified] Signature:____________________  Date:____________Time: _________    Patient name: Jp Valerio  : 1945  Provider#: [id number]

## 2023-07-11 ENCOUNTER — APPOINTMENT (OUTPATIENT)
Facility: HOSPITAL | Age: 78
End: 2023-07-11
Payer: MEDICARE

## 2023-07-11 ENCOUNTER — HOSPITAL ENCOUNTER (INPATIENT)
Facility: HOSPITAL | Age: 78
LOS: 2 days | Discharge: HOME HEALTH | DRG: 195 | End: 2023-07-13
Attending: EMERGENCY MEDICINE | Admitting: INTERNAL MEDICINE
Payer: MEDICARE

## 2023-07-11 ENCOUNTER — HOSPITAL ENCOUNTER (EMERGENCY)
Facility: HOSPITAL | Age: 78
Discharge: ANOTHER HOSPITAL | End: 2023-07-11
Attending: EMERGENCY MEDICINE
Payer: MEDICARE

## 2023-07-11 ENCOUNTER — APPOINTMENT (OUTPATIENT)
Facility: HOSPITAL | Age: 78
DRG: 195 | End: 2023-07-11
Payer: MEDICARE

## 2023-07-11 VITALS
SYSTOLIC BLOOD PRESSURE: 125 MMHG | DIASTOLIC BLOOD PRESSURE: 73 MMHG | OXYGEN SATURATION: 96 % | HEIGHT: 71 IN | HEART RATE: 81 BPM | RESPIRATION RATE: 18 BRPM | TEMPERATURE: 98.4 F | WEIGHT: 211 LBS | BODY MASS INDEX: 29.54 KG/M2

## 2023-07-11 DIAGNOSIS — J90 PLEURAL EFFUSION: Primary | ICD-10-CM

## 2023-07-11 DIAGNOSIS — J18.9 COMMUNITY ACQUIRED PNEUMONIA OF LEFT LOWER LOBE OF LUNG: ICD-10-CM

## 2023-07-11 DIAGNOSIS — J18.9 PNEUMONIA OF LEFT LOWER LOBE DUE TO INFECTIOUS ORGANISM: ICD-10-CM

## 2023-07-11 DIAGNOSIS — R07.9 CHEST PAIN, UNSPECIFIED TYPE: ICD-10-CM

## 2023-07-11 PROBLEM — J15.9 COMMUNITY ACQUIRED BACTERIAL PNEUMONIA: Status: ACTIVE | Noted: 2023-07-11

## 2023-07-11 LAB
ALBUMIN SERPL-MCNC: 3.3 G/DL (ref 3.5–5)
ALBUMIN SERPL-MCNC: 3.4 G/DL (ref 3.5–5)
ALBUMIN/GLOB SERPL: 0.6 (ref 1.1–2.2)
ALBUMIN/GLOB SERPL: 0.7 (ref 1.1–2.2)
ALP SERPL-CCNC: 69 U/L (ref 45–117)
ALP SERPL-CCNC: 72 U/L (ref 45–117)
ALT SERPL-CCNC: 14 U/L (ref 12–78)
ALT SERPL-CCNC: 14 U/L (ref 12–78)
ANION GAP SERPL CALC-SCNC: 4 MMOL/L (ref 5–15)
ANION GAP SERPL CALC-SCNC: 6 MMOL/L (ref 5–15)
AST SERPL W P-5'-P-CCNC: 15 U/L (ref 15–37)
AST SERPL W P-5'-P-CCNC: 8 U/L (ref 15–37)
BASOPHILS # BLD: 0 K/UL (ref 0–0.1)
BASOPHILS # BLD: 0 K/UL (ref 0–0.1)
BASOPHILS NFR BLD: 0 % (ref 0–1)
BASOPHILS NFR BLD: 0 % (ref 0–1)
BILIRUB SERPL-MCNC: 0.4 MG/DL (ref 0.2–1)
BILIRUB SERPL-MCNC: 0.5 MG/DL (ref 0.2–1)
BUN SERPL-MCNC: 5 MG/DL (ref 6–20)
BUN SERPL-MCNC: 6 MG/DL (ref 6–20)
BUN/CREAT SERPL: 7 (ref 12–20)
BUN/CREAT SERPL: 7 (ref 12–20)
CA-I BLD-MCNC: 9.3 MG/DL (ref 8.5–10.1)
CA-I BLD-MCNC: 9.4 MG/DL (ref 8.5–10.1)
CHLORIDE SERPL-SCNC: 102 MMOL/L (ref 97–108)
CHLORIDE SERPL-SCNC: 102 MMOL/L (ref 97–108)
CO2 SERPL-SCNC: 31 MMOL/L (ref 21–32)
CO2 SERPL-SCNC: 33 MMOL/L (ref 21–32)
CREAT SERPL-MCNC: 0.73 MG/DL (ref 0.7–1.3)
CREAT SERPL-MCNC: 0.88 MG/DL (ref 0.7–1.3)
DIFFERENTIAL METHOD BLD: ABNORMAL
DIFFERENTIAL METHOD BLD: ABNORMAL
EKG ATRIAL RATE: 72 BPM
EKG DIAGNOSIS: NORMAL
EKG P AXIS: 15 DEGREES
EKG P-R INTERVAL: 203 MS
EKG Q-T INTERVAL: 407 MS
EKG QRS DURATION: 107 MS
EKG QTC CALCULATION (BAZETT): 446 MS
EKG R AXIS: 16 DEGREES
EKG T AXIS: 26 DEGREES
EKG VENTRICULAR RATE: 72 BPM
EOSINOPHIL # BLD: 0.1 K/UL (ref 0–0.4)
EOSINOPHIL # BLD: 0.3 K/UL (ref 0–0.4)
EOSINOPHIL NFR BLD: 0 % (ref 0–7)
EOSINOPHIL NFR BLD: 3 % (ref 0–7)
ERYTHROCYTE [DISTWIDTH] IN BLOOD BY AUTOMATED COUNT: 12.5 % (ref 11.5–14.5)
ERYTHROCYTE [DISTWIDTH] IN BLOOD BY AUTOMATED COUNT: 12.6 % (ref 11.5–14.5)
FLUAV RNA SPEC QL NAA+PROBE: NOT DETECTED
FLUBV RNA SPEC QL NAA+PROBE: NOT DETECTED
GLOBULIN SER CALC-MCNC: 4.9 G/DL (ref 2–4)
GLOBULIN SER CALC-MCNC: 5.1 G/DL (ref 2–4)
GLUCOSE BLD STRIP.AUTO-MCNC: 139 MG/DL (ref 65–100)
GLUCOSE SERPL-MCNC: 125 MG/DL (ref 65–100)
GLUCOSE SERPL-MCNC: 128 MG/DL (ref 65–100)
HCT VFR BLD AUTO: 38 % (ref 36.6–50.3)
HCT VFR BLD AUTO: 38.8 % (ref 36.6–50.3)
HGB BLD-MCNC: 12.6 G/DL (ref 12.1–17)
HGB BLD-MCNC: 12.9 G/DL (ref 12.1–17)
IMM GRANULOCYTES # BLD AUTO: 0 K/UL (ref 0–0.04)
IMM GRANULOCYTES # BLD AUTO: 0 K/UL (ref 0–0.04)
IMM GRANULOCYTES NFR BLD AUTO: 0 % (ref 0–0.5)
IMM GRANULOCYTES NFR BLD AUTO: 0 % (ref 0–0.5)
INR PPP: 1.2 (ref 0.9–1.1)
LYMPHOCYTES # BLD: 1.6 K/UL (ref 0.8–3.5)
LYMPHOCYTES # BLD: 1.9 K/UL (ref 0.8–3.5)
LYMPHOCYTES NFR BLD: 14 % (ref 12–49)
LYMPHOCYTES NFR BLD: 22 % (ref 12–49)
MCH RBC QN AUTO: 32.1 PG (ref 26–34)
MCH RBC QN AUTO: 32.1 PG (ref 26–34)
MCHC RBC AUTO-ENTMCNC: 33.2 G/DL (ref 30–36.5)
MCHC RBC AUTO-ENTMCNC: 33.2 G/DL (ref 30–36.5)
MCV RBC AUTO: 96.5 FL (ref 80–99)
MCV RBC AUTO: 96.9 FL (ref 80–99)
MONOCYTES # BLD: 1.1 K/UL (ref 0–1)
MONOCYTES # BLD: 1.3 K/UL (ref 0–1)
MONOCYTES NFR BLD: 11 % (ref 5–13)
MONOCYTES NFR BLD: 12 % (ref 5–13)
NEUTS SEG # BLD: 5.4 K/UL (ref 1.8–8)
NEUTS SEG # BLD: 8.5 K/UL (ref 1.8–8)
NEUTS SEG NFR BLD: 63 % (ref 32–75)
NEUTS SEG NFR BLD: 75 % (ref 32–75)
NRBC # BLD: 0 K/UL (ref 0–0.01)
NRBC # BLD: 0 K/UL (ref 0–0.01)
NRBC BLD-RTO: 0 PER 100 WBC
NRBC BLD-RTO: 0 PER 100 WBC
PERFORMED BY:: ABNORMAL
PLATELET # BLD AUTO: 267 K/UL (ref 150–400)
PLATELET # BLD AUTO: 270 K/UL (ref 150–400)
PMV BLD AUTO: 10.6 FL (ref 8.9–12.9)
PMV BLD AUTO: 11 FL (ref 8.9–12.9)
POTASSIUM SERPL-SCNC: 3.4 MMOL/L (ref 3.5–5.1)
POTASSIUM SERPL-SCNC: 3.5 MMOL/L (ref 3.5–5.1)
PROT SERPL-MCNC: 8.3 G/DL (ref 6.4–8.2)
PROT SERPL-MCNC: 8.4 G/DL (ref 6.4–8.2)
PROTHROMBIN TIME: 15 SEC (ref 11.9–14.6)
RBC # BLD AUTO: 3.92 M/UL (ref 4.1–5.7)
RBC # BLD AUTO: 4.02 M/UL (ref 4.1–5.7)
SARS-COV-2 RNA RESP QL NAA+PROBE: NOT DETECTED
SODIUM SERPL-SCNC: 139 MMOL/L (ref 136–145)
SODIUM SERPL-SCNC: 139 MMOL/L (ref 136–145)
TROPONIN I SERPL HS-MCNC: 12 NG/L (ref 0–76)
TROPONIN I SERPL HS-MCNC: 13 NG/L (ref 0–76)
TROPONIN I SERPL HS-MCNC: 14 NG/L (ref 0–76)
WBC # BLD AUTO: 11.5 K/UL (ref 4.1–11.1)
WBC # BLD AUTO: 8.7 K/UL (ref 4.1–11.1)

## 2023-07-11 PROCEDURE — 99285 EMERGENCY DEPT VISIT HI MDM: CPT

## 2023-07-11 PROCEDURE — 84484 ASSAY OF TROPONIN QUANT: CPT

## 2023-07-11 PROCEDURE — 96365 THER/PROPH/DIAG IV INF INIT: CPT

## 2023-07-11 PROCEDURE — 80053 COMPREHEN METABOLIC PANEL: CPT

## 2023-07-11 PROCEDURE — 85025 COMPLETE CBC W/AUTO DIFF WBC: CPT

## 2023-07-11 PROCEDURE — 2580000003 HC RX 258: Performed by: EMERGENCY MEDICINE

## 2023-07-11 PROCEDURE — 36415 COLL VENOUS BLD VENIPUNCTURE: CPT

## 2023-07-11 PROCEDURE — 6370000000 HC RX 637 (ALT 250 FOR IP): Performed by: INTERNAL MEDICINE

## 2023-07-11 PROCEDURE — 6360000002 HC RX W HCPCS: Performed by: EMERGENCY MEDICINE

## 2023-07-11 PROCEDURE — 87040 BLOOD CULTURE FOR BACTERIA: CPT

## 2023-07-11 PROCEDURE — 93005 ELECTROCARDIOGRAM TRACING: CPT | Performed by: EMERGENCY MEDICINE

## 2023-07-11 PROCEDURE — 82962 GLUCOSE BLOOD TEST: CPT

## 2023-07-11 PROCEDURE — 71045 X-RAY EXAM CHEST 1 VIEW: CPT

## 2023-07-11 PROCEDURE — 87636 SARSCOV2 & INF A&B AMP PRB: CPT

## 2023-07-11 PROCEDURE — 2580000003 HC RX 258: Performed by: INTERNAL MEDICINE

## 2023-07-11 PROCEDURE — 6360000002 HC RX W HCPCS: Performed by: INTERNAL MEDICINE

## 2023-07-11 PROCEDURE — 71250 CT THORAX DX C-: CPT

## 2023-07-11 PROCEDURE — 96367 TX/PROPH/DG ADDL SEQ IV INF: CPT

## 2023-07-11 PROCEDURE — 85610 PROTHROMBIN TIME: CPT

## 2023-07-11 PROCEDURE — 83036 HEMOGLOBIN GLYCOSYLATED A1C: CPT

## 2023-07-11 PROCEDURE — 2060000000 HC ICU INTERMEDIATE R&B

## 2023-07-11 PROCEDURE — 6370000000 HC RX 637 (ALT 250 FOR IP): Performed by: EMERGENCY MEDICINE

## 2023-07-11 RX ORDER — LIDOCAINE HYDROCHLORIDE 20 MG/ML
15 SOLUTION OROPHARYNGEAL
Status: COMPLETED | OUTPATIENT
Start: 2023-07-11 | End: 2023-07-11

## 2023-07-11 RX ORDER — ENOXAPARIN SODIUM 100 MG/ML
40 INJECTION SUBCUTANEOUS DAILY
Status: DISCONTINUED | OUTPATIENT
Start: 2023-07-11 | End: 2023-07-13 | Stop reason: HOSPADM

## 2023-07-11 RX ORDER — SODIUM CHLORIDE 0.9 % (FLUSH) 0.9 %
5-40 SYRINGE (ML) INJECTION EVERY 12 HOURS SCHEDULED
Status: DISCONTINUED | OUTPATIENT
Start: 2023-07-11 | End: 2023-07-13 | Stop reason: HOSPADM

## 2023-07-11 RX ORDER — LISINOPRIL 10 MG/1
10 TABLET ORAL DAILY
Status: DISCONTINUED | OUTPATIENT
Start: 2023-07-11 | End: 2023-07-13 | Stop reason: HOSPADM

## 2023-07-11 RX ORDER — DEXTROSE MONOHYDRATE 100 MG/ML
INJECTION, SOLUTION INTRAVENOUS CONTINUOUS PRN
Status: DISCONTINUED | OUTPATIENT
Start: 2023-07-11 | End: 2023-07-13 | Stop reason: HOSPADM

## 2023-07-11 RX ORDER — ACETAMINOPHEN 325 MG/1
650 TABLET ORAL EVERY 6 HOURS PRN
Status: DISCONTINUED | OUTPATIENT
Start: 2023-07-11 | End: 2023-07-13 | Stop reason: HOSPADM

## 2023-07-11 RX ORDER — GUAIFENESIN/DEXTROMETHORPHAN 100-10MG/5
5 SYRUP ORAL EVERY 4 HOURS PRN
Status: DISCONTINUED | OUTPATIENT
Start: 2023-07-11 | End: 2023-07-13 | Stop reason: HOSPADM

## 2023-07-11 RX ORDER — CARVEDILOL 12.5 MG/1
25 TABLET ORAL 2 TIMES DAILY WITH MEALS
Status: DISCONTINUED | OUTPATIENT
Start: 2023-07-11 | End: 2023-07-13 | Stop reason: HOSPADM

## 2023-07-11 RX ORDER — INSULIN LISPRO 100 [IU]/ML
0-8 INJECTION, SOLUTION INTRAVENOUS; SUBCUTANEOUS
Status: DISCONTINUED | OUTPATIENT
Start: 2023-07-11 | End: 2023-07-13 | Stop reason: HOSPADM

## 2023-07-11 RX ORDER — MAGNESIUM HYDROXIDE/ALUMINUM HYDROXICE/SIMETHICONE 120; 1200; 1200 MG/30ML; MG/30ML; MG/30ML
30 SUSPENSION ORAL
Status: COMPLETED | OUTPATIENT
Start: 2023-07-11 | End: 2023-07-11

## 2023-07-11 RX ORDER — SODIUM CHLORIDE 0.9 % (FLUSH) 0.9 %
5-40 SYRINGE (ML) INJECTION PRN
Status: DISCONTINUED | OUTPATIENT
Start: 2023-07-11 | End: 2023-07-13 | Stop reason: HOSPADM

## 2023-07-11 RX ORDER — ATORVASTATIN CALCIUM 10 MG/1
10 TABLET, FILM COATED ORAL DAILY
Status: DISCONTINUED | OUTPATIENT
Start: 2023-07-11 | End: 2023-07-13 | Stop reason: HOSPADM

## 2023-07-11 RX ORDER — CARVEDILOL 25 MG/1
25 TABLET ORAL 2 TIMES DAILY WITH MEALS
COMMUNITY

## 2023-07-11 RX ORDER — ONDANSETRON 4 MG/1
4 TABLET, ORALLY DISINTEGRATING ORAL EVERY 8 HOURS PRN
Status: DISCONTINUED | OUTPATIENT
Start: 2023-07-11 | End: 2023-07-13 | Stop reason: HOSPADM

## 2023-07-11 RX ORDER — DOCUSATE SODIUM 100 MG/1
100 CAPSULE, LIQUID FILLED ORAL 2 TIMES DAILY
COMMUNITY

## 2023-07-11 RX ORDER — AZITHROMYCIN 500 MG/1
500 TABLET, FILM COATED ORAL EVERY 24 HOURS
Status: DISCONTINUED | OUTPATIENT
Start: 2023-07-12 | End: 2023-07-13 | Stop reason: HOSPADM

## 2023-07-11 RX ORDER — LISINOPRIL 10 MG/1
10 TABLET ORAL DAILY
COMMUNITY

## 2023-07-11 RX ORDER — GUAIFENESIN 600 MG/1
600 TABLET, EXTENDED RELEASE ORAL 2 TIMES DAILY
Status: DISCONTINUED | OUTPATIENT
Start: 2023-07-11 | End: 2023-07-13 | Stop reason: HOSPADM

## 2023-07-11 RX ORDER — SODIUM CHLORIDE 9 MG/ML
INJECTION, SOLUTION INTRAVENOUS PRN
Status: DISCONTINUED | OUTPATIENT
Start: 2023-07-11 | End: 2023-07-13 | Stop reason: HOSPADM

## 2023-07-11 RX ORDER — ASPIRIN 81 MG/1
81 TABLET, CHEWABLE ORAL DAILY
Status: DISCONTINUED | OUTPATIENT
Start: 2023-07-11 | End: 2023-07-13 | Stop reason: HOSPADM

## 2023-07-11 RX ORDER — ASPIRIN 81 MG/1
81 TABLET, CHEWABLE ORAL DAILY
COMMUNITY

## 2023-07-11 RX ORDER — FUROSEMIDE 20 MG/1
20 TABLET ORAL 2 TIMES DAILY
COMMUNITY

## 2023-07-11 RX ORDER — PRAVASTATIN SODIUM 40 MG
40 TABLET ORAL DAILY
COMMUNITY

## 2023-07-11 RX ORDER — ONDANSETRON 2 MG/ML
4 INJECTION INTRAMUSCULAR; INTRAVENOUS EVERY 6 HOURS PRN
Status: DISCONTINUED | OUTPATIENT
Start: 2023-07-11 | End: 2023-07-13 | Stop reason: HOSPADM

## 2023-07-11 RX ORDER — POLYETHYLENE GLYCOL 3350 17 G/17G
17 POWDER, FOR SOLUTION ORAL DAILY PRN
Status: DISCONTINUED | OUTPATIENT
Start: 2023-07-11 | End: 2023-07-13 | Stop reason: HOSPADM

## 2023-07-11 RX ORDER — ACETAMINOPHEN 650 MG/1
650 SUPPOSITORY RECTAL EVERY 6 HOURS PRN
Status: DISCONTINUED | OUTPATIENT
Start: 2023-07-11 | End: 2023-07-13 | Stop reason: HOSPADM

## 2023-07-11 RX ORDER — INSULIN LISPRO 100 [IU]/ML
0-4 INJECTION, SOLUTION INTRAVENOUS; SUBCUTANEOUS NIGHTLY
Status: DISCONTINUED | OUTPATIENT
Start: 2023-07-11 | End: 2023-07-13 | Stop reason: HOSPADM

## 2023-07-11 RX ORDER — DOCUSATE SODIUM 100 MG/1
100 CAPSULE, LIQUID FILLED ORAL 2 TIMES DAILY
Status: DISCONTINUED | OUTPATIENT
Start: 2023-07-11 | End: 2023-07-13 | Stop reason: HOSPADM

## 2023-07-11 RX ORDER — FAMOTIDINE 20 MG/1
20 TABLET, FILM COATED ORAL ONCE
Status: COMPLETED | OUTPATIENT
Start: 2023-07-11 | End: 2023-07-11

## 2023-07-11 RX ADMIN — ALUMINUM HYDROXIDE, MAGNESIUM HYDROXIDE, AND SIMETHICONE 30 ML: 200; 200; 20 SUSPENSION ORAL at 08:43

## 2023-07-11 RX ADMIN — CARVEDILOL 25 MG: 12.5 TABLET, FILM COATED ORAL at 18:23

## 2023-07-11 RX ADMIN — GUAIFENESIN 600 MG: 600 TABLET, EXTENDED RELEASE ORAL at 20:25

## 2023-07-11 RX ADMIN — ENOXAPARIN SODIUM 40 MG: 100 INJECTION SUBCUTANEOUS at 18:23

## 2023-07-11 RX ADMIN — DOCUSATE SODIUM 100 MG: 100 CAPSULE, LIQUID FILLED ORAL at 20:25

## 2023-07-11 RX ADMIN — FAMOTIDINE 20 MG: 20 TABLET, FILM COATED ORAL at 08:43

## 2023-07-11 RX ADMIN — Medication 15 ML: at 08:43

## 2023-07-11 RX ADMIN — AZITHROMYCIN MONOHYDRATE 500 MG: 500 INJECTION, POWDER, LYOPHILIZED, FOR SOLUTION INTRAVENOUS at 11:13

## 2023-07-11 RX ADMIN — ATORVASTATIN CALCIUM 10 MG: 10 TABLET, FILM COATED ORAL at 18:23

## 2023-07-11 RX ADMIN — SODIUM CHLORIDE, PRESERVATIVE FREE 10 ML: 5 INJECTION INTRAVENOUS at 21:55

## 2023-07-11 RX ADMIN — CEFTRIAXONE SODIUM 1000 MG: 1 INJECTION, POWDER, FOR SOLUTION INTRAMUSCULAR; INTRAVENOUS at 10:40

## 2023-07-11 ASSESSMENT — PAIN - FUNCTIONAL ASSESSMENT
PAIN_FUNCTIONAL_ASSESSMENT: 0-10
PAIN_FUNCTIONAL_ASSESSMENT: NONE - DENIES PAIN

## 2023-07-11 ASSESSMENT — PAIN DESCRIPTION - LOCATION: LOCATION: CHEST

## 2023-07-11 ASSESSMENT — LIFESTYLE VARIABLES
HOW OFTEN DO YOU HAVE A DRINK CONTAINING ALCOHOL: NEVER
HOW MANY STANDARD DRINKS CONTAINING ALCOHOL DO YOU HAVE ON A TYPICAL DAY: PATIENT DOES NOT DRINK

## 2023-07-11 ASSESSMENT — PAIN SCALES - GENERAL
PAINLEVEL_OUTOF10: 0
PAINLEVEL_OUTOF10: 10

## 2023-07-11 NOTE — H&P
HISTORY & PHYSICAL  Joel Velazco MD, [facility], [address] [place] [address]         NAME: Iain Coleman   :     MRN:  [id number]     Date/Time:  2023 4:19 PM    Patient PCP: Vanesa Montez MD       Subjective:   CHIEF COMPLAINT: Shortness of breath cough    HISTORY OF PRESENT ILLNESS:     Prasanth Dobbs is a 76 y.o. male with PMH of hypertension, type 2 diabetes and recent diagnosis of left-sided pleural effusion which was supposed to be scheduled for thoracentesis however was lost to follow-up just 1 month prior, comes in for worsened shortness of breath and cough. Patient is alert and x3 however is very poor historian however is surrounded by family. States that he started having shortness of breath and cough over the last 2 3 days which has worsened acutely. Wife/family state that he was supposed to schedule for thoracentesis for left effusion drainage which was not followed up on per family. Patient and wife deny any fevers, chest pain, abdominal pain. However patient does endorse chills and diaphoresis. No significant weight loss noted. Patient and wife deny any hemoptysis. Patient does endorse significant history of previous prostate cancer treated fully and currently in remission. Patient with diagnosed with COVID infection was 1 year prior. COVID-19 and influenza screening in the ED was negative. In ED, patient's chest x-ray as well as CT chest without contrast, reviewed by myself, revealed moderate left-sided pleural effusion with complete lateral/lower lobe as well as a liter consolidation. Laboratory data reviewed by myself noted to have mild leukocytosis. Case reviewed extensively with ED physician and agree that patient requires inpatient admission/observation for further treatment and management.      Past Medical History:   Diagnosis Date    Acid reflux     Diabetes mellitus ([address]) lingular pneumonia  Treat with IV Rocephin and azithromycin. Obtain IR consultation for therapeutic and diagnostic thoracentesis. Obtain pulmonology consultation. Patient without any weight loss, hemoptysis noted. Patient however does have previous history of prostate cancer. Type 2 diabetes  Hold metformin for now. Start on corrective dose insulin with Accu-Cheks. Hypertension  Continue Coreg and lisinopril. DVT prophylaxis  Lovenox SC. Full CODE STATUS      Discussion/MDM: Patient with multiple medical comorbidities, each with high likelihood for morbidity and mortality if left untreated. I have reviewed patient's presenting subjective and objective findings, as well as all laboratory studies, imaging studies, and vital signs to date as well as treatment rendered and patient's response to those treatments. In addition, prior medical, surgical and relevant social and family histories were reviewed. I have discussed management plan with patient/family and with nursing staff. Toxic drug monitoring:   Corrective dose insulin with Accu-Cheks periodically.   _______________________________________________________________________  Care Plan discussed with:    Comments   Patient x    Family  x    RN     Care Manager                    Consultant:      _______________________________________________________________________  Expected  Disposition:   Home with Family x   HH/PT/OT/RN    SNF/LTC    [place]    ________________________________________________________________________  TOTAL TIME:  48 Minutes    Critical Care Provided     Minutes non procedure based      Comments    x Reviewed previous records   >50% of visit spent in counseling and coordination of care x Discussion with patient and/or family and questions answered       ________________________________________________________________________  Signed: Harsha Sifuentes MD    Procedures: see electronic medical records for all procedures/Xrays and

## 2023-07-11 NOTE — ED NOTES
Repeat Trop drawn. New 20g inserted left hand x1 attempt. Unable to draw blood from site. Lab called for blood cultures.      Maribell Koch RN  07/11/23 9858

## 2023-07-11 NOTE — CARE COORDINATION
07/11/23 1800   Service Assessment   Patient Orientation Alert and Oriented   Cognition Alert   History Provided By Patient; Child/Family  (Wife Abdias Joaquin & daughter Chaitanya Pierre.)   Primary Caregiver Family   Accompanied By/Relationship Wife Abdias Joaquin & daughter Chaitanya Pierre. Support Systems Spouse/Significant Other;Children   Patient's Healthcare Decision Maker is: Legal Next of Kin   PCP Verified by CM Yes  Rhoda Phelpsfermin - seen 6/29/23.)   Last Visit to PCP Within last 3 months   Prior Functional Level Assistance with the following:;Bathing;Dressing; Toileting;Cooking;Housework; Shopping;Mobility  (Uses walker/w/c.)   Current Functional Level Assistance with the following:;Bathing;Dressing; Toileting;Cooking;Housework; Shopping;Mobility   Can patient return to prior living arrangement Yes   Ability to make needs known: Fair   Family able to assist with home care needs: Yes   Would you like for me to discuss the discharge plan with any other family members/significant others, and if so, who? Yes  (Wife Chloe Ash & daughters.)   Financial Resources Medicaid; Medicare   Community Resources None   Social/Functional History   Lives With Spouse   Type of Home Trailer   Home Layout One level   Home Access Ramped entrance   [address] Shower/Tub Tub/Shower unit   Bathroom Toilet Standard   Bathroom Accessibility Accessible   Home Equipment Walker, standard; [address] Help From Family   ADL Assistance Needs assistance   Toileting Needs assistance   [facility] Needs assistance   Homemaking Responsibilities Yes   Ambulation Assistance Needs assistance  (Walker/w/c.)   Transfer Assistance Needs assistance   Active  No   Occupation Retired   Discharge Planning   Type of Residence Trailer/Mobile [address] [facility] [address] Prior To Admission [facility]  (PCAS X 5 days x 5 hours via [facility].)   Potential Assistance Needed N/A;Home Care   DME

## 2023-07-11 NOTE — ED TRIAGE NOTES
Pt arrives to ED by Irma Roche from [facility] ED. Pt sent over for pleural effusion, pneumonia. Pt sent her for thoracentesis.

## 2023-07-11 NOTE — ED NOTES
Transfer Center called for Pulmonology consult at [facility].       Rani Tinsley RN  07/11/23 112

## 2023-07-11 NOTE — ED PROVIDER NOTES
[facility] EMERGENCY DEPT  EMERGENCY DEPARTMENT HISTORY AND PHYSICAL EXAM      Date: 7/11/2023  Patient Name: Shin Mcghee  MRN: [id number]  [address] [facility] [birth year]  Date of evaluation: 7/11/2023  Provider: Berny Zapata MD   Note Started: 8:35 AM EDT 7/11/23    HISTORY OF PRESENT ILLNESS     Chief Complaint   Patient presents with    Chest Pain       History Provided By: Patient    HPI: Shin Mcghee is a 76 y.o. male presents with pain in chest from throat down into lower abdomen that began around 0500 this morning. Pain described as a burning pain wife gave Tylenol with little relief. Has pmh of DM, htn, acid reflux. States pain had improved but now coming back. Was give asa pta. States notes episode of diaphoresis with pain as well as nausea. PAST MEDICAL HISTORY   Past Medical History:  Past Medical History:   Diagnosis Date    Acid reflux     Diabetes mellitus ([address])     Hypertension        Past Surgical History:  History reviewed. No pertinent surgical history. Family History:  History reviewed. No pertinent family history. Social History: Allergies:  No Known Allergies    PCP: Brayan Hackett MD    Current Meds:   No current facility-administered medications for this encounter.      Current Outpatient Medications   Medication Sig Dispense Refill    aspirin 81 MG chewable tablet Take 1 tablet by mouth daily      carvedilol (COREG) 25 MG tablet Take 1 tablet by mouth 2 times daily (with meals)      furosemide (LASIX) 20 MG tablet Take 1 tablet by mouth 2 times daily      lisinopril (PRINIVIL;ZESTRIL) 10 MG tablet Take 1 tablet by mouth daily      metFORMIN (GLUCOPHAGE) 500 MG tablet Take 1 tablet by mouth 2 times daily (with meals)      pravastatin (PRAVACHOL) 40 MG tablet Take 1 tablet by mouth daily      docusate sodium (COLACE) 100 MG capsule Take 1 capsule by mouth 2 times daily       Facility-Administered Medications Ordered in Other Encounters   Medication Dose Route Frequency Provider

## 2023-07-11 NOTE — ED TRIAGE NOTES
Pt reports chest and epigastric pain that began this morning. Per EMS, spouse gave pt tylenol for pain on onset and was advised by dispatch to give 324 ASA. Pt Reports 10/10 pain. Pt states he was recently taken off antiacids by PCP. Pt denies SOB.

## 2023-07-11 NOTE — ED NOTES
Verbal shift change report given to Kin Reagan (oncoming nurse) by Carli Johns (offgoing nurse). Report included the following information Nurse Handoff Report and ED SBAR.         Adriana Beal RN  07/11/23 8357

## 2023-07-12 ENCOUNTER — APPOINTMENT (OUTPATIENT)
Facility: HOSPITAL | Age: 78
DRG: 195 | End: 2023-07-12
Attending: INTERNAL MEDICINE
Payer: MEDICARE

## 2023-07-12 ENCOUNTER — APPOINTMENT (OUTPATIENT)
Facility: HOSPITAL | Age: 78
DRG: 195 | End: 2023-07-12
Payer: MEDICARE

## 2023-07-12 LAB
ANION GAP SERPL CALC-SCNC: 4 MMOL/L (ref 5–15)
BASOPHILS # BLD: 0 K/UL (ref 0–0.1)
BASOPHILS NFR BLD: 0 % (ref 0–1)
BUN SERPL-MCNC: 8 MG/DL (ref 6–20)
BUN/CREAT SERPL: 12 (ref 12–20)
CA-I BLD-MCNC: 8.8 MG/DL (ref 8.5–10.1)
CHLORIDE SERPL-SCNC: 105 MMOL/L (ref 97–108)
CO2 SERPL-SCNC: 28 MMOL/L (ref 21–32)
CREAT SERPL-MCNC: 0.65 MG/DL (ref 0.7–1.3)
DIFFERENTIAL METHOD BLD: ABNORMAL
EOSINOPHIL # BLD: 0.1 K/UL (ref 0–0.4)
EOSINOPHIL NFR BLD: 1 % (ref 0–7)
ERYTHROCYTE [DISTWIDTH] IN BLOOD BY AUTOMATED COUNT: 12.6 % (ref 11.5–14.5)
EST. AVERAGE GLUCOSE BLD GHB EST-MCNC: 97 MG/DL
GLUCOSE BLD STRIP.AUTO-MCNC: 114 MG/DL (ref 65–100)
GLUCOSE BLD STRIP.AUTO-MCNC: 127 MG/DL (ref 65–100)
GLUCOSE BLD STRIP.AUTO-MCNC: 134 MG/DL (ref 65–100)
GLUCOSE BLD STRIP.AUTO-MCNC: 141 MG/DL (ref 65–100)
GLUCOSE SERPL-MCNC: 120 MG/DL (ref 65–100)
HBA1C MFR BLD: 5 % (ref 4–5.6)
HCT VFR BLD AUTO: 37.3 % (ref 36.6–50.3)
HGB BLD-MCNC: 12.5 G/DL (ref 12.1–17)
IMM GRANULOCYTES # BLD AUTO: 0 K/UL (ref 0–0.04)
IMM GRANULOCYTES NFR BLD AUTO: 0 % (ref 0–0.5)
LYMPHOCYTES # BLD: 2.4 K/UL (ref 0.8–3.5)
LYMPHOCYTES NFR BLD: 23 % (ref 12–49)
MCH RBC QN AUTO: 32.4 PG (ref 26–34)
MCHC RBC AUTO-ENTMCNC: 33.5 G/DL (ref 30–36.5)
MCV RBC AUTO: 96.6 FL (ref 80–99)
MONOCYTES # BLD: 1.9 K/UL (ref 0–1)
MONOCYTES NFR BLD: 18 % (ref 5–13)
NEUTS SEG # BLD: 6.1 K/UL (ref 1.8–8)
NEUTS SEG NFR BLD: 58 % (ref 32–75)
NRBC # BLD: 0 K/UL (ref 0–0.01)
NRBC BLD-RTO: 0 PER 100 WBC
PERFORMED BY:: ABNORMAL
PLATELET # BLD AUTO: 240 K/UL (ref 150–400)
PMV BLD AUTO: 11.3 FL (ref 8.9–12.9)
POTASSIUM SERPL-SCNC: 3.2 MMOL/L (ref 3.5–5.1)
PROT FLD-MCNC: 4.8 G/DL
RBC # BLD AUTO: 3.86 M/UL (ref 4.1–5.7)
SODIUM SERPL-SCNC: 137 MMOL/L (ref 136–145)
SPECIMEN SOURCE FLD: NORMAL
WBC # BLD AUTO: 10.5 K/UL (ref 4.1–11.1)

## 2023-07-12 PROCEDURE — 80048 BASIC METABOLIC PNL TOTAL CA: CPT

## 2023-07-12 PROCEDURE — 88112 CYTOPATH CELL ENHANCE TECH: CPT

## 2023-07-12 PROCEDURE — 85025 COMPLETE CBC W/AUTO DIFF WBC: CPT

## 2023-07-12 PROCEDURE — 2580000003 HC RX 258: Performed by: INTERNAL MEDICINE

## 2023-07-12 PROCEDURE — 6370000000 HC RX 637 (ALT 250 FOR IP): Performed by: HOSPITALIST

## 2023-07-12 PROCEDURE — 0W9B3ZZ DRAINAGE OF LEFT PLEURAL CAVITY, PERCUTANEOUS APPROACH: ICD-10-PCS | Performed by: STUDENT IN AN ORGANIZED HEALTH CARE EDUCATION/TRAINING PROGRAM

## 2023-07-12 PROCEDURE — 87070 CULTURE OTHR SPECIMN AEROBIC: CPT

## 2023-07-12 PROCEDURE — 6370000000 HC RX 637 (ALT 250 FOR IP): Performed by: INTERNAL MEDICINE

## 2023-07-12 PROCEDURE — 87205 SMEAR GRAM STAIN: CPT

## 2023-07-12 PROCEDURE — 71045 X-RAY EXAM CHEST 1 VIEW: CPT

## 2023-07-12 PROCEDURE — 84157 ASSAY OF PROTEIN OTHER: CPT

## 2023-07-12 PROCEDURE — 82962 GLUCOSE BLOOD TEST: CPT

## 2023-07-12 PROCEDURE — 6360000002 HC RX W HCPCS: Performed by: INTERNAL MEDICINE

## 2023-07-12 PROCEDURE — 2060000000 HC ICU INTERMEDIATE R&B

## 2023-07-12 PROCEDURE — C1729 CATH, DRAINAGE: HCPCS

## 2023-07-12 RX ORDER — POTASSIUM CHLORIDE 20 MEQ/1
40 TABLET, EXTENDED RELEASE ORAL ONCE
Status: COMPLETED | OUTPATIENT
Start: 2023-07-12 | End: 2023-07-12

## 2023-07-12 RX ADMIN — GUAIFENESIN 600 MG: 600 TABLET, EXTENDED RELEASE ORAL at 08:31

## 2023-07-12 RX ADMIN — GUAIFENESIN 600 MG: 600 TABLET, EXTENDED RELEASE ORAL at 23:20

## 2023-07-12 RX ADMIN — CEFTRIAXONE SODIUM 1000 MG: 1 INJECTION, POWDER, FOR SOLUTION INTRAMUSCULAR; INTRAVENOUS at 06:52

## 2023-07-12 RX ADMIN — DOCUSATE SODIUM 100 MG: 100 CAPSULE, LIQUID FILLED ORAL at 23:20

## 2023-07-12 RX ADMIN — SODIUM CHLORIDE, PRESERVATIVE FREE 10 ML: 5 INJECTION INTRAVENOUS at 21:00

## 2023-07-12 RX ADMIN — SODIUM CHLORIDE, PRESERVATIVE FREE 10 ML: 5 INJECTION INTRAVENOUS at 08:31

## 2023-07-12 RX ADMIN — CARVEDILOL 25 MG: 12.5 TABLET, FILM COATED ORAL at 08:31

## 2023-07-12 RX ADMIN — ACETAMINOPHEN 650 MG: 325 TABLET ORAL at 11:40

## 2023-07-12 RX ADMIN — AZITHROMYCIN MONOHYDRATE 500 MG: 500 TABLET ORAL at 06:52

## 2023-07-12 RX ADMIN — LISINOPRIL 10 MG: 10 TABLET ORAL at 08:31

## 2023-07-12 RX ADMIN — ASPIRIN 81 MG: 81 TABLET, CHEWABLE ORAL at 08:31

## 2023-07-12 RX ADMIN — DOCUSATE SODIUM 100 MG: 100 CAPSULE, LIQUID FILLED ORAL at 08:31

## 2023-07-12 RX ADMIN — CARVEDILOL 25 MG: 12.5 TABLET, FILM COATED ORAL at 16:51

## 2023-07-12 RX ADMIN — ATORVASTATIN CALCIUM 10 MG: 10 TABLET, FILM COATED ORAL at 08:31

## 2023-07-12 RX ADMIN — POTASSIUM CHLORIDE 40 MEQ: 1500 TABLET, EXTENDED RELEASE ORAL at 06:02

## 2023-07-12 RX ADMIN — ACETAMINOPHEN 650 MG: 325 TABLET ORAL at 23:20

## 2023-07-12 RX ADMIN — ENOXAPARIN SODIUM 40 MG: 100 INJECTION SUBCUTANEOUS at 16:50

## 2023-07-12 ASSESSMENT — ENCOUNTER SYMPTOMS
COUGH: 1
GASTROINTESTINAL NEGATIVE: 1
SHORTNESS OF BREATH: 1

## 2023-07-12 ASSESSMENT — PAIN DESCRIPTION - LOCATION: LOCATION: GENERALIZED

## 2023-07-12 ASSESSMENT — PAIN SCALES - GENERAL
PAINLEVEL_OUTOF10: 2
PAINLEVEL_OUTOF10: 0

## 2023-07-12 ASSESSMENT — PAIN DESCRIPTION - DESCRIPTORS: DESCRIPTORS: ACHING

## 2023-07-12 NOTE — CONSULTS
PULMONARY CONSULT  [facility]    Name: Corrie Hobson MRN: [id number]   : 1945 Hospital: [facility]   Date: 2023  Admission date: 2023 Hospital Day: 2       HPI:     Patient Active Problem List   Diagnosis    Community acquired bacterial pneumonia             [x] High complexity decision making was performed  [x] See my orders for details      Subjective/Initial History:     I was asked by Basilio Lee MD to see Corrie Hobson  a 76 y.o.    male in consultation     Excerpts from admission 2023 or consult notes as follows:   76-year-old male came in because of shortness of breath cough generalized weakness, past medical history of hypertension diabetes mellitus he was scheduled for outpatient thoracentesis as previous chest x-ray shows left-sided pleural effusion but unable to get it done came to the hospital because of increasing shortness of breath history of diabetes mellitus hypertension history of COVID-19 in  he is a lifetime non-smoker so came to the hospital admitted and pulmonary consult was called regarding pleural effusion      No Known Allergies     MAR reviewed and pertinent medications noted or modified as needed     Current Facility-Administered Medications   Medication Dose Route Frequency Provider Last Rate Last Admin    sodium chloride flush 0.9 % injection 5-40 mL  5-40 mL IntraVENous 2 times per Basilio Lee MD   10 mL at 23 0831    sodium chloride flush 0.9 % injection 5-40 mL  5-40 mL IntraVENous PRN Basilio Lee MD        0.9 % sodium chloride infusion   IntraVENous PRN Basilio Lee MD        enoxaparin (LOVENOX) injection 40 mg  40 mg SubCUTAneous Daily Basilio Lee MD   40 mg at 23 1823    ondansetron (ZOFRAN-ODT) disintegrating tablet 4 mg  4 mg Oral Q8H PRN Basilio Lee MD        Or    ondansetron (ZOFRAN) injection 4 mg  4 mg IntraVENous Q6H PRN Basilio Lee MD        polyethylene glycol

## 2023-07-12 NOTE — CARE COORDINATION
CM reviewed medical record. Patient's DCP is home with personal care aides five days a week. Discharge is pending thoracentesis and pulmonary clearance.

## 2023-07-12 NOTE — OR NURSING
660mL clear danii fluid drained during left thoracentesis. Bandaid applied to left mid back. Vitals stable. Specimen collected and delivered to lab. Post procedure chest xray ordered.

## 2023-07-13 ENCOUNTER — APPOINTMENT (OUTPATIENT)
Facility: HOSPITAL | Age: 78
DRG: 195 | End: 2023-07-13
Payer: MEDICARE

## 2023-07-13 VITALS
DIASTOLIC BLOOD PRESSURE: 67 MMHG | HEIGHT: 71 IN | SYSTOLIC BLOOD PRESSURE: 119 MMHG | OXYGEN SATURATION: 97 % | HEART RATE: 79 BPM | WEIGHT: 211 LBS | RESPIRATION RATE: 20 BRPM | BODY MASS INDEX: 29.54 KG/M2 | TEMPERATURE: 98.2 F

## 2023-07-13 LAB
BACTERIA SPEC CULT: NORMAL
GLUCOSE BLD STRIP.AUTO-MCNC: 130 MG/DL (ref 65–100)
GLUCOSE BLD STRIP.AUTO-MCNC: 135 MG/DL (ref 65–100)
GLUCOSE BLD STRIP.AUTO-MCNC: 159 MG/DL (ref 65–100)
GRAM STN SPEC: NORMAL
GRAM STN SPEC: NORMAL
Lab: NORMAL
PERFORMED BY:: ABNORMAL

## 2023-07-13 PROCEDURE — 97165 OT EVAL LOW COMPLEX 30 MIN: CPT

## 2023-07-13 PROCEDURE — 71045 X-RAY EXAM CHEST 1 VIEW: CPT

## 2023-07-13 PROCEDURE — 6370000000 HC RX 637 (ALT 250 FOR IP): Performed by: INTERNAL MEDICINE

## 2023-07-13 PROCEDURE — 2580000003 HC RX 258: Performed by: INTERNAL MEDICINE

## 2023-07-13 PROCEDURE — 97161 PT EVAL LOW COMPLEX 20 MIN: CPT

## 2023-07-13 PROCEDURE — 82962 GLUCOSE BLOOD TEST: CPT

## 2023-07-13 PROCEDURE — 6360000002 HC RX W HCPCS: Performed by: INTERNAL MEDICINE

## 2023-07-13 PROCEDURE — 97530 THERAPEUTIC ACTIVITIES: CPT

## 2023-07-13 RX ORDER — LEVOFLOXACIN 750 MG/1
750 TABLET ORAL DAILY
Qty: 5 TABLET | Refills: 0 | Status: SHIPPED | OUTPATIENT
Start: 2023-07-14 | End: 2023-07-19

## 2023-07-13 RX ORDER — AZITHROMYCIN 500 MG/1
500 TABLET, FILM COATED ORAL DAILY
Qty: 3 TABLET | Refills: 0 | Status: SHIPPED | OUTPATIENT
Start: 2023-07-14 | End: 2023-07-17

## 2023-07-13 RX ADMIN — ATORVASTATIN CALCIUM 10 MG: 10 TABLET, FILM COATED ORAL at 08:32

## 2023-07-13 RX ADMIN — CARVEDILOL 25 MG: 12.5 TABLET, FILM COATED ORAL at 16:50

## 2023-07-13 RX ADMIN — CARVEDILOL 25 MG: 12.5 TABLET, FILM COATED ORAL at 08:32

## 2023-07-13 RX ADMIN — DOCUSATE SODIUM 100 MG: 100 CAPSULE, LIQUID FILLED ORAL at 08:32

## 2023-07-13 RX ADMIN — GUAIFENESIN 600 MG: 600 TABLET, EXTENDED RELEASE ORAL at 08:32

## 2023-07-13 RX ADMIN — LISINOPRIL 10 MG: 10 TABLET ORAL at 08:32

## 2023-07-13 RX ADMIN — ACETAMINOPHEN 650 MG: 325 TABLET ORAL at 06:16

## 2023-07-13 RX ADMIN — CEFTRIAXONE SODIUM 1000 MG: 1 INJECTION, POWDER, FOR SOLUTION INTRAMUSCULAR; INTRAVENOUS at 06:38

## 2023-07-13 RX ADMIN — SODIUM CHLORIDE, PRESERVATIVE FREE 10 ML: 5 INJECTION INTRAVENOUS at 08:32

## 2023-07-13 RX ADMIN — AZITHROMYCIN MONOHYDRATE 500 MG: 500 TABLET ORAL at 06:38

## 2023-07-13 RX ADMIN — ASPIRIN 81 MG: 81 TABLET, CHEWABLE ORAL at 08:32

## 2023-07-13 RX ADMIN — ENOXAPARIN SODIUM 40 MG: 100 INJECTION SUBCUTANEOUS at 16:50

## 2023-07-13 ASSESSMENT — PAIN SCALES - GENERAL
PAINLEVEL_OUTOF10: 0
PAINLEVEL_OUTOF10: 2
PAINLEVEL_OUTOF10: 0

## 2023-07-13 ASSESSMENT — PAIN DESCRIPTION - ORIENTATION: ORIENTATION: POSTERIOR

## 2023-07-13 ASSESSMENT — PAIN DESCRIPTION - DESCRIPTORS: DESCRIPTORS: ACHING

## 2023-07-13 ASSESSMENT — ENCOUNTER SYMPTOMS
SHORTNESS OF BREATH: 1
GASTROINTESTINAL NEGATIVE: 1
COUGH: 1

## 2023-07-13 ASSESSMENT — PAIN DESCRIPTION - LOCATION: LOCATION: BACK

## 2023-07-13 NOTE — PLAN OF CARE
OCCUPATIONAL THERAPY EVALUATION  Patient: Rosalinda Reagan (31 y.o. male)  Date: 7/13/2023  Primary Diagnosis: Pleural effusion [J90]  Community acquired bacterial pneumonia [J15.9]  Pneumonia of left lower lobe due to infectious organism [J18.9]       Precautions: Ax2, Bed Alarm, Fall Risk                In place during session:External Catheter and EKG/telemetry     ASSESSMENT  Pt is a 76 y.o. male presenting to [facility] with c/o chest pain and SOB, admitted 7/11/23 and currently being treated for L lower lung collapse, DM II, HTN, and DVT prophylaxis on Lovenox. Pt received semi-supine in bed upon arrival, AXO x person, time, situation, and type of place, and agreeable to OT evaluation. Based on current observations, pt presents with decreased  functional mobility, independence in ADLs, high-level IADLs, ROM, strength, body mechanics, activity tolerance, endurance, safety awareness, coordination, balance, posture, fine-motor control (see below for objective details and assist levels). Overall, pt tolerates session fair with no c/o pain with activity. Bed mobility completed with min A for rolling and scooting, and mod A for supine>sit. Sit<>stand transfers completed with mod Ax2 and SPT completed with min A and additional time overall. Total A req'd to adjust L sock while seated in chair. Pt able to feed self with mod I using RUE, anticipate req'ing set up for container management. BP checked while seated in chair (126/70). Pt will benefit from continued skilled OT services to address current impairments and improve IND and safety with self cares and functional transfers/mobility. Current OT d/c recommendation Home with Home Health Therapy and previous level of care once medically appropriate.       Start of Session End of Session   SPO2 (%) 96 96   Heart Rate (BPM) 80 81       Other factors to consider for discharge: family/social support, DME, time since onset, severity of deficits, functional baseline and transfers to the wheelchair, Grooming: set up, Bathing:  mod A, Dressing: mod A, and Toileting: max A    HOME SUPPORT: The patient lived with his wife and required assistance for ADLs from a PCA and his wife. Pt reports having PCA care 5x/week from 8 AM-4 PM.    Occupational Therapy Goals:  Initiated 7/13/2023  Patient/Family goal: Go home. 1.  Patient will perform lower body dressing with Set-up within 7 day(s). 2.  Patient will perform grooming with Set-up within 7 day(s). 3.  Patient will perform bathing with Set-up within 7 day(s). 4.  Patient will perform toilet transfers with Set-up  within 7 day(s). 5.  Patient will perform all aspects of toileting with Set-up within 7 day(s). 6.  Patient will participate in upper extremity therapeutic exercise/activities with Modified [place] within 7 day(s).     Outcome: Progressing

## 2023-07-13 NOTE — PLAN OF CARE
PHYSICAL THERAPY EVALUATION  Patient: [place] (53 y.o. male)  Date: 7/13/2023  Primary Diagnosis: Pleural effusion [J90]  Community acquired bacterial pneumonia [J15.9]  Pneumonia of left lower lobe due to infectious organism [J18.9]       Precautions: Bed Alarm, Fall Risk                In place during session: Peripheral IV, External Catheter, and EKG/telemetry   ASSESSMENT  Pt is a 76 y.o. male admitted on 7/11/2023 for CP, SOB; pt currently being treated for L lower lung collapse . Pt semi supine  upon PT/OT arrival, agreeable to evaluation. Pt A&O x person,time, situation. type of place. .    Patient have aide at home. Required cotreat/eval due to non ambulatory status and further weakness due to hospitalization   PMH of hypertension, type 2 diabetes and recent diagnosis of left-sided pleural effusion which was supposed to be scheduled for thoracentesis however was lost to follow-up just 1 month prior, comes in for worsened shortness of breath and cough. Patient with left side hemiplegia, uses thomas walker. Based on the objective data described below, the patient currently presents with impaired functional mobility, decreased independence in ADLs, impaired ability to perform high-level IADLs, decreased ROM, impaired strength, poor body mechanics, decreased activity tolerance, poor safety awareness, impaired balance, and impaired posture. (See below for objective details and assist levels). Overall pt tolerated session good today with therapy. Pt will benefit from continued skilled PT to address above deficits and return to PLOF. Current PT DC recommendation Home with Home Health Therapy and family assist once medically appropriate.      Start of Session End of Session   SPO2 (%) 96 96   Heart Rate (BPM) 80 81       Current Level of Function Impacting Discharge (mobility/balance): decreased standing balance required constant support    Other factors to consider for discharge: patient's current support

## 2023-07-13 NOTE — DISCHARGE SUMMARY
HOSPITALIST DISCHARGE NOTE  Bg Mohan MD, [address], [place]       PATIENT ID: Katty Obrien  MRN: [id number]   YOB: 1945    DATE OF ADMISSION: 7/11/2023 12:51 PM    DATE OF DISCHARGE: 07/13/23    PRIMARY CARE PROVIDER: Evan Rose MD     ATTENDING PHYSICIAN: Bg Mohan MD  DISCHARGING PROVIDER: Bg Mohan MD        CONSULTATIONS: IP CONSULT TO PULMONOLOGY    PROCEDURES/SURGERIES: * No surgery found *    ADMITTING [address] [facility] [address] COURSE:     Praveen Barboza is a 76 y.o. male with PMH of hypertension, type 2 diabetes and recent diagnosis of left-sided pleural effusion which was supposed to be scheduled for thoracentesis however was lost to follow-up just 1 month prior, comes in for worsened shortness of breath and cough. Patient is alert and x3 however is very poor historian however is surrounded by family. States that he started having shortness of breath and cough over the last 2 3 days which has worsened acutely. Wife/family state that he was supposed to schedule for thoracentesis for left effusion drainage which was not followed up on per family. Patient and wife deny any fevers, chest pain, abdominal pain. However patient does endorse chills and diaphoresis. No significant weight loss noted. Patient and wife deny any hemoptysis. Patient does endorse significant history of previous prostate cancer treated fully and currently in remission. Patient with diagnosed with COVID infection was 1 year prior. COVID-19 and influenza screening in the ED was negative. DISCHARGE DIAGNOSES / PLAN:      Left lower lung collapse  Associated with moderate to large effusion. Also associate with lingular pneumonia  Treat with IV Rocephin and azithromycin. Status post IR consultation for therapeutic and diagnostic thoracentesis. Appreciate pulmonology consultation.   Drained close MD  7/13/2023  12:21 PM

## 2023-07-13 NOTE — CARE COORDINATION
Patient will discharge home with  today. Anticipated SOC date 07-. Patient will require medical transport. Primary nurse is aware. Transition of Care Plan:    RUR: 15%  Prior Level of Functioning: HH Services  Disposition: HH services  If SNF or IPR: Date FOC offered: N/A  Date FOC received:   Accepting facility:   Date authorization started with reference number:   Date authorization received and expires: Follow up appointments: Set up by  as needed  DME needed: None  Transportation at discharge: Medical  IM/IMM Medicare/ letter given:Yes   Is patient a  and connected with [facility]? No If yes, was [employer] transfer form completed and [facility] notified? Caregiver Contact: Wild Schmidt  Discharge Caregiver contacted prior to discharge? Yes   Care Conference needed?  No  Barriers to discharge: None

## 2023-07-13 NOTE — PLAN OF CARE
Problem: Occupational Therapy - Adult  Goal: By Discharge: Performs self-care activities at highest level of function for planned discharge setting. See evaluation for individualized goals. Description: FUNCTIONAL STATUS PRIOR TO ADMISSION:  Patient utilized a thomas-walker and wheelchair with assistance required for ambulation and transfers to the wheelchair, Grooming: set up, Bathing:  mod A, Dressing: mod A, and Toileting: max A    HOME SUPPORT: The patient lived with his wife and required assistance for ADLs from a PCA and his wife. Pt reports having PCA care 5x/week from 8 AM-4 PM.    Occupational Therapy Goals:  Initiated 7/13/2023  Patient/Family goal: Go home. 1.  Patient will perform lower body dressing with Set-up within 7 day(s). 2.  Patient will perform grooming with Set-up within 7 day(s). 3.  Patient will perform bathing with Set-up within 7 day(s). 4.  Patient will perform toilet transfers with Set-up  within 7 day(s). 5.  Patient will perform all aspects of toileting with Set-up within 7 day(s). 6.  Patient will participate in upper extremity therapeutic exercise/activities with Modified [place] within 7 day(s). 7/13/2023 1208 by Richard Pal OT  Outcome: Progressing     Problem: Discharge Planning  Goal: Discharge to home or other facility with appropriate resources  Outcome: Completed     Problem: Skin/Tissue Integrity  Goal: Absence of new skin breakdown  Description: 1. Monitor for areas of redness and/or skin breakdown  2. Assess vascular access sites hourly  3. Every 4-6 hours minimum:  Change oxygen saturation probe site  4. Every 4-6 hours:  If on nasal continuous positive airway pressure, respiratory therapy assess nares and determine need for appliance change or resting period.   Outcome: Completed     Problem: Safety - Adult  Goal: Free from fall injury  Outcome: Completed     Problem: ABCDS Injury Assessment  Goal: Absence of physical injury  Outcome: Completed

## 2023-07-16 LAB
BACTERIA SPEC CULT: NORMAL
Lab: NORMAL

## 2023-07-16 NOTE — PROGRESS NOTES
1100-Received phone call from patient's daughter last evening around 1715, Juan Miguel Granda, [phone] [facility] verified. She states that the patient's medication prescribed on discharge (Azithromycin) is not available at [facility] in [facility]. Additionally, when returning to the pharmacy, the pharmacy was closed reportedly due to staffing issues. She wishes that the medication be called [place] [facility] [place] [facility] [phone]. Dr. Erica Hinson states to call Azithromycin 500mg #3, 1 tablet by mouth daily for 3 days, no refills. As [facility] closed at 1800 on 7/15/23, prescription call in complete around 1100 on 7/16/23. Called [facility] at [phone] to inform around [phone]. No further needs at this time.
Discharge paperwork complete. Just need to print when patient ready for discharge. Primary nurse aware.
Dual skin assessment performed. Pt has some excoriation to the scrotal area. No pressure injuries noted. L arm and leg is noted to be weak residual from stroke.
HOSPITALIST PROGRESS NOTE  Kaleb Alvarado MD, [address]         Daily Progress Note: 7/12/2023      Subjective:     Patient is alert and oriented x3 and much more verbal and alert today. Currently not dependent on any supplemental oxygen with improvement in shortness of breath status post left-sided thoracentesis per IR. Drained close to 700 cc of pleural fluid per IR guided thoracentesis. No overnight fever/chills, chest pain, nausea/vomiting noted.     Medications reviewed  Current Facility-Administered Medications   Medication Dose Route Frequency    sodium chloride flush 0.9 % injection 5-40 mL  5-40 mL IntraVENous 2 times per day    sodium chloride flush 0.9 % injection 5-40 mL  5-40 mL IntraVENous PRN    0.9 % sodium chloride infusion   IntraVENous PRN    enoxaparin (LOVENOX) injection 40 mg  40 mg SubCUTAneous Daily    ondansetron (ZOFRAN-ODT) disintegrating tablet 4 mg  4 mg Oral Q8H PRN    Or    ondansetron (ZOFRAN) injection 4 mg  4 mg IntraVENous Q6H PRN    polyethylene glycol (GLYCOLAX) packet 17 g  17 g Oral Daily PRN    acetaminophen (TYLENOL) tablet 650 mg  650 mg Oral Q6H PRN    Or    acetaminophen (TYLENOL) suppository 650 mg  650 mg Rectal Q6H PRN    albuterol (PROVENTIL) nebulizer solution 2.5 mg  2.5 mg Nebulization Q2H PRN    guaiFENesin-dextromethorphan (ROBITUSSIN DM) 100-10 MG/5ML syrup 5 mL  5 mL Oral Q4H PRN    guaiFENesin (MUCINEX) extended release tablet 600 mg  600 mg Oral BID    cefTRIAXone (ROCEPHIN) 1,000 mg in sterile water 10 mL IV syringe  1,000 mg IntraVENous Q24H    And    azithromycin (ZITHROMAX) tablet 500 mg  500 mg Oral Q24H    aspirin chewable tablet 81 mg  81 mg Oral Daily    carvedilol (COREG) tablet 25 mg  25 mg Oral BID WC    docusate sodium (COLACE) capsule 100 mg  100 mg Oral BID    lisinopril (PRINIVIL;ZESTRIL) tablet 10 mg  10 mg Oral Daily    atorvastatin (LIPITOR)
IV & tele removed. Discharge instructions provided. Taken by [employer] home. No complaints noted.
effusion  Personal history of COVID-19 pneumonia 2022  Hypertension and diabetes mellitus      RECOMMENDATIONS/PLAN:     68-year-old male came in because of generalized weakness shortness of breath and cough chest x-ray CAT scan showed large left pleural effusion  On Rocephin and Zithromax for community-acquired pneumonia  After thoracentesis chest x-ray today  Need to be echo  Incentive spirometry for atelectasis  Status post thoracentesis done yesterday by IR about 660 cc of fluid removed, culture cytology most likely exudative protein is 4.8       This care involved high complexity medical decision making: I personally:  Reviewed the flowsheet and previous days notes  Reviewed and summarized records or history from previous days note or discussions with staff, family  High Risk Drug therapy requiring intensive monitoring for toxicity: eg steroids, pressors, antibiotics  Reviewed and/or ordered Clinical lab tests  Reviewed images and/or ordered Radiology tests  Reviewed the patients ECG / Telemetry  Reviewed and/or adjusted NiPPV settings  Called and arranged for Radiologic procedures or interventions  performed or ordered Diagnostic endoscopies with identified risk factors.   discussed my assessment/management with : Nursing, Hospitalist and Family for coordination of care          Kimberlee Naranjo MD

## 2023-07-17 LAB
BACTERIA SPEC CULT: NORMAL
CYTOLOGY-NON GYN: NORMAL
Lab: NORMAL

## 2023-10-08 ENCOUNTER — APPOINTMENT (OUTPATIENT)
Facility: HOSPITAL | Age: 78
End: 2023-10-08
Attending: EMERGENCY MEDICINE
Payer: MEDICARE

## 2023-10-08 ENCOUNTER — HOSPITAL ENCOUNTER (EMERGENCY)
Facility: HOSPITAL | Age: 78
Discharge: HOME | End: 2023-10-08
Attending: EMERGENCY MEDICINE
Payer: MEDICARE

## 2023-10-08 VITALS
SYSTOLIC BLOOD PRESSURE: 127 MMHG | OXYGEN SATURATION: 97 % | WEIGHT: 219 LBS | BODY MASS INDEX: 34.37 KG/M2 | HEART RATE: 73 BPM | HEIGHT: 67 IN | DIASTOLIC BLOOD PRESSURE: 66 MMHG | TEMPERATURE: 99.2 F | RESPIRATION RATE: 18 BRPM

## 2023-10-08 DIAGNOSIS — R10.13 ABDOMINAL PAIN, EPIGASTRIC: ICD-10-CM

## 2023-10-08 DIAGNOSIS — K80.20 CALCULUS OF GALLBLADDER WITHOUT CHOLECYSTITIS WITHOUT OBSTRUCTION: Primary | ICD-10-CM

## 2023-10-08 DIAGNOSIS — R07.89 ATYPICAL CHEST PAIN: ICD-10-CM

## 2023-10-08 LAB
ALBUMIN SERPL-MCNC: 3.5 G/DL (ref 3.5–5)
ALBUMIN/GLOB SERPL: 0.6 (ref 1.1–2.2)
ALP SERPL-CCNC: 70 U/L (ref 45–117)
ALT SERPL-CCNC: 28 U/L (ref 12–78)
ANION GAP SERPL CALC-SCNC: 9 MMOL/L (ref 5–15)
AST SERPL W P-5'-P-CCNC: 16 U/L (ref 15–37)
BASOPHILS # BLD: 0 K/UL (ref 0–0.1)
BASOPHILS NFR BLD: 0 % (ref 0–1)
BILIRUB SERPL-MCNC: 0.5 MG/DL (ref 0.2–1)
BUN SERPL-MCNC: 7 MG/DL (ref 6–20)
BUN/CREAT SERPL: 8 (ref 12–20)
CA-I BLD-MCNC: 9.4 MG/DL (ref 8.5–10.1)
CHLORIDE SERPL-SCNC: 100 MMOL/L (ref 97–108)
CO2 SERPL-SCNC: 31 MMOL/L (ref 21–32)
CREAT SERPL-MCNC: 0.93 MG/DL (ref 0.7–1.3)
DIFFERENTIAL METHOD BLD: ABNORMAL
EOSINOPHIL # BLD: 0.1 K/UL (ref 0–0.4)
EOSINOPHIL NFR BLD: 1 % (ref 0–7)
ERYTHROCYTE [DISTWIDTH] IN BLOOD BY AUTOMATED COUNT: 13.1 % (ref 11.5–14.5)
GLOBULIN SER CALC-MCNC: 5.7 G/DL (ref 2–4)
GLUCOSE SERPL-MCNC: 154 MG/DL (ref 65–100)
HCT VFR BLD AUTO: 40.1 % (ref 36.6–50.3)
HGB BLD-MCNC: 13.1 G/DL (ref 12.1–17)
IMM GRANULOCYTES # BLD AUTO: 0 K/UL (ref 0–0.04)
IMM GRANULOCYTES NFR BLD AUTO: 0 % (ref 0–0.5)
LIPASE SERPL-CCNC: 23 U/L (ref 13–75)
LYMPHOCYTES # BLD: 1.3 K/UL (ref 0.8–3.5)
LYMPHOCYTES NFR BLD: 10 % (ref 12–49)
MCH RBC QN AUTO: 31.7 PG (ref 26–34)
MCHC RBC AUTO-ENTMCNC: 32.7 G/DL (ref 30–36.5)
MCV RBC AUTO: 97.1 FL (ref 80–99)
MONOCYTES # BLD: 1.3 K/UL (ref 0–1)
MONOCYTES NFR BLD: 10 % (ref 5–13)
NEUTS SEG # BLD: 9.9 K/UL (ref 1.8–8)
NEUTS SEG NFR BLD: 79 % (ref 32–75)
NRBC # BLD: 0 K/UL (ref 0–0.01)
NRBC BLD-RTO: 0 PER 100 WBC
PLATELET # BLD AUTO: 363 K/UL (ref 150–400)
PMV BLD AUTO: 9.9 FL (ref 8.9–12.9)
POTASSIUM SERPL-SCNC: 3.8 MMOL/L (ref 3.5–5.1)
PROT SERPL-MCNC: 9.2 G/DL (ref 6.4–8.2)
RBC # BLD AUTO: 4.13 M/UL (ref 4.1–5.7)
RBC MORPH BLD: ABNORMAL
SODIUM SERPL-SCNC: 140 MMOL/L (ref 136–145)
TROPONIN I SERPL HS-MCNC: 13 NG/L (ref 0–76)
TROPONIN I SERPL HS-MCNC: 16 NG/L (ref 0–76)
WBC # BLD AUTO: 12.6 K/UL (ref 4.1–11.1)

## 2023-10-08 PROCEDURE — 99284 EMERGENCY DEPT VISIT MOD MDM: CPT

## 2023-10-08 PROCEDURE — 93005 ELECTROCARDIOGRAM TRACING: CPT | Performed by: EMERGENCY MEDICINE

## 2023-10-08 PROCEDURE — 36415 COLL VENOUS BLD VENIPUNCTURE: CPT

## 2023-10-08 PROCEDURE — 85025 COMPLETE CBC W/AUTO DIFF WBC: CPT

## 2023-10-08 PROCEDURE — 83690 ASSAY OF LIPASE: CPT

## 2023-10-08 PROCEDURE — 80053 COMPREHEN METABOLIC PANEL: CPT

## 2023-10-08 PROCEDURE — 84484 ASSAY OF TROPONIN QUANT: CPT

## 2023-10-08 PROCEDURE — 74176 CT ABD & PELVIS W/O CONTRAST: CPT

## 2023-10-08 PROCEDURE — 6370000000 HC RX 637 (ALT 250 FOR IP): Performed by: EMERGENCY MEDICINE

## 2023-10-08 RX ORDER — MAGNESIUM HYDROXIDE/ALUMINUM HYDROXICE/SIMETHICONE 120; 1200; 1200 MG/30ML; MG/30ML; MG/30ML
30 SUSPENSION ORAL
Status: COMPLETED | OUTPATIENT
Start: 2023-10-08 | End: 2023-10-08

## 2023-10-08 RX ADMIN — ALUMINUM HYDROXIDE, MAGNESIUM HYDROXIDE, AND SIMETHICONE 30 ML: 200; 200; 20 SUSPENSION ORAL at 14:51

## 2023-10-08 NOTE — ED NOTES
Attempted to have pt try to collect urine sample for 2nd time. Pt states he does not need to urinate at this time and would not like to attempt.       Maddy Jeong RN  10/08/23 0830

## 2023-10-08 NOTE — ED NOTES
Pt given discharge and follow up instructions. Pt advised to follow with PCP. No further questions at this time.       Pilar Miranda RN  10/08/23 6394

## 2023-10-08 NOTE — ED TRIAGE NOTES
Patient reports chest pain that radiates to his left arm but patient also complains of belly pain patient took patient off of Protonix recently but patient took Protonix this morning

## 2023-10-08 NOTE — ED PROVIDER NOTES
threatening deterioration in the patient's condition which required my urgent intervention. CONSULTS:  None    PROCEDURES:  Unless otherwise noted below, none     Procedures        FINAL IMPRESSION    No diagnosis found. DISPOSITION/PLAN   DISPOSITION        PATIENT REFERRED TO:  No follow-up provider specified.     DISCHARGE MEDICATIONS:  New Prescriptions    No medications on file     Controlled Substances Monitoring:          No data to display                (Please note that portions of this note were completed with a voice recognition program.  Efforts were made to edit the dictations but occasionally words are mis-transcribed.)    Apple Niño MD (electronically signed)  Attending Emergency Physician           Edson Cordon MD  10/08/23 1776

## 2023-10-09 LAB
EKG ATRIAL RATE: 79 BPM
EKG DIAGNOSIS: NORMAL
EKG P AXIS: 27 DEGREES
EKG P-R INTERVAL: 192 MS
EKG Q-T INTERVAL: 348 MS
EKG QRS DURATION: 91 MS
EKG QTC CALCULATION (BAZETT): 399 MS
EKG R AXIS: 39 DEGREES
EKG T AXIS: 28 DEGREES
EKG VENTRICULAR RATE: 79 BPM

## 2023-10-17 ENCOUNTER — APPOINTMENT (OUTPATIENT)
Facility: HOSPITAL | Age: 78
End: 2023-10-17
Payer: MEDICARE

## 2023-10-17 ENCOUNTER — HOSPITAL ENCOUNTER (EMERGENCY)
Facility: HOSPITAL | Age: 78
Discharge: HOME | End: 2023-10-17
Attending: EMERGENCY MEDICINE
Payer: MEDICARE

## 2023-10-17 ENCOUNTER — HOSPITAL ENCOUNTER (EMERGENCY)
Facility: HOSPITAL | Age: 78
Discharge: ANOTHER HOSPITAL | End: 2023-10-18
Attending: EMERGENCY MEDICINE
Payer: MEDICARE

## 2023-10-17 VITALS
HEART RATE: 82 BPM | TEMPERATURE: 98.5 F | WEIGHT: 205.6 LBS | DIASTOLIC BLOOD PRESSURE: 71 MMHG | OXYGEN SATURATION: 93 % | HEIGHT: 67 IN | SYSTOLIC BLOOD PRESSURE: 122 MMHG | RESPIRATION RATE: 16 BRPM | BODY MASS INDEX: 32.27 KG/M2

## 2023-10-17 DIAGNOSIS — I31.39 PERICARDIAL EFFUSION: Primary | ICD-10-CM

## 2023-10-17 DIAGNOSIS — J90 BILATERAL PLEURAL EFFUSION: ICD-10-CM

## 2023-10-17 DIAGNOSIS — J90 PLEURAL EFFUSION ON LEFT: Primary | ICD-10-CM

## 2023-10-17 LAB
ALBUMIN SERPL-MCNC: 2.7 G/DL (ref 3.5–5)
ALBUMIN SERPL-MCNC: 2.7 G/DL (ref 3.5–5)
ALBUMIN/GLOB SERPL: 0.5 (ref 1.1–2.2)
ALBUMIN/GLOB SERPL: 0.5 (ref 1.1–2.2)
ALP SERPL-CCNC: 66 U/L (ref 45–117)
ALP SERPL-CCNC: 71 U/L (ref 45–117)
ALT SERPL-CCNC: 23 U/L (ref 12–78)
ALT SERPL-CCNC: 28 U/L (ref 12–78)
ANION GAP SERPL CALC-SCNC: 10 MMOL/L (ref 5–15)
ANION GAP SERPL CALC-SCNC: 12 MMOL/L (ref 5–15)
APPEARANCE UR: CLEAR
ARTERIAL PATENCY WRIST A: YES
AST SERPL W P-5'-P-CCNC: 16 U/L (ref 15–37)
AST SERPL W P-5'-P-CCNC: 28 U/L (ref 15–37)
BACTERIA URNS QL MICRO: NEGATIVE /HPF
BASE EXCESS BLDA CALC-SCNC: 2.1 MMOL/L (ref 0–3)
BASOPHILS # BLD: 0 K/UL (ref 0–0.1)
BASOPHILS # BLD: 0 K/UL (ref 0–0.1)
BASOPHILS NFR BLD: 0 % (ref 0–1)
BASOPHILS NFR BLD: 0 % (ref 0–1)
BDY SITE: ABNORMAL
BILIRUB SERPL-MCNC: 0.6 MG/DL (ref 0.2–1)
BILIRUB SERPL-MCNC: 0.6 MG/DL (ref 0.2–1)
BILIRUB UR QL CFM: POSITIVE
BILIRUB UR QL: POSITIVE
BNP SERPL-MCNC: 482 PG/ML
BNP SERPL-MCNC: 812 PG/ML
BUN SERPL-MCNC: 10 MG/DL (ref 6–20)
BUN SERPL-MCNC: 14 MG/DL (ref 6–20)
BUN/CREAT SERPL: 13 (ref 12–20)
BUN/CREAT SERPL: 18 (ref 12–20)
CA-I BLD-MCNC: 8.8 MG/DL (ref 8.5–10.1)
CA-I BLD-MCNC: 8.8 MG/DL (ref 8.5–10.1)
CHLORIDE SERPL-SCNC: 100 MMOL/L (ref 97–108)
CHLORIDE SERPL-SCNC: 99 MMOL/L (ref 97–108)
CO2 SERPL-SCNC: 25 MMOL/L (ref 21–32)
CO2 SERPL-SCNC: 28 MMOL/L (ref 21–32)
COHGB MFR BLD: 0.4 % (ref 1–2)
COLOR UR: ABNORMAL
CREAT SERPL-MCNC: 0.79 MG/DL (ref 0.7–1.3)
CREAT SERPL-MCNC: 0.8 MG/DL (ref 0.7–1.3)
DIFFERENTIAL METHOD BLD: ABNORMAL
DIFFERENTIAL METHOD BLD: ABNORMAL
EOSINOPHIL # BLD: 0 K/UL (ref 0–0.4)
EOSINOPHIL # BLD: 0.1 K/UL (ref 0–0.4)
EOSINOPHIL NFR BLD: 0 % (ref 0–7)
EOSINOPHIL NFR BLD: 1 % (ref 0–7)
ERYTHROCYTE [DISTWIDTH] IN BLOOD BY AUTOMATED COUNT: 13.3 % (ref 11.5–14.5)
ERYTHROCYTE [DISTWIDTH] IN BLOOD BY AUTOMATED COUNT: 13.9 % (ref 11.5–14.5)
FIO2 ON VENT: 0.28 %
FLUAV RNA SPEC QL NAA+PROBE: NOT DETECTED
FLUBV RNA SPEC QL NAA+PROBE: NOT DETECTED
GLOBULIN SER CALC-MCNC: 5.1 G/DL (ref 2–4)
GLOBULIN SER CALC-MCNC: 5.4 G/DL (ref 2–4)
GLUCOSE SERPL-MCNC: 146 MG/DL (ref 65–100)
GLUCOSE SERPL-MCNC: 184 MG/DL (ref 65–100)
GLUCOSE UR STRIP.AUTO-MCNC: NEGATIVE MG/DL
HCO3 BLDA-SCNC: 27 MMOL/L (ref 22–26)
HCT VFR BLD AUTO: 31.2 % (ref 36.6–50.3)
HCT VFR BLD AUTO: 31.7 % (ref 36.6–50.3)
HGB BLD-MCNC: 10.2 G/DL (ref 12.1–17)
HGB BLD-MCNC: 10.4 G/DL (ref 12.1–17)
HGB UR QL STRIP: ABNORMAL
IMM GRANULOCYTES # BLD AUTO: 0.1 K/UL (ref 0–0.04)
IMM GRANULOCYTES # BLD AUTO: 0.1 K/UL (ref 0–0.04)
IMM GRANULOCYTES NFR BLD AUTO: 1 % (ref 0–0.5)
IMM GRANULOCYTES NFR BLD AUTO: 1 % (ref 0–0.5)
INR PPP: 1.7 (ref 0.9–1.1)
KETONES UR QL STRIP.AUTO: ABNORMAL MG/DL
LACTATE SERPL-SCNC: 1.2 MMOL/L (ref 0.4–2)
LEUKOCYTE ESTERASE UR QL STRIP.AUTO: NEGATIVE
LYMPHOCYTES # BLD: 1.2 K/UL (ref 0.8–3.5)
LYMPHOCYTES # BLD: 1.4 K/UL (ref 0.8–3.5)
LYMPHOCYTES NFR BLD: 13 % (ref 12–49)
LYMPHOCYTES NFR BLD: 9 % (ref 12–49)
MAGNESIUM SERPL-MCNC: 1.7 MG/DL (ref 1.6–2.4)
MCH RBC QN AUTO: 31.6 PG (ref 26–34)
MCH RBC QN AUTO: 31.7 PG (ref 26–34)
MCHC RBC AUTO-ENTMCNC: 32.7 G/DL (ref 30–36.5)
MCHC RBC AUTO-ENTMCNC: 32.8 G/DL (ref 30–36.5)
MCV RBC AUTO: 96.6 FL (ref 80–99)
MCV RBC AUTO: 96.6 FL (ref 80–99)
METHGB MFR BLD: 0.3 % (ref 0–1.4)
MONOCYTES # BLD: 1.6 K/UL (ref 0–1)
MONOCYTES # BLD: 1.7 K/UL (ref 0–1)
MONOCYTES NFR BLD: 13 % (ref 5–13)
MONOCYTES NFR BLD: 15 % (ref 5–13)
MUCOUS THREADS URNS QL MICRO: ABNORMAL /LPF
NEUTS SEG # BLD: 7.7 K/UL (ref 1.8–8)
NEUTS SEG # BLD: 9.8 K/UL (ref 1.8–8)
NEUTS SEG NFR BLD: 70 % (ref 32–75)
NEUTS SEG NFR BLD: 77 % (ref 32–75)
NITRITE UR QL STRIP.AUTO: POSITIVE
NRBC # BLD: 0 K/UL (ref 0–0.01)
NRBC # BLD: 0 K/UL (ref 0–0.01)
NRBC BLD-RTO: 0 PER 100 WBC
NRBC BLD-RTO: 0 PER 100 WBC
OXYHGB MFR BLD: 94 % (ref 95–99)
PCO2 BLDA: 41 MMHG (ref 35–45)
PERFORMED BY:: ABNORMAL
PH BLDA: 7.43 (ref 7.35–7.45)
PH UR STRIP: 6 (ref 5–8)
PLATELET # BLD AUTO: 382 K/UL (ref 150–400)
PLATELET # BLD AUTO: 395 K/UL (ref 150–400)
PMV BLD AUTO: 9.8 FL (ref 8.9–12.9)
PMV BLD AUTO: 9.9 FL (ref 8.9–12.9)
PO2 BLDA: 73 MMHG (ref 80–100)
POTASSIUM SERPL-SCNC: 3.2 MMOL/L (ref 3.5–5.1)
POTASSIUM SERPL-SCNC: 3.9 MMOL/L (ref 3.5–5.1)
PROT SERPL-MCNC: 7.8 G/DL (ref 6.4–8.2)
PROT SERPL-MCNC: 8.1 G/DL (ref 6.4–8.2)
PROT UR STRIP-MCNC: 100 MG/DL
PROTHROMBIN TIME: 19.1 SEC (ref 11.9–14.6)
RBC # BLD AUTO: 3.23 M/UL (ref 4.1–5.7)
RBC # BLD AUTO: 3.28 M/UL (ref 4.1–5.7)
RBC #/AREA URNS HPF: ABNORMAL /HPF (ref 0–3)
RBC MORPH BLD: ABNORMAL
SAO2% DEVICE SAO2% SENSOR NAME: ABNORMAL
SARS-COV-2 RNA RESP QL NAA+PROBE: NOT DETECTED
SERVICE CMNT-IMP: ABNORMAL
SODIUM SERPL-SCNC: 135 MMOL/L (ref 136–145)
SODIUM SERPL-SCNC: 139 MMOL/L (ref 136–145)
SP GR UR REFRACTOMETRY: >1.03 (ref 1–1.03)
SPECIMEN SITE: ABNORMAL
TROPONIN I SERPL HS-MCNC: 17 NG/L (ref 0–76)
TROPONIN I SERPL HS-MCNC: 19 NG/L (ref 0–76)
URINE CULTURE IF INDICATED: ABNORMAL
UROBILINOGEN UR QL STRIP.AUTO: 2 EU/DL (ref 0.2–1)
WBC # BLD AUTO: 10.8 K/UL (ref 4.1–11.1)
WBC # BLD AUTO: 12.8 K/UL (ref 4.1–11.1)
WBC URNS QL MICRO: ABNORMAL /HPF (ref 0–5)

## 2023-10-17 PROCEDURE — 82803 BLOOD GASES ANY COMBINATION: CPT

## 2023-10-17 PROCEDURE — 83880 ASSAY OF NATRIURETIC PEPTIDE: CPT

## 2023-10-17 PROCEDURE — 84484 ASSAY OF TROPONIN QUANT: CPT

## 2023-10-17 PROCEDURE — 74177 CT ABD & PELVIS W/CONTRAST: CPT

## 2023-10-17 PROCEDURE — 83735 ASSAY OF MAGNESIUM: CPT

## 2023-10-17 PROCEDURE — 80053 COMPREHEN METABOLIC PANEL: CPT

## 2023-10-17 PROCEDURE — 85610 PROTHROMBIN TIME: CPT

## 2023-10-17 PROCEDURE — 6360000004 HC RX CONTRAST MEDICATION: Performed by: EMERGENCY MEDICINE

## 2023-10-17 PROCEDURE — 70450 CT HEAD/BRAIN W/O DYE: CPT

## 2023-10-17 PROCEDURE — 81001 URINALYSIS AUTO W/SCOPE: CPT

## 2023-10-17 PROCEDURE — 36600 WITHDRAWAL OF ARTERIAL BLOOD: CPT

## 2023-10-17 PROCEDURE — 87636 SARSCOV2 & INF A&B AMP PRB: CPT

## 2023-10-17 PROCEDURE — 71260 CT THORAX DX C+: CPT

## 2023-10-17 PROCEDURE — 71045 X-RAY EXAM CHEST 1 VIEW: CPT

## 2023-10-17 PROCEDURE — 6370000000 HC RX 637 (ALT 250 FOR IP): Performed by: EMERGENCY MEDICINE

## 2023-10-17 PROCEDURE — 93005 ELECTROCARDIOGRAM TRACING: CPT | Performed by: EMERGENCY MEDICINE

## 2023-10-17 PROCEDURE — 2580000003 HC RX 258: Performed by: EMERGENCY MEDICINE

## 2023-10-17 PROCEDURE — 85025 COMPLETE CBC W/AUTO DIFF WBC: CPT

## 2023-10-17 PROCEDURE — 99284 EMERGENCY DEPT VISIT MOD MDM: CPT

## 2023-10-17 PROCEDURE — 36415 COLL VENOUS BLD VENIPUNCTURE: CPT

## 2023-10-17 PROCEDURE — 83605 ASSAY OF LACTIC ACID: CPT

## 2023-10-17 RX ORDER — LISINOPRIL 5 MG/1
TABLET ORAL
Status: ON HOLD | COMMUNITY
Start: 2023-10-10

## 2023-10-17 RX ORDER — ACETAMINOPHEN 500 MG
1000 TABLET ORAL
Status: COMPLETED | OUTPATIENT
Start: 2023-10-17 | End: 2023-10-17

## 2023-10-17 RX ORDER — ASPIRIN 81 MG/1
324 TABLET, CHEWABLE ORAL
Status: COMPLETED | OUTPATIENT
Start: 2023-10-17 | End: 2023-10-17

## 2023-10-17 RX ORDER — CITALOPRAM HYDROBROMIDE 10 MG/1
TABLET ORAL
Status: ON HOLD | COMMUNITY
Start: 2023-10-10

## 2023-10-17 RX ORDER — CARVEDILOL 12.5 MG/1
TABLET ORAL
Status: ON HOLD | COMMUNITY
Start: 2023-10-10

## 2023-10-17 RX ORDER — CEPHALEXIN 500 MG/1
500 CAPSULE ORAL 2 TIMES DAILY
Qty: 10 CAPSULE | Refills: 0 | Status: ON HOLD | OUTPATIENT
Start: 2023-10-17 | End: 2023-10-19 | Stop reason: ALTCHOICE

## 2023-10-17 RX ORDER — 0.9 % SODIUM CHLORIDE 0.9 %
1000 INTRAVENOUS SOLUTION INTRAVENOUS ONCE
Status: COMPLETED | OUTPATIENT
Start: 2023-10-17 | End: 2023-10-17

## 2023-10-17 RX ADMIN — SODIUM CHLORIDE 1000 ML: 9 INJECTION, SOLUTION INTRAVENOUS at 19:34

## 2023-10-17 RX ADMIN — ACETAMINOPHEN 1000 MG: 500 TABLET ORAL at 01:27

## 2023-10-17 RX ADMIN — ASPIRIN 324 MG: 81 TABLET, CHEWABLE ORAL at 19:34

## 2023-10-17 RX ADMIN — IOPAMIDOL 100 ML: 755 INJECTION, SOLUTION INTRAVENOUS at 19:40

## 2023-10-17 ASSESSMENT — PAIN SCALES - WONG BAKER: WONGBAKER_NUMERICALRESPONSE: 0

## 2023-10-17 NOTE — DISCHARGE INSTRUCTIONS
(A) Negative      Leukocyte Esterase, Urine Negative Negative      Bilirubin Confirmation, UA Positive (A) Negative      WBC, UA 0-4 0 - 5 /hpf    RBC, UA 0-5 0 - 3 /hpf    BACTERIA, URINE Negative Negative /hpf    Urine Culture if Indicated Culture not indicated by UA result Culture not indicated by UA result      Mucus, UA 3+ (A) Negative /lpf       Radiologic Studies  XR CHEST PORTABLE   Final Result      Arch cardiomegaly with mild pulmonary edema, moderately large left pleural   effusion, and left basilar airspace disease.           ------------------------------------------------------------------------------------------------------------  The exam and treatment you received in the Emergency Department were for an urgent problem and are not intended as complete care. It is important that you follow-up with a doctor, nurse practitioner, or physician assistant to:  (1) confirm your diagnosis,  (2) re-evaluation of changes in your illness and treatment, and  (3) for ongoing care. Please take your discharge instructions with you when you go to your follow-up appointment. If you have any problem arranging a follow-up appointment, contact the Emergency Department. If your symptoms become worse or you do not improve as expected and you are unable to reach your health care provider, please return to the Emergency Department. We are available 24 hours a day. If a prescription has been provided, please have it filled as soon as possible to prevent a delay in treatment. If you have any questions or reservations about taking the medication due to side effects or interactions with other medications, please call your primary care provider or contact the ER.

## 2023-10-17 NOTE — ED TRIAGE NOTES
Patient presents c/o weakness and back pain. Patients wife reports to EMS that he has been restless for the past few days. EMS reports patient had previous stroke effecting the left side. Upon arrival patient states that he is experiencing pain that is why he has been restless. Patient states that his pain is on the left side around area of broken ribs from a previous fall. Patient reports the fall causing broken ribs was approx. 3 years ago.

## 2023-10-17 NOTE — ED PROVIDER NOTES
sodium 100 MG capsule  Commonly known as: COLACE     * furosemide 20 MG tablet  Commonly known as: LASIX     * furosemide 20 MG tablet  Commonly known as: LASIX     lisinopril 5 MG tablet  Commonly known as: PRINIVIL;ZESTRIL     * metFORMIN 500 MG tablet  Commonly known as: GLUCOPHAGE     * metFORMIN 500 MG tablet  Commonly known as: GLUCOPHAGE     pantoprazole 40 MG tablet  Commonly known as: PROTONIX     * pravastatin 40 MG tablet  Commonly known as: PRAVACHOL     * pravastatin 40 MG tablet  Commonly known as: PRAVACHOL           * This list has 10 medication(s) that are the same as other medications prescribed for you. Read the directions carefully, and ask your doctor or other care provider to review them with you. DISCONTINUED MEDICATIONS:  Current Discharge Medication List          I am the Primary Clinician of Record: Yrn Adler MD (electronically signed)    (Please note that parts of this dictation were completed with voice recognition software. Quite often unanticipated grammatical, syntax, homophones, and other interpretive errors are inadvertently transcribed by the computer software. Please disregards these errors.  Please excuse any errors that have escaped final proofreading.)     Yrn Adler MD  10/19/23 6974

## 2023-10-17 NOTE — ED PROVIDER NOTES
Discharge    Diagnosis     Clinical Impression:   1. Pleural effusion on left        Attestations:     MD Cassandra Velazquez MD  10/17/23 3134

## 2023-10-17 NOTE — ED TRIAGE NOTES
Patient was seen yesterday here for UTI given antibiotics patient was found responsive to loud verbal patient is clammy with left upper quadrant pain

## 2023-10-17 NOTE — ED NOTES
Patient stable at time of discharge. Reviewed discharge instructions and education. Patient verbalized understanding. Patient states no questions at this time.       Ke Rodriguez RN  10/17/23 8457

## 2023-10-18 ENCOUNTER — HOSPITAL ENCOUNTER (INPATIENT)
Facility: HOSPITAL | Age: 78
LOS: 6 days | Discharge: HOME HEALTH | DRG: 314 | End: 2023-10-24
Attending: STUDENT IN AN ORGANIZED HEALTH CARE EDUCATION/TRAINING PROGRAM | Admitting: INTERNAL MEDICINE
Payer: MEDICARE

## 2023-10-18 ENCOUNTER — APPOINTMENT (OUTPATIENT)
Facility: HOSPITAL | Age: 78
DRG: 314 | End: 2023-10-18
Attending: INTERNAL MEDICINE
Payer: MEDICARE

## 2023-10-18 VITALS
TEMPERATURE: 99.2 F | RESPIRATION RATE: 22 BRPM | DIASTOLIC BLOOD PRESSURE: 79 MMHG | HEART RATE: 84 BPM | BODY MASS INDEX: 31.97 KG/M2 | SYSTOLIC BLOOD PRESSURE: 140 MMHG | WEIGHT: 203.7 LBS | OXYGEN SATURATION: 95 % | HEIGHT: 67 IN

## 2023-10-18 DIAGNOSIS — I31.39 PERICARDIAL EFFUSION: Primary | ICD-10-CM

## 2023-10-18 LAB
ECHO AO ASC DIAM: 3.5 CM
ECHO AO ASCENDING AORTA INDEX: 1.72 CM/M2
ECHO AV AREA PEAK VELOCITY: 3.1 CM2
ECHO AV AREA VTI: 3.2 CM2
ECHO AV AREA/BSA PEAK VELOCITY: 1.5 CM2/M2
ECHO AV AREA/BSA VTI: 1.6 CM2/M2
ECHO AV MEAN GRADIENT: 3 MMHG
ECHO AV MEAN VELOCITY: 0.8 M/S
ECHO AV PEAK GRADIENT: 5 MMHG
ECHO AV PEAK VELOCITY: 1.1 M/S
ECHO AV VELOCITY RATIO: 0.82
ECHO AV VTI: 19 CM
ECHO BSA: 2.09 M2
ECHO EST RA PRESSURE: 10 MMHG
ECHO LA DIAMETER INDEX: 2.11 CM/M2
ECHO LA DIAMETER: 4.3 CM
ECHO LA VOL 2C: 81 ML (ref 18–58)
ECHO LA VOL 2C: 83 ML (ref 18–58)
ECHO LA VOL 4C: 48 ML (ref 18–58)
ECHO LA VOL 4C: 53 ML (ref 18–58)
ECHO LA VOLUME AREA LENGTH: 67 ML
ECHO LA VOLUME INDEX AREA LENGTH: 33 ML/M2 (ref 16–34)
ECHO LV E' LATERAL VELOCITY: 8 CM/S
ECHO LV E' SEPTAL VELOCITY: 8 CM/S
ECHO LV EDV A4C: 87 ML
ECHO LV EDV INDEX A4C: 43 ML/M2
ECHO LV EJECTION FRACTION A4C: 68 %
ECHO LV ESV A4C: 28 ML
ECHO LV ESV INDEX A4C: 14 ML/M2
ECHO LV FRACTIONAL SHORTENING: 34 % (ref 28–44)
ECHO LV INTERNAL DIMENSION DIASTOLE INDEX: 2.01 CM/M2
ECHO LV INTERNAL DIMENSION DIASTOLIC: 4.1 CM (ref 4.2–5.9)
ECHO LV INTERNAL DIMENSION SYSTOLIC INDEX: 1.32 CM/M2
ECHO LV INTERNAL DIMENSION SYSTOLIC: 2.7 CM
ECHO LV IVSD: 1.4 CM (ref 0.6–1)
ECHO LV MASS 2D: 182.5 G (ref 88–224)
ECHO LV MASS INDEX 2D: 89.4 G/M2 (ref 49–115)
ECHO LV POSTERIOR WALL DIASTOLIC: 1.1 CM (ref 0.6–1)
ECHO LV RELATIVE WALL THICKNESS RATIO: 0.54
ECHO LVOT AREA: 4.2 CM2
ECHO LVOT AV VTI INDEX: 0.77
ECHO LVOT DIAM: 2.3 CM
ECHO LVOT MEAN GRADIENT: 2 MMHG
ECHO LVOT PEAK GRADIENT: 3 MMHG
ECHO LVOT PEAK VELOCITY: 0.9 M/S
ECHO LVOT STROKE VOLUME INDEX: 29.7 ML/M2
ECHO LVOT SV: 60.6 ML
ECHO LVOT VTI: 14.6 CM
ECHO MV A VELOCITY: 0.58 M/S
ECHO MV AREA VTI: 3.3 CM2
ECHO MV E DECELERATION TIME (DT): 171.1 MS
ECHO MV E VELOCITY: 0.7 M/S
ECHO MV E/A RATIO: 1.21
ECHO MV E/E' LATERAL: 8.75
ECHO MV E/E' RATIO (AVERAGED): 8.75
ECHO MV E/E' SEPTAL: 8.75
ECHO MV LVOT VTI INDEX: 1.24
ECHO MV MAX VELOCITY: 1 M/S
ECHO MV MEAN GRADIENT: 1 MMHG
ECHO MV MEAN VELOCITY: 0.6 M/S
ECHO MV PEAK GRADIENT: 4 MMHG
ECHO MV VTI: 18.1 CM
ECHO RIGHT VENTRICULAR SYSTOLIC PRESSURE (RVSP): 45 MMHG
ECHO RV FREE WALL PEAK S': 10 CM/S
ECHO RV TAPSE: 1.5 CM (ref 1.7–?)
ECHO TV REGURGITANT MAX VELOCITY: 2.96 M/S
ECHO TV REGURGITANT PEAK GRADIENT: 35 MMHG
GLUCOSE BLD STRIP.AUTO-MCNC: 131 MG/DL (ref 65–117)
GLUCOSE BLD STRIP.AUTO-MCNC: 134 MG/DL (ref 65–117)
GLUCOSE BLD STRIP.AUTO-MCNC: 136 MG/DL (ref 65–117)
GLUCOSE BLD STRIP.AUTO-MCNC: 143 MG/DL (ref 65–117)
GLUCOSE BLD STRIP.AUTO-MCNC: 159 MG/DL (ref 65–117)
PROCALCITONIN SERPL-MCNC: <0.05 NG/ML
SERVICE CMNT-IMP: ABNORMAL

## 2023-10-18 PROCEDURE — 87040 BLOOD CULTURE FOR BACTERIA: CPT

## 2023-10-18 PROCEDURE — 93306 TTE W/DOPPLER COMPLETE: CPT

## 2023-10-18 PROCEDURE — 97530 THERAPEUTIC ACTIVITIES: CPT

## 2023-10-18 PROCEDURE — 97162 PT EVAL MOD COMPLEX 30 MIN: CPT

## 2023-10-18 PROCEDURE — 6370000000 HC RX 637 (ALT 250 FOR IP): Performed by: INTERNAL MEDICINE

## 2023-10-18 PROCEDURE — 2580000003 HC RX 258: Performed by: INTERNAL MEDICINE

## 2023-10-18 PROCEDURE — 2060000000 HC ICU INTERMEDIATE R&B

## 2023-10-18 PROCEDURE — 2700000000 HC OXYGEN THERAPY PER DAY

## 2023-10-18 PROCEDURE — 84145 PROCALCITONIN (PCT): CPT

## 2023-10-18 PROCEDURE — 6360000002 HC RX W HCPCS: Performed by: INTERNAL MEDICINE

## 2023-10-18 PROCEDURE — 97530 THERAPEUTIC ACTIVITIES: CPT | Performed by: OCCUPATIONAL THERAPIST

## 2023-10-18 PROCEDURE — 97166 OT EVAL MOD COMPLEX 45 MIN: CPT | Performed by: OCCUPATIONAL THERAPIST

## 2023-10-18 PROCEDURE — 36415 COLL VENOUS BLD VENIPUNCTURE: CPT

## 2023-10-18 PROCEDURE — 92610 EVALUATE SWALLOWING FUNCTION: CPT

## 2023-10-18 PROCEDURE — 82962 GLUCOSE BLOOD TEST: CPT

## 2023-10-18 RX ORDER — DEXTROSE MONOHYDRATE 100 MG/ML
INJECTION, SOLUTION INTRAVENOUS CONTINUOUS PRN
Status: DISCONTINUED | OUTPATIENT
Start: 2023-10-18 | End: 2023-10-24 | Stop reason: HOSPADM

## 2023-10-18 RX ORDER — CARVEDILOL 6.25 MG/1
6.25 TABLET ORAL 2 TIMES DAILY WITH MEALS
Status: DISCONTINUED | OUTPATIENT
Start: 2023-10-18 | End: 2023-10-24 | Stop reason: HOSPADM

## 2023-10-18 RX ORDER — SODIUM CHLORIDE 0.9 % (FLUSH) 0.9 %
5-40 SYRINGE (ML) INJECTION EVERY 12 HOURS SCHEDULED
Status: DISCONTINUED | OUTPATIENT
Start: 2023-10-18 | End: 2023-10-24 | Stop reason: HOSPADM

## 2023-10-18 RX ORDER — FUROSEMIDE 10 MG/ML
20 INJECTION INTRAMUSCULAR; INTRAVENOUS DAILY
Status: DISCONTINUED | OUTPATIENT
Start: 2023-10-18 | End: 2023-10-20

## 2023-10-18 RX ORDER — ACETAMINOPHEN 325 MG/1
650 TABLET ORAL EVERY 6 HOURS PRN
Status: DISCONTINUED | OUTPATIENT
Start: 2023-10-18 | End: 2023-10-24 | Stop reason: HOSPADM

## 2023-10-18 RX ORDER — INSULIN LISPRO 100 [IU]/ML
0-4 INJECTION, SOLUTION INTRAVENOUS; SUBCUTANEOUS NIGHTLY
Status: DISCONTINUED | OUTPATIENT
Start: 2023-10-18 | End: 2023-10-24 | Stop reason: HOSPADM

## 2023-10-18 RX ORDER — PRAVASTATIN SODIUM 40 MG
40 TABLET ORAL NIGHTLY
Status: DISCONTINUED | OUTPATIENT
Start: 2023-10-18 | End: 2023-10-24 | Stop reason: HOSPADM

## 2023-10-18 RX ORDER — INSULIN LISPRO 100 [IU]/ML
0-4 INJECTION, SOLUTION INTRAVENOUS; SUBCUTANEOUS
Status: DISCONTINUED | OUTPATIENT
Start: 2023-10-18 | End: 2023-10-24 | Stop reason: HOSPADM

## 2023-10-18 RX ORDER — PANTOPRAZOLE SODIUM 40 MG/1
40 TABLET, DELAYED RELEASE ORAL DAILY
Status: DISCONTINUED | OUTPATIENT
Start: 2023-10-18 | End: 2023-10-24 | Stop reason: HOSPADM

## 2023-10-18 RX ORDER — CITALOPRAM 20 MG/1
10 TABLET ORAL DAILY
Status: DISCONTINUED | OUTPATIENT
Start: 2023-10-18 | End: 2023-10-24 | Stop reason: HOSPADM

## 2023-10-18 RX ORDER — POLYETHYLENE GLYCOL 3350 17 G/17G
17 POWDER, FOR SOLUTION ORAL DAILY PRN
COMMUNITY

## 2023-10-18 RX ORDER — ASPIRIN 81 MG/1
81 TABLET ORAL DAILY
Status: DISCONTINUED | OUTPATIENT
Start: 2023-10-18 | End: 2023-10-23

## 2023-10-18 RX ORDER — FUROSEMIDE 10 MG/ML
40 INJECTION INTRAMUSCULAR; INTRAVENOUS ONCE
Status: COMPLETED | OUTPATIENT
Start: 2023-10-18 | End: 2023-10-18

## 2023-10-18 RX ORDER — ACETAMINOPHEN 650 MG/1
650 SUPPOSITORY RECTAL EVERY 6 HOURS PRN
Status: DISCONTINUED | OUTPATIENT
Start: 2023-10-18 | End: 2023-10-24 | Stop reason: HOSPADM

## 2023-10-18 RX ORDER — ONDANSETRON 2 MG/ML
4 INJECTION INTRAMUSCULAR; INTRAVENOUS EVERY 6 HOURS PRN
Status: DISCONTINUED | OUTPATIENT
Start: 2023-10-18 | End: 2023-10-24 | Stop reason: HOSPADM

## 2023-10-18 RX ORDER — SODIUM CHLORIDE 0.9 % (FLUSH) 0.9 %
5-40 SYRINGE (ML) INJECTION PRN
Status: DISCONTINUED | OUTPATIENT
Start: 2023-10-18 | End: 2023-10-24 | Stop reason: HOSPADM

## 2023-10-18 RX ORDER — POLYETHYLENE GLYCOL 3350 17 G/17G
17 POWDER, FOR SOLUTION ORAL DAILY PRN
Status: DISCONTINUED | OUTPATIENT
Start: 2023-10-18 | End: 2023-10-24 | Stop reason: HOSPADM

## 2023-10-18 RX ORDER — ONDANSETRON 4 MG/1
4 TABLET, ORALLY DISINTEGRATING ORAL EVERY 8 HOURS PRN
Status: DISCONTINUED | OUTPATIENT
Start: 2023-10-18 | End: 2023-10-24 | Stop reason: HOSPADM

## 2023-10-18 RX ORDER — SODIUM CHLORIDE 9 MG/ML
INJECTION, SOLUTION INTRAVENOUS PRN
Status: DISCONTINUED | OUTPATIENT
Start: 2023-10-18 | End: 2023-10-22 | Stop reason: SDUPTHER

## 2023-10-18 RX ADMIN — CITALOPRAM HYDROBROMIDE 10 MG: 20 TABLET ORAL at 08:48

## 2023-10-18 RX ADMIN — SODIUM CHLORIDE, PRESERVATIVE FREE 10 ML: 5 INJECTION INTRAVENOUS at 08:49

## 2023-10-18 RX ADMIN — ASPIRIN 81 MG: 81 TABLET, COATED ORAL at 08:48

## 2023-10-18 RX ADMIN — CARVEDILOL 6.25 MG: 6.25 TABLET, FILM COATED ORAL at 17:05

## 2023-10-18 RX ADMIN — PANTOPRAZOLE SODIUM 40 MG: 40 TABLET, DELAYED RELEASE ORAL at 08:48

## 2023-10-18 RX ADMIN — AZITHROMYCIN MONOHYDRATE 500 MG: 500 INJECTION, POWDER, LYOPHILIZED, FOR SOLUTION INTRAVENOUS at 04:50

## 2023-10-18 RX ADMIN — PRAVASTATIN SODIUM 40 MG: 40 TABLET ORAL at 20:40

## 2023-10-18 RX ADMIN — SODIUM CHLORIDE, PRESERVATIVE FREE 10 ML: 5 INJECTION INTRAVENOUS at 20:41

## 2023-10-18 RX ADMIN — SODIUM CHLORIDE 1000 MG: 900 INJECTION INTRAVENOUS at 04:23

## 2023-10-18 RX ADMIN — FUROSEMIDE 40 MG: 10 INJECTION, SOLUTION INTRAMUSCULAR; INTRAVENOUS at 04:53

## 2023-10-18 RX ADMIN — FUROSEMIDE 20 MG: 10 INJECTION, SOLUTION INTRAMUSCULAR; INTRAVENOUS at 12:16

## 2023-10-18 RX ADMIN — ACETAMINOPHEN 650 MG: 325 TABLET ORAL at 14:00

## 2023-10-18 ASSESSMENT — PAIN SCALES - WONG BAKER
WONGBAKER_NUMERICALRESPONSE: 0

## 2023-10-18 NOTE — ED NOTES
Dr. Elo Clay from [facility] is on the line talking to Dr. Angel Herr  10/17/23 2035

## 2023-10-18 NOTE — ED NOTES
Transfer center called and gave a room number for patient he will be going to [facility] room 2160. Report # is [phone]. ETA for [facility] is about an hour and a half for pickup.      Juan Diego Lopez  10/17/23 2152

## 2023-10-18 NOTE — ED NOTES
Called Transfer Center spoke to [place] to see if we could get patient transferred to [facility] side ed she is attempting to connect now.      Javi Acharya  10/17/23 2028

## 2023-10-18 NOTE — H&P
Arterial 2.1 0 - 3 mmol/L    O2 Method Nasal Cannula      FIO2 Arterial 0.28 %    Source Arterial      Site Right Radial      [employer] YES      Carboxyhgb, Arterial 0.4 (L) 1 - 2 %    Methemoglobin, Arterial 0.3 0 - 1.4 %    Oxyhemoglobin 94.0 (L) 95 - 99 %    Performed by: Jaimie Cardona     Critical Value Read Back Called to [address] [place] [address] on  at    Lactic Acid    Collection Time: 10/17/23  9:16 PM   Result Value Ref Range    Lactic Acid, Plasma 1.2 0.4 - 2.0 mmol/L         CT Head W/O Contrast    Result Date: 10/17/2023  EXAM: CT HEAD WO CONTRAST INDICATION: headache COMPARISON: None. CONTRAST: None. TECHNIQUE: Unenhanced CT of the head was performed using 5 mm images. Brain and bone windows were generated. Coronal and sagittal reformats. CT dose reduction was achieved through use of a standardized protocol tailored for this examination and automatic exposure control for dose modulation. Contrast is present from previously performed at chest abdomen pelvis CT. FINDINGS: The ventricles and sulci are normal in size, shape and configuration. Moderate small vessel ischemic changes are seen in the periventricular white matter. There is no intracranial hemorrhage, extra-axial collection, or mass effect. The basilar cisterns are open. No CT evidence of acute infarct. The bone windows demonstrate no abnormalities. The visualized portions of the paranasal sinuses and mastoid air cells are clear. No acute abnormality. CT ABDOMEN PELVIS W IV CONTRAST Additional Contrast? None    Result Date: 10/17/2023  INDICATION: left sided chest pain, hx of left pleural effusion COMPARISON: 7/11/2023 TECHNIQUE:  Following the uneventful intravenous administration of 100 cc Isovue-300, 5 mm axial images were obtained through the chest, abdomen, and pelvis. Oral contrast was not administered. Coronal and sagittal reconstructions were generated.  CT dose reduction was achieved through use of a standardized protocol tailored

## 2023-10-18 NOTE — CONSULTS
MD Dayron   cephALEXin (KEFLEX) 500 MG capsule Take 1 capsule by mouth 2 times daily for 5 days 10/17/23 10/22/23  Fanny Stringer MD   albuterol (PROVENTIL) (5 MG/ML) 0.5% nebulizer solution Take 0.5 mLs by nebulization every 2 hours as needed for Wheezing or Shortness of Breath 7/13/23   Ramya Miranda MD   aspirin 81 MG chewable tablet Take 1 tablet by mouth daily    Dayron Malik MD   furosemide (LASIX) 20 MG tablet Take 1 tablet by mouth 2 times daily    Dayron Malik MD   metFORMIN (GLUCOPHAGE) 500 MG tablet Take 1 tablet by mouth 2 times daily (with meals)    Dayron Malik MD   pravastatin (PRAVACHOL) 40 MG tablet Take 1 tablet by mouth daily    Dayron Malik MD   docusate sodium (COLACE) 100 MG capsule Take 1 capsule by mouth 2 times daily    Dayron Malik MD   amLODIPine (NORVASC) 10 MG tablet Take by mouth daily    Automatic Reconciliation, Ar   aspirin 81 MG EC tablet Take 1 tablet by mouth daily    Automatic Reconciliation, Ar   diclofenac sodium (VOLTAREN) 1 % GEL Apply topically 4 times daily    Automatic Reconciliation, Ar   docusate (COLACE, DULCOLAX) 100 MG CAPS Take 100 mg by mouth 2 times daily    Automatic Reconciliation, Ar   furosemide (LASIX) 20 MG tablet Take 1 tablet by mouth daily as needed    Automatic Reconciliation, Ar   metFORMIN (GLUCOPHAGE) 500 MG tablet Take by mouth 2 times daily (with meals)    Automatic Reconciliation, Ar   pantoprazole (PROTONIX) 40 MG tablet Take 1 tablet by mouth daily    Automatic Reconciliation, Ar   pravastatin (PRAVACHOL) 40 MG tablet Take 1 tablet by mouth nightly    Automatic Reconciliation, Ar       Recent Results (from the past 24 hour(s))   EKG 12 Lead    Collection Time: 10/17/23  6:33 PM   Result Value Ref Range    Ventricular Rate 84 BPM    Atrial Rate 84 BPM    P-R Interval 171 ms    QRS Duration 90 ms    Q-T Interval 305 ms    QTc Calculation (Bazett) 361 ms    P Axis 11 degrees    R Axis 18 degrees

## 2023-10-18 NOTE — ED NOTES
Patient care transferred to [address] [place] [address]. [facility] hospital made aware of transport.       Joey De La Garza RN  10/18/23 1727

## 2023-10-19 LAB
ALBUMIN SERPL-MCNC: 2.6 G/DL (ref 3.5–5)
ALBUMIN/GLOB SERPL: 0.6 (ref 1.1–2.2)
ALP SERPL-CCNC: 64 U/L (ref 45–117)
ALT SERPL-CCNC: 27 U/L (ref 12–78)
ANION GAP SERPL CALC-SCNC: 6 MMOL/L (ref 5–15)
AST SERPL-CCNC: 15 U/L (ref 15–37)
BASOPHILS # BLD: 0 K/UL (ref 0–0.1)
BASOPHILS NFR BLD: 0 % (ref 0–1)
BILIRUB SERPL-MCNC: 0.6 MG/DL (ref 0.2–1)
BUN SERPL-MCNC: 19 MG/DL (ref 6–20)
BUN/CREAT SERPL: 24 (ref 12–20)
CALCIUM SERPL-MCNC: 9 MG/DL (ref 8.5–10.1)
CHLORIDE SERPL-SCNC: 104 MMOL/L (ref 97–108)
CO2 SERPL-SCNC: 29 MMOL/L (ref 21–32)
CREAT SERPL-MCNC: 0.8 MG/DL (ref 0.7–1.3)
DIFFERENTIAL METHOD BLD: ABNORMAL
EKG ATRIAL RATE: 84 BPM
EKG DIAGNOSIS: NORMAL
EKG P AXIS: 11 DEGREES
EKG P-R INTERVAL: 171 MS
EKG Q-T INTERVAL: 305 MS
EKG QRS DURATION: 90 MS
EKG QTC CALCULATION (BAZETT): 361 MS
EKG R AXIS: 18 DEGREES
EKG T AXIS: 15 DEGREES
EKG VENTRICULAR RATE: 84 BPM
EOSINOPHIL # BLD: 0.1 K/UL (ref 0–0.4)
EOSINOPHIL NFR BLD: 1 % (ref 0–7)
ERYTHROCYTE [DISTWIDTH] IN BLOOD BY AUTOMATED COUNT: 13.8 % (ref 11.5–14.5)
GLOBULIN SER CALC-MCNC: 4.7 G/DL (ref 2–4)
GLUCOSE BLD STRIP.AUTO-MCNC: 125 MG/DL (ref 65–117)
GLUCOSE BLD STRIP.AUTO-MCNC: 146 MG/DL (ref 65–117)
GLUCOSE BLD STRIP.AUTO-MCNC: 157 MG/DL (ref 65–117)
GLUCOSE BLD STRIP.AUTO-MCNC: 170 MG/DL (ref 65–117)
GLUCOSE SERPL-MCNC: 135 MG/DL (ref 65–100)
HCT VFR BLD AUTO: 30.5 % (ref 36.6–50.3)
HGB BLD-MCNC: 9.7 G/DL (ref 12.1–17)
IMM GRANULOCYTES # BLD AUTO: 0.1 K/UL (ref 0–0.04)
IMM GRANULOCYTES NFR BLD AUTO: 1 % (ref 0–0.5)
IRON SATN MFR SERPL: 10 % (ref 20–50)
IRON SERPL-MCNC: 14 UG/DL (ref 35–150)
LYMPHOCYTES # BLD: 1.6 K/UL (ref 0.8–3.5)
LYMPHOCYTES NFR BLD: 13 % (ref 12–49)
MAGNESIUM SERPL-MCNC: 2.2 MG/DL (ref 1.6–2.4)
MCH RBC QN AUTO: 31.2 PG (ref 26–34)
MCHC RBC AUTO-ENTMCNC: 31.8 G/DL (ref 30–36.5)
MCV RBC AUTO: 98.1 FL (ref 80–99)
MONOCYTES # BLD: 1.7 K/UL (ref 0–1)
MONOCYTES NFR BLD: 13 % (ref 5–13)
NEUTS SEG # BLD: 9 K/UL (ref 1.8–8)
NEUTS SEG NFR BLD: 72 % (ref 32–75)
NRBC # BLD: 0 K/UL (ref 0–0.01)
NRBC BLD-RTO: 0 PER 100 WBC
PHOSPHATE SERPL-MCNC: 3.6 MG/DL (ref 2.6–4.7)
PLATELET # BLD AUTO: 440 K/UL (ref 150–400)
PMV BLD AUTO: 9.9 FL (ref 8.9–12.9)
POTASSIUM SERPL-SCNC: 3.5 MMOL/L (ref 3.5–5.1)
PROT SERPL-MCNC: 7.3 G/DL (ref 6.4–8.2)
RBC # BLD AUTO: 3.11 M/UL (ref 4.1–5.7)
SERVICE CMNT-IMP: ABNORMAL
SODIUM SERPL-SCNC: 139 MMOL/L (ref 136–145)
TIBC SERPL-MCNC: 141 UG/DL (ref 250–450)
WBC # BLD AUTO: 12.5 K/UL (ref 4.1–11.1)

## 2023-10-19 PROCEDURE — 83540 ASSAY OF IRON: CPT

## 2023-10-19 PROCEDURE — 6370000000 HC RX 637 (ALT 250 FOR IP): Performed by: INTERNAL MEDICINE

## 2023-10-19 PROCEDURE — 2580000003 HC RX 258: Performed by: INTERNAL MEDICINE

## 2023-10-19 PROCEDURE — 84100 ASSAY OF PHOSPHORUS: CPT

## 2023-10-19 PROCEDURE — 80053 COMPREHEN METABOLIC PANEL: CPT

## 2023-10-19 PROCEDURE — 2580000003 HC RX 258: Performed by: STUDENT IN AN ORGANIZED HEALTH CARE EDUCATION/TRAINING PROGRAM

## 2023-10-19 PROCEDURE — 92526 ORAL FUNCTION THERAPY: CPT

## 2023-10-19 PROCEDURE — 36415 COLL VENOUS BLD VENIPUNCTURE: CPT

## 2023-10-19 PROCEDURE — 83735 ASSAY OF MAGNESIUM: CPT

## 2023-10-19 PROCEDURE — 6360000002 HC RX W HCPCS: Performed by: STUDENT IN AN ORGANIZED HEALTH CARE EDUCATION/TRAINING PROGRAM

## 2023-10-19 PROCEDURE — 82962 GLUCOSE BLOOD TEST: CPT

## 2023-10-19 PROCEDURE — 2700000000 HC OXYGEN THERAPY PER DAY

## 2023-10-19 PROCEDURE — 2060000000 HC ICU INTERMEDIATE R&B

## 2023-10-19 PROCEDURE — 6360000002 HC RX W HCPCS: Performed by: INTERNAL MEDICINE

## 2023-10-19 PROCEDURE — 83550 IRON BINDING TEST: CPT

## 2023-10-19 PROCEDURE — 85025 COMPLETE CBC W/AUTO DIFF WBC: CPT

## 2023-10-19 RX ADMIN — CARVEDILOL 6.25 MG: 6.25 TABLET, FILM COATED ORAL at 18:44

## 2023-10-19 RX ADMIN — ACETAMINOPHEN 650 MG: 325 TABLET ORAL at 09:41

## 2023-10-19 RX ADMIN — PRAVASTATIN SODIUM 40 MG: 40 TABLET ORAL at 22:53

## 2023-10-19 RX ADMIN — PANTOPRAZOLE SODIUM 40 MG: 40 TABLET, DELAYED RELEASE ORAL at 09:22

## 2023-10-19 RX ADMIN — SACUBITRIL AND VALSARTAN 1 TABLET: 24; 26 TABLET, FILM COATED ORAL at 22:52

## 2023-10-19 RX ADMIN — CARVEDILOL 6.25 MG: 6.25 TABLET, FILM COATED ORAL at 09:25

## 2023-10-19 RX ADMIN — CITALOPRAM HYDROBROMIDE 10 MG: 20 TABLET ORAL at 09:22

## 2023-10-19 RX ADMIN — SACUBITRIL AND VALSARTAN 1 TABLET: 24; 26 TABLET, FILM COATED ORAL at 09:22

## 2023-10-19 RX ADMIN — ACETAMINOPHEN 650 MG: 325 TABLET ORAL at 00:42

## 2023-10-19 RX ADMIN — AZITHROMYCIN MONOHYDRATE 500 MG: 500 INJECTION, POWDER, LYOPHILIZED, FOR SOLUTION INTRAVENOUS at 04:55

## 2023-10-19 RX ADMIN — SODIUM CHLORIDE 1000 MG: 900 INJECTION INTRAVENOUS at 06:05

## 2023-10-19 RX ADMIN — SODIUM CHLORIDE, PRESERVATIVE FREE 10 ML: 5 INJECTION INTRAVENOUS at 09:26

## 2023-10-19 RX ADMIN — FUROSEMIDE 20 MG: 10 INJECTION, SOLUTION INTRAMUSCULAR; INTRAVENOUS at 09:22

## 2023-10-19 RX ADMIN — SODIUM CHLORIDE, PRESERVATIVE FREE 10 ML: 5 INJECTION INTRAVENOUS at 22:53

## 2023-10-19 RX ADMIN — ASPIRIN 81 MG: 81 TABLET, COATED ORAL at 09:27

## 2023-10-19 ASSESSMENT — PAIN SCALES - WONG BAKER
WONGBAKER_NUMERICALRESPONSE: 0
WONGBAKER_NUMERICALRESPONSE: 0

## 2023-10-19 ASSESSMENT — PAIN DESCRIPTION - ORIENTATION: ORIENTATION: LEFT

## 2023-10-19 ASSESSMENT — PAIN SCALES - GENERAL
PAINLEVEL_OUTOF10: 0
PAINLEVEL_OUTOF10: 3
PAINLEVEL_OUTOF10: 0

## 2023-10-19 ASSESSMENT — PAIN DESCRIPTION - LOCATION: LOCATION: SHOULDER

## 2023-10-19 ASSESSMENT — PAIN DESCRIPTION - DESCRIPTORS: DESCRIPTORS: ACHING

## 2023-10-19 NOTE — CARE COORDINATION
Care Management Initial Assessment       RUR:18%  Readmission? No  1st IM letter given? Yes -     1st  letter given: No     10/19/23 1611   Service Assessment   Patient Orientation Other (see comment)   Cognition Other (see comment)   History Provided By Spouse   Primary 101Bahman Roberts is: Named in [address]   PCP Verified by CM Yes   Last Visit to PCP Within last 3 months   Prior Functional Level Assistance with the following:;Bathing;Dressing; Toileting;Feeding   Current Functional Level Assistance with the following:;Bathing;Dressing; Toileting;Feeding;Mobility   Can patient return to prior living arrangement Yes   Ability to make needs known: Poor   Family able to assist with home care needs: Yes   Financial Resources Medicare;Medicaid   Social/Functional History   Lives With Spouse   Type of [address] [facility] bed;Walker, standard; [address]   ADL Assistance Needs assistance   Ambulation Assistance Needs assistance   Transfer Assistance Needs assistance   Active  No   Occupation Retired   Discharge Planning   Living Arrangements Spouse/Significant Other   DME Ordered? Bedside commode   Type of Home Care Services Aide Services;OT   Patient expects to be discharged to: Trailer/mobile home   Services At/After Discharge   Transition of Care Consult (CM Consult) [address] [facility] Provided? No   Mode of Transport at Discharge BLS     CM spoke with wife on phone and went by to see patient. She provided information regarding dispositition. Patient lives with wife in a trailer with a ramp. He requires assistance but has had some mobility with DME    DME - Hospital bed, walker, wheelchair, bsc    He has had [address] [facility] with [facility] and their number is [phone].  Pt had ended

## 2023-10-20 LAB
ANION GAP SERPL CALC-SCNC: 8 MMOL/L (ref 5–15)
BUN SERPL-MCNC: 15 MG/DL (ref 6–20)
BUN/CREAT SERPL: 21 (ref 12–20)
CALCIUM SERPL-MCNC: 8.8 MG/DL (ref 8.5–10.1)
CHLORIDE SERPL-SCNC: 101 MMOL/L (ref 97–108)
CO2 SERPL-SCNC: 31 MMOL/L (ref 21–32)
CREAT SERPL-MCNC: 0.7 MG/DL (ref 0.7–1.3)
ERYTHROCYTE [DISTWIDTH] IN BLOOD BY AUTOMATED COUNT: 13.8 % (ref 11.5–14.5)
GLUCOSE BLD STRIP.AUTO-MCNC: 123 MG/DL (ref 65–117)
GLUCOSE BLD STRIP.AUTO-MCNC: 124 MG/DL (ref 65–117)
GLUCOSE BLD STRIP.AUTO-MCNC: 135 MG/DL (ref 65–117)
GLUCOSE BLD STRIP.AUTO-MCNC: 141 MG/DL (ref 65–117)
GLUCOSE SERPL-MCNC: 130 MG/DL (ref 65–100)
HCT VFR BLD AUTO: 33.6 % (ref 36.6–50.3)
HGB BLD-MCNC: 10.7 G/DL (ref 12.1–17)
MCH RBC QN AUTO: 30.9 PG (ref 26–34)
MCHC RBC AUTO-ENTMCNC: 31.8 G/DL (ref 30–36.5)
MCV RBC AUTO: 97.1 FL (ref 80–99)
NRBC # BLD: 0 K/UL (ref 0–0.01)
NRBC BLD-RTO: 0 PER 100 WBC
PLATELET # BLD AUTO: 497 K/UL (ref 150–400)
PMV BLD AUTO: 9.8 FL (ref 8.9–12.9)
POTASSIUM SERPL-SCNC: 3 MMOL/L (ref 3.5–5.1)
RBC # BLD AUTO: 3.46 M/UL (ref 4.1–5.7)
SERVICE CMNT-IMP: ABNORMAL
SODIUM SERPL-SCNC: 140 MMOL/L (ref 136–145)
WBC # BLD AUTO: 11.3 K/UL (ref 4.1–11.1)

## 2023-10-20 PROCEDURE — 2580000003 HC RX 258: Performed by: INTERNAL MEDICINE

## 2023-10-20 PROCEDURE — 36415 COLL VENOUS BLD VENIPUNCTURE: CPT

## 2023-10-20 PROCEDURE — 2060000000 HC ICU INTERMEDIATE R&B

## 2023-10-20 PROCEDURE — 85027 COMPLETE CBC AUTOMATED: CPT

## 2023-10-20 PROCEDURE — 6360000002 HC RX W HCPCS: Performed by: STUDENT IN AN ORGANIZED HEALTH CARE EDUCATION/TRAINING PROGRAM

## 2023-10-20 PROCEDURE — 2700000000 HC OXYGEN THERAPY PER DAY

## 2023-10-20 PROCEDURE — 2580000003 HC RX 258: Performed by: STUDENT IN AN ORGANIZED HEALTH CARE EDUCATION/TRAINING PROGRAM

## 2023-10-20 PROCEDURE — 6370000000 HC RX 637 (ALT 250 FOR IP): Performed by: INTERNAL MEDICINE

## 2023-10-20 PROCEDURE — 80048 BASIC METABOLIC PNL TOTAL CA: CPT

## 2023-10-20 PROCEDURE — 6360000002 HC RX W HCPCS: Performed by: INTERNAL MEDICINE

## 2023-10-20 PROCEDURE — 82962 GLUCOSE BLOOD TEST: CPT

## 2023-10-20 PROCEDURE — 92526 ORAL FUNCTION THERAPY: CPT

## 2023-10-20 RX ORDER — FUROSEMIDE 40 MG/1
40 TABLET ORAL DAILY
Status: DISCONTINUED | OUTPATIENT
Start: 2023-10-21 | End: 2023-10-24 | Stop reason: HOSPADM

## 2023-10-20 RX ADMIN — ASPIRIN 81 MG: 81 TABLET, COATED ORAL at 10:25

## 2023-10-20 RX ADMIN — SODIUM CHLORIDE, PRESERVATIVE FREE 10 ML: 5 INJECTION INTRAVENOUS at 20:21

## 2023-10-20 RX ADMIN — CITALOPRAM HYDROBROMIDE 10 MG: 20 TABLET ORAL at 10:24

## 2023-10-20 RX ADMIN — AZITHROMYCIN MONOHYDRATE 500 MG: 500 INJECTION, POWDER, LYOPHILIZED, FOR SOLUTION INTRAVENOUS at 04:26

## 2023-10-20 RX ADMIN — CARVEDILOL 6.25 MG: 6.25 TABLET, FILM COATED ORAL at 10:25

## 2023-10-20 RX ADMIN — PANTOPRAZOLE SODIUM 40 MG: 40 TABLET, DELAYED RELEASE ORAL at 10:25

## 2023-10-20 RX ADMIN — SACUBITRIL AND VALSARTAN 1 TABLET: 24; 26 TABLET, FILM COATED ORAL at 20:23

## 2023-10-20 RX ADMIN — SODIUM CHLORIDE 1000 MG: 900 INJECTION INTRAVENOUS at 04:27

## 2023-10-20 RX ADMIN — SACUBITRIL AND VALSARTAN 1 TABLET: 24; 26 TABLET, FILM COATED ORAL at 10:25

## 2023-10-20 RX ADMIN — FUROSEMIDE 20 MG: 10 INJECTION, SOLUTION INTRAMUSCULAR; INTRAVENOUS at 10:25

## 2023-10-20 RX ADMIN — CARVEDILOL 6.25 MG: 6.25 TABLET, FILM COATED ORAL at 17:54

## 2023-10-20 RX ADMIN — PRAVASTATIN SODIUM 40 MG: 40 TABLET ORAL at 20:20

## 2023-10-20 RX ADMIN — SODIUM CHLORIDE, PRESERVATIVE FREE 10 ML: 5 INJECTION INTRAVENOUS at 10:26

## 2023-10-20 ASSESSMENT — PAIN SCALES - GENERAL
PAINLEVEL_OUTOF10: 0

## 2023-10-20 ASSESSMENT — PAIN SCALES - WONG BAKER
WONGBAKER_NUMERICALRESPONSE: 0

## 2023-10-20 NOTE — CARE COORDINATION
CM Note : Patient needs 02 challenge to qualify for home 02    1:41 pm 02 home ordered.  Resumption of HH ordered    3:59 pm [facility] indicates they will follow for home [facility] RN CM   0848

## 2023-10-21 ENCOUNTER — APPOINTMENT (OUTPATIENT)
Facility: HOSPITAL | Age: 78
DRG: 314 | End: 2023-10-21
Attending: STUDENT IN AN ORGANIZED HEALTH CARE EDUCATION/TRAINING PROGRAM
Payer: MEDICARE

## 2023-10-21 LAB
ALBUMIN SERPL-MCNC: 2.6 G/DL (ref 3.5–5)
ALBUMIN/GLOB SERPL: 0.5 (ref 1.1–2.2)
ALP SERPL-CCNC: 102 U/L (ref 45–117)
ALT SERPL-CCNC: 57 U/L (ref 12–78)
ANION GAP SERPL CALC-SCNC: 10 MMOL/L (ref 5–15)
ANION GAP SERPL CALC-SCNC: 8 MMOL/L (ref 5–15)
AST SERPL-CCNC: 44 U/L (ref 15–37)
BILIRUB SERPL-MCNC: 0.9 MG/DL (ref 0.2–1)
BUN SERPL-MCNC: 13 MG/DL (ref 6–20)
BUN SERPL-MCNC: 13 MG/DL (ref 6–20)
BUN/CREAT SERPL: 16 (ref 12–20)
BUN/CREAT SERPL: 19 (ref 12–20)
CALCIUM SERPL-MCNC: 8.7 MG/DL (ref 8.5–10.1)
CALCIUM SERPL-MCNC: 8.9 MG/DL (ref 8.5–10.1)
CHLORIDE SERPL-SCNC: 100 MMOL/L (ref 97–108)
CHLORIDE SERPL-SCNC: 102 MMOL/L (ref 97–108)
CO2 SERPL-SCNC: 26 MMOL/L (ref 21–32)
CO2 SERPL-SCNC: 32 MMOL/L (ref 21–32)
CREAT SERPL-MCNC: 0.69 MG/DL (ref 0.7–1.3)
CREAT SERPL-MCNC: 0.8 MG/DL (ref 0.7–1.3)
EKG DIAGNOSIS: NORMAL
EKG Q-T INTERVAL: 288 MS
EKG QRS DURATION: 90 MS
EKG QTC CALCULATION (BAZETT): 467 MS
EKG R AXIS: 143 DEGREES
EKG T AXIS: 265 DEGREES
EKG VENTRICULAR RATE: 158 BPM
ERYTHROCYTE [DISTWIDTH] IN BLOOD BY AUTOMATED COUNT: 13.5 % (ref 11.5–14.5)
FERRITIN SERPL-MCNC: 687 NG/ML (ref 26–388)
GLOBULIN SER CALC-MCNC: 5.5 G/DL (ref 2–4)
GLUCOSE BLD STRIP.AUTO-MCNC: 122 MG/DL (ref 65–117)
GLUCOSE BLD STRIP.AUTO-MCNC: 146 MG/DL (ref 65–117)
GLUCOSE BLD STRIP.AUTO-MCNC: 154 MG/DL (ref 65–117)
GLUCOSE BLD STRIP.AUTO-MCNC: 155 MG/DL (ref 65–117)
GLUCOSE BLD STRIP.AUTO-MCNC: 157 MG/DL (ref 65–117)
GLUCOSE SERPL-MCNC: 121 MG/DL (ref 65–100)
GLUCOSE SERPL-MCNC: 149 MG/DL (ref 65–100)
HCT VFR BLD AUTO: 36.8 % (ref 36.6–50.3)
HGB BLD-MCNC: 12 G/DL (ref 12.1–17)
INR PPP: 1.5 (ref 0.9–1.1)
LACTATE SERPL-SCNC: 2.1 MMOL/L (ref 0.4–2)
MAGNESIUM SERPL-MCNC: 2.2 MG/DL (ref 1.6–2.4)
MCH RBC QN AUTO: 31.7 PG (ref 26–34)
MCHC RBC AUTO-ENTMCNC: 32.6 G/DL (ref 30–36.5)
MCV RBC AUTO: 97.1 FL (ref 80–99)
NRBC # BLD: 0 K/UL (ref 0–0.01)
NRBC BLD-RTO: 0 PER 100 WBC
PLATELET # BLD AUTO: 494 K/UL (ref 150–400)
PMV BLD AUTO: 9.4 FL (ref 8.9–12.9)
POTASSIUM SERPL-SCNC: 2.8 MMOL/L (ref 3.5–5.1)
POTASSIUM SERPL-SCNC: 3.3 MMOL/L (ref 3.5–5.1)
PROCALCITONIN SERPL-MCNC: <0.05 NG/ML
PROT SERPL-MCNC: 8.1 G/DL (ref 6.4–8.2)
PROTHROMBIN TIME: 15.6 SEC (ref 9–11.1)
RBC # BLD AUTO: 3.79 M/UL (ref 4.1–5.7)
SERVICE CMNT-IMP: ABNORMAL
SODIUM SERPL-SCNC: 138 MMOL/L (ref 136–145)
SODIUM SERPL-SCNC: 140 MMOL/L (ref 136–145)
TSH SERPL DL<=0.05 MIU/L-ACNC: 0.66 UIU/ML (ref 0.36–3.74)
WBC # BLD AUTO: 9.3 K/UL (ref 4.1–11.1)

## 2023-10-21 PROCEDURE — 6370000000 HC RX 637 (ALT 250 FOR IP): Performed by: INTERNAL MEDICINE

## 2023-10-21 PROCEDURE — 6360000002 HC RX W HCPCS: Performed by: GENERAL ACUTE CARE HOSPITAL

## 2023-10-21 PROCEDURE — 2700000000 HC OXYGEN THERAPY PER DAY

## 2023-10-21 PROCEDURE — 6370000000 HC RX 637 (ALT 250 FOR IP): Performed by: GENERAL ACUTE CARE HOSPITAL

## 2023-10-21 PROCEDURE — 83735 ASSAY OF MAGNESIUM: CPT

## 2023-10-21 PROCEDURE — 2060000000 HC ICU INTERMEDIATE R&B

## 2023-10-21 PROCEDURE — 2580000003 HC RX 258: Performed by: GENERAL ACUTE CARE HOSPITAL

## 2023-10-21 PROCEDURE — 2580000003 HC RX 258: Performed by: STUDENT IN AN ORGANIZED HEALTH CARE EDUCATION/TRAINING PROGRAM

## 2023-10-21 PROCEDURE — 84443 ASSAY THYROID STIM HORMONE: CPT

## 2023-10-21 PROCEDURE — 83605 ASSAY OF LACTIC ACID: CPT

## 2023-10-21 PROCEDURE — 6360000002 HC RX W HCPCS: Performed by: STUDENT IN AN ORGANIZED HEALTH CARE EDUCATION/TRAINING PROGRAM

## 2023-10-21 PROCEDURE — P9047 ALBUMIN (HUMAN), 25%, 50ML: HCPCS | Performed by: GENERAL ACUTE CARE HOSPITAL

## 2023-10-21 PROCEDURE — 36415 COLL VENOUS BLD VENIPUNCTURE: CPT

## 2023-10-21 PROCEDURE — 82962 GLUCOSE BLOOD TEST: CPT

## 2023-10-21 PROCEDURE — 2500000003 HC RX 250 WO HCPCS: Performed by: INTERNAL MEDICINE

## 2023-10-21 PROCEDURE — 84145 PROCALCITONIN (PCT): CPT

## 2023-10-21 PROCEDURE — 2580000003 HC RX 258: Performed by: INTERNAL MEDICINE

## 2023-10-21 PROCEDURE — 85027 COMPLETE CBC AUTOMATED: CPT

## 2023-10-21 PROCEDURE — 80053 COMPREHEN METABOLIC PANEL: CPT

## 2023-10-21 PROCEDURE — 5A2204Z RESTORATION OF CARDIAC RHYTHM, SINGLE: ICD-10-PCS | Performed by: INTERNAL MEDICINE

## 2023-10-21 PROCEDURE — 82728 ASSAY OF FERRITIN: CPT

## 2023-10-21 PROCEDURE — 85610 PROTHROMBIN TIME: CPT

## 2023-10-21 PROCEDURE — 6360000002 HC RX W HCPCS: Performed by: INTERNAL MEDICINE

## 2023-10-21 PROCEDURE — 2500000003 HC RX 250 WO HCPCS: Performed by: GENERAL ACUTE CARE HOSPITAL

## 2023-10-21 PROCEDURE — 71045 X-RAY EXAM CHEST 1 VIEW: CPT

## 2023-10-21 PROCEDURE — 2500000003 HC RX 250 WO HCPCS: Performed by: NURSE PRACTITIONER

## 2023-10-21 RX ORDER — POTASSIUM CHLORIDE 7.45 MG/ML
10 INJECTION INTRAVENOUS
Status: DISCONTINUED | OUTPATIENT
Start: 2023-10-21 | End: 2023-10-21

## 2023-10-21 RX ORDER — DILTIAZEM HYDROCHLORIDE 5 MG/ML
10 INJECTION INTRAVENOUS ONCE
Status: DISCONTINUED | OUTPATIENT
Start: 2023-10-21 | End: 2023-10-21

## 2023-10-21 RX ORDER — LIDOCAINE 4 G/G
1 PATCH TOPICAL DAILY
Status: DISCONTINUED | OUTPATIENT
Start: 2023-10-21 | End: 2023-10-24 | Stop reason: HOSPADM

## 2023-10-21 RX ORDER — FENTANYL CITRATE 50 UG/ML
25 INJECTION, SOLUTION INTRAMUSCULAR; INTRAVENOUS ONCE
Status: DISCONTINUED | OUTPATIENT
Start: 2023-10-21 | End: 2023-10-21

## 2023-10-21 RX ORDER — POTASSIUM CHLORIDE 750 MG/1
40 TABLET, FILM COATED, EXTENDED RELEASE ORAL 3 TIMES DAILY
Status: DISCONTINUED | OUTPATIENT
Start: 2023-10-21 | End: 2023-10-21

## 2023-10-21 RX ORDER — POTASSIUM CHLORIDE 750 MG/1
40 TABLET, FILM COATED, EXTENDED RELEASE ORAL ONCE
Status: DISCONTINUED | OUTPATIENT
Start: 2023-10-21 | End: 2023-10-23

## 2023-10-21 RX ORDER — SODIUM CHLORIDE 0.9 % (FLUSH) 0.9 %
5-40 SYRINGE (ML) INJECTION EVERY 12 HOURS SCHEDULED
Status: DISCONTINUED | OUTPATIENT
Start: 2023-10-21 | End: 2023-10-21

## 2023-10-21 RX ORDER — 0.9 % SODIUM CHLORIDE 0.9 %
250 INTRAVENOUS SOLUTION INTRAVENOUS ONCE
Status: COMPLETED | OUTPATIENT
Start: 2023-10-21 | End: 2023-10-21

## 2023-10-21 RX ORDER — SODIUM CHLORIDE 0.9 % (FLUSH) 0.9 %
5-40 SYRINGE (ML) INJECTION PRN
Status: DISCONTINUED | OUTPATIENT
Start: 2023-10-21 | End: 2023-10-21

## 2023-10-21 RX ORDER — MIDODRINE HYDROCHLORIDE 5 MG/1
10 TABLET ORAL 2 TIMES DAILY WITH MEALS
Status: DISCONTINUED | OUTPATIENT
Start: 2023-10-21 | End: 2023-10-22 | Stop reason: ALTCHOICE

## 2023-10-21 RX ORDER — SODIUM CHLORIDE 9 MG/ML
INJECTION, SOLUTION INTRAVENOUS PRN
Status: DISCONTINUED | OUTPATIENT
Start: 2023-10-21 | End: 2023-10-24 | Stop reason: HOSPADM

## 2023-10-21 RX ORDER — ALBUMIN (HUMAN) 12.5 G/50ML
25 SOLUTION INTRAVENOUS EVERY 6 HOURS
Status: COMPLETED | OUTPATIENT
Start: 2023-10-21 | End: 2023-10-22

## 2023-10-21 RX ORDER — AMIODARONE HYDROCHLORIDE 200 MG/1
400 TABLET ORAL 3 TIMES DAILY
Status: DISCONTINUED | OUTPATIENT
Start: 2023-10-21 | End: 2023-10-24 | Stop reason: HOSPADM

## 2023-10-21 RX ORDER — MIDAZOLAM HYDROCHLORIDE 1 MG/ML
1 INJECTION INTRAMUSCULAR; INTRAVENOUS ONCE
Status: COMPLETED | OUTPATIENT
Start: 2023-10-21 | End: 2023-10-21

## 2023-10-21 RX ORDER — ALBUMIN (HUMAN) 12.5 G/50ML
25 SOLUTION INTRAVENOUS ONCE
Status: DISCONTINUED | OUTPATIENT
Start: 2023-10-21 | End: 2023-10-21

## 2023-10-21 RX ORDER — MIDAZOLAM HYDROCHLORIDE 2 MG/2ML
1 INJECTION, SOLUTION INTRAMUSCULAR; INTRAVENOUS ONCE
Status: COMPLETED | OUTPATIENT
Start: 2023-10-21 | End: 2023-10-21

## 2023-10-21 RX ADMIN — AMIODARONE HYDROCHLORIDE 1 MG/MIN: 50 INJECTION, SOLUTION INTRAVENOUS at 10:03

## 2023-10-21 RX ADMIN — CITALOPRAM HYDROBROMIDE 10 MG: 20 TABLET ORAL at 08:49

## 2023-10-21 RX ADMIN — ACETAMINOPHEN 650 MG: 325 TABLET ORAL at 20:55

## 2023-10-21 RX ADMIN — SACUBITRIL AND VALSARTAN 1 TABLET: 24; 26 TABLET, FILM COATED ORAL at 08:50

## 2023-10-21 RX ADMIN — PANTOPRAZOLE SODIUM 40 MG: 40 TABLET, DELAYED RELEASE ORAL at 08:49

## 2023-10-21 RX ADMIN — PRAVASTATIN SODIUM 40 MG: 40 TABLET ORAL at 20:55

## 2023-10-21 RX ADMIN — AMIODARONE HYDROCHLORIDE 1 MG/MIN: 50 INJECTION, SOLUTION INTRAVENOUS at 16:39

## 2023-10-21 RX ADMIN — SODIUM CHLORIDE, PRESERVATIVE FREE 10 ML: 5 INJECTION INTRAVENOUS at 08:47

## 2023-10-21 RX ADMIN — MIDODRINE HYDROCHLORIDE 10 MG: 5 TABLET ORAL at 16:35

## 2023-10-21 RX ADMIN — POTASSIUM CHLORIDE 10 MEQ: 7.46 INJECTION, SOLUTION INTRAVENOUS at 10:25

## 2023-10-21 RX ADMIN — SODIUM CHLORIDE, PRESERVATIVE FREE 10 ML: 5 INJECTION INTRAVENOUS at 20:56

## 2023-10-21 RX ADMIN — ALBUMIN (HUMAN) 25 G: 0.25 INJECTION, SOLUTION INTRAVENOUS at 16:55

## 2023-10-21 RX ADMIN — MIDAZOLAM HYDROCHLORIDE 1 MG: 1 INJECTION, SOLUTION INTRAMUSCULAR; INTRAVENOUS at 09:48

## 2023-10-21 RX ADMIN — APIXABAN 5 MG: 5 TABLET, FILM COATED ORAL at 11:17

## 2023-10-21 RX ADMIN — AMIODARONE HYDROCHLORIDE 1 MG/MIN: 50 INJECTION, SOLUTION INTRAVENOUS at 11:15

## 2023-10-21 RX ADMIN — APIXABAN 5 MG: 5 TABLET, FILM COATED ORAL at 20:55

## 2023-10-21 RX ADMIN — AMIODARONE HYDROCHLORIDE 150 MG: 1.5 INJECTION, SOLUTION INTRAVENOUS at 09:39

## 2023-10-21 RX ADMIN — ASPIRIN 81 MG: 81 TABLET, COATED ORAL at 08:50

## 2023-10-21 RX ADMIN — AZITHROMYCIN MONOHYDRATE 500 MG: 500 INJECTION, POWDER, LYOPHILIZED, FOR SOLUTION INTRAVENOUS at 03:13

## 2023-10-21 RX ADMIN — AMIODARONE HYDROCHLORIDE 1 MG/MIN: 50 INJECTION, SOLUTION INTRAVENOUS at 23:05

## 2023-10-21 RX ADMIN — AMIODARONE HYDROCHLORIDE 150 MG: 1.5 INJECTION, SOLUTION INTRAVENOUS at 07:04

## 2023-10-21 RX ADMIN — POTASSIUM CHLORIDE 10 MEQ: 7.46 INJECTION, SOLUTION INTRAVENOUS at 09:02

## 2023-10-21 RX ADMIN — ALBUMIN (HUMAN) 25 G: 0.25 INJECTION, SOLUTION INTRAVENOUS at 23:04

## 2023-10-21 RX ADMIN — CARVEDILOL 6.25 MG: 6.25 TABLET, FILM COATED ORAL at 09:03

## 2023-10-21 RX ADMIN — FUROSEMIDE 40 MG: 40 TABLET ORAL at 09:03

## 2023-10-21 RX ADMIN — MIDODRINE HYDROCHLORIDE 10 MG: 5 TABLET ORAL at 11:17

## 2023-10-21 RX ADMIN — AMIODARONE HYDROCHLORIDE 0.5 MG/MIN: 50 INJECTION, SOLUTION INTRAVENOUS at 08:54

## 2023-10-21 RX ADMIN — ALBUMIN (HUMAN) 25 G: 0.25 INJECTION, SOLUTION INTRAVENOUS at 11:15

## 2023-10-21 RX ADMIN — POTASSIUM CHLORIDE 10 MEQ: 7.46 INJECTION, SOLUTION INTRAVENOUS at 08:56

## 2023-10-21 RX ADMIN — SODIUM CHLORIDE 1000 MG: 900 INJECTION INTRAVENOUS at 03:00

## 2023-10-21 RX ADMIN — SODIUM CHLORIDE 250 ML: 9 INJECTION, SOLUTION INTRAVENOUS at 10:02

## 2023-10-21 RX ADMIN — AMIODARONE HYDROCHLORIDE 400 MG: 200 TABLET ORAL at 20:55

## 2023-10-21 RX ADMIN — MIDAZOLAM 1 MG: 1 INJECTION INTRAMUSCULAR; INTRAVENOUS at 09:40

## 2023-10-21 RX ADMIN — POTASSIUM CHLORIDE 40 MEQ: 750 TABLET, FILM COATED, EXTENDED RELEASE ORAL at 08:49

## 2023-10-21 RX ADMIN — AMIODARONE HYDROCHLORIDE 400 MG: 200 TABLET ORAL at 16:56

## 2023-10-21 ASSESSMENT — PAIN SCALES - GENERAL: PAINLEVEL_OUTOF10: 0

## 2023-10-21 NOTE — PROCEDURES
DCCV      Pre-op diagnosis: Atrial fibrillation    Post-op diagnosis: CV to SR    Procedure performed: DCCV, Moderate conscious sedation     Procedure Type: Emergent    Sedation: Moderate conscious sedation with IV versed. This was performed by non-anesthesia personnel and I provided direct supervision to a trained independent observer. Time under moderate sedation:  13 Min    Patient age:  78 y.o. Anticoagulation:  None    Complications: None      Protocol:  Next, hands free patches were placed in the AP position. The patient was then placed in the supine position in preparation for the cardioversion. 360 J synced shock delivered x 1 with successful restoration of NSR. The patient tolerated the procedure well without any complications at the time of this documentation. Findings as mentioned below. FINDINGS:    1. Successful DCCV to NSR      RECOMMENDATIONS:    1. Continue amio  2. Add eliquis  3.  Standard post-anesthesia care    Cole De Leon DO

## 2023-10-22 LAB
ALBUMIN SERPL-MCNC: 2.8 G/DL (ref 3.5–5)
ALBUMIN/GLOB SERPL: 0.6 (ref 1.1–2.2)
ALP SERPL-CCNC: 84 U/L (ref 45–117)
ALT SERPL-CCNC: 54 U/L (ref 12–78)
ANION GAP SERPL CALC-SCNC: 5 MMOL/L (ref 5–15)
AST SERPL-CCNC: 35 U/L (ref 15–37)
BILIRUB SERPL-MCNC: 0.8 MG/DL (ref 0.2–1)
BUN SERPL-MCNC: 15 MG/DL (ref 6–20)
BUN/CREAT SERPL: 21 (ref 12–20)
CALCIUM SERPL-MCNC: 8.8 MG/DL (ref 8.5–10.1)
CHLORIDE SERPL-SCNC: 102 MMOL/L (ref 97–108)
CO2 SERPL-SCNC: 33 MMOL/L (ref 21–32)
CREAT SERPL-MCNC: 0.72 MG/DL (ref 0.7–1.3)
EKG ATRIAL RATE: 80 BPM
EKG DIAGNOSIS: NORMAL
EKG P AXIS: 63 DEGREES
EKG P-R INTERVAL: 162 MS
EKG Q-T INTERVAL: 462 MS
EKG QRS DURATION: 86 MS
EKG QTC CALCULATION (BAZETT): 532 MS
EKG R AXIS: 8 DEGREES
EKG T AXIS: 30 DEGREES
EKG VENTRICULAR RATE: 80 BPM
ERYTHROCYTE [DISTWIDTH] IN BLOOD BY AUTOMATED COUNT: 13.7 % (ref 11.5–14.5)
GLOBULIN SER CALC-MCNC: 4.7 G/DL (ref 2–4)
GLUCOSE BLD STRIP.AUTO-MCNC: 135 MG/DL (ref 65–117)
GLUCOSE BLD STRIP.AUTO-MCNC: 137 MG/DL (ref 65–117)
GLUCOSE BLD STRIP.AUTO-MCNC: 141 MG/DL (ref 65–117)
GLUCOSE BLD STRIP.AUTO-MCNC: 172 MG/DL (ref 65–117)
GLUCOSE SERPL-MCNC: 149 MG/DL (ref 65–100)
HCT VFR BLD AUTO: 33.8 % (ref 36.6–50.3)
HGB BLD-MCNC: 10.9 G/DL (ref 12.1–17)
INR PPP: 1.5 (ref 0.9–1.1)
MCH RBC QN AUTO: 31 PG (ref 26–34)
MCHC RBC AUTO-ENTMCNC: 32.2 G/DL (ref 30–36.5)
MCV RBC AUTO: 96 FL (ref 80–99)
NRBC # BLD: 0 K/UL (ref 0–0.01)
NRBC BLD-RTO: 0 PER 100 WBC
PLATELET # BLD AUTO: 466 K/UL (ref 150–400)
PMV BLD AUTO: 9.3 FL (ref 8.9–12.9)
POTASSIUM SERPL-SCNC: 3.2 MMOL/L (ref 3.5–5.1)
PROT SERPL-MCNC: 7.5 G/DL (ref 6.4–8.2)
PROTHROMBIN TIME: 15.3 SEC (ref 9–11.1)
RBC # BLD AUTO: 3.52 M/UL (ref 4.1–5.7)
SERVICE CMNT-IMP: ABNORMAL
SODIUM SERPL-SCNC: 140 MMOL/L (ref 136–145)
WBC # BLD AUTO: 10.7 K/UL (ref 4.1–11.1)

## 2023-10-22 PROCEDURE — 82962 GLUCOSE BLOOD TEST: CPT

## 2023-10-22 PROCEDURE — 93005 ELECTROCARDIOGRAM TRACING: CPT | Performed by: GENERAL ACUTE CARE HOSPITAL

## 2023-10-22 PROCEDURE — 6360000002 HC RX W HCPCS: Performed by: GENERAL ACUTE CARE HOSPITAL

## 2023-10-22 PROCEDURE — 6370000000 HC RX 637 (ALT 250 FOR IP): Performed by: INTERNAL MEDICINE

## 2023-10-22 PROCEDURE — P9047 ALBUMIN (HUMAN), 25%, 50ML: HCPCS | Performed by: GENERAL ACUTE CARE HOSPITAL

## 2023-10-22 PROCEDURE — 6360000002 HC RX W HCPCS: Performed by: STUDENT IN AN ORGANIZED HEALTH CARE EDUCATION/TRAINING PROGRAM

## 2023-10-22 PROCEDURE — 85610 PROTHROMBIN TIME: CPT

## 2023-10-22 PROCEDURE — 2580000003 HC RX 258: Performed by: INTERNAL MEDICINE

## 2023-10-22 PROCEDURE — 36415 COLL VENOUS BLD VENIPUNCTURE: CPT

## 2023-10-22 PROCEDURE — 2060000000 HC ICU INTERMEDIATE R&B

## 2023-10-22 PROCEDURE — 6370000000 HC RX 637 (ALT 250 FOR IP): Performed by: GENERAL ACUTE CARE HOSPITAL

## 2023-10-22 PROCEDURE — 2700000000 HC OXYGEN THERAPY PER DAY

## 2023-10-22 PROCEDURE — 85027 COMPLETE CBC AUTOMATED: CPT

## 2023-10-22 PROCEDURE — 2580000003 HC RX 258: Performed by: GENERAL ACUTE CARE HOSPITAL

## 2023-10-22 PROCEDURE — 80053 COMPREHEN METABOLIC PANEL: CPT

## 2023-10-22 PROCEDURE — 2580000003 HC RX 258: Performed by: STUDENT IN AN ORGANIZED HEALTH CARE EDUCATION/TRAINING PROGRAM

## 2023-10-22 RX ORDER — POTASSIUM CHLORIDE 750 MG/1
40 TABLET, FILM COATED, EXTENDED RELEASE ORAL ONCE
Status: COMPLETED | OUTPATIENT
Start: 2023-10-22 | End: 2023-10-22

## 2023-10-22 RX ADMIN — SODIUM CHLORIDE, PRESERVATIVE FREE 10 ML: 5 INJECTION INTRAVENOUS at 20:01

## 2023-10-22 RX ADMIN — AMIODARONE HYDROCHLORIDE 400 MG: 200 TABLET ORAL at 10:55

## 2023-10-22 RX ADMIN — CARVEDILOL 6.25 MG: 6.25 TABLET, FILM COATED ORAL at 17:37

## 2023-10-22 RX ADMIN — POTASSIUM CHLORIDE 40 MEQ: 750 TABLET, EXTENDED RELEASE ORAL at 17:43

## 2023-10-22 RX ADMIN — AZITHROMYCIN MONOHYDRATE 500 MG: 500 INJECTION, POWDER, LYOPHILIZED, FOR SOLUTION INTRAVENOUS at 03:41

## 2023-10-22 RX ADMIN — SODIUM CHLORIDE 1000 MG: 900 INJECTION INTRAVENOUS at 03:08

## 2023-10-22 RX ADMIN — POTASSIUM CHLORIDE 40 MEQ: 750 TABLET, EXTENDED RELEASE ORAL at 13:35

## 2023-10-22 RX ADMIN — AMIODARONE HYDROCHLORIDE 400 MG: 200 TABLET ORAL at 20:01

## 2023-10-22 RX ADMIN — APIXABAN 5 MG: 5 TABLET, FILM COATED ORAL at 20:01

## 2023-10-22 RX ADMIN — AMIODARONE HYDROCHLORIDE 1 MG/MIN: 50 INJECTION, SOLUTION INTRAVENOUS at 06:39

## 2023-10-22 RX ADMIN — PRAVASTATIN SODIUM 40 MG: 40 TABLET ORAL at 20:01

## 2023-10-22 RX ADMIN — FUROSEMIDE 40 MG: 40 TABLET ORAL at 10:54

## 2023-10-22 RX ADMIN — APIXABAN 5 MG: 5 TABLET, FILM COATED ORAL at 10:53

## 2023-10-22 RX ADMIN — CITALOPRAM HYDROBROMIDE 10 MG: 20 TABLET ORAL at 10:54

## 2023-10-22 RX ADMIN — AMIODARONE HYDROCHLORIDE 400 MG: 200 TABLET ORAL at 16:01

## 2023-10-22 RX ADMIN — PANTOPRAZOLE SODIUM 40 MG: 40 TABLET, DELAYED RELEASE ORAL at 10:57

## 2023-10-22 RX ADMIN — ALBUMIN (HUMAN) 25 G: 0.25 INJECTION, SOLUTION INTRAVENOUS at 04:44

## 2023-10-23 LAB
ANION GAP SERPL CALC-SCNC: 3 MMOL/L (ref 5–15)
BUN SERPL-MCNC: 11 MG/DL (ref 6–20)
BUN/CREAT SERPL: 15 (ref 12–20)
CALCIUM SERPL-MCNC: 8.8 MG/DL (ref 8.5–10.1)
CHLORIDE SERPL-SCNC: 103 MMOL/L (ref 97–108)
CO2 SERPL-SCNC: 33 MMOL/L (ref 21–32)
CREAT SERPL-MCNC: 0.71 MG/DL (ref 0.7–1.3)
EKG ATRIAL RATE: 80 BPM
EKG DIAGNOSIS: NORMAL
EKG P AXIS: 63 DEGREES
EKG P-R INTERVAL: 162 MS
EKG Q-T INTERVAL: 462 MS
EKG QRS DURATION: 86 MS
EKG QTC CALCULATION (BAZETT): 532 MS
EKG R AXIS: 8 DEGREES
EKG T AXIS: 30 DEGREES
EKG VENTRICULAR RATE: 80 BPM
GLUCOSE BLD STRIP.AUTO-MCNC: 131 MG/DL (ref 65–117)
GLUCOSE BLD STRIP.AUTO-MCNC: 131 MG/DL (ref 65–117)
GLUCOSE BLD STRIP.AUTO-MCNC: 149 MG/DL (ref 65–117)
GLUCOSE SERPL-MCNC: 131 MG/DL (ref 65–100)
POTASSIUM SERPL-SCNC: 3.4 MMOL/L (ref 3.5–5.1)
SERVICE CMNT-IMP: ABNORMAL
SODIUM SERPL-SCNC: 139 MMOL/L (ref 136–145)

## 2023-10-23 PROCEDURE — 36415 COLL VENOUS BLD VENIPUNCTURE: CPT

## 2023-10-23 PROCEDURE — 2580000003 HC RX 258: Performed by: STUDENT IN AN ORGANIZED HEALTH CARE EDUCATION/TRAINING PROGRAM

## 2023-10-23 PROCEDURE — 2700000000 HC OXYGEN THERAPY PER DAY

## 2023-10-23 PROCEDURE — 82962 GLUCOSE BLOOD TEST: CPT

## 2023-10-23 PROCEDURE — 6370000000 HC RX 637 (ALT 250 FOR IP): Performed by: INTERNAL MEDICINE

## 2023-10-23 PROCEDURE — 6360000002 HC RX W HCPCS: Performed by: STUDENT IN AN ORGANIZED HEALTH CARE EDUCATION/TRAINING PROGRAM

## 2023-10-23 PROCEDURE — 2060000000 HC ICU INTERMEDIATE R&B

## 2023-10-23 PROCEDURE — 2580000003 HC RX 258: Performed by: INTERNAL MEDICINE

## 2023-10-23 PROCEDURE — 6370000000 HC RX 637 (ALT 250 FOR IP): Performed by: GENERAL ACUTE CARE HOSPITAL

## 2023-10-23 PROCEDURE — 80048 BASIC METABOLIC PNL TOTAL CA: CPT

## 2023-10-23 PROCEDURE — 97535 SELF CARE MNGMENT TRAINING: CPT

## 2023-10-23 PROCEDURE — 97530 THERAPEUTIC ACTIVITIES: CPT

## 2023-10-23 RX ORDER — POTASSIUM CHLORIDE 750 MG/1
10 TABLET, FILM COATED, EXTENDED RELEASE ORAL DAILY
Status: DISCONTINUED | OUTPATIENT
Start: 2023-10-24 | End: 2023-10-24 | Stop reason: HOSPADM

## 2023-10-23 RX ORDER — POTASSIUM CHLORIDE 750 MG/1
40 TABLET, FILM COATED, EXTENDED RELEASE ORAL ONCE
Status: COMPLETED | OUTPATIENT
Start: 2023-10-23 | End: 2023-10-23

## 2023-10-23 RX ADMIN — PRAVASTATIN SODIUM 40 MG: 40 TABLET ORAL at 20:13

## 2023-10-23 RX ADMIN — FUROSEMIDE 40 MG: 40 TABLET ORAL at 09:39

## 2023-10-23 RX ADMIN — CARVEDILOL 6.25 MG: 6.25 TABLET, FILM COATED ORAL at 18:16

## 2023-10-23 RX ADMIN — AZITHROMYCIN MONOHYDRATE 500 MG: 500 INJECTION, POWDER, LYOPHILIZED, FOR SOLUTION INTRAVENOUS at 03:26

## 2023-10-23 RX ADMIN — SACUBITRIL AND VALSARTAN 1 TABLET: 24; 26 TABLET, FILM COATED ORAL at 09:39

## 2023-10-23 RX ADMIN — SODIUM CHLORIDE, PRESERVATIVE FREE 10 ML: 5 INJECTION INTRAVENOUS at 09:40

## 2023-10-23 RX ADMIN — SODIUM CHLORIDE 1000 MG: 900 INJECTION INTRAVENOUS at 03:00

## 2023-10-23 RX ADMIN — APIXABAN 5 MG: 5 TABLET, FILM COATED ORAL at 20:14

## 2023-10-23 RX ADMIN — AMIODARONE HYDROCHLORIDE 400 MG: 200 TABLET ORAL at 15:30

## 2023-10-23 RX ADMIN — POTASSIUM CHLORIDE 40 MEQ: 750 TABLET, FILM COATED, EXTENDED RELEASE ORAL at 09:15

## 2023-10-23 RX ADMIN — SACUBITRIL AND VALSARTAN 1 TABLET: 24; 26 TABLET, FILM COATED ORAL at 20:13

## 2023-10-23 RX ADMIN — CARVEDILOL 6.25 MG: 6.25 TABLET, FILM COATED ORAL at 09:40

## 2023-10-23 RX ADMIN — CITALOPRAM HYDROBROMIDE 10 MG: 20 TABLET ORAL at 09:39

## 2023-10-23 RX ADMIN — APIXABAN 5 MG: 5 TABLET, FILM COATED ORAL at 09:30

## 2023-10-23 RX ADMIN — SODIUM CHLORIDE, PRESERVATIVE FREE 10 ML: 5 INJECTION INTRAVENOUS at 20:14

## 2023-10-23 RX ADMIN — PANTOPRAZOLE SODIUM 40 MG: 40 TABLET, DELAYED RELEASE ORAL at 09:38

## 2023-10-23 RX ADMIN — AMIODARONE HYDROCHLORIDE 400 MG: 200 TABLET ORAL at 09:39

## 2023-10-23 RX ADMIN — AMIODARONE HYDROCHLORIDE 400 MG: 200 TABLET ORAL at 20:14

## 2023-10-23 ASSESSMENT — PAIN SCALES - GENERAL
PAINLEVEL_OUTOF10: 0

## 2023-10-23 ASSESSMENT — PAIN SCALES - WONG BAKER: WONGBAKER_NUMERICALRESPONSE: 0

## 2023-10-24 VITALS
DIASTOLIC BLOOD PRESSURE: 84 MMHG | TEMPERATURE: 97.9 F | SYSTOLIC BLOOD PRESSURE: 148 MMHG | OXYGEN SATURATION: 95 % | RESPIRATION RATE: 20 BRPM | WEIGHT: 203.48 LBS | HEIGHT: 67 IN | HEART RATE: 82 BPM | BODY MASS INDEX: 31.94 KG/M2

## 2023-10-24 LAB
ANION GAP SERPL CALC-SCNC: 7 MMOL/L (ref 5–15)
BACTERIA SPEC CULT: NORMAL
BACTERIA SPEC CULT: NORMAL
BUN SERPL-MCNC: 13 MG/DL (ref 6–20)
BUN/CREAT SERPL: 16 (ref 12–20)
CALCIUM SERPL-MCNC: 8.9 MG/DL (ref 8.5–10.1)
CHLORIDE SERPL-SCNC: 104 MMOL/L (ref 97–108)
CO2 SERPL-SCNC: 31 MMOL/L (ref 21–32)
CREAT SERPL-MCNC: 0.81 MG/DL (ref 0.7–1.3)
GLUCOSE BLD STRIP.AUTO-MCNC: 131 MG/DL (ref 65–117)
GLUCOSE BLD STRIP.AUTO-MCNC: 165 MG/DL (ref 65–117)
GLUCOSE SERPL-MCNC: 128 MG/DL (ref 65–100)
POTASSIUM SERPL-SCNC: 3.3 MMOL/L (ref 3.5–5.1)
SERVICE CMNT-IMP: ABNORMAL
SERVICE CMNT-IMP: ABNORMAL
SERVICE CMNT-IMP: NORMAL
SERVICE CMNT-IMP: NORMAL
SODIUM SERPL-SCNC: 142 MMOL/L (ref 136–145)

## 2023-10-24 PROCEDURE — 6370000000 HC RX 637 (ALT 250 FOR IP): Performed by: INTERNAL MEDICINE

## 2023-10-24 PROCEDURE — 36415 COLL VENOUS BLD VENIPUNCTURE: CPT

## 2023-10-24 PROCEDURE — 6360000002 HC RX W HCPCS: Performed by: STUDENT IN AN ORGANIZED HEALTH CARE EDUCATION/TRAINING PROGRAM

## 2023-10-24 PROCEDURE — 2580000003 HC RX 258: Performed by: STUDENT IN AN ORGANIZED HEALTH CARE EDUCATION/TRAINING PROGRAM

## 2023-10-24 PROCEDURE — 94761 N-INVAS EAR/PLS OXIMETRY MLT: CPT

## 2023-10-24 PROCEDURE — 82962 GLUCOSE BLOOD TEST: CPT

## 2023-10-24 PROCEDURE — 80048 BASIC METABOLIC PNL TOTAL CA: CPT

## 2023-10-24 PROCEDURE — 2580000003 HC RX 258: Performed by: INTERNAL MEDICINE

## 2023-10-24 RX ORDER — FUROSEMIDE 40 MG/1
40 TABLET ORAL DAILY
Qty: 60 TABLET | Refills: 3 | Status: SHIPPED | OUTPATIENT
Start: 2023-10-25

## 2023-10-24 RX ORDER — POTASSIUM CHLORIDE 750 MG/1
40 TABLET, FILM COATED, EXTENDED RELEASE ORAL ONCE
Status: COMPLETED | OUTPATIENT
Start: 2023-10-24 | End: 2023-10-24

## 2023-10-24 RX ORDER — CARVEDILOL 6.25 MG/1
6.25 TABLET ORAL 2 TIMES DAILY WITH MEALS
Qty: 60 TABLET | Refills: 2 | Status: SHIPPED | OUTPATIENT
Start: 2023-10-24

## 2023-10-24 RX ORDER — POTASSIUM CHLORIDE 1500 MG/1
20 TABLET, EXTENDED RELEASE ORAL DAILY
Qty: 30 TABLET | Refills: 2 | Status: SHIPPED | OUTPATIENT
Start: 2023-10-25 | End: 2023-11-24

## 2023-10-24 RX ORDER — AMIODARONE HYDROCHLORIDE 200 MG/1
200 TABLET ORAL DAILY
Qty: 30 TABLET | Refills: 3 | Status: SHIPPED | OUTPATIENT
Start: 2023-10-24 | End: 2023-11-23

## 2023-10-24 RX ADMIN — POTASSIUM CHLORIDE 40 MEQ: 750 TABLET, FILM COATED, EXTENDED RELEASE ORAL at 10:43

## 2023-10-24 RX ADMIN — CITALOPRAM HYDROBROMIDE 10 MG: 20 TABLET ORAL at 10:42

## 2023-10-24 RX ADMIN — FUROSEMIDE 40 MG: 40 TABLET ORAL at 10:43

## 2023-10-24 RX ADMIN — AZITHROMYCIN MONOHYDRATE 500 MG: 500 INJECTION, POWDER, LYOPHILIZED, FOR SOLUTION INTRAVENOUS at 03:52

## 2023-10-24 RX ADMIN — PANTOPRAZOLE SODIUM 40 MG: 40 TABLET, DELAYED RELEASE ORAL at 10:42

## 2023-10-24 RX ADMIN — SACUBITRIL AND VALSARTAN 1 TABLET: 24; 26 TABLET, FILM COATED ORAL at 10:43

## 2023-10-24 RX ADMIN — AMIODARONE HYDROCHLORIDE 400 MG: 200 TABLET ORAL at 14:18

## 2023-10-24 RX ADMIN — SODIUM CHLORIDE, PRESERVATIVE FREE 10 ML: 5 INJECTION INTRAVENOUS at 10:45

## 2023-10-24 RX ADMIN — AMIODARONE HYDROCHLORIDE 400 MG: 200 TABLET ORAL at 10:42

## 2023-10-24 RX ADMIN — CARVEDILOL 6.25 MG: 6.25 TABLET, FILM COATED ORAL at 10:44

## 2023-10-24 RX ADMIN — SODIUM CHLORIDE 1000 MG: 900 INJECTION INTRAVENOUS at 03:16

## 2023-10-24 RX ADMIN — APIXABAN 5 MG: 5 TABLET, FILM COATED ORAL at 10:42

## 2023-10-24 RX ADMIN — POTASSIUM CHLORIDE 10 MEQ: 750 TABLET, FILM COATED, EXTENDED RELEASE ORAL at 10:45

## 2023-10-24 ASSESSMENT — PAIN SCALES - GENERAL: PAINLEVEL_OUTOF10: 0

## 2023-10-24 NOTE — CARE COORDINATION
Transition of Care Plan:    RUR: 17%  Prior Level of Functioning: Assistance  [facility]  If SNF or IPR: Date FOC offered: N/A  Date FOC received: N/A  Accepting facility: N/A  Date authorization started with reference number: N/A  Date authorization received and expires: N/A  Follow up appointments: PCP  DME needed: No  Transportation at discharge: [facility] Bonilla will transport at 4:00 pm  IM/IMM Medicare/ letter given: Given  Is patient a  and connected with VA? No   If yes, was [employer] transfer form completed and VA notified? Caregiver Contact: Pt's spouse-Jodie Vaughn   Discharge Caregiver contacted prior to discharge? Pt's spouse was contacted  Care Conference needed? No  Barriers to discharge: N/A    Pt is clear from CM standpoint for d/c. [facility] will be transporting at 4:00 pm       10/24/23 8886   [address] Resource Information Provided? No   Mode of Transport at Discharge BLS   Confirm Follow Up Transport Family   Condition of Participation: Discharge Planning   The Plan for Transition of Care is related to the following treatment goals: Home health services-[facility] [address]   The Patient and/or Patient Representative was provided with a Choice of Provider? Patient Representative   Name of the Patient Representative who was provided with the Choice of Provider and agrees with the Discharge Plan? Pt's spouse Arthur Salas   The Patient and/Or Patient Representative agree with the Discharge Plan? Yes   Freedom of Choice list was provided with basic dialogue that supports the patient's individualized plan of care/goals, treatment preferences, and shares the quality data associated with the providers?   Yes     Elise Sprague

## 2023-11-01 ENCOUNTER — HOSPITAL ENCOUNTER (EMERGENCY)
Facility: HOSPITAL | Age: 78
Discharge: ANOTHER HOSPITAL | End: 2023-11-01
Attending: EMERGENCY MEDICINE
Payer: MEDICARE

## 2023-11-01 ENCOUNTER — APPOINTMENT (OUTPATIENT)
Facility: HOSPITAL | Age: 78
End: 2023-11-01
Payer: MEDICARE

## 2023-11-01 ENCOUNTER — APPOINTMENT (OUTPATIENT)
Facility: HOSPITAL | Age: 78
DRG: 187 | End: 2023-11-01
Attending: INTERNAL MEDICINE
Payer: MEDICARE

## 2023-11-01 ENCOUNTER — HOSPITAL ENCOUNTER (INPATIENT)
Facility: HOSPITAL | Age: 78
LOS: 4 days | Discharge: HOME HEALTH | DRG: 187 | End: 2023-11-05
Attending: EMERGENCY MEDICINE | Admitting: INTERNAL MEDICINE
Payer: MEDICARE

## 2023-11-01 ENCOUNTER — APPOINTMENT (OUTPATIENT)
Facility: HOSPITAL | Age: 78
DRG: 187 | End: 2023-11-01
Payer: MEDICARE

## 2023-11-01 VITALS
BODY MASS INDEX: 29.24 KG/M2 | TEMPERATURE: 97.8 F | HEIGHT: 67 IN | SYSTOLIC BLOOD PRESSURE: 115 MMHG | OXYGEN SATURATION: 98 % | HEART RATE: 73 BPM | WEIGHT: 186.3 LBS | DIASTOLIC BLOOD PRESSURE: 63 MMHG | RESPIRATION RATE: 26 BRPM

## 2023-11-01 DIAGNOSIS — J90 BILATERAL PLEURAL EFFUSION: Primary | ICD-10-CM

## 2023-11-01 DIAGNOSIS — R06.02 SHORTNESS OF BREATH: ICD-10-CM

## 2023-11-01 DIAGNOSIS — I31.39 PERICARDIAL EFFUSION: ICD-10-CM

## 2023-11-01 DIAGNOSIS — J18.9 PNEUMONIA OF BOTH LOWER LOBES DUE TO INFECTIOUS ORGANISM: ICD-10-CM

## 2023-11-01 DIAGNOSIS — R62.7 FAILURE TO THRIVE IN ADULT: ICD-10-CM

## 2023-11-01 DIAGNOSIS — J90 PLEURAL EFFUSION: Primary | ICD-10-CM

## 2023-11-01 DIAGNOSIS — K59.00 CONSTIPATION, UNSPECIFIED CONSTIPATION TYPE: ICD-10-CM

## 2023-11-01 DIAGNOSIS — R11.2 NAUSEA AND VOMITING, UNSPECIFIED VOMITING TYPE: ICD-10-CM

## 2023-11-01 PROBLEM — J86.9 EMPYEMA (HCC): Status: ACTIVE | Noted: 2023-11-01

## 2023-11-01 LAB
ANION GAP SERPL CALC-SCNC: 10 MMOL/L (ref 5–15)
APPEARANCE UR: CLEAR
BACTERIA URNS QL MICRO: ABNORMAL /HPF
BASOPHILS # BLD: 0 K/UL (ref 0–0.1)
BASOPHILS NFR BLD: 0 % (ref 0–1)
BILIRUB UR QL CFM: POSITIVE
BNP SERPL-MCNC: 171 PG/ML
BNP SERPL-MCNC: 203 PG/ML
BUN SERPL-MCNC: 14 MG/DL (ref 6–20)
BUN/CREAT SERPL: 14 (ref 12–20)
CA-I BLD-MCNC: 9.3 MG/DL (ref 8.5–10.1)
CHLORIDE SERPL-SCNC: 101 MMOL/L (ref 97–108)
CO2 SERPL-SCNC: 27 MMOL/L (ref 21–32)
COLOR UR: ABNORMAL
CREAT SERPL-MCNC: 0.99 MG/DL (ref 0.7–1.3)
DIFFERENTIAL METHOD BLD: ABNORMAL
EOSINOPHIL # BLD: 0.2 K/UL (ref 0–0.4)
EOSINOPHIL NFR BLD: 2 % (ref 0–7)
ERYTHROCYTE [DISTWIDTH] IN BLOOD BY AUTOMATED COUNT: 14 % (ref 11.5–14.5)
EST. AVERAGE GLUCOSE BLD GHB EST-MCNC: 117 MG/DL
GLUCOSE BLD STRIP.AUTO-MCNC: 112 MG/DL (ref 65–100)
GLUCOSE BLD STRIP.AUTO-MCNC: 159 MG/DL (ref 65–100)
GLUCOSE BLD STRIP.AUTO-MCNC: 174 MG/DL (ref 65–100)
GLUCOSE SERPL-MCNC: 124 MG/DL (ref 65–100)
GLUCOSE UR STRIP.AUTO-MCNC: NEGATIVE MG/DL
HBA1C MFR BLD: 5.7 % (ref 4–5.6)
HCT VFR BLD AUTO: 36.9 % (ref 36.6–50.3)
HGB BLD-MCNC: 12 G/DL (ref 12.1–17)
HGB UR QL STRIP: NEGATIVE
IMM GRANULOCYTES # BLD AUTO: 0 K/UL (ref 0–0.04)
IMM GRANULOCYTES NFR BLD AUTO: 0 % (ref 0–0.5)
KETONES UR QL STRIP.AUTO: NEGATIVE MG/DL
LACTATE SERPL-SCNC: 1.1 MMOL/L (ref 0.4–2)
LACTATE SERPL-SCNC: 1.6 MMOL/L (ref 0.4–2)
LEUKOCYTE ESTERASE UR QL STRIP.AUTO: NEGATIVE
LYMPHOCYTES # BLD: 1.7 K/UL (ref 0.8–3.5)
LYMPHOCYTES NFR BLD: 17 % (ref 12–49)
MCH RBC QN AUTO: 31.3 PG (ref 26–34)
MCHC RBC AUTO-ENTMCNC: 32.5 G/DL (ref 30–36.5)
MCV RBC AUTO: 96.3 FL (ref 80–99)
MONOCYTES # BLD: 1.2 K/UL (ref 0–1)
MONOCYTES NFR BLD: 12 % (ref 5–13)
NEUTS SEG # BLD: 7 K/UL (ref 1.8–8)
NEUTS SEG NFR BLD: 69 % (ref 32–75)
NITRITE UR QL STRIP.AUTO: NEGATIVE
NRBC # BLD: 0 K/UL (ref 0–0.01)
NRBC BLD-RTO: 0 PER 100 WBC
PERFORMED BY:: ABNORMAL
PH UR STRIP: 5 (ref 5–8)
PLATELET # BLD AUTO: 463 K/UL (ref 150–400)
PMV BLD AUTO: 10.3 FL (ref 8.9–12.9)
POTASSIUM SERPL-SCNC: 3.7 MMOL/L (ref 3.5–5.1)
PROCALCITONIN SERPL-MCNC: <0.05 NG/ML
PROT UR STRIP-MCNC: ABNORMAL MG/DL
RBC # BLD AUTO: 3.83 M/UL (ref 4.1–5.7)
RBC #/AREA URNS HPF: ABNORMAL /HPF (ref 0–3)
RBC MORPH BLD: ABNORMAL
SODIUM SERPL-SCNC: 138 MMOL/L (ref 136–145)
SP GR UR REFRACTOMETRY: >1.03 (ref 1–1.03)
TROPONIN I SERPL HS-MCNC: 10 NG/L (ref 0–76)
TROPONIN I SERPL HS-MCNC: 12 NG/L (ref 0–76)
TROPONIN I SERPL HS-MCNC: 13 NG/L (ref 0–76)
UROBILINOGEN UR QL STRIP.AUTO: 1 EU/DL (ref 0.2–1)
WBC # BLD AUTO: 10.1 K/UL (ref 4.1–11.1)
WBC URNS QL MICRO: ABNORMAL /HPF (ref 0–5)

## 2023-11-01 PROCEDURE — 36415 COLL VENOUS BLD VENIPUNCTURE: CPT

## 2023-11-01 PROCEDURE — 0W9B3ZZ DRAINAGE OF LEFT PLEURAL CAVITY, PERCUTANEOUS APPROACH: ICD-10-PCS | Performed by: STUDENT IN AN ORGANIZED HEALTH CARE EDUCATION/TRAINING PROGRAM

## 2023-11-01 PROCEDURE — 83605 ASSAY OF LACTIC ACID: CPT

## 2023-11-01 PROCEDURE — 88112 CYTOPATH CELL ENHANCE TECH: CPT

## 2023-11-01 PROCEDURE — 84157 ASSAY OF PROTEIN OTHER: CPT

## 2023-11-01 PROCEDURE — 71045 X-RAY EXAM CHEST 1 VIEW: CPT

## 2023-11-01 PROCEDURE — 83880 ASSAY OF NATRIURETIC PEPTIDE: CPT

## 2023-11-01 PROCEDURE — 82962 GLUCOSE BLOOD TEST: CPT

## 2023-11-01 PROCEDURE — 2580000003 HC RX 258: Performed by: INTERNAL MEDICINE

## 2023-11-01 PROCEDURE — 6360000002 HC RX W HCPCS: Performed by: INTERNAL MEDICINE

## 2023-11-01 PROCEDURE — 99285 EMERGENCY DEPT VISIT HI MDM: CPT

## 2023-11-01 PROCEDURE — 85025 COMPLETE CBC W/AUTO DIFF WBC: CPT

## 2023-11-01 PROCEDURE — 80048 BASIC METABOLIC PNL TOTAL CA: CPT

## 2023-11-01 PROCEDURE — 84145 PROCALCITONIN (PCT): CPT

## 2023-11-01 PROCEDURE — 96365 THER/PROPH/DIAG IV INF INIT: CPT

## 2023-11-01 PROCEDURE — 2500000003 HC RX 250 WO HCPCS: Performed by: INTERNAL MEDICINE

## 2023-11-01 PROCEDURE — 2580000003 HC RX 258: Performed by: STUDENT IN AN ORGANIZED HEALTH CARE EDUCATION/TRAINING PROGRAM

## 2023-11-01 PROCEDURE — 93005 ELECTROCARDIOGRAM TRACING: CPT | Performed by: EMERGENCY MEDICINE

## 2023-11-01 PROCEDURE — 6360000002 HC RX W HCPCS: Performed by: STUDENT IN AN ORGANIZED HEALTH CARE EDUCATION/TRAINING PROGRAM

## 2023-11-01 PROCEDURE — 1100000000 HC RM PRIVATE

## 2023-11-01 PROCEDURE — 84484 ASSAY OF TROPONIN QUANT: CPT

## 2023-11-01 PROCEDURE — 96375 TX/PRO/DX INJ NEW DRUG ADDON: CPT

## 2023-11-01 PROCEDURE — 74177 CT ABD & PELVIS W/CONTRAST: CPT

## 2023-11-01 PROCEDURE — 87070 CULTURE OTHR SPECIMN AEROBIC: CPT

## 2023-11-01 PROCEDURE — 83036 HEMOGLOBIN GLYCOSYLATED A1C: CPT

## 2023-11-01 PROCEDURE — 87205 SMEAR GRAM STAIN: CPT

## 2023-11-01 PROCEDURE — 6360000004 HC RX CONTRAST MEDICATION: Performed by: EMERGENCY MEDICINE

## 2023-11-01 PROCEDURE — C1729 CATH, DRAINAGE: HCPCS

## 2023-11-01 PROCEDURE — 81001 URINALYSIS AUTO W/SCOPE: CPT

## 2023-11-01 PROCEDURE — 6370000000 HC RX 637 (ALT 250 FOR IP): Performed by: INTERNAL MEDICINE

## 2023-11-01 PROCEDURE — 6370000000 HC RX 637 (ALT 250 FOR IP): Performed by: EMERGENCY MEDICINE

## 2023-11-01 PROCEDURE — 87040 BLOOD CULTURE FOR BACTERIA: CPT

## 2023-11-01 PROCEDURE — 88305 TISSUE EXAM BY PATHOLOGIST: CPT

## 2023-11-01 RX ORDER — INSULIN LISPRO 100 [IU]/ML
0-4 INJECTION, SOLUTION INTRAVENOUS; SUBCUTANEOUS NIGHTLY
Status: DISCONTINUED | OUTPATIENT
Start: 2023-11-01 | End: 2023-11-05 | Stop reason: HOSPADM

## 2023-11-01 RX ORDER — POTASSIUM CHLORIDE 20 MEQ/1
40 TABLET, EXTENDED RELEASE ORAL PRN
Status: DISCONTINUED | OUTPATIENT
Start: 2023-11-01 | End: 2023-11-05 | Stop reason: HOSPADM

## 2023-11-01 RX ORDER — SODIUM CHLORIDE 9 MG/ML
INJECTION, SOLUTION INTRAVENOUS PRN
Status: DISCONTINUED | OUTPATIENT
Start: 2023-11-01 | End: 2023-11-05 | Stop reason: HOSPADM

## 2023-11-01 RX ORDER — CARVEDILOL 3.12 MG/1
6.25 TABLET ORAL 2 TIMES DAILY WITH MEALS
Status: DISCONTINUED | OUTPATIENT
Start: 2023-11-01 | End: 2023-11-01

## 2023-11-01 RX ORDER — AMIODARONE HYDROCHLORIDE 200 MG/1
200 TABLET ORAL DAILY
Status: DISCONTINUED | OUTPATIENT
Start: 2023-11-01 | End: 2023-11-05 | Stop reason: HOSPADM

## 2023-11-01 RX ORDER — ONDANSETRON 4 MG/1
4 TABLET, ORALLY DISINTEGRATING ORAL EVERY 8 HOURS PRN
Status: DISCONTINUED | OUTPATIENT
Start: 2023-11-01 | End: 2023-11-05 | Stop reason: HOSPADM

## 2023-11-01 RX ORDER — HYDROMORPHONE HYDROCHLORIDE 1 MG/ML
0.25 INJECTION, SOLUTION INTRAMUSCULAR; INTRAVENOUS; SUBCUTANEOUS
Status: COMPLETED | OUTPATIENT
Start: 2023-11-01 | End: 2023-11-01

## 2023-11-01 RX ORDER — INSULIN LISPRO 100 [IU]/ML
0-8 INJECTION, SOLUTION INTRAVENOUS; SUBCUTANEOUS
Status: DISCONTINUED | OUTPATIENT
Start: 2023-11-01 | End: 2023-11-05 | Stop reason: HOSPADM

## 2023-11-01 RX ORDER — SODIUM CHLORIDE 0.9 % (FLUSH) 0.9 %
5-40 SYRINGE (ML) INJECTION PRN
Status: DISCONTINUED | OUTPATIENT
Start: 2023-11-01 | End: 2023-11-05 | Stop reason: HOSPADM

## 2023-11-01 RX ORDER — PRAVASTATIN SODIUM 40 MG
40 TABLET ORAL NIGHTLY
Status: DISCONTINUED | OUTPATIENT
Start: 2023-11-01 | End: 2023-11-05 | Stop reason: HOSPADM

## 2023-11-01 RX ORDER — PANTOPRAZOLE SODIUM 40 MG/1
40 TABLET, DELAYED RELEASE ORAL
Status: DISCONTINUED | OUTPATIENT
Start: 2023-11-01 | End: 2023-11-05 | Stop reason: HOSPADM

## 2023-11-01 RX ORDER — ONDANSETRON 2 MG/ML
4 INJECTION INTRAMUSCULAR; INTRAVENOUS ONCE
Status: COMPLETED | OUTPATIENT
Start: 2023-11-01 | End: 2023-11-01

## 2023-11-01 RX ORDER — CITALOPRAM HYDROBROMIDE 10 MG/1
10 TABLET ORAL DAILY
Status: DISCONTINUED | OUTPATIENT
Start: 2023-11-01 | End: 2023-11-05 | Stop reason: HOSPADM

## 2023-11-01 RX ORDER — LIDOCAINE HYDROCHLORIDE 20 MG/ML
15 SOLUTION OROPHARYNGEAL
Status: COMPLETED | OUTPATIENT
Start: 2023-11-01 | End: 2023-11-01

## 2023-11-01 RX ORDER — ASPIRIN 81 MG/1
324 TABLET, CHEWABLE ORAL
Status: COMPLETED | OUTPATIENT
Start: 2023-11-01 | End: 2023-11-01

## 2023-11-01 RX ORDER — DEXTROSE MONOHYDRATE 100 MG/ML
INJECTION, SOLUTION INTRAVENOUS CONTINUOUS PRN
Status: DISCONTINUED | OUTPATIENT
Start: 2023-11-01 | End: 2023-11-05 | Stop reason: HOSPADM

## 2023-11-01 RX ORDER — POLYETHYLENE GLYCOL 3350 17 G/17G
17 POWDER, FOR SOLUTION ORAL DAILY PRN
Status: DISCONTINUED | OUTPATIENT
Start: 2023-11-01 | End: 2023-11-05 | Stop reason: HOSPADM

## 2023-11-01 RX ORDER — ACETAMINOPHEN 325 MG/1
650 TABLET ORAL EVERY 6 HOURS PRN
Status: DISCONTINUED | OUTPATIENT
Start: 2023-11-01 | End: 2023-11-05 | Stop reason: HOSPADM

## 2023-11-01 RX ORDER — MAGNESIUM HYDROXIDE/ALUMINUM HYDROXICE/SIMETHICONE 120; 1200; 1200 MG/30ML; MG/30ML; MG/30ML
30 SUSPENSION ORAL
Status: COMPLETED | OUTPATIENT
Start: 2023-11-01 | End: 2023-11-01

## 2023-11-01 RX ORDER — ONDANSETRON 2 MG/ML
4 INJECTION INTRAMUSCULAR; INTRAVENOUS EVERY 6 HOURS PRN
Status: DISCONTINUED | OUTPATIENT
Start: 2023-11-01 | End: 2023-11-05 | Stop reason: HOSPADM

## 2023-11-01 RX ORDER — FUROSEMIDE 40 MG/1
40 TABLET ORAL DAILY
Status: DISCONTINUED | OUTPATIENT
Start: 2023-11-01 | End: 2023-11-05 | Stop reason: HOSPADM

## 2023-11-01 RX ORDER — MAGNESIUM SULFATE IN WATER 40 MG/ML
2000 INJECTION, SOLUTION INTRAVENOUS PRN
Status: DISCONTINUED | OUTPATIENT
Start: 2023-11-01 | End: 2023-11-05 | Stop reason: HOSPADM

## 2023-11-01 RX ORDER — PREDNISONE 20 MG/1
20 TABLET ORAL 2 TIMES DAILY
Status: DISCONTINUED | OUTPATIENT
Start: 2023-11-01 | End: 2023-11-05 | Stop reason: HOSPADM

## 2023-11-01 RX ORDER — ASPIRIN 81 MG/1
81 TABLET ORAL DAILY
Status: DISCONTINUED | OUTPATIENT
Start: 2023-11-01 | End: 2023-11-05 | Stop reason: HOSPADM

## 2023-11-01 RX ORDER — ACETAMINOPHEN 650 MG/1
650 SUPPOSITORY RECTAL EVERY 6 HOURS PRN
Status: DISCONTINUED | OUTPATIENT
Start: 2023-11-01 | End: 2023-11-05 | Stop reason: HOSPADM

## 2023-11-01 RX ORDER — CARVEDILOL 3.12 MG/1
3.12 TABLET ORAL 2 TIMES DAILY WITH MEALS
Status: DISCONTINUED | OUTPATIENT
Start: 2023-11-01 | End: 2023-11-04

## 2023-11-01 RX ORDER — SODIUM CHLORIDE 0.9 % (FLUSH) 0.9 %
5-40 SYRINGE (ML) INJECTION EVERY 12 HOURS SCHEDULED
Status: DISCONTINUED | OUTPATIENT
Start: 2023-11-01 | End: 2023-11-05 | Stop reason: HOSPADM

## 2023-11-01 RX ORDER — POTASSIUM CHLORIDE 7.45 MG/ML
10 INJECTION INTRAVENOUS PRN
Status: DISCONTINUED | OUTPATIENT
Start: 2023-11-01 | End: 2023-11-05 | Stop reason: HOSPADM

## 2023-11-01 RX ADMIN — CEFTRIAXONE SODIUM 2000 MG: 2 INJECTION, POWDER, FOR SOLUTION INTRAMUSCULAR; INTRAVENOUS at 08:43

## 2023-11-01 RX ADMIN — AMIODARONE HYDROCHLORIDE 200 MG: 200 TABLET ORAL at 16:13

## 2023-11-01 RX ADMIN — SODIUM CHLORIDE, PRESERVATIVE FREE 10 ML: 5 INJECTION INTRAVENOUS at 20:41

## 2023-11-01 RX ADMIN — ASPIRIN 324 MG: 81 TABLET, CHEWABLE ORAL at 03:11

## 2023-11-01 RX ADMIN — AZITHROMYCIN MONOHYDRATE 500 MG: 500 INJECTION, POWDER, LYOPHILIZED, FOR SOLUTION INTRAVENOUS at 08:43

## 2023-11-01 RX ADMIN — PIPERACILLIN AND TAZOBACTAM 3375 MG: 3; .375 INJECTION, POWDER, LYOPHILIZED, FOR SOLUTION INTRAVENOUS at 20:41

## 2023-11-01 RX ADMIN — POTASSIUM BICARBONATE 20 MEQ: 782 TABLET, EFFERVESCENT ORAL at 11:21

## 2023-11-01 RX ADMIN — PREDNISONE 20 MG: 20 TABLET ORAL at 20:41

## 2023-11-01 RX ADMIN — ALUMINUM HYDROXIDE, MAGNESIUM HYDROXIDE, AND SIMETHICONE 30 ML: 200; 200; 20 SUSPENSION ORAL at 03:11

## 2023-11-01 RX ADMIN — LIDOCAINE HYDROCHLORIDE 15 ML: 20 SOLUTION ORAL at 03:11

## 2023-11-01 RX ADMIN — ACETAMINOPHEN 650 MG: 325 TABLET ORAL at 20:41

## 2023-11-01 RX ADMIN — PREDNISONE 20 MG: 20 TABLET ORAL at 11:21

## 2023-11-01 RX ADMIN — ONDANSETRON 4 MG: 2 INJECTION INTRAMUSCULAR; INTRAVENOUS at 11:22

## 2023-11-01 RX ADMIN — PIPERACILLIN AND TAZOBACTAM 3375 MG: 3; .375 INJECTION, POWDER, LYOPHILIZED, FOR SOLUTION INTRAVENOUS at 11:22

## 2023-11-01 RX ADMIN — PRAVASTATIN SODIUM 40 MG: 40 TABLET ORAL at 20:41

## 2023-11-01 RX ADMIN — CITALOPRAM HYDROBROMIDE 10 MG: 10 TABLET ORAL at 16:14

## 2023-11-01 RX ADMIN — FUROSEMIDE 40 MG: 40 TABLET ORAL at 11:21

## 2023-11-01 RX ADMIN — HYDROMORPHONE HYDROCHLORIDE 0.25 MG: 1 INJECTION, SOLUTION INTRAMUSCULAR; INTRAVENOUS; SUBCUTANEOUS at 11:22

## 2023-11-01 RX ADMIN — IOPAMIDOL 100 ML: 755 INJECTION, SOLUTION INTRAVENOUS at 08:00

## 2023-11-01 RX ADMIN — PANTOPRAZOLE SODIUM 40 MG: 40 TABLET, DELAYED RELEASE ORAL at 16:13

## 2023-11-01 ASSESSMENT — LIFESTYLE VARIABLES
HOW OFTEN DO YOU HAVE A DRINK CONTAINING ALCOHOL: NEVER
HOW MANY STANDARD DRINKS CONTAINING ALCOHOL DO YOU HAVE ON A TYPICAL DAY: PATIENT DOES NOT DRINK
HOW OFTEN DO YOU HAVE A DRINK CONTAINING ALCOHOL: NEVER
HOW MANY STANDARD DRINKS CONTAINING ALCOHOL DO YOU HAVE ON A TYPICAL DAY: PATIENT DOES NOT DRINK

## 2023-11-01 ASSESSMENT — PAIN DESCRIPTION - DESCRIPTORS
DESCRIPTORS: GNAWING
DESCRIPTORS: DULL;ACHING

## 2023-11-01 ASSESSMENT — PAIN - FUNCTIONAL ASSESSMENT
PAIN_FUNCTIONAL_ASSESSMENT: ACTIVITIES ARE NOT PREVENTED
PAIN_FUNCTIONAL_ASSESSMENT: 0-10
PAIN_FUNCTIONAL_ASSESSMENT: NONE - DENIES PAIN
PAIN_FUNCTIONAL_ASSESSMENT: ACTIVITIES ARE NOT PREVENTED

## 2023-11-01 ASSESSMENT — PAIN SCALES - GENERAL
PAINLEVEL_OUTOF10: 3
PAINLEVEL_OUTOF10: 0
PAINLEVEL_OUTOF10: 0
PAINLEVEL_OUTOF10: 4

## 2023-11-01 ASSESSMENT — PAIN DESCRIPTION - PAIN TYPE: TYPE: ACUTE PAIN

## 2023-11-01 ASSESSMENT — PAIN DESCRIPTION - ORIENTATION
ORIENTATION: MID
ORIENTATION: RIGHT

## 2023-11-01 ASSESSMENT — ENCOUNTER SYMPTOMS
SHORTNESS OF BREATH: 1
CHEST TIGHTNESS: 1
GASTROINTESTINAL NEGATIVE: 1

## 2023-11-01 ASSESSMENT — PAIN DESCRIPTION - LOCATION
LOCATION: CHEST
LOCATION: CHEST;ABDOMEN;THROAT

## 2023-11-01 NOTE — ED PROVIDER NOTES
17 g packet  Commonly known as: GLYCOLAX     potassium chloride 20 MEQ Tbcr extended release tablet  Commonly known as: KLOR-CON M  Take 1 tablet by mouth daily     pravastatin 40 MG tablet  Commonly known as: PRAVACHOL     sacubitril-valsartan 24-26 MG per tablet  Commonly known as: ENTRESTO  Take 1 tablet by mouth 2 times daily                DISCONTINUED MEDICATIONS:  Current Discharge Medication List          I am the Primary Clinician of Record. Karri Roberts MD (electronically signed)    (Please note that parts of this dictation were completed with voice recognition software. Quite often unanticipated grammatical, syntax, homophones, and other interpretive errors are inadvertently transcribed by the computer software. Please disregards these errors.  Please excuse any errors that have escaped final proofreading.)        Karri Roberts MD  11/01/23 1038

## 2023-11-01 NOTE — ED PROVIDER NOTES
[facility] EMERGENCY DEPT  EMERGENCY DEPARTMENT HISTORY AND PHYSICAL EXAM      Date: 11/1/2023  Patient Name: Jess Harris  MRN: [id number]  [address] [facility] 0/38/2562  Date of evaluation: 11/1/2023  Provider: Flash Butts MD   Note Started: 2:52 AM EDT 11/1/23    HISTORY OF PRESENT ILLNESS     Chief Complaint   Patient presents with    Chest Pain       History Provided By: Patient    HPI: Jess Harris is a 78 y.o. male presents to ed with cp and intermittent sob. States started about an hour ago. Reports pain in throat, burning in nature and down michelle chest.     Pt reports a gnawing pain that goes from throat down to chest and abdomen. Pt also reports intermittent shortness of breath. Pt denies any dizziness. + nausea, pt had 1 episode of emesis yesterday. Per family who arrived later, he is increasingly fatigued, with poor appetite,     PAST MEDICAL HISTORY   Past Medical History:  Past Medical History:   Diagnosis Date    Acid reflux     Anemia     Diabetes ([address] [facility])     Diabetes mellitus ([facility])     GERD (gastroesophageal reflux disease)     Hypertension     Stroke [facility])        Past Surgical History:  Past Surgical History:   Procedure Laterality Date    COLONOSCOPY         Family History:  Family History   Problem Relation Age of Onset    Heart Disease Father     Hypertension Brother     Diabetes Brother        Social History:  Social History     Tobacco Use    Smoking status: Never    Smokeless tobacco: Never   Vaping Use    Vaping Use: Never used   Substance Use Topics    Alcohol use: Not Currently    Drug use: Never       Allergies: Allergies   Allergen Reactions    Fentanyl Other (See Comments)     confusion    Morphine Other (See Comments)     confusion    Tramadol Other (See Comments)     confusion       PCP: Jon Nicolas MD    Current Meds:   No current facility-administered medications for this encounter. No current outpatient medications on file.      Facility-Administered Medications

## 2023-11-01 NOTE — ED NOTES
TRANSFER - OUT REPORT:    Verbal report given to [facility] on [address]  being transferred to [address] [place] [address] for routine progression of patient care       Report consisted of patient's Situation, Background, Assessment and   Recommendations(SBAR). Information from the following report(s) Nurse Handoff Report, ED SBAR, and [facility] was reviewed with the receiving nurse. [place] Fall Assessment:    Presents to emergency department  because of falls (Syncope, seizure, or loss of consciousness): No  Age > 70: Yes  Altered Mental Status, Intoxication with alcohol or substance confusion (Disorientation, impaired judgment, poor safety awaremess, or inability to follow instructions): No  Impaired Mobility: Ambulates or transfers with assistive devices or assistance; Unable to ambulate or transer.: Yes  Nursing Judgement: Yes          Lines:   Peripheral IV 11/01/23 Right Antecubital (Active)       Peripheral IV 11/01/23 Left Antecubital (Active)   Site Assessment Clean, dry & intact 11/01/23 1100   Line Status Blood return noted 11/01/23 1100   Line Care Cap changed 11/01/23 1100   Phlebitis Assessment No symptoms 11/01/23 1100   Infiltration Assessment 0 11/01/23 1100   Alcohol Cap Used Yes 11/01/23 1100   Dressing Status Clean, dry & intact 11/01/23 1100   Dressing Type Transparent 11/01/23 1100        Opportunity for questions and clarification was provided.       Patient transported with:  Monitor and Jaz Mac RN  11/01/23 3008

## 2023-11-01 NOTE — ED NOTES
Pt sent here for progression of care for pulmonary effusion and pneumonia     Jerome Hernandez, KALYANI  11/01/23 1016

## 2023-11-01 NOTE — CARE COORDINATION
11/01/23 1139   Service Assessment   Patient Orientation Alert and Oriented;Person   Cognition Short Term Memory Deficit   History Provided By Patient;Spouse   Primary Caregiver Family   Accompanied By/Relationship Pt alone during interview. CM spoke with wife Ovidio Villarreal via phone. Support Systems Spouse/Significant Other;Children;Family Members   Patient's Healthcare Decision Maker is: Legal Next of [facility]   PCP Verified by CM Yes  Ovidio Araujo - seen a month ago.)   Last Visit to PCP Within last 3 months   Prior Functional Level Assistance with the following:;Bathing;Dressing; Toileting;Cooking;Housework; Shopping;Mobility  (Walker/w/c.)   Current Functional Level Assistance with the following:;Bathing;Dressing; Toileting;Cooking;Housework; Shopping;Mobility  (Walker/w/c.)   Can patient return to prior living arrangement Yes   Ability to make needs known: Fair   Family able to assist with home care needs: Yes   Would you like for me to discuss the discharge plan with any other family members/significant others, and if so, who? Yes  (Wife Ovidio Villarreal)   Financial Resources Medicaid; Medicare   Community Resources None   Social/Functional History   Lives With Spouse   Type of Home Trailer   Home Layout One level   Home Access Ramped entrance   [address] Shower/Tub Tub/Shower unit   Bathroom Toilet Standard   Bathroom Accessibility Accessible   Home Equipment Walker, standard; [address] [place] Help From Family   ADL Assistance Needs assistance   Toileting Needs assistance   Homemaking Assistance Needs assistance   Homemaking Responsibilities Yes   Ambulation Assistance Needs assistance  (Walker/w/c.)   Transfer Assistance Independent   Active  No   Occupation Retired   Discharge Planning   Type of Residence Trailer/Mobile [address] [facility] [address] Prior To Admission Transportation   Potential Assistance Needed N/A   DME Ordered?  No   Potential

## 2023-11-01 NOTE — ED TRIAGE NOTES
Pt reports an intermittent gnawing pain that goes from throat down to chest and abdomen. Pt also reports intermittent shortness of breath. Pt denies any dizziness. + nausea, pt had 1 episode of emesis yesterday. Pain started yesterday after eating.

## 2023-11-01 NOTE — OR NURSING
20cc bloody fluid drained from left posterior chest. Fluids to las as ordered.  Report called to primary

## 2023-11-01 NOTE — PLAN OF CARE
Problem: Discharge Planning  Goal: Discharge to home or other facility with appropriate resources  Outcome: Progressing  Flowsheets (Taken 11/1/2023 4698)  Discharge to home or other facility with appropriate resources: Identify barriers to discharge with patient and caregiver     Problem: Skin/Tissue Integrity  Goal: Absence of new skin breakdown  Description: 1. Monitor for areas of redness and/or skin breakdown  2. Assess vascular access sites hourly  3. Every 4-6 hours minimum:  Change oxygen saturation probe site  4. Every 4-6 hours:  If on nasal continuous positive airway pressure, respiratory therapy assess nares and determine need for appliance change or resting period.   Outcome: Progressing     Problem: Safety - Adult  Goal: Free from fall injury  Outcome: Progressing

## 2023-11-01 NOTE — CARE COORDINATION
11/1/23 Per Tricia pt accepted to [facility] @ [phone] upon discharge. Est SOC: 11/4/23.

## 2023-11-01 NOTE — H&P
Hospitalist Admission Note    NAME:   Dee Montelongo   :    MRN: [id number]     Date/Time: 2023 12:25 PM    Patient PCP: Citlali Carrion MD    ______________________________________________________________________  Given the patient's current clinical presentation, I have a high level of concern for decompensation if discharged from the emergency department. Complex decision making was performed, which includes reviewing the patient's available past medical records, laboratory results, and x-ray films. My assessment of this patient's clinical condition and my plan of care is as follows. Assessment / Plan:    Right-sided pleural effusion/concerns for empyema-patient presents with above mentioned symptomatology with a Right-sided pleural effusion, with concerns for empyema, currently does not meet sepsis criteria at this time  Follow-up blood cultures  Continue Zosyn for antibiotic coverage  Trend inflammatory markers  Lasix once daily  Follow-up interventional radiology recommendations  Serial chest x-rays  Continue to monitor respiratory status  Pulmonology consult further evaluation    History of atrial fibrillation-hold home anticoagulation, continue amiodarone once daily    Depression/anxiety-continue current medications    Hyperlipidemia-continue statin    Prophylaxis-holding Eliquis  FEN-cardiac diet, replete potassium magnesium  Full code, will clarify about surrogate decision-maker admitted further medical management                  Medical Decision Making:   I personally reviewed labs: CBC, CMP, magnesium  I personally reviewed imaging: CT abdomen pelvis/chest x-ray, agree with official reads  I personally reviewed EKG:  Toxic drug monitoring: Eliquis, monitoring for signs and symptoms of bleeding, daily CBCs  Discussed case with: ED provider. After discussion I am in agreement that acuity of patient's medical condition necessitates hospital stay.       Code Status:

## 2023-11-01 NOTE — ED NOTES
Per Dr Mercedes Puente, proceed with blood culture collection even though pt has already received ABX prior to arrival to arriving [facility] [address] [place] [address]  11/01/23 9910

## 2023-11-01 NOTE — ED NOTES
Pt placed on tele box, called tele, states box is coming through, pt transported to [facility] [address] by edt on stretcher     Fariba Mcclain, RN  11/01/23 5045

## 2023-11-02 ENCOUNTER — APPOINTMENT (OUTPATIENT)
Facility: HOSPITAL | Age: 78
DRG: 187 | End: 2023-11-02
Payer: MEDICARE

## 2023-11-02 LAB
ALBUMIN SERPL-MCNC: 2.8 G/DL (ref 3.5–5)
ALBUMIN/GLOB SERPL: 0.5 (ref 1.1–2.2)
ALP SERPL-CCNC: 66 U/L (ref 45–117)
ALT SERPL-CCNC: 20 U/L (ref 12–78)
ANION GAP SERPL CALC-SCNC: 11 MMOL/L (ref 5–15)
AST SERPL W P-5'-P-CCNC: 9 U/L (ref 15–37)
BASOPHILS # BLD: 0 K/UL (ref 0–0.1)
BASOPHILS NFR BLD: 0 % (ref 0–1)
BILIRUB SERPL-MCNC: 0.5 MG/DL (ref 0.2–1)
BUN SERPL-MCNC: 22 MG/DL (ref 6–20)
BUN/CREAT SERPL: 18 (ref 12–20)
CA-I BLD-MCNC: 9.5 MG/DL (ref 8.5–10.1)
CHLORIDE SERPL-SCNC: 101 MMOL/L (ref 97–108)
CO2 SERPL-SCNC: 27 MMOL/L (ref 21–32)
CREAT SERPL-MCNC: 1.2 MG/DL (ref 0.7–1.3)
DIFFERENTIAL METHOD BLD: ABNORMAL
EKG ATRIAL RATE: 72 BPM
EKG DIAGNOSIS: NORMAL
EKG P AXIS: 15 DEGREES
EKG P-R INTERVAL: 191 MS
EKG Q-T INTERVAL: 463 MS
EKG QRS DURATION: 96 MS
EKG QTC CALCULATION (BAZETT): 507 MS
EKG R AXIS: 31 DEGREES
EKG T AXIS: -67 DEGREES
EKG VENTRICULAR RATE: 72 BPM
EOSINOPHIL # BLD: 0 K/UL (ref 0–0.4)
EOSINOPHIL NFR BLD: 0 % (ref 0–7)
ERYTHROCYTE [DISTWIDTH] IN BLOOD BY AUTOMATED COUNT: 14 % (ref 11.5–14.5)
GLOBULIN SER CALC-MCNC: 5.9 G/DL (ref 2–4)
GLUCOSE BLD STRIP.AUTO-MCNC: 155 MG/DL (ref 65–100)
GLUCOSE BLD STRIP.AUTO-MCNC: 160 MG/DL (ref 65–100)
GLUCOSE BLD STRIP.AUTO-MCNC: 171 MG/DL (ref 65–100)
GLUCOSE SERPL-MCNC: 153 MG/DL (ref 65–100)
HCT VFR BLD AUTO: 43.9 % (ref 36.6–50.3)
HGB BLD-MCNC: 13.7 G/DL (ref 12.1–17)
IMM GRANULOCYTES # BLD AUTO: 0.1 K/UL (ref 0–0.04)
IMM GRANULOCYTES NFR BLD AUTO: 1 % (ref 0–0.5)
LYMPHOCYTES # BLD: 1.4 K/UL (ref 0.8–3.5)
LYMPHOCYTES NFR BLD: 12 % (ref 12–49)
MCH RBC QN AUTO: 30.4 PG (ref 26–34)
MCHC RBC AUTO-ENTMCNC: 31.2 G/DL (ref 30–36.5)
MCV RBC AUTO: 97.6 FL (ref 80–99)
MONOCYTES # BLD: 0.8 K/UL (ref 0–1)
MONOCYTES NFR BLD: 6 % (ref 5–13)
NEUTS SEG # BLD: 10.1 K/UL (ref 1.8–8)
NEUTS SEG NFR BLD: 81 % (ref 32–75)
NRBC # BLD: 0 K/UL (ref 0–0.01)
NRBC BLD-RTO: 0 PER 100 WBC
PERFORMED BY:: ABNORMAL
PLATELET # BLD AUTO: 479 K/UL (ref 150–400)
PMV BLD AUTO: 10.6 FL (ref 8.9–12.9)
POTASSIUM SERPL-SCNC: 4.2 MMOL/L (ref 3.5–5.1)
PROT FLD-MCNC: 5.2 G/DL
PROT SERPL-MCNC: 8.7 G/DL (ref 6.4–8.2)
RBC # BLD AUTO: 4.5 M/UL (ref 4.1–5.7)
SODIUM SERPL-SCNC: 139 MMOL/L (ref 136–145)
SPECIMEN SOURCE FLD: NORMAL
TROPONIN I SERPL HS-MCNC: 10 NG/L (ref 0–76)
WBC # BLD AUTO: 12.4 K/UL (ref 4.1–11.1)

## 2023-11-02 PROCEDURE — 71045 X-RAY EXAM CHEST 1 VIEW: CPT

## 2023-11-02 PROCEDURE — 6370000000 HC RX 637 (ALT 250 FOR IP): Performed by: INTERNAL MEDICINE

## 2023-11-02 PROCEDURE — 2500000003 HC RX 250 WO HCPCS: Performed by: INTERNAL MEDICINE

## 2023-11-02 PROCEDURE — 84484 ASSAY OF TROPONIN QUANT: CPT

## 2023-11-02 PROCEDURE — 85025 COMPLETE CBC W/AUTO DIFF WBC: CPT

## 2023-11-02 PROCEDURE — 82962 GLUCOSE BLOOD TEST: CPT

## 2023-11-02 PROCEDURE — 6360000002 HC RX W HCPCS: Performed by: INTERNAL MEDICINE

## 2023-11-02 PROCEDURE — 2580000003 HC RX 258: Performed by: INTERNAL MEDICINE

## 2023-11-02 PROCEDURE — 80053 COMPREHEN METABOLIC PANEL: CPT

## 2023-11-02 PROCEDURE — 2060000000 HC ICU INTERMEDIATE R&B

## 2023-11-02 PROCEDURE — 36415 COLL VENOUS BLD VENIPUNCTURE: CPT

## 2023-11-02 RX ORDER — DIGOXIN 0.25 MG/ML
250 INJECTION INTRAMUSCULAR; INTRAVENOUS EVERY 6 HOURS
Status: COMPLETED | OUTPATIENT
Start: 2023-11-02 | End: 2023-11-03

## 2023-11-02 RX ADMIN — AMIODARONE HYDROCHLORIDE 200 MG: 200 TABLET ORAL at 08:26

## 2023-11-02 RX ADMIN — PRAVASTATIN SODIUM 40 MG: 40 TABLET ORAL at 21:01

## 2023-11-02 RX ADMIN — SODIUM CHLORIDE, PRESERVATIVE FREE 10 ML: 5 INJECTION INTRAVENOUS at 08:27

## 2023-11-02 RX ADMIN — POTASSIUM BICARBONATE 20 MEQ: 782 TABLET, EFFERVESCENT ORAL at 08:22

## 2023-11-02 RX ADMIN — SODIUM CHLORIDE, PRESERVATIVE FREE 10 ML: 5 INJECTION INTRAVENOUS at 21:01

## 2023-11-02 RX ADMIN — DIGOXIN 250 MCG: 0.25 INJECTION INTRAMUSCULAR; INTRAVENOUS at 11:16

## 2023-11-02 RX ADMIN — PREDNISONE 20 MG: 20 TABLET ORAL at 08:20

## 2023-11-02 RX ADMIN — AMIODARONE HYDROCHLORIDE 1 MG/MIN: 1.8 INJECTION, SOLUTION INTRAVENOUS at 11:11

## 2023-11-02 RX ADMIN — AMIODARONE HYDROCHLORIDE 0.5 MG/MIN: 1.8 INJECTION, SOLUTION INTRAVENOUS at 17:21

## 2023-11-02 RX ADMIN — PIPERACILLIN AND TAZOBACTAM 3375 MG: 3; .375 INJECTION, POWDER, LYOPHILIZED, FOR SOLUTION INTRAVENOUS at 21:02

## 2023-11-02 RX ADMIN — PIPERACILLIN AND TAZOBACTAM 3375 MG: 3; .375 INJECTION, POWDER, LYOPHILIZED, FOR SOLUTION INTRAVENOUS at 04:43

## 2023-11-02 RX ADMIN — DIGOXIN 250 MCG: 0.25 INJECTION INTRAMUSCULAR; INTRAVENOUS at 17:21

## 2023-11-02 RX ADMIN — FUROSEMIDE 40 MG: 40 TABLET ORAL at 08:25

## 2023-11-02 RX ADMIN — PIPERACILLIN AND TAZOBACTAM 3375 MG: 3; .375 INJECTION, POWDER, LYOPHILIZED, FOR SOLUTION INTRAVENOUS at 11:26

## 2023-11-02 RX ADMIN — CARVEDILOL 3.12 MG: 3.12 TABLET, FILM COATED ORAL at 17:21

## 2023-11-02 RX ADMIN — PREDNISONE 20 MG: 20 TABLET ORAL at 21:01

## 2023-11-02 RX ADMIN — PANTOPRAZOLE SODIUM 40 MG: 40 TABLET, DELAYED RELEASE ORAL at 04:43

## 2023-11-02 RX ADMIN — CITALOPRAM HYDROBROMIDE 10 MG: 10 TABLET ORAL at 08:27

## 2023-11-02 ASSESSMENT — ENCOUNTER SYMPTOMS
SHORTNESS OF BREATH: 1
CHEST TIGHTNESS: 1
GASTROINTESTINAL NEGATIVE: 1

## 2023-11-03 LAB
ALBUMIN SERPL-MCNC: 2.5 G/DL (ref 3.5–5)
ALBUMIN/GLOB SERPL: 0.5 (ref 1.1–2.2)
ALP SERPL-CCNC: 62 U/L (ref 45–117)
ALT SERPL-CCNC: 38 U/L (ref 12–78)
ANION GAP SERPL CALC-SCNC: 6 MMOL/L (ref 5–15)
AST SERPL W P-5'-P-CCNC: 40 U/L (ref 15–37)
BASOPHILS # BLD: 0 K/UL (ref 0–0.1)
BASOPHILS NFR BLD: 0 % (ref 0–1)
BILIRUB SERPL-MCNC: 0.4 MG/DL (ref 0.2–1)
BUN SERPL-MCNC: 32 MG/DL (ref 6–20)
BUN/CREAT SERPL: 21 (ref 12–20)
CA-I BLD-MCNC: 9.3 MG/DL (ref 8.5–10.1)
CHLORIDE SERPL-SCNC: 101 MMOL/L (ref 97–108)
CO2 SERPL-SCNC: 29 MMOL/L (ref 21–32)
CREAT SERPL-MCNC: 1.53 MG/DL (ref 0.7–1.3)
CYTOLOGY-NON GYN: NORMAL
DIFFERENTIAL METHOD BLD: ABNORMAL
EOSINOPHIL # BLD: 0 K/UL (ref 0–0.4)
EOSINOPHIL NFR BLD: 0 % (ref 0–7)
ERYTHROCYTE [DISTWIDTH] IN BLOOD BY AUTOMATED COUNT: 13.7 % (ref 11.5–14.5)
GLOBULIN SER CALC-MCNC: 5.5 G/DL (ref 2–4)
GLUCOSE BLD STRIP.AUTO-MCNC: 159 MG/DL (ref 65–100)
GLUCOSE BLD STRIP.AUTO-MCNC: 160 MG/DL (ref 65–100)
GLUCOSE BLD STRIP.AUTO-MCNC: 181 MG/DL (ref 65–100)
GLUCOSE BLD STRIP.AUTO-MCNC: 201 MG/DL (ref 65–100)
GLUCOSE SERPL-MCNC: 168 MG/DL (ref 65–100)
HCT VFR BLD AUTO: 38.8 % (ref 36.6–50.3)
HGB BLD-MCNC: 12.3 G/DL (ref 12.1–17)
IMM GRANULOCYTES # BLD AUTO: 0.1 K/UL (ref 0–0.04)
IMM GRANULOCYTES NFR BLD AUTO: 0 % (ref 0–0.5)
LYMPHOCYTES # BLD: 1.2 K/UL (ref 0.8–3.5)
LYMPHOCYTES NFR BLD: 10 % (ref 12–49)
MCH RBC QN AUTO: 30.6 PG (ref 26–34)
MCHC RBC AUTO-ENTMCNC: 31.7 G/DL (ref 30–36.5)
MCV RBC AUTO: 96.5 FL (ref 80–99)
MONOCYTES # BLD: 0.8 K/UL (ref 0–1)
MONOCYTES NFR BLD: 6 % (ref 5–13)
NEUTS SEG # BLD: 9.8 K/UL (ref 1.8–8)
NEUTS SEG NFR BLD: 84 % (ref 32–75)
NRBC # BLD: 0 K/UL (ref 0–0.01)
NRBC BLD-RTO: 0 PER 100 WBC
PERFORMED BY:: ABNORMAL
PLATELET # BLD AUTO: 439 K/UL (ref 150–400)
PMV BLD AUTO: 10.5 FL (ref 8.9–12.9)
POTASSIUM SERPL-SCNC: 4.2 MMOL/L (ref 3.5–5.1)
PROT SERPL-MCNC: 8 G/DL (ref 6.4–8.2)
RBC # BLD AUTO: 4.02 M/UL (ref 4.1–5.7)
SODIUM SERPL-SCNC: 136 MMOL/L (ref 136–145)
WBC # BLD AUTO: 11.9 K/UL (ref 4.1–11.1)

## 2023-11-03 PROCEDURE — 2580000003 HC RX 258: Performed by: INTERNAL MEDICINE

## 2023-11-03 PROCEDURE — 6360000002 HC RX W HCPCS: Performed by: INTERNAL MEDICINE

## 2023-11-03 PROCEDURE — 2500000003 HC RX 250 WO HCPCS: Performed by: INTERNAL MEDICINE

## 2023-11-03 PROCEDURE — 97530 THERAPEUTIC ACTIVITIES: CPT

## 2023-11-03 PROCEDURE — 6370000000 HC RX 637 (ALT 250 FOR IP): Performed by: INTERNAL MEDICINE

## 2023-11-03 PROCEDURE — 94761 N-INVAS EAR/PLS OXIMETRY MLT: CPT

## 2023-11-03 PROCEDURE — 80053 COMPREHEN METABOLIC PANEL: CPT

## 2023-11-03 PROCEDURE — 85025 COMPLETE CBC W/AUTO DIFF WBC: CPT

## 2023-11-03 PROCEDURE — 97161 PT EVAL LOW COMPLEX 20 MIN: CPT

## 2023-11-03 PROCEDURE — 82962 GLUCOSE BLOOD TEST: CPT

## 2023-11-03 PROCEDURE — 2060000000 HC ICU INTERMEDIATE R&B

## 2023-11-03 PROCEDURE — 36415 COLL VENOUS BLD VENIPUNCTURE: CPT

## 2023-11-03 RX ORDER — BISACODYL 10 MG
10 SUPPOSITORY, RECTAL RECTAL DAILY PRN
Status: DISCONTINUED | OUTPATIENT
Start: 2023-11-03 | End: 2023-11-05 | Stop reason: HOSPADM

## 2023-11-03 RX ORDER — PREDNISONE 20 MG/1
TABLET ORAL
Qty: 15 TABLET | Refills: 0 | Status: SHIPPED | OUTPATIENT
Start: 2023-11-03

## 2023-11-03 RX ORDER — AMOXICILLIN AND CLAVULANATE POTASSIUM 875; 125 MG/1; MG/1
1 TABLET, FILM COATED ORAL 2 TIMES DAILY
Qty: 10 TABLET | Refills: 0 | Status: SHIPPED | OUTPATIENT
Start: 2023-11-03 | End: 2023-11-08

## 2023-11-03 RX ADMIN — DIGOXIN 250 MCG: 0.25 INJECTION INTRAMUSCULAR; INTRAVENOUS at 05:25

## 2023-11-03 RX ADMIN — PREDNISONE 20 MG: 20 TABLET ORAL at 21:39

## 2023-11-03 RX ADMIN — PIPERACILLIN AND TAZOBACTAM 3375 MG: 3; .375 INJECTION, POWDER, LYOPHILIZED, FOR SOLUTION INTRAVENOUS at 11:39

## 2023-11-03 RX ADMIN — FUROSEMIDE 40 MG: 40 TABLET ORAL at 09:49

## 2023-11-03 RX ADMIN — CITALOPRAM HYDROBROMIDE 10 MG: 10 TABLET ORAL at 09:49

## 2023-11-03 RX ADMIN — POTASSIUM BICARBONATE 20 MEQ: 782 TABLET, EFFERVESCENT ORAL at 09:55

## 2023-11-03 RX ADMIN — CARVEDILOL 3.12 MG: 3.12 TABLET, FILM COATED ORAL at 17:16

## 2023-11-03 RX ADMIN — PIPERACILLIN AND TAZOBACTAM 3375 MG: 3; .375 INJECTION, POWDER, LYOPHILIZED, FOR SOLUTION INTRAVENOUS at 21:46

## 2023-11-03 RX ADMIN — PREDNISONE 20 MG: 20 TABLET ORAL at 09:49

## 2023-11-03 RX ADMIN — AMIODARONE HYDROCHLORIDE 0.5 MG/MIN: 1.8 INJECTION, SOLUTION INTRAVENOUS at 04:33

## 2023-11-03 RX ADMIN — PRAVASTATIN SODIUM 40 MG: 40 TABLET ORAL at 21:39

## 2023-11-03 RX ADMIN — PIPERACILLIN AND TAZOBACTAM 3375 MG: 3; .375 INJECTION, POWDER, LYOPHILIZED, FOR SOLUTION INTRAVENOUS at 03:06

## 2023-11-03 RX ADMIN — DIGOXIN 250 MCG: 0.25 INJECTION INTRAMUSCULAR; INTRAVENOUS at 00:29

## 2023-11-03 RX ADMIN — CARVEDILOL 3.12 MG: 3.12 TABLET, FILM COATED ORAL at 09:49

## 2023-11-03 RX ADMIN — SODIUM CHLORIDE, PRESERVATIVE FREE 10 ML: 5 INJECTION INTRAVENOUS at 21:47

## 2023-11-03 RX ADMIN — AMIODARONE HYDROCHLORIDE 200 MG: 200 TABLET ORAL at 09:50

## 2023-11-03 ASSESSMENT — ENCOUNTER SYMPTOMS
CHEST TIGHTNESS: 1
SHORTNESS OF BREATH: 1
GASTROINTESTINAL NEGATIVE: 1

## 2023-11-03 NOTE — PLAN OF CARE
PHYSICAL THERAPY EVALUATION  Patient: Sandie Pedraza (31 y.o. male)  Date: 11/3/2023  Primary Diagnosis: Empyema ([address]) [J86.9]  Bilateral pleural effusion [J90]       Precautions: Fall Risk                Recommendations for nursing mobility: Out of bed to chair for meals, Encourage HEP in prep for ADLs/mobility; see handout for details, Frequent repositioning to prevent skin breakdown, and Use of bed/chair alarm for safety    In place during session: Peripheral IV, External Catheter, and EKG/telemetry   ASSESSMENT  Pt is a 78 y.o. male admitted on 11/1/2023 for SOB; pt currently being treated for empyema,PNA . Pt semi supine  upon PT arrival, agreeable to evaluation. Pt A&O x 4. Patient reports he lives with wife and requires assist at home. He reports he canambulate a little with thomas walker. Patient able to perform bed mob and supine to sit with min to mod assist x 1 . Sit to stand with 3 attempts and max assist for standing, Once he got his balance , he could take 3 side steps to the [facility] with gait belt and thomas walker and mod to max assist.patient has all equipment needed for home. Based on the objective data described below, the patient currently presents with impaired functional mobility, impaired ability to perform high-level IADLs, decreased ROM, impaired strength, decreased activity tolerance, poor attention/concentration, impaired balance, and impaired posture. (See below for objective details and assist levels). Overall pt tolerated session fair today with PT. Pt will benefit from continued skilled PT to address above deficits and return to PLOF. Potential barriers for safe discharge: . Current PT DC recommendation Home with Home Health Therapy and family assist once medically appropriate. GOALS:    Problem: Physical Therapy - Adult  Goal: By Discharge: Performs mobility at highest level of function for planned discharge setting. See evaluation for individualized goals.   Description: FUNCTIONAL

## 2023-11-04 LAB
GLUCOSE BLD STRIP.AUTO-MCNC: 140 MG/DL (ref 65–100)
GLUCOSE BLD STRIP.AUTO-MCNC: 147 MG/DL (ref 65–100)
GLUCOSE BLD STRIP.AUTO-MCNC: 178 MG/DL (ref 65–100)
GLUCOSE BLD STRIP.AUTO-MCNC: 242 MG/DL (ref 65–100)
PERFORMED BY:: ABNORMAL

## 2023-11-04 PROCEDURE — 82962 GLUCOSE BLOOD TEST: CPT

## 2023-11-04 PROCEDURE — 6360000002 HC RX W HCPCS: Performed by: INTERNAL MEDICINE

## 2023-11-04 PROCEDURE — 93005 ELECTROCARDIOGRAM TRACING: CPT | Performed by: INTERNAL MEDICINE

## 2023-11-04 PROCEDURE — 6370000000 HC RX 637 (ALT 250 FOR IP): Performed by: INTERNAL MEDICINE

## 2023-11-04 PROCEDURE — 2500000003 HC RX 250 WO HCPCS: Performed by: INTERNAL MEDICINE

## 2023-11-04 PROCEDURE — 2060000000 HC ICU INTERMEDIATE R&B

## 2023-11-04 PROCEDURE — 2580000003 HC RX 258: Performed by: INTERNAL MEDICINE

## 2023-11-04 RX ORDER — METOPROLOL TARTRATE 5 MG/5ML
2.5 INJECTION INTRAVENOUS ONCE
Status: COMPLETED | OUTPATIENT
Start: 2023-11-04 | End: 2023-11-04

## 2023-11-04 RX ADMIN — PIPERACILLIN AND TAZOBACTAM 3375 MG: 3; .375 INJECTION, POWDER, LYOPHILIZED, FOR SOLUTION INTRAVENOUS at 21:01

## 2023-11-04 RX ADMIN — PIPERACILLIN AND TAZOBACTAM 3375 MG: 3; .375 INJECTION, POWDER, LYOPHILIZED, FOR SOLUTION INTRAVENOUS at 12:27

## 2023-11-04 RX ADMIN — AMIODARONE HYDROCHLORIDE 200 MG: 200 TABLET ORAL at 09:21

## 2023-11-04 RX ADMIN — CARVEDILOL 3.12 MG: 3.12 TABLET, FILM COATED ORAL at 09:22

## 2023-11-04 RX ADMIN — PREDNISONE 20 MG: 20 TABLET ORAL at 09:22

## 2023-11-04 RX ADMIN — PIPERACILLIN AND TAZOBACTAM 3375 MG: 3; .375 INJECTION, POWDER, LYOPHILIZED, FOR SOLUTION INTRAVENOUS at 04:41

## 2023-11-04 RX ADMIN — PREDNISONE 20 MG: 20 TABLET ORAL at 21:01

## 2023-11-04 RX ADMIN — DILTIAZEM HYDROCHLORIDE 30 MG: 30 TABLET, FILM COATED ORAL at 21:01

## 2023-11-04 RX ADMIN — DILTIAZEM HYDROCHLORIDE 30 MG: 30 TABLET, FILM COATED ORAL at 11:41

## 2023-11-04 RX ADMIN — SODIUM CHLORIDE, PRESERVATIVE FREE 10 ML: 5 INJECTION INTRAVENOUS at 21:03

## 2023-11-04 RX ADMIN — APIXABAN 2.5 MG: 2.5 TABLET, FILM COATED ORAL at 21:04

## 2023-11-04 RX ADMIN — PANTOPRAZOLE SODIUM 40 MG: 40 TABLET, DELAYED RELEASE ORAL at 06:22

## 2023-11-04 RX ADMIN — CITALOPRAM HYDROBROMIDE 10 MG: 10 TABLET ORAL at 09:22

## 2023-11-04 RX ADMIN — SODIUM CHLORIDE, PRESERVATIVE FREE 5 ML: 5 INJECTION INTRAVENOUS at 09:30

## 2023-11-04 RX ADMIN — POTASSIUM BICARBONATE 20 MEQ: 782 TABLET, EFFERVESCENT ORAL at 09:21

## 2023-11-04 RX ADMIN — PRAVASTATIN SODIUM 40 MG: 40 TABLET ORAL at 21:01

## 2023-11-04 RX ADMIN — ASPIRIN 81 MG: 81 TABLET, COATED ORAL at 09:22

## 2023-11-04 RX ADMIN — METOPROLOL TARTRATE 2.5 MG: 5 INJECTION INTRAVENOUS at 06:36

## 2023-11-04 ASSESSMENT — ENCOUNTER SYMPTOMS
SHORTNESS OF BREATH: 1
CHEST TIGHTNESS: 1
GASTROINTESTINAL NEGATIVE: 1

## 2023-11-04 NOTE — PLAN OF CARE
Problem: Discharge Planning  Goal: Discharge to home or other facility with appropriate resources  Outcome: Progressing  Flowsheets (Taken 11/4/2023 0745)  Discharge to home or other facility with appropriate resources:   Identify barriers to discharge with patient and caregiver   Arrange for needed discharge resources and transportation as appropriate   Identify discharge learning needs (meds, wound care, etc)   Refer to discharge planning if patient needs post-hospital services based on physician order or complex needs related to functional status, cognitive ability or social support system     Problem: Skin/Tissue Integrity  Goal: Absence of new skin breakdown  Description: 1. Monitor for areas of redness and/or skin breakdown  2. Assess vascular access sites hourly  3. Every 4-6 hours minimum:  Change oxygen saturation probe site  4. Every 4-6 hours:  If on nasal continuous positive airway pressure, respiratory therapy assess nares and determine need for appliance change or resting period.   Outcome: Progressing     Problem: Safety - Adult  Goal: Free from fall injury  Outcome: Progressing     Problem: Chronic Conditions and Co-morbidities  Goal: Patient's chronic conditions and co-morbidity symptoms are monitored and maintained or improved  Outcome: Progressing  Flowsheets (Taken 11/4/2023 0745)  Care Plan - Patient's Chronic Conditions and Co-Morbidity Symptoms are Monitored and Maintained or Improved:   Monitor and assess patient's chronic conditions and comorbid symptoms for stability, deterioration, or improvement   Collaborate with multidisciplinary team to address chronic and comorbid conditions and prevent exacerbation or deterioration   Update acute care plan with appropriate goals if chronic or comorbid symptoms are exacerbated and prevent overall improvement and discharge

## 2023-11-04 NOTE — CARE COORDINATION
Patient remains clear to d/c from  to home with [facility], patient accepted with [facility] [place]. CM continues to follow and monitor for needs.

## 2023-11-05 VITALS
OXYGEN SATURATION: 98 % | BODY MASS INDEX: 28.2 KG/M2 | SYSTOLIC BLOOD PRESSURE: 127 MMHG | DIASTOLIC BLOOD PRESSURE: 61 MMHG | HEART RATE: 66 BPM | HEIGHT: 67 IN | RESPIRATION RATE: 18 BRPM | TEMPERATURE: 97.3 F | WEIGHT: 179.68 LBS

## 2023-11-05 LAB
GLUCOSE BLD STRIP.AUTO-MCNC: 158 MG/DL (ref 65–100)
GLUCOSE BLD STRIP.AUTO-MCNC: 164 MG/DL (ref 65–100)
PERFORMED BY:: ABNORMAL
PERFORMED BY:: ABNORMAL

## 2023-11-05 PROCEDURE — 6370000000 HC RX 637 (ALT 250 FOR IP): Performed by: INTERNAL MEDICINE

## 2023-11-05 PROCEDURE — 82962 GLUCOSE BLOOD TEST: CPT

## 2023-11-05 PROCEDURE — 2580000003 HC RX 258: Performed by: INTERNAL MEDICINE

## 2023-11-05 PROCEDURE — 6360000002 HC RX W HCPCS: Performed by: INTERNAL MEDICINE

## 2023-11-05 RX ORDER — DILTIAZEM HYDROCHLORIDE 120 MG/1
120 CAPSULE, COATED, EXTENDED RELEASE ORAL DAILY
Status: DISCONTINUED | OUTPATIENT
Start: 2023-11-05 | End: 2023-11-05 | Stop reason: HOSPADM

## 2023-11-05 RX ORDER — DILTIAZEM HYDROCHLORIDE 120 MG/1
120 CAPSULE, COATED, EXTENDED RELEASE ORAL DAILY
Qty: 30 CAPSULE | Refills: 3 | Status: SHIPPED | OUTPATIENT
Start: 2023-11-05

## 2023-11-05 RX ADMIN — PANTOPRAZOLE SODIUM 40 MG: 40 TABLET, DELAYED RELEASE ORAL at 06:22

## 2023-11-05 RX ADMIN — ASPIRIN 81 MG: 81 TABLET, COATED ORAL at 09:14

## 2023-11-05 RX ADMIN — AMIODARONE HYDROCHLORIDE 200 MG: 200 TABLET ORAL at 09:14

## 2023-11-05 RX ADMIN — APIXABAN 2.5 MG: 2.5 TABLET, FILM COATED ORAL at 09:14

## 2023-11-05 RX ADMIN — DILTIAZEM HYDROCHLORIDE 30 MG: 30 TABLET, FILM COATED ORAL at 09:14

## 2023-11-05 RX ADMIN — POTASSIUM BICARBONATE 20 MEQ: 782 TABLET, EFFERVESCENT ORAL at 09:14

## 2023-11-05 RX ADMIN — PREDNISONE 20 MG: 20 TABLET ORAL at 09:14

## 2023-11-05 RX ADMIN — CITALOPRAM HYDROBROMIDE 10 MG: 10 TABLET ORAL at 09:14

## 2023-11-05 RX ADMIN — PIPERACILLIN AND TAZOBACTAM 3375 MG: 3; .375 INJECTION, POWDER, LYOPHILIZED, FOR SOLUTION INTRAVENOUS at 04:20

## 2023-11-05 RX ADMIN — DILTIAZEM HYDROCHLORIDE 120 MG: 120 CAPSULE, COATED, EXTENDED RELEASE ORAL at 13:12

## 2023-11-05 ASSESSMENT — ENCOUNTER SYMPTOMS
GASTROINTESTINAL NEGATIVE: 1
SHORTNESS OF BREATH: 1
CHEST TIGHTNESS: 1

## 2023-11-05 NOTE — CONSULTS
PULMONARY CONSULT  [facility]    Name: Rosamaria Gill MRN: [id number]   : 1945 Hospital: [address]   Date: 2023  Admission date: 2023 Hospital Day: 1       HPI:     Patient Active Problem List   Diagnosis    Community acquired bacterial pneumonia    Peripheral vascular disease ([address])    Genuine stress incontinence, female    Hemiplegia ([address])    Hyperproteinemia    NSVT (nonsustained ventricular tachycardia) (HCC)    Chronic venous hypertension    Laryngopharyngeal reflux    History of adenomatous polyp of colon    Respiratory failure ([address])    Essential hypertension    Intracranial hemorrhage (HCC)    Diabetes mellitus type 2, controlled ([address])    Dysphagia    Multiple rib fractures    Humerus shaft fracture    Pericardial effusion             [x] High complexity decision making was performed  [x] See my orders for details      Subjective/Initial History:     I was asked by No admitting provider for patient encounter. to see Rosamaria Gill  a 78 y.o.    male in consultation     Excerpts from admission 2023 or consult notes as follows:   78-year-old male transferred from outside facility because of pleural effusion pericardial effusion patient has significant history of pleural effusion pericardial effusion in the past diastolic dysfunction had atrial fibrillation recent cardioversion 2D echo shows ejection fraction about 55% and RVSP of 45 he is complaining of generalized weakness he is on room air CAT scan of the chest was done still shows pericardial effusion and right pleural effusion previous admission he has left sided thoracentesis done and recently he was discharged from another facility where he was in A-fib cardioversion was done 10/21 so admitted and pulmonary consult was called      Allergies   Allergen Reactions    Fentanyl Other (See Comments)     confusion    Morphine Other (See Comments)     confusion    Tramadol Other (See Comments)
PULMONARY NOTE  [facility]    Name: Christianne Johnson MRN: [id number]   : 1945 Hospital: [facility]   Date: 2023  Admission date: 2023 Hospital Day: 4       HPI:     Patient Active Problem List   Diagnosis    Community acquired bacterial pneumonia    Peripheral vascular disease ([address])    Genuine stress incontinence, female    Hemiplegia ([address])    Hyperproteinemia    NSVT (nonsustained ventricular tachycardia) (HCC)    Chronic venous hypertension    Laryngopharyngeal reflux    History of adenomatous polyp of colon    Respiratory failure ([address])    Essential hypertension    Intracranial hemorrhage (HCC)    Diabetes mellitus type 2, controlled ([address])    Dysphagia    Multiple rib fractures    Humerus shaft fracture    Pericardial effusion    Empyema ([address])             [x] High complexity decision making was performed  [x] See my orders for details      Subjective/Initial History:     I was asked by Susan Dlecid MD to see Christianne Johnson  a 78 y.o.    male in consultation     Excerpts from admission 2023 or consult notes as follows:   56-year-old male transferred from outside facility because of pleural effusion pericardial effusion patient has significant history of pleural effusion pericardial effusion in the past diastolic dysfunction had atrial fibrillation recent cardioversion 2D echo shows ejection fraction about 55% and RVSP of 45 he is complaining of generalized weakness he is on room air CAT scan of the chest was done still shows pericardial effusion and right pleural effusion previous admission he has left sided thoracentesis done and recently he was discharged from another facility where he was in A-fib cardioversion was done 10/21 so admitted and pulmonary consult was called      Allergies   Allergen Reactions    Fentanyl Other (See Comments)     confusion    Morphine Other (See Comments)     confusion    Tramadol Other (See Comments)
PULMONARY NOTE  [facility]    Name: Sandie Pedraza MRN: [id number]   : 1945 Hospital: [facility]   Date: 2023  Admission date: 2023 Hospital Day: 2       HPI:     Patient Active Problem List   Diagnosis    Community acquired bacterial pneumonia    Peripheral vascular disease ([address])    Genuine stress incontinence, female    Hemiplegia ([address])    Hyperproteinemia    NSVT (nonsustained ventricular tachycardia) (HCC)    Chronic venous hypertension    Laryngopharyngeal reflux    History of adenomatous polyp of colon    Respiratory failure (HCC)    Essential hypertension    Intracranial hemorrhage (HCC)    Diabetes mellitus type 2, controlled ([address])    Dysphagia    Multiple rib fractures    Humerus shaft fracture    Pericardial effusion    Empyema ([address])             [x] High complexity decision making was performed  [x] See my orders for details      Subjective/Initial History:     I was asked by Bess Holter, MD to see Sandie Pedraza  a 78 y.o.    male in consultation     Excerpts from admission 2023 or consult notes as follows:   26-year-old male transferred from outside facility because of pleural effusion pericardial effusion patient has significant history of pleural effusion pericardial effusion in the past diastolic dysfunction had atrial fibrillation recent cardioversion 2D echo shows ejection fraction about 55% and RVSP of 45 he is complaining of generalized weakness he is on room air CAT scan of the chest was done still shows pericardial effusion and right pleural effusion previous admission he has left sided thoracentesis done and recently he was discharged from another facility where he was in A-fib cardioversion was done 10/21 so admitted and pulmonary consult was called      Allergies   Allergen Reactions    Fentanyl Other (See Comments)     confusion    Morphine Other (See Comments)     confusion    Tramadol Other (See Comments)
PULMONARY NOTE  [facility]    Name: Sandie Pedraza MRN: [id number]   : 1945 Hospital: [facility]   Date: 2023  Admission date: 2023 Hospital Day: 5       HPI:     Patient Active Problem List   Diagnosis    Community acquired bacterial pneumonia    Peripheral vascular disease ([address])    Genuine stress incontinence, female    Hemiplegia ([address])    Hyperproteinemia    NSVT (nonsustained ventricular tachycardia) (HCC)    Chronic venous hypertension    Laryngopharyngeal reflux    History of adenomatous polyp of colon    Respiratory failure ([address])    Essential hypertension    Intracranial hemorrhage (HCC)    Diabetes mellitus type 2, controlled ([address])    Dysphagia    Multiple rib fractures    Humerus shaft fracture    Pericardial effusion    Empyema ([address])             [x] High complexity decision making was performed  [x] See my orders for details      Subjective/Initial History:     I was asked by Bess Holter, MD to see Sandie Pedraza  a 78 y.o.    male in consultation     Excerpts from admission 2023 or consult notes as follows:   77-year-old male transferred from outside facility because of pleural effusion pericardial effusion patient has significant history of pleural effusion pericardial effusion in the past diastolic dysfunction had atrial fibrillation recent cardioversion 2D echo shows ejection fraction about 55% and RVSP of 45 he is complaining of generalized weakness he is on room air CAT scan of the chest was done still shows pericardial effusion and right pleural effusion previous admission he has left sided thoracentesis done and recently he was discharged from another facility where he was in A-fib cardioversion was done 10/21 so admitted and pulmonary consult was called      Allergies   Allergen Reactions    Fentanyl Other (See Comments)     confusion    Morphine Other (See Comments)     confusion    Tramadol Other (See Comments)
confusion        MAR reviewed and pertinent medications noted or modified as needed     Current Facility-Administered Medications   Medication Dose Route Frequency Provider Last Rate Last Admin    furosemide (LASIX) tablet 40 mg  40 mg Oral Daily Sana Rudolph MD   40 mg at 11/03/23 0949    potassium bicarb-citric acid (EFFER-K) effervescent tablet 20 mEq  20 mEq Oral Daily Sana Rudolph MD   20 mEq at 11/03/23 0955    predniSONE (DELTASONE) tablet 20 mg  20 mg Oral BID Sana Rudolph MD   20 mg at 11/03/23 0949    piperacillin-tazobactam (ZOSYN) 3,375 mg in sodium chloride 0.9 % 50 mL IVPB (mini-bag)  3,375 mg IntraVENous q8h Sana Rudolph MD   Stopped at 11/03/23 0345    amiodarone (CORDARONE) tablet 200 mg  200 mg Oral Daily Sana Rudolph MD   200 mg at 11/03/23 0950    [Held by provider] apixaban (ELIQUIS) tablet 5 mg  5 mg Oral BID Sana Rudolph MD        [facility] by provider] aspirin EC tablet 81 mg  81 mg Oral Daily Sana Rudolph MD        citalopram (CELEXA) tablet 10 mg  10 mg Oral Daily Sana Rudolph MD   10 mg at 11/03/23 1959    diclofenac sodium (VOLTAREN) 1 % gel 2 g  2 g Topical 4x Daily PRN Sana Rudolph MD        pantoprazole (PROTONIX) tablet 40 mg  40 mg Oral QAM AC Sana Rudolph MD   40 mg at 11/02/23 0443    pravastatin (PRAVACHOL) tablet 40 mg  40 mg Oral Nightly Sana Rduolph MD   40 mg at 11/02/23 2101    sodium chloride flush 0.9 % injection 5-40 mL  5-40 mL IntraVENous 2 times per day Sana Rudolph MD   10 mL at 11/02/23 2101    sodium chloride flush 0.9 % injection 5-40 mL  5-40 mL IntraVENous PRN Sana Rudolph MD        0.9 % sodium chloride infusion   IntraVENous PRN Sana Rudolph MD        potassium chloride (KLOR-CON M) extended release tablet 40 mEq  40 mEq Oral PRN Sana Rudolph MD
Absolute 0.0 0.0 - 0.1 K/UL    Absolute Immature Granulocyte 0.0 0.00 - 0.04 K/UL    Differential Type Smear Scanned      RBC Comment Anisocytosis  2+       Troponin    Collection Time: 11/01/23  3:04 AM   Result Value Ref Range    Troponin, High Sensitivity 12 0 - 76 ng/L   Basic Metabolic Panel    Collection Time: 11/01/23  3:04 AM   Result Value Ref Range    Sodium 138 136 - 145 mmol/L    Potassium 3.7 3.5 - 5.1 mmol/L    Chloride 101 97 - 108 mmol/L    CO2 27 21 - 32 mmol/L    Anion Gap 10 5 - 15 mmol/L    Glucose 124 (H) 65 - 100 mg/dL    BUN 14 6 - 20 mg/dL    Creatinine 0.99 0.70 - 1.30 mg/dL    Bun/Cre Ratio 14 12 - 20      Est, Glom Filt Rate >60 >60 ml/min/1.73m2    Calcium 9.3 8.5 - 10.1 mg/dL   Brain Natriuretic Peptide    Collection Time: 11/01/23  3:04 AM   Result Value Ref Range    NT Pro- <450 pg/mL   Troponin    Collection Time: 11/01/23  4:58 AM   Result Value Ref Range    Troponin, High Sensitivity 13 0 - 76 ng/L   Urinalysis    Collection Time: 11/01/23  6:52 AM   Result Value Ref Range    Color, UA Yellow/Straw      Appearance Clear Clear      Specific Gravity, UA >1.030 (H) 1.003 - 1.030    pH, Urine 5.0 5.0 - 8.0      Protein, UA Trace (A) Negative mg/dL    Glucose, UA Negative Negative mg/dL    Ketones, Urine Negative Negative mg/dL    Blood, Urine Negative Negative      Urobilinogen, Urine 1.0 0.2 - 1.0 EU/dL    Nitrite, Urine Negative Negative      Leukocyte Esterase, Urine Negative Negative      Bilirubin Confirmation, UA Positive (A) Negative     Urinalysis, Micro    Collection Time: 11/01/23  6:52 AM   Result Value Ref Range    WBC, UA 5-10 0 - 5 /hpf    RBC, UA 0-5 0 - 3 /hpf    BACTERIA, URINE 1+ (A) Negative /hpf   Lactic Acid    Collection Time: 11/01/23 11:01 AM   Result Value Ref Range    Lactic Acid, Plasma 1.6 0.4 - 2.0 mmol/L   Procalcitonin    Collection Time: 11/01/23 11:01 AM   Result Value Ref Range    Procalcitonin <0.05 (H) 0 ng/mL   Troponin    Collection Time:
Collected: 11/01/23 1101    Order Status: Completed Specimen: Blood Updated: 11/02/23 1210     Special Requests No Special Requests        Culture No growth 1 day       Culture, Blood 1 [[id number]]     Order Status: Sent Specimen: Blood     Culture, Blood 2 [[id number]]     Order Status: Sent Specimen: Blood              Imaging:  CT ABDOMEN PELVIS W IV CONTRAST Additional Contrast? Radiologist Recommendation    Result Date: 11/1/2023  EXAM: CT ABDOMEN PELVIS W IV CONTRAST INDICATION: abdominal pain, recurrent n/v COMPARISON: CT abdomen and pelvis 10/17/2023 CONTRAST: 100 mL of Isovue-370. ORAL CONTRAST: None TECHNIQUE: Following intravenous administration of contrast, thin axial images were obtained through the abdomen and pelvis. Coronal and sagittal reconstructions were generated. CT dose reduction was achieved through use of a standardized protocol tailored for this examination and automatic exposure control for dose modulation. FINDINGS: LOWER THORAX: Cardiomegaly. Slightly decreasing moderate to large pericardial effusion. Calcified mediastinal lymph nodes. Stable small to moderate right loculated pleural effusion. Decrease in small left pleural effusion and pleural thickening. Patchy bilateral lower lobe dependent partial collapse/consolidations. LIVER: Steatosis and parenchymal heterogeneity, without definite mass. BILIARY TREE: Cholelithiasis. CBD is not dilated. SPLEEN: within normal limits. PANCREAS: No mass or ductal dilatation. ADRENALS: Unremarkable. KIDNEYS: No mass, calculus, or hydronephrosis. Several bilateral cysts; no dedicated follow-up recommended. STOMACH: Unremarkable. SMALL BOWEL: No dilatation or wall thickening. Diverticulosis. COLON: No dilatation or wall thickening. APPENDIX: Normal. PERITONEUM: No ascites or pneumoperitoneum. RETROPERITONEUM: No lymphadenopathy. Atherosclerosis without aneurysm. REPRODUCTIVE ORGANS: Prostatectomy.  Penile tunica albuginea calcifications, compatible with
Positive (A) Negative     Urinalysis, Micro    Collection Time: 11/01/23  6:52 AM   Result Value Ref Range    WBC, UA 5-10 0 - 5 /hpf    RBC, UA 0-5 0 - 3 /hpf    BACTERIA, URINE 1+ (A) Negative /hpf   Lactic Acid    Collection Time: 11/01/23 11:01 AM   Result Value Ref Range    Lactic Acid, Plasma 1.6 0.4 - 2.0 mmol/L   Procalcitonin    Collection Time: 11/01/23 11:01 AM   Result Value Ref Range    Procalcitonin <0.05 (H) 0 ng/mL   Troponin    Collection Time: 11/01/23 11:01 AM   Result Value Ref Range    Troponin, High Sensitivity 10 0 - 76 ng/L   Brain Natriuretic Peptide    Collection Time: 11/01/23 11:01 AM   Result Value Ref Range    NT Pro- <450 pg/mL   POCT Glucose    Collection Time: 11/01/23 12:17 PM   Result Value Ref Range    POC Glucose 112 (H) 65 - 100 mg/dL    Performed by: Hanh Ray     Assessment and Plan:     Bilateral pleural effusion:  -Unclear etiology  -Left-sided thoracentesis from 7/12/2023 with cytology showing small mature lymphocytic infiltration, however sample size was too small for further characterization including flow cytometry.  -The patient has been referred to interventional radiology for thoracentesis. Would recommend flow cytometry on pleural fluid sample for further evaluation.  -Small lymphocytic infiltration typically is seen in inflammation and can also be seen in lymphoproliferative disorders such as CLL/SLL.  -However, there is no evidence of lymphadenopathy or hepatosplenomegaly on CT scans done so far. Moreover, the CBC does not show evidence of leukocytosis or lymphocytosis. Therefore low suspicion for primary lymphoproliferative disorder. Dyspnea and chest pain:  -Management per primary team  -Pulmonology following. Thank you for the  consult. This dictation was generated by voice recognition computer software.   Although all attempts are made to edit the dictation for accuracy, there may be errors in the transcription
failure  Prognosis guarded       RECOMMENDATIONS/PLAN:     78-year-old male came in because of  chest pain chest x-ray CAT scan was done which shows bilateral pleural effusion more on the right side and pericardial effusion  We will get radiology for diagnostic and therapeutic thoracentesis  Previous thoracentesis shows lymphocytic pleural effusion  WBC is normal and afebrile  Patient has cardioversion done on 10/21  Supplemental O2 to keep sats > 93%  Aspiration precautions  Labs to follow electrolytes, renal function and and blood counts  Glucose monitoring and SSI  Bronchial hygiene with respiratory therapy techniques, bronchodilators  DVT, SUP prophylaxis  Pt needs IV fluids with additives and Drug therapy requiring intensive monitoring for toxicity  Prescription drug management with home med reconciliation reviewed  IV zosyn day 2/7  Amiodarone 200 mg   Aspirin 81 mg (ON HOLD)   Coreg 3.125 mg   Celexa 10 mg   Lasix 40 mg   Protonix 40 mg   Pravastatin 40 mg   Prednisone 20 mg  Oncology consult for possible lymphoma - following   Cardiology consult      11/2: O2 sat 98% on RA. Not oxygen dependent at home. Denies chest pain, SOB, cough, wheezing. IR drained 20 cc from left posterior chest and sent flow cytometry to lab. WBC 12.4. This care involved high complexity medical decision making: I personally:  Reviewed the flowsheet and previous days notes  Reviewed and summarized records or history from previous days note or discussions with staff, family  High Risk Drug therapy requiring intensive monitoring for toxicity: eg steroids, pressors, antibiotics  Reviewed and/or ordered Clinical lab tests  Reviewed images and/or ordered Radiology tests  Reviewed the patients ECG / Telemetry  Reviewed and/or adjusted NiPPV settings  Called and arranged for Radiologic procedures or interventions  performed or ordered Diagnostic endoscopies with identified risk factors.   discussed my assessment/management with : Nursing,

## 2023-11-05 NOTE — DISCHARGE SUMMARY
Hospitalist Discharge Summary     Patient ID:  Cristi Santiago  [id number]  10 y.o.  1945  11/1/2023    PCP on record: Shin Moyer MD    Admit date: 11/1/2023  Discharge date and time: 11/3/2023    DISCHARGE DIAGNOSIS:    Right-sided pleural effusion/concerns for empyema/pericardial effusion/atrial fibrillation with RVR/depression/anxiety/hypertension/hyperlipidemia    CONSULTATIONS:  IP CONSULT TO PULMONOLOGY  IP CONSULT TO ONCOLOGY  IP CONSULT TO CARDIOLOGY    Excerpted HPI from H&P of Adi Walker MD:  Argenis Renee is a 78 y.o.  male with PMHx significant for multiple comorbidities presents [facility] from [facility] with complaint of chest pain/shortness of breath/nausea/vomiting. Patient states he was in his usual state of health until yesterday afternoon when he started sprinting sudden onset intermittent chest discomfort, moderate intensity, associate with shortness of breath on deep inspiration as well as exertion, is also associate with nausea as well as 1 episode of nonbilious nonprojectile vomiting following which patient presented to the ED. Patient denies any fevers chills lightheadedness dizziness palpitations headache focal weakness loss sensation auditory or visual symptoms abdominal stool or urine complaints or any other associated symptoms. Patient endorses no recent sick contacts or travel activity  We were asked to admit for work up and evaluation of the above problems. ______________________________________________________________________  DISCHARGE SUMMARY/HOSPITAL COURSE:  for full details see H&P, daily progress notes, labs, consult notes.      Patient was subsequently admitted to [facility] further evaluation as well as management, patient was evaluated by pulmonology as well as oncology, patient was evaluated mental radiology, underwent diagnostic/therapeutic thoracentesis,
(L) 12 - 49 %    Monocytes % 6 5 - 13 %    Eosinophils % 0 0 - 7 %    Basophils % 0 0 - 1 %    Immature Granulocytes 0 0 - 0.5 %    Neutrophils Absolute 9.8 (H) 1.8 - 8.0 K/UL    Lymphocytes Absolute 1.2 0.8 - 3.5 K/UL    Monocytes Absolute 0.8 0.0 - 1.0 K/UL    Eosinophils Absolute 0.0 0.0 - 0.4 K/UL    Basophils Absolute 0.0 0.0 - 0.1 K/UL    Absolute Immature Granulocyte 0.1 (H) 0.00 - 0.04 K/UL    Differential Type AUTOMATED     Comprehensive Metabolic Panel w/ Reflex to MG    Collection Time: 11/03/23  5:01 AM   Result Value Ref Range    Sodium 136 136 - 145 mmol/L    Potassium 4.2 3.5 - 5.1 mmol/L    Chloride 101 97 - 108 mmol/L    CO2 29 21 - 32 mmol/L    Anion Gap 6 5 - 15 mmol/L    Glucose 168 (H) 65 - 100 mg/dL    BUN 32 (H) 6 - 20 mg/dL    Creatinine 1.53 (H) 0.70 - 1.30 mg/dL    Bun/Cre Ratio 21 (H) 12 - 20      Est, Glom Filt Rate 46 (L) >60 ml/min/1.73m2    Calcium 9.3 8.5 - 10.1 mg/dL    Total Bilirubin 0.4 0.2 - 1.0 mg/dL    AST 40 (H) 15 - 37 U/L    ALT 38 12 - 78 U/L    Alk Phosphatase 62 45 - 117 U/L    Total Protein 8.0 6.4 - 8.2 g/dL    Albumin 2.5 (L) 3.5 - 5.0 g/dL    Globulin 5.5 (H) 2.0 - 4.0 g/dL    Albumin/Globulin Ratio 0.5 (L) 1.1 - 2.2     POCT Glucose    Collection Time: 11/03/23  7:27 AM   Result Value Ref Range    POC Glucose 159 (H) 65 - 100 mg/dL    Performed by: [employer]    POCT Glucose    Collection Time: 11/03/23 11:04 AM   Result Value Ref Range    POC Glucose 181 (H) 65 - 100 mg/dL    Performed by: [employer]    POCT Glucose    Collection Time: 11/03/23  5:00 PM   Result Value Ref Range    POC Glucose 160 (H) 65 - 100 mg/dL    Performed by: Kathleen Chester    POCT Glucose    Collection Time: 11/03/23  8:16 PM   Result Value Ref Range    POC Glucose 201 (H) 65 - 100 mg/dL    Performed by: Sharon Orourke    POCT Glucose    Collection Time: 11/04/23  7:40 AM   Result Value Ref Range    POC Glucose 147 (H) 65 - 100 mg/dL    Performed by: Daren Merlin    POCT Glucose

## 2023-11-05 NOTE — PLAN OF CARE
Problem: Skin/Tissue Integrity  Goal: Absence of new skin breakdown  Description: 1. Monitor for areas of redness and/or skin breakdown  2. Assess vascular access sites hourly  3. Every 4-6 hours minimum:  Change oxygen saturation probe site  4. Every 4-6 hours:  If on nasal continuous positive airway pressure, respiratory therapy assess nares and determine need for appliance change or resting period.   Outcome: Adequate for Discharge     Problem: Chronic Conditions and Co-morbidities  Goal: Patient's chronic conditions and co-morbidity symptoms are monitored and maintained or improved  Outcome: Adequate for Discharge  Flowsheets (Taken 11/5/2023 0805)  Care Plan - Patient's Chronic Conditions and Co-Morbidity Symptoms are Monitored and Maintained or Improved:   Monitor and assess patient's chronic conditions and comorbid symptoms for stability, deterioration, or improvement   Collaborate with multidisciplinary team to address chronic and comorbid conditions and prevent exacerbation or deterioration   Update acute care plan with appropriate goals if chronic or comorbid symptoms are exacerbated and prevent overall improvement and discharge

## 2023-11-05 NOTE — CARE COORDINATION
1210: Discharge summary/order home with home health noted and attached in [address] with updates. [facility] patient when medically cleared for discharge. Nurse calling family with transportation ETA when available. When transportation available, patient to discharge home per order. Discharge Checklist Completed. Transition of Care Plan:    RUR: 24%  Prior Level of Functioning: Dependent  Disposition: Home with [address] [facility]  If SNF or IPR: Date FOC offered: 11/01/2023  Date [address] received: 11/01/2023  Accepting facility:  N/A  Date authorization started with reference number: N/A  Date authorization received and expires: N/A  Follow up appointments: Discharge Summary  DME needed: Has DME needed  Transportation at discharge: Family  IM/IMM Medicare/ letter given: 11/03/2023  Is patient a [place] and connected with [facility]? No  If yes, was [employer] transfer form completed and [facility] notified? N/A  Caregiver Contact: Patient   Discharge Caregiver contacted prior to discharge? Patient   Care Conference needed?  No  Barriers to discharge: None

## 2023-11-06 LAB
BACTERIA SPEC CULT: NORMAL
BACTERIA SPEC CULT: NORMAL
EKG ATRIAL RATE: 293 BPM
EKG DIAGNOSIS: NORMAL
EKG Q-T INTERVAL: 356 MS
EKG QRS DURATION: 90 MS
EKG QTC CALCULATION (BAZETT): 485 MS
EKG R AXIS: 3 DEGREES
EKG T AXIS: 229 DEGREES
EKG VENTRICULAR RATE: 112 BPM
GRAM STN SPEC: NORMAL
GRAM STN SPEC: NORMAL
Lab: NORMAL

## 2023-11-07 LAB
BACTERIA SPEC CULT: NORMAL
BACTERIA SPEC CULT: NORMAL
Lab: NORMAL
Lab: NORMAL

## 2024-01-15 ENCOUNTER — APPOINTMENT (OUTPATIENT)
Facility: HOSPITAL | Age: 79
End: 2024-01-15
Attending: EMERGENCY MEDICINE
Payer: MEDICARE

## 2024-01-15 ENCOUNTER — HOSPITAL ENCOUNTER (EMERGENCY)
Facility: HOSPITAL | Age: 79
Discharge: HOME | End: 2024-01-15
Attending: EMERGENCY MEDICINE
Payer: MEDICARE

## 2024-01-15 VITALS
WEIGHT: 186 LBS | OXYGEN SATURATION: 100 % | TEMPERATURE: 98 F | BODY MASS INDEX: 29.19 KG/M2 | SYSTOLIC BLOOD PRESSURE: 147 MMHG | HEIGHT: 67 IN | HEART RATE: 78 BPM | RESPIRATION RATE: 18 BRPM | DIASTOLIC BLOOD PRESSURE: 83 MMHG

## 2024-01-15 DIAGNOSIS — S09.90XA INJURY OF HEAD, INITIAL ENCOUNTER: Primary | ICD-10-CM

## 2024-01-15 DIAGNOSIS — S00.83XA CONTUSION OF FACE, INITIAL ENCOUNTER: ICD-10-CM

## 2024-01-15 DIAGNOSIS — S01.81XA FACIAL LACERATION, INITIAL ENCOUNTER: ICD-10-CM

## 2024-01-15 DIAGNOSIS — T14.8XXA HEMATOMA: ICD-10-CM
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
## 2024-01-15 LAB
EKG ATRIAL RATE: 82 BPM
EKG DIAGNOSIS: NORMAL
EKG P AXIS: 8 DEGREES
EKG P-R INTERVAL: 200 MS
EKG Q-T INTERVAL: 471 MS
EKG QRS DURATION: 97 MS
EKG QTC CALCULATION (BAZETT): 551 MS
EKG R AXIS: 0 DEGREES
EKG T AXIS: -43 DEGREES
EKG VENTRICULAR RATE: 82 BPM

## 2024-01-15 PROCEDURE — 93005 ELECTROCARDIOGRAM TRACING: CPT | Performed by: EMERGENCY MEDICINE

## 2024-01-15 PROCEDURE — 70450 CT HEAD/BRAIN W/O DYE: CPT

## 2024-01-15 PROCEDURE — 12011 RPR F/E/E/N/L/M 2.5 CM/<: CPT

## 2024-01-15 PROCEDURE — 99284 EMERGENCY DEPT VISIT MOD MDM: CPT

## 2024-01-15 RX ORDER — ONDANSETRON 2 MG/ML
4 INJECTION INTRAMUSCULAR; INTRAVENOUS ONCE
Status: DISCONTINUED | OUTPATIENT
Start: 2024-01-15 | End: 2024-01-15

## 2024-01-15 NOTE — ED TRIAGE NOTES
Pt with ground level fall only c/o laceration above left eyebrow, no LOC, EMS reported in rout to hospital pt had a bout of vomiting and reported he wasn't feeling well, pt is on blood thinner  pt BS in field 109

## 2024-01-15 NOTE — ED NOTES
Per Gabbi mayfield [facility] no truck available at this moment to d/c patient home. Awaiting staff for transportation home. Trip #[id number]

## 2024-01-15 NOTE — ED PROVIDER NOTES
initial encounter    4. Contusion of face, initial encounter    5. Hematoma          DISPOSITION/PLAN   DISPOSITION Decision To Discharge 01/15/2024 03:15:17 PM    Discharge Note: The patient is stable for discharge home. The signs, symptoms, diagnosis, and discharge instructions have been discussed, understanding conveyed, and agreed upon. The patient is to follow up as recommended or return to ER should their symptoms worsen.      PATIENT REFERRED TO:  Reynaldo Boothe Sr., MD  [address] [facility] [address] [phone]    In 1 day  for re-evaluation    [facility]  [address] [facility] [address] [phone]    As needed, If symptoms worsen      The adhesive put on your wound will fall off over about a week's time. Do not pull it off. You can trim edges as they raise up.            DISCHARGE MEDICATIONS:     Medication List        ASK your doctor about these medications      amiodarone 200 MG tablet  Commonly known as: CORDARONE  Take 1 tablet by mouth daily     apixaban 5 MG Tabs tablet  Commonly known as: ELIQUIS  Take 1 tablet by mouth 2 times daily     aspirin 81 MG EC tablet     citalopram 10 MG tablet  Commonly known as: CELEXA     diclofenac sodium 1 % Gel  Commonly known as: VOLTAREN     dilTIAZem 120 MG extended release capsule  Commonly known as: CARDIZEM CD  Take 1 capsule by mouth daily     docusate sodium 100 MG capsule  Commonly known as: COLACE     metFORMIN 500 MG tablet  Commonly known as: GLUCOPHAGE     pantoprazole 40 MG tablet  Commonly known as: PROTONIX     polyethylene glycol 17 g packet  Commonly known as: GLYCOLAX     pravastatin 40 MG tablet  Commonly known as: PRAVACHOL     predniSONE 20 MG tablet  Commonly known as: DELTASONE  Take 1 tablet twice daily for 5 days, then take 1 tablet daily for 5 days                DISCONTINUED MEDICATIONS:  Current Discharge Medication List          I am the Primary Clinician of Record: Wendie Macdonald MD (electronically

## 2024-01-15 NOTE — DISCHARGE INSTRUCTIONS
Thank you!  Thank you for allowing me to care for you in the emergency department. It is my goal to provide you with excellent care.  Please fill out the survey that will come to you by mail or email since we listen to your feedback!     Below you will find a list of your tests from today's visit.  Should you have any questions, please do not hesitate to call the emergency department.    Labs  No results found for this or any previous visit (from the past 12 hour(s)).    Radiologic Studies  CT HEAD WO CONTRAST   Final Result   No acute abnormality              ------------------------------------------------------------------------------------------------------------  The exam and treatment you received in the Emergency Department were for an urgent problem and are not intended as complete care. It is important that you follow-up with a doctor, nurse practitioner, or physician assistant to:  (1) confirm your diagnosis,  (2) re-evaluation of changes in your illness and treatment, and (3) for ongoing care. Please take your discharge instructions with you when you go to your follow-up appointment.     If you have any problem arranging a follow-up appointment, contact the Emergency Department.  If your symptoms become worse or you do not improve as expected and you are unable to reach your health care provider, please return to the Emergency Department. We are available 24 hours a day.     If a prescription has been provided, please have it filled as soon as possible to prevent a delay in treatment. If you have any questions or reservations about taking the medication due to side effects or interactions with other medications, please call your primary care provider or contact the ER.

## 2024-04-09 ENCOUNTER — HOSPITAL ENCOUNTER (OUTPATIENT)
Facility: HOSPITAL | Age: 79
Setting detail: RECURRING SERIES
Discharge: HOME | End: 2024-04-12
Payer: MEDICARE

## 2024-04-09 PROCEDURE — 97161 PT EVAL LOW COMPLEX 20 MIN: CPT

## 2024-04-09 NOTE — THERAPY EVALUATION
taken if barriers to learning present: Conversation with family  Patient Self Reported Health Status: fair  Rehabilitation Potential: fair      PRECAUTIONS:   >>>PATIENT IS HIGH FALL RISK<<<   and Patient requires one on one supervision with treatment       Date of Initial Evaluation: 4/9/2024  Date of last Progress Note: n/a  Visit # of last Progress Note: 1     Number of Insurance Authorized visits: 10        Therapeutic Exercise Flow Sheet:                                                                                    Running Visit #    EXERCISE   1   2   3   4   5   6   7   8   9   10     Nustep (right hand)                      Sit to stand                     LAQ                     Marching in Place                     Gait training                                                                                                            [x] Eval only for Running Visit #1, no treatment provided    Modalities to be included future treatment sessions: Electrical Stimulation, Heat Pack, Cold Pack, and Ultrasound  Has HEP been provided to patient? [x] No    [] Yes                                      Access Code:                    Date Provided:  [] Reviewed HEP    [] Progressed/Changed HEP, details:        GOALS  Short Term Goals, to be met within 5 treatments:  Patient will demonstrate independence and compliance with HEP in order to increase mobility and assist with carryover from PT services.  Status at last Eval/Progress Note: none provided  Current Status: see above, Initial Evaluation completed today  Goal Met?  No    2.  Patient will be able to transfer from WC to bed and bed to bedside commode independently and safely.   Status at last Eval/Progress Note: requires assistance near by  Current Status: see above, Initial Evaluation completed today  Goal Met?  No    3. Patient will be able to perform sit to stand with right hand support safely with SBA with hemiwalker.  Status at last Eval/Progress

## 2024-04-15 ENCOUNTER — HOSPITAL ENCOUNTER (OUTPATIENT)
Facility: HOSPITAL | Age: 79
Setting detail: RECURRING SERIES
Discharge: HOME | End: 2024-04-18
Payer: MEDICARE

## 2024-04-15 PROCEDURE — 97116 GAIT TRAINING THERAPY: CPT

## 2024-04-15 PROCEDURE — 97110 THERAPEUTIC EXERCISES: CPT

## 2024-04-15 NOTE — PROGRESS NOTES
PHYSICAL THERAPY - MEDICARE DAILY TREATMENT NOTE (updated 3/23)    Date of Service: 4/15/2024        Patient Name:  Tyshawn Vaughn :  1945   Medical   Diagnosis:  Hemiplegia and hemiparesis following unspecified cerebrovascular disease affecting unspecified side [I69.959] Treatment Diagnosis:  M62.81  GENERAL MUSCLE WEAKNESS, R26.81   Unsteadiness on feet, R26.89   Abnormalities of gait and mobility, and R27.9     Unspecified lack of coordination    Referral Source:  Reynaldo Boothe Sr* Insurance:   Payor: MEDICARE / Plan: MEDICARE PART A AND B / Product Type: *No Product type* /    [x] Patient's date of birth verified                      Visit #   Current  / Total 2 10   Time   In / Out 1100 1158   Total Treatment Time (in minutes) 58    Total Timed Codes (in minutes) 58    1:1 Treatment Time (in minutes) 58       [facility] Totals Reminder:  bill using total billable   min of TIMED therapeutic procedures and modalities.   8-22 min = 1 unit; 23-37 min = 2 units; 38-52 min = 3 units; 53-67 min = 4 units; 68-82 min = 5 units        SUBJECTIVE  Pain Level (0-10 scale): 0/10    Any medication changes, allergies to medications, adverse drug reactions, diagnosis change, or new procedure performed?: No  Medications: [x] Verified on Patient Summary List    Subjective functional status/changes:     Pt states he fells broke his collarbone, which is why he's here because he fell.    OBJECTIVE  Therapeutic Procedures:  Tx Min Billable or 1:1 Min (if diff from Tx Min) Procedure, Rationale, Specifics   48  79142 Therapeutic Exercise (timed):  increase ROM, strength, coordination, balance, and proprioception to improve patient's ability to progress to PLOF and address remaining functional goals. (see flow sheet as applicable)   10  22448 Gait Training (timed):    250 feet with HW (assistive device) over level floor surfaces with CGA level of assist. Cuing for sequencing of HW and feet.  To improve safety and dynamic

## 2024-04-17 ENCOUNTER — HOSPITAL ENCOUNTER (OUTPATIENT)
Facility: HOSPITAL | Age: 79
Setting detail: RECURRING SERIES
Discharge: HOME | End: 2024-04-20
Payer: MEDICARE

## 2024-04-17 PROCEDURE — 97110 THERAPEUTIC EXERCISES: CPT

## 2024-04-17 PROCEDURE — 97116 GAIT TRAINING THERAPY: CPT

## 2024-04-17 PROCEDURE — 97530 THERAPEUTIC ACTIVITIES: CPT

## 2024-04-17 NOTE — PROGRESS NOTES
Hand /leg strap  Lvl 1 x12'  timer                         Sit to stand          x5 min A Min A  2x5                        LAQ         x20     x20 B                    Marching in Place         X20 B     X20 B                       Gait training        [facility] follow  1 lap - 1 rest CGA  [facility] follow  145ft - 1 rest min A                                                                                                                                               Caregiver Education        standing  W/ Liz                      [x] Eval only for Running Visit #1, no treatment provided     Modalities to be included future treatment sessions: Electrical Stimulation, Heat Pack, Cold Pack, and Ultrasound  Has HEP been provided to patient? [] No    [x] Yes                                      Access Code: [email or web address]                  Date Provided: 4/15/2024  [] Reviewed HEP    [] Progressed/Changed HEP, details:      GOALS  Short Term Goals, to be met within 5 treatments:  Patient will demonstrate independence and compliance with HEP in order to increase mobility and assist with carryover from PT services.  Status at last Eval/Progress Note: none provided  Current Status: provided  Goal Met?  No     2.  Patient will be able to transfer from  to bed and bed to bedside commode independently and safely.   Status at last Eval/Progress Note: requires assistance near by  Current Status: requires assistance near by  Goal Met?  No     3. Patient will be able to perform sit to stand with right hand support safely with SBA with hemiwalker.  Status at last Eval/Progress Note: numerous attempts to stand up from chair with assistance  Current Status: numerous attempts to stand up from chair with assistance  Goal Met?  No     4. Patient will be able to walk for at least 50 feet with hemiwalker minimal assistance x 1  Status at last Eval/Progress Note: not walking only transfers since the fall  Current Status: not walking only

## 2024-04-19 ENCOUNTER — OFFICE VISIT (OUTPATIENT)
Age: 79
End: 2024-04-19
Payer: MEDICARE

## 2024-04-19 VITALS
TEMPERATURE: 97.5 F | BODY MASS INDEX: 29.13 KG/M2 | HEART RATE: 66 BPM | RESPIRATION RATE: 18 BRPM | DIASTOLIC BLOOD PRESSURE: 83 MMHG | OXYGEN SATURATION: 95 % | SYSTOLIC BLOOD PRESSURE: 165 MMHG | HEIGHT: 67 IN

## 2024-04-19 DIAGNOSIS — I87.303 CHRONIC VENOUS HYPERTENSION INVOLVING BOTH SIDES: ICD-10-CM

## 2024-04-19 DIAGNOSIS — I73.9 PERIPHERAL VASCULAR DISEASE (HCC): Primary | ICD-10-CM

## 2024-04-19 PROCEDURE — 1036F TOBACCO NON-USER: CPT | Performed by: SURGERY

## 2024-04-19 PROCEDURE — 99212 OFFICE O/P EST SF 10 MIN: CPT | Performed by: SURGERY

## 2024-04-19 PROCEDURE — G8427 DOCREV CUR MEDS BY ELIG CLIN: HCPCS | Performed by: SURGERY

## 2024-04-19 PROCEDURE — 3077F SYST BP >= 140 MM HG: CPT | Performed by: SURGERY

## 2024-04-19 PROCEDURE — 3078F DIAST BP <80 MM HG: CPT | Performed by: SURGERY

## 2024-04-19 PROCEDURE — 1123F ACP DISCUSS/DSCN MKR DOCD: CPT | Performed by: SURGERY

## 2024-04-19 PROCEDURE — G8419 CALC BMI OUT NRM PARAM NOF/U: HCPCS | Performed by: SURGERY

## 2024-04-19 RX ORDER — ACETAMINOPHEN 500 MG
500 TABLET ORAL EVERY 6 HOURS PRN
COMMUNITY

## 2024-04-19 RX ORDER — ALBUTEROL SULFATE 90 UG/1
2 AEROSOL, METERED RESPIRATORY (INHALATION) EVERY 6 HOURS PRN
COMMUNITY

## 2024-04-19 RX ORDER — CARVEDILOL 6.25 MG/1
6.25 TABLET ORAL 2 TIMES DAILY WITH MEALS
COMMUNITY

## 2024-04-19 RX ORDER — PRIMIDONE 50 MG/1
100 TABLET ORAL 2 TIMES DAILY
COMMUNITY
Start: 2024-02-09

## 2024-04-19 RX ORDER — THIAMINE HCL 100 MG
TABLET ORAL
COMMUNITY

## 2024-04-19 ASSESSMENT — PATIENT HEALTH QUESTIONNAIRE - PHQ9
SUM OF ALL RESPONSES TO PHQ QUESTIONS 1-9: 0
1. LITTLE INTEREST OR PLEASURE IN DOING THINGS: NOT AT ALL
SUM OF ALL RESPONSES TO PHQ QUESTIONS 1-9: 0
2. FEELING DOWN, DEPRESSED OR HOPELESS: NOT AT ALL
SUM OF ALL RESPONSES TO PHQ9 QUESTIONS 1 & 2: 0
SUM OF ALL RESPONSES TO PHQ QUESTIONS 1-9: 0
SUM OF ALL RESPONSES TO PHQ QUESTIONS 1-9: 0

## 2024-04-22 ENCOUNTER — APPOINTMENT (OUTPATIENT)
Facility: HOSPITAL | Age: 79
End: 2024-04-22
Payer: MEDICARE

## 2024-04-24 ENCOUNTER — HOSPITAL ENCOUNTER (OUTPATIENT)
Facility: HOSPITAL | Age: 79
Setting detail: RECURRING SERIES
Discharge: HOME | End: 2024-04-27
Payer: MEDICARE

## 2024-04-24 PROCEDURE — 97116 GAIT TRAINING THERAPY: CPT

## 2024-04-24 PROCEDURE — 97110 THERAPEUTIC EXERCISES: CPT

## 2024-04-24 NOTE — PROGRESS NOTES
min A level of assist. Cuing for picking up L foot and maintaining proper posture.  To improve safety and dynamic movement with household/community ambulation.  (see flow sheet as applicable)   56     Total Total   [x] Patient Education billed concurrently with other procedures     Pain Level at end of session (0-10 scale): 0/10    Assessment   Patient tolerated treatment fair. Noted more involuntary shaking in B UE today compared to other days. Continued with gait with HW and cueing for clearing L foot and postural control. Patient will continue to benefit from skilled PT services to analyze and address functional mobility deficits, analyze and address ROM deficits, and analyze and address strength deficits to address functional deficits and attain remaining goals.    PRECAUTIONS:   >>>PATIENT IS HIGH FALL RISK<<<   and Patient requires one on one supervision with treatment        Date of Initial Evaluation: 4/9/2024  Date of last Progress Note: n/a  Visit # of last Progress Note: 1      Number of Insurance Authorized visits: N/A         Therapeutic Exercise Flow Sheet:                                                                                    Running Visit #    EXERCISE   1   2   3   4   5   6   7   8   9   10      Nustep (right hand)      Hand /leg strap  Lvl 1 x12'  timer   Hand /leg strap  Lvl 1 x12'  timer      Hand /leg strap  Lvl 1 x12'  timer                   Sit to stand          x5 min A Min A  2x5      min A   2x5                  LAQ         x20     x20 B    X20 B                  Marching in Place         X20 B     X20 B       X20 B                  Gait training        HW  WC follow  1 lap - 1 rest CGA  HW  WC follow  145ft - 1 rest min A  HW  WC follow  135' 1 rest min A                                                                                                                                              Caregiver Education            standing  W/ Liz                  [x] Eval only

## 2024-04-26 ENCOUNTER — HOSPITAL ENCOUNTER (OUTPATIENT)
Facility: HOSPITAL | Age: 79
Setting detail: RECURRING SERIES
Discharge: HOME | End: 2024-04-29
Payer: MEDICARE

## 2024-04-26 PROCEDURE — 97116 GAIT TRAINING THERAPY: CPT

## 2024-04-26 PROCEDURE — 97110 THERAPEUTIC EXERCISES: CPT

## 2024-04-26 NOTE — PROGRESS NOTES
PHYSICAL THERAPY - MEDICARE DAILY TREATMENT NOTE (updated 3/23)    Date of Service: 2024        Patient Name:  Tyshawn Vaughn :  1945   Medical   Diagnosis:  Hemiplegia and hemiparesis following unspecified cerebrovascular disease affecting unspecified side [I69.959] Treatment Diagnosis:  M62.81  GENERAL MUSCLE WEAKNESS    Referral Source:  Reynaldo Boothe Sr* Insurance:   Payor: MEDICARE / Plan: MEDICARE PART A AND B / Product Type: *No Product type* /    [x] Patient's date of birth verified                      Visit #   Current  / Total 5 10   Time   In / Out 11:02 12:03   Total Treatment Time (in minutes) 61    Total Timed Codes (in minutes) 61    1:1 Treatment Time (in minutes) 61         [facility] Totals Reminder:  bill using total billable   min of TIMED therapeutic procedures and modalities.   8-22 min = 1 unit; 23-37 min = 2 units; 38-52 min = 3 units; 53-67 min = 4 units; 68-82 min = 5 units        SUBJECTIVE  Pain Level (0-10 scale): 0/10    Any medication changes, allergies to medications, adverse drug reactions, diagnosis change, or new procedure performed?: No  Medications: [x] Verified on Patient Summary List    Subjective functional status/changes:     \"I haven't fallen recently and I am not having any pain.\"  Reports he still does not have a home health aide.    OBJECTIVE  Therapeutic Procedures:  Tx Min Billable or 1:1 Min (if diff from Tx Min) Procedure, Rationale, Specifics   51 51 [id number] Therapeutic Exercise (timed):  increase ROM, strength, coordination, balance, and proprioception to improve patient's ability to progress to PLOF and address remaining functional goals. (see flow sheet as applicable)   10 10 50764 Gait Training (timed):    75 feet with HW (assistive device) over level surfaces with CGA level of assist. Cuing for posture and RLE step thru/step length.  To improve safety and dynamic movement with household/community ambulation.  (see flow sheet as applicable)

## 2024-04-27 NOTE — PROGRESS NOTES
weaker and currently wheelchair.  Patient will be reassessed 1 month follow-up.          Gary Rios MD

## 2024-04-29 ENCOUNTER — HOSPITAL ENCOUNTER (OUTPATIENT)
Facility: HOSPITAL | Age: 79
Setting detail: RECURRING SERIES
Discharge: HOME | End: 2024-05-02
Payer: MEDICARE

## 2024-04-29 PROCEDURE — 97110 THERAPEUTIC EXERCISES: CPT

## 2024-04-29 PROCEDURE — 97116 GAIT TRAINING THERAPY: CPT

## 2024-04-29 NOTE — PROGRESS NOTES
Status: not walking since the fall  Goal Met?  No     2.  Patient will be able to perform sit to stand from chair independently with hemiwalker.  Status at last Eval/Progress Note: numerous attempts  Current Status: numerous attempts  Goal Met?  No     3. Patient will be able to get in and out of vehicle with increasing LE strength with little difficulty.  Status at last Eval/Progress Note: difficulty to lift left LE into vehicle  Current Status: difficulty to lift LE into vehicle  Goal Met?  No     4. Patient will be able to return to ambulating to bathroom with hemiwalker independently  Status at last Eval/Progress Note: using bedside commode  Current Status: using bedside commode  Goal Met?  No    []  See Progress Note/Recertification  []  See Discharge Summary    PLAN  [x]  Continue plan of care  [x]  Upgrade activities as tolerated  []  Discharge due to :  []  Other:      RADHA LOMELI PTA, GERONIMO       4/29/2024       10:09 AM

## 2024-05-01 ENCOUNTER — APPOINTMENT (OUTPATIENT)
Facility: HOSPITAL | Age: 79
End: 2024-05-01
Payer: MEDICARE

## 2024-05-03 ENCOUNTER — APPOINTMENT (OUTPATIENT)
Facility: HOSPITAL | Age: 79
End: 2024-05-03
Payer: MEDICARE

## 2024-05-06 ENCOUNTER — APPOINTMENT (OUTPATIENT)
Facility: HOSPITAL | Age: 79
End: 2024-05-06
Payer: MEDICARE

## 2024-05-08 ENCOUNTER — HOSPITAL ENCOUNTER (OUTPATIENT)
Facility: HOSPITAL | Age: 79
Setting detail: RECURRING SERIES
Discharge: HOME | End: 2024-05-11
Payer: MEDICARE

## 2024-05-08 PROCEDURE — 97110 THERAPEUTIC EXERCISES: CPT

## 2024-05-08 NOTE — PROGRESS NOTES
[facility]  [address] [place] [id number]  Ph: [phone]     Fax: [phone]    CONTINUED PLAN OF CARE/RECERTIFICATION FOR PHYSICAL THERAPY          Patient Name:              Tyshawn Vaughn :  1945   Treatment/Medical Diagnosis:  Hemiplegia and hemiparesis following unspecified cerebrovascular disease affecting unspecified side [I69.959]   Onset Date:  10/22    Referral Source:  Reynaldo Boothe Sr* Start of Care (SOC):  24   Prior Hospitalization:  See Medical History Provider #:  NPI      Prior Level of Function (PLOF):  Assistance with gait and ADL   Comorbidities:  See Medical History   Medications:  Verified on Patient Summary List   Visits from SOC:  7 Missed Visits:  4       Summary of Care / Assessment / Recommendations:     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 Patient was ambulating with hemiwalker independently until he fell and sustained fracture of the left shoulder and ribs. Since that time patient has spent most of his time in the wheelchair. Patient would like to return to walking to the bathroom independently with hemiwalker independently. Limited range of motion of the left shoulder since the fall.     Patient tolerated treatment and does quite well with assistance with gait and supervision with HEP. Patient remains a fall risk. Patient was experiencing increasing tremors of right UE. Medication is not helping per patient. Patient will progress better with carryover in home environment. Will provide handouts with pictures so patient and his wife and family can perform when able. Patient has shown improvement with his ability to ambulate with hemiwalker for at least 125 feet with assistance. Transfers are better, but continues to require assistance. Continue to be a fall risk due to left side weakness and excessive tremors. Patient

## 2024-05-08 NOTE — PROGRESS NOTES
PHYSICAL THERAPY - DAILY TREATMENT NOTE (updated 3/23)    Date of Service: 2024        Patient Name:  Tyshawn Vaughn :  1945   Medical   Diagnosis:  Hemiplegia and hemiparesis following unspecified cerebrovascular disease affecting unspecified side [I69.959] Treatment Diagnosis:  M62.81  GENERAL MUSCLE WEAKNESS, R26.81   Unsteadiness on feet, and R27.8     Other lack of coordination    Referral Source:  Reynaldo Boothe Sr* Insurance:   Payor: MEDICARE / Plan: MEDICARE PART A AND B / Product Type: *No Product type* /     [x] Patient's date of birth verified                Visit #   Current  / Total 7 10   Time   In / Out 1000 1055   Total Treatment Time (in minutes) 55    Total Timed Codes  (in minutes) 55    [facility] Totals Reminder:    8-22 min = 1 unit; 23-37 min = 2 units; 38-52 min = 3 units;  53-67 min = 4 units; 68-82 min = 5 units      SUBJECTIVE  Pain Level (0-10 scale): 0/10    Any medication changes, allergies to medications, adverse drug reactions, diagnosis change, or new procedure performed?: No  Medications: [x]  Verified on Patient Summary List    Subjective functional status/changes:     \"Patient stated that he does not have anyone to help him walk at home .\" Wife does what she can.\"     OBJECTIVE  Therapeutic Procedures:  Tx Min Billable or 1:1 Min (if diff from Tx Min) Procedure, Rationale, Specifics   55 55 47860 Therapeutic Exercise (timed):  increase ROM, strength, coordination, balance, and proprioception to improve patient's ability to progress to PLOF and address remaining functional goals. (see flow sheet as applicable)                  55 55    Total Total   [x] Patient Education billed concurrently with other procedures      Pain Level at end of session (0-10 scale): 0/10  Objective:Lower Extremity Testing            MMT AROM     Right Left Right Left   Hip   Hip Flexion     5/5 4/5 WFL WFL   Hip Abduction    5/5 4/5 WFL WFL   Hip Adduction   5/5 4/5 WFL WFL   Knee   Knee

## 2024-05-13 ENCOUNTER — APPOINTMENT (OUTPATIENT)
Facility: HOSPITAL | Age: 79
End: 2024-05-13
Payer: MEDICARE

## 2024-05-15 ENCOUNTER — HOSPITAL ENCOUNTER (OUTPATIENT)
Facility: HOSPITAL | Age: 79
Setting detail: RECURRING SERIES
Discharge: HOME | End: 2024-05-18
Payer: MEDICARE

## 2024-05-15 PROCEDURE — 97116 GAIT TRAINING THERAPY: CPT

## 2024-05-15 PROCEDURE — 97110 THERAPEUTIC EXERCISES: CPT

## 2024-05-15 NOTE — PROGRESS NOTES
PHYSICAL THERAPY - MEDICARE DAILY TREATMENT NOTE (updated 3/23)    Date of Service: 5/15/2024        Patient Name:  Tyshawn Vaughn :  1945   Medical   Diagnosis:  Hemiplegia and hemiparesis following unspecified cerebrovascular disease affecting unspecified side [I69.959] Treatment Diagnosis:  R26.89   Abnormalities of gait and mobility    Referral Source:  Reynaldo Boothe Sr* Insurance:   Payor: MEDICARE / Plan: MEDICARE PART A AND B / Product Type: *No Product type* /    [x] Patient's date of birth verified                      Visit #   Current  / Total 8 10   Time   In / Out 1000 1104   Total Treatment Time (in minutes) 64    Total Timed Codes (in minutes) 64    1:1 Treatment Time (in minutes) 64       [facility] Totals Reminder:  bill using total billable   min of TIMED therapeutic procedures and modalities.   8-22 min = 1 unit; 23-37 min = 2 units; 38-52 min = 3 units; 53-67 min = 4 units; 68-82 min = 5 units        SUBJECTIVE  Pain Level (0-10 scale): 0/10    Any medication changes, allergies to medications, adverse drug reactions, diagnosis change, or new procedure performed?: No  Medications: [x] Verified on Patient Summary List    Subjective functional status/changes:     \"I got a new nurse. I am doing alright.\"    OBJECTIVE  Therapeutic Procedures:  Tx Min Billable or 1:1 Min (if diff from Tx Min) Procedure, Rationale, Specifics   54  65830 Therapeutic Exercise (timed):  increase ROM, strength, coordination, balance, and proprioception to improve patient's ability to progress to PLOF and address remaining functional goals. (see flow sheet as applicable)   10  65490 Gait Training (timed):    100 feet with hemiwalker (assistive device) over level surfaces with CGA  level of assist. Cuing for sequencing.  To improve safety and dynamic movement with household/community ambulation.  (see flow sheet as applicable)   64     Total Total   [x] Patient Education billed concurrently with other procedures

## 2024-05-22 ENCOUNTER — HOSPITAL ENCOUNTER (OUTPATIENT)
Facility: HOSPITAL | Age: 79
Setting detail: RECURRING SERIES
Discharge: HOME | End: 2024-05-25
Payer: MEDICARE

## 2024-05-22 PROCEDURE — 97110 THERAPEUTIC EXERCISES: CPT

## 2024-05-22 NOTE — PROGRESS NOTES
assistance  Current Status: is able to perform with SBA assistance  Goal Met?  Yes     4. Patient will be able to walk for at least 50 feet with hemiwalker minimal assistance x 1  Status at last Eval/Progress Note: not walking only transfers since the fall  Current Status: walking in therapy for 125  feet with HM with no rest periods SBA f/by WC for safety  Goal Met?  yes     Long Term Goals, to be met within 10 treatments:  1.Patient will be able to ambulate for at least 100 feet with hemiwalker with minimal assistance x 1.  Status at last Eval/Progress Note: not walking since the fall  Current Status: walking in therapy for 125 feet with HM with no rest period with minimal assistance x 1  Goal Met: yes     2.  Patient will be able to perform sit to stand from chair independently with hemiwalker.  Status at last Eval/Progress Note: numerous attempts  Current Status: able to perform 5 x with CGA assistance  Goal Met?  progressing     3. Patient will be able to get in and out of vehicle with increasing LE strength with little difficulty.  Status at last Eval/Progress Note: difficulty to lift left LE into vehicle  Current Status: lifting the R leg in is fine, the L leg still needs some help  Goal Met?  No     4. Patient will be able to return to ambulating to bathroom with hemiwalker independently  Status at last Eval/Progress Note: using bedside commode  Current Status: still using bedside commode  Goal Met?  No    []  See Progress Note/Recertification  [x]  See Discharge Summary    PLAN  []  Continue plan of care  []  Upgrade activities as tolerated  [x]  Discharge due to : Completion of care  []  Other:      Inoa Marcial, PT, DPT       5/22/2024       2:26 PM

## 2024-05-22 NOTE — THERAPY DISCHARGE
[facility]  [address] [place] [id number]  Ph: [phone]     Fax: [phone]    PHYSICAL THERAPY DISCHARGE SUMMARY  Patient Name: Tyshawn Vaughn : 1945   Treatment/Medical Diagnosis: Hemiplegia and hemiparesis following unspecified cerebrovascular disease affecting unspecified side [I69.959]   Referral Source: Reynaldo Boothe Sr*     Date of Initial Visit: 24 Attended Visits: 9 Missed Visits: 0     SUMMARY OF TREATMENT/CURRENT STATUS  Patient tolerated treatment session well today. He was able to perform exercises well and with less cues today. He still has some difficulty with ambulation noting increased tremors and decreased full stride on LLE secondary to weakness. MMT measurements and goals were updated today. Patient has demonstrated slight improvements in LE strength measurements, increase duration and distance for ambulation, and improved ability to STS transition with less assist needed. Patient was told this would be the last visit for therapy and his wife was also informed about discharge plan. Patient was encouraged to continue to try and challenge himself with therapy when a caregiver is present and can be safely performed. Him and his wife were in agreement. He is DC from PT services at this time.    GOALS  Short Term Goals, to be met within 5 treatments:  Patient will demonstrate independence and compliance with HEP in order to increase mobility and assist with carryover from PT services.  Status at last Eval/Progress Note: none provided  Current Status: provided -no one to help with exercises. Performing exercises as much as he can   Goal Met? progressing     2.  Patient will be able to transfer from WC to bed and bed to bedside commode independently and safely.   Status at last Eval/Progress Note: requires assistance near by  Current Status: requires assistance near by  Goal Met?  progressing     3. Patient will be able to

## 2024-05-23 ENCOUNTER — APPOINTMENT (OUTPATIENT)
Facility: HOSPITAL | Age: 79
End: 2024-05-23
Payer: MEDICARE

## 2024-06-18 ENCOUNTER — TELEPHONE (OUTPATIENT)
Age: 79
End: 2024-06-18

## 2024-06-18 DIAGNOSIS — I73.9 PERIPHERAL VASCULAR DISEASE (HCC): Primary | ICD-10-CM

## 2024-06-18 NOTE — TELEPHONE ENCOUNTER
I called and spoke with Jodie Vaughn. I gave her the number to call scheduling to get the vascular study scheduled. I also rescheduled Tyshawn Vaughn's appointment with .

## 2024-07-16 ENCOUNTER — HOSPITAL ENCOUNTER (OUTPATIENT)
Facility: HOSPITAL | Age: 79
Discharge: HOME | End: 2024-07-18
Attending: SURGERY
Payer: MEDICARE

## 2024-07-16 DIAGNOSIS — I73.9 PERIPHERAL VASCULAR DISEASE (HCC): ICD-10-CM

## 2024-07-16 LAB
VAS LEFT ARM BP: 157 MMHG
VAS LEFT LOW THIGH PRESSURE: 176 MMHG
VAS LEFT TBI: 0.83
VAS LEFT TOE PRESSURE: 131 MMHG
VAS RIGHT ARM BP: 150 MMHG
VAS RIGHT HIGH THIGH PRESSURE: 180 MMHG
VAS RIGHT LOW THIGH PRESSURE: 196 MMHG
VAS RIGHT TBI: 0.97
VAS RIGHT TOE PRESSURE: 152 MMHG

## 2024-07-16 PROCEDURE — 93923 UPR/LXTR ART STDY 3+ LVLS: CPT

## 2024-07-19 ENCOUNTER — OFFICE VISIT (OUTPATIENT)
Age: 79
End: 2024-07-19
Payer: MEDICARE

## 2024-07-19 VITALS
TEMPERATURE: 97.7 F | HEIGHT: 67 IN | RESPIRATION RATE: 18 BRPM | DIASTOLIC BLOOD PRESSURE: 68 MMHG | BODY MASS INDEX: 29.13 KG/M2 | SYSTOLIC BLOOD PRESSURE: 159 MMHG | HEART RATE: 70 BPM | OXYGEN SATURATION: 93 %

## 2024-07-19 DIAGNOSIS — I73.9 PERIPHERAL VASCULAR DISEASE (HCC): Primary | ICD-10-CM

## 2024-07-19 PROCEDURE — 3077F SYST BP >= 140 MM HG: CPT | Performed by: SURGERY

## 2024-07-19 PROCEDURE — 3078F DIAST BP <80 MM HG: CPT | Performed by: SURGERY

## 2024-07-19 PROCEDURE — G8419 CALC BMI OUT NRM PARAM NOF/U: HCPCS | Performed by: SURGERY

## 2024-07-19 PROCEDURE — 1036F TOBACCO NON-USER: CPT | Performed by: SURGERY

## 2024-07-19 PROCEDURE — G8427 DOCREV CUR MEDS BY ELIG CLIN: HCPCS | Performed by: SURGERY

## 2024-07-19 PROCEDURE — 99212 OFFICE O/P EST SF 10 MIN: CPT | Performed by: SURGERY

## 2024-07-19 PROCEDURE — 1123F ACP DISCUSS/DSCN MKR DOCD: CPT | Performed by: SURGERY

## 2024-07-19 RX ORDER — LISINOPRIL 5 MG/1
5 TABLET ORAL DAILY
COMMUNITY
Start: 2024-06-10

## 2024-07-19 NOTE — PROGRESS NOTES
Identified pt with two pt identifiers (name and ). Reviewed chart in preparation for visit and have obtained necessary documentation.    Tyshawn Vaughn is a 78 y.o. male  Chief Complaint   Patient presents with    Follow-up     PVD     BP (!) 159/68 (Site: Right Upper Arm, Position: Sitting, Cuff Size: Large Adult)   Pulse 70   Temp 97.7 °F (36.5 °C) (Temporal)   Resp 18   Ht 1.702 m (5' 7\")   SpO2 93%   BMI 29.13 kg/m²     1. Have you been to the ER, urgent care clinic since your last visit?  Hospitalized since your last visit?NO    2. Have you seen or consulted any other health care providers outside of the [facility] since your last visit?  Include any pap smears or colon screening. NO    Patient and provider made aware of elevated BP x2. Patient asymptomatic. Patient reminded to monitor BP, continue to take BP medications if prescribed, and follow up with PCP/Cardiologist.  Patient expressed understanding and agreement.

## 2024-07-29 NOTE — PROGRESS NOTES
Vascular History and Physical    Patient: Tyshawn Vaughn  MRN: [id number]    YOB: 1945  Age: 78 y.o.  Sex: male     Chief Complaint:  Chief Complaint   Patient presents with    Follow-up     PVD       History of Present Illness: Tyshawn Vaughn is a 78 y.o. very pleasant gentleman physical exam follow-up vascular examination.  Patient had a ELIE examination done as well.  Patient also has bilateral leg edema.  Last time I have wrapped both legs.  Patient also recommended compression stocking.  Patient denies any worsening pain.  However left leg pain has not improved.    Social History:  Social Connections: Not on file       Past Medical History:  Past Medical History:   Diagnosis Date    Acid reflux     Anemia     Diabetes (HCC)     Diabetes mellitus (HCC)     GERD (gastroesophageal reflux disease)     Hypertension     Stroke (HCC)        Surgical History:  Past Surgical History:   Procedure Laterality Date    COLONOSCOPY         Allergies:  Allergies   Allergen Reactions    Fentanyl Other (See Comments)     confusion    Morphine Other (See Comments)     confusion    Tramadol Other (See Comments)     confusion       Current Meds:  Current Outpatient Medications   Medication Sig Dispense Refill    lisinopril (PRINIVIL;ZESTRIL) 5 MG tablet Take 1 tablet by mouth daily      acetaminophen (TYLENOL) 500 MG tablet Take 1 tablet by mouth every 6 hours as needed for Pain      B Complex, Folic Acid, TABS Take by mouth      albuterol sulfate HFA (VENTOLIN HFA) 108 (90 Base) MCG/ACT inhaler Inhale 2 puffs into the lungs every 6 hours as needed for Wheezing      carvedilol (COREG) 6.25 MG tablet Take 1 tablet by mouth 2 times daily (with meals)      apixaban (ELIQUIS) 5 MG TABS tablet Take 1 tablet by mouth 2 times daily 60 tablet 3    polyethylene glycol (GLYCOLAX) 17 g packet Take 1 packet by mouth daily as needed for Constipation      citalopram (CELEXA) 10 MG tablet       metFORMIN (GLUCOPHAGE) 500 MG tablet

## 2024-09-10 ENCOUNTER — HOSPITAL ENCOUNTER (OUTPATIENT)
Facility: HOSPITAL | Age: 79
Discharge: HOME | End: 2024-09-13
Payer: MEDICARE

## 2024-09-10 ENCOUNTER — TRANSCRIBE ORDERS (OUTPATIENT)
Facility: HOSPITAL | Age: 79
End: 2024-09-10

## 2024-09-10 DIAGNOSIS — J90 PLEURAL EFFUSION: Primary | ICD-10-CM

## 2024-09-10 DIAGNOSIS — J90 PLEURAL EFFUSION: ICD-10-CM

## 2024-09-10 PROCEDURE — 71046 X-RAY EXAM CHEST 2 VIEWS: CPT

## 2024-10-27 ENCOUNTER — APPOINTMENT (OUTPATIENT)
Facility: HOSPITAL | Age: 79
DRG: 871 | End: 2024-10-27
Payer: MEDICARE

## 2024-10-27 ENCOUNTER — HOSPITAL ENCOUNTER (INPATIENT)
Facility: HOSPITAL | Age: 79
LOS: 2 days | Discharge: HOME HEALTH | DRG: 871 | End: 2024-10-29
Attending: STUDENT IN AN ORGANIZED HEALTH CARE EDUCATION/TRAINING PROGRAM | Admitting: HOSPITALIST
Payer: MEDICARE

## 2024-10-27 ENCOUNTER — APPOINTMENT (OUTPATIENT)
Facility: HOSPITAL | Age: 79
DRG: 871 | End: 2024-10-27
Attending: HOSPITALIST
Payer: MEDICARE

## 2024-10-27 DIAGNOSIS — J18.9 PARAPNEUMONIC EFFUSION: ICD-10-CM

## 2024-10-27 DIAGNOSIS — J91.8 PARAPNEUMONIC EFFUSION: ICD-10-CM

## 2024-10-27 DIAGNOSIS — A41.9 SEPTICEMIA (HCC): ICD-10-CM

## 2024-10-27 DIAGNOSIS — J18.9 PNEUMONIA OF LEFT LOWER LOBE DUE TO INFECTIOUS ORGANISM: Primary | ICD-10-CM

## 2024-10-27 PROBLEM — I10 HTN (HYPERTENSION): Status: ACTIVE | Noted: 2020-08-19

## 2024-10-27 PROBLEM — E83.42 HYPOMAGNESEMIA: Status: ACTIVE | Noted: 2024-10-27

## 2024-10-27 PROBLEM — R79.89 ELEVATED LACTIC ACID LEVEL: Status: ACTIVE | Noted: 2024-10-27

## 2024-10-27 PROBLEM — E87.6 HYPOKALEMIA: Status: ACTIVE | Noted: 2024-10-27

## 2024-10-27 LAB
ALBUMIN SERPL-MCNC: 3.8 G/DL (ref 3.5–5)
ALBUMIN/GLOB SERPL: 1 (ref 1.1–2.2)
ALP SERPL-CCNC: 75 U/L (ref 45–117)
ALT SERPL-CCNC: 25 U/L (ref 12–78)
ANION GAP SERPL CALC-SCNC: 10 MMOL/L (ref 2–12)
AST SERPL W P-5'-P-CCNC: 18 U/L (ref 15–37)
BASOPHILS # BLD: 0 K/UL (ref 0–0.1)
BASOPHILS NFR BLD: 0 % (ref 0–1)
BILIRUB SERPL-MCNC: 0.4 MG/DL (ref 0.2–1)
BNP SERPL-MCNC: 541 PG/ML
BUN SERPL-MCNC: 9 MG/DL (ref 6–20)
BUN/CREAT SERPL: 8 (ref 12–20)
CA-I BLD-MCNC: 9.3 MG/DL (ref 8.5–10.1)
CHLORIDE SERPL-SCNC: 101 MMOL/L (ref 97–108)
CO2 SERPL-SCNC: 32 MMOL/L (ref 21–32)
CREAT SERPL-MCNC: 1.06 MG/DL (ref 0.7–1.3)
DIFFERENTIAL METHOD BLD: ABNORMAL
EOSINOPHIL # BLD: 0.2 K/UL (ref 0–0.4)
EOSINOPHIL NFR BLD: 2 % (ref 0–7)
ERYTHROCYTE [DISTWIDTH] IN BLOOD BY AUTOMATED COUNT: 12.3 % (ref 11.5–14.5)
EST. AVERAGE GLUCOSE BLD GHB EST-MCNC: 97 MG/DL
FLUAV RNA SPEC QL NAA+PROBE: NOT DETECTED
FLUBV RNA SPEC QL NAA+PROBE: NOT DETECTED
GLOBULIN SER CALC-MCNC: 4 G/DL (ref 2–4)
GLUCOSE BLD STRIP.AUTO-MCNC: 104 MG/DL (ref 65–100)
GLUCOSE BLD STRIP.AUTO-MCNC: 131 MG/DL (ref 65–100)
GLUCOSE BLD STRIP.AUTO-MCNC: 99 MG/DL (ref 65–100)
GLUCOSE SERPL-MCNC: 114 MG/DL (ref 65–100)
HBA1C MFR BLD: 5 % (ref 4–5.6)
HCT VFR BLD AUTO: 42.7 % (ref 36.6–50.3)
HGB BLD-MCNC: 14.5 G/DL (ref 12.1–17)
IMM GRANULOCYTES # BLD AUTO: 0 K/UL (ref 0–0.04)
IMM GRANULOCYTES NFR BLD AUTO: 0 % (ref 0–0.5)
LACTATE SERPL-SCNC: 2.4 MMOL/L (ref 0.4–2)
LACTATE SERPL-SCNC: 2.5 MMOL/L (ref 0.4–2)
LACTATE SERPL-SCNC: 2.7 MMOL/L (ref 0.4–2)
LACTATE SERPL-SCNC: 3.2 MMOL/L (ref 0.4–2)
LYMPHOCYTES # BLD: 1.3 K/UL (ref 0.8–3.5)
LYMPHOCYTES NFR BLD: 17 % (ref 12–49)
MAGNESIUM SERPL-MCNC: 1.6 MG/DL (ref 1.6–2.4)
MCH RBC QN AUTO: 35 PG (ref 26–34)
MCHC RBC AUTO-ENTMCNC: 34 G/DL (ref 30–36.5)
MCV RBC AUTO: 103.1 FL (ref 80–99)
MONOCYTES # BLD: 0.9 K/UL (ref 0–1)
MONOCYTES NFR BLD: 11 % (ref 5–13)
NEUTS SEG # BLD: 5.6 K/UL (ref 1.8–8)
NEUTS SEG NFR BLD: 70 % (ref 32–75)
NRBC # BLD: 0 K/UL (ref 0–0.01)
NRBC BLD-RTO: 0 PER 100 WBC
PERFORMED BY:: ABNORMAL
PERFORMED BY:: ABNORMAL
PERFORMED BY:: NORMAL
PLATELET # BLD AUTO: 231 K/UL (ref 150–400)
PMV BLD AUTO: 11 FL (ref 8.9–12.9)
POTASSIUM SERPL-SCNC: 3.4 MMOL/L (ref 3.5–5.1)
PROCALCITONIN SERPL-MCNC: <0.05 NG/ML
PROT SERPL-MCNC: 7.8 G/DL (ref 6.4–8.2)
RBC # BLD AUTO: 4.14 M/UL (ref 4.1–5.7)
SARS-COV-2 RNA RESP QL NAA+PROBE: NOT DETECTED
SODIUM SERPL-SCNC: 143 MMOL/L (ref 136–145)
TROPONIN I SERPL HS-MCNC: 31 NG/L (ref 0–76)
TROPONIN I SERPL HS-MCNC: 35 NG/L (ref 0–76)
WBC # BLD AUTO: 8 K/UL (ref 4.1–11.1)

## 2024-10-27 PROCEDURE — 87636 SARSCOV2 & INF A&B AMP PRB: CPT

## 2024-10-27 PROCEDURE — 87040 BLOOD CULTURE FOR BACTERIA: CPT

## 2024-10-27 PROCEDURE — 71250 CT THORAX DX C-: CPT

## 2024-10-27 PROCEDURE — 85025 COMPLETE CBC W/AUTO DIFF WBC: CPT

## 2024-10-27 PROCEDURE — 71045 X-RAY EXAM CHEST 1 VIEW: CPT

## 2024-10-27 PROCEDURE — 96375 TX/PRO/DX INJ NEW DRUG ADDON: CPT

## 2024-10-27 PROCEDURE — 84484 ASSAY OF TROPONIN QUANT: CPT

## 2024-10-27 PROCEDURE — 96374 THER/PROPH/DIAG INJ IV PUSH: CPT

## 2024-10-27 PROCEDURE — 6370000000 HC RX 637 (ALT 250 FOR IP): Performed by: STUDENT IN AN ORGANIZED HEALTH CARE EDUCATION/TRAINING PROGRAM

## 2024-10-27 PROCEDURE — 87205 SMEAR GRAM STAIN: CPT

## 2024-10-27 PROCEDURE — 2580000003 HC RX 258: Performed by: STUDENT IN AN ORGANIZED HEALTH CARE EDUCATION/TRAINING PROGRAM

## 2024-10-27 PROCEDURE — 87449 NOS EACH ORGANISM AG IA: CPT

## 2024-10-27 PROCEDURE — 6370000000 HC RX 637 (ALT 250 FOR IP): Performed by: HOSPITALIST

## 2024-10-27 PROCEDURE — 83880 ASSAY OF NATRIURETIC PEPTIDE: CPT

## 2024-10-27 PROCEDURE — 1100000000 HC RM PRIVATE

## 2024-10-27 PROCEDURE — 2580000003 HC RX 258: Performed by: HOSPITALIST

## 2024-10-27 PROCEDURE — 6360000002 HC RX W HCPCS: Performed by: STUDENT IN AN ORGANIZED HEALTH CARE EDUCATION/TRAINING PROGRAM

## 2024-10-27 PROCEDURE — 82962 GLUCOSE BLOOD TEST: CPT

## 2024-10-27 PROCEDURE — 99285 EMERGENCY DEPT VISIT HI MDM: CPT

## 2024-10-27 PROCEDURE — 82607 VITAMIN B-12: CPT

## 2024-10-27 PROCEDURE — 83540 ASSAY OF IRON: CPT

## 2024-10-27 PROCEDURE — 2500000003 HC RX 250 WO HCPCS: Performed by: HOSPITALIST

## 2024-10-27 PROCEDURE — 82746 ASSAY OF FOLIC ACID SERUM: CPT

## 2024-10-27 PROCEDURE — 87070 CULTURE OTHR SPECIMN AEROBIC: CPT

## 2024-10-27 PROCEDURE — 93005 ELECTROCARDIOGRAM TRACING: CPT | Performed by: STUDENT IN AN ORGANIZED HEALTH CARE EDUCATION/TRAINING PROGRAM

## 2024-10-27 PROCEDURE — 84145 PROCALCITONIN (PCT): CPT

## 2024-10-27 PROCEDURE — 74018 RADEX ABDOMEN 1 VIEW: CPT

## 2024-10-27 PROCEDURE — 80053 COMPREHEN METABOLIC PANEL: CPT

## 2024-10-27 PROCEDURE — 83735 ASSAY OF MAGNESIUM: CPT

## 2024-10-27 PROCEDURE — 83036 HEMOGLOBIN GLYCOSYLATED A1C: CPT

## 2024-10-27 PROCEDURE — 83605 ASSAY OF LACTIC ACID: CPT

## 2024-10-27 PROCEDURE — 93005 ELECTROCARDIOGRAM TRACING: CPT | Performed by: HOSPITALIST

## 2024-10-27 PROCEDURE — 6360000002 HC RX W HCPCS: Performed by: HOSPITALIST

## 2024-10-27 PROCEDURE — 92610 EVALUATE SWALLOWING FUNCTION: CPT

## 2024-10-27 RX ORDER — LEVOFLOXACIN 5 MG/ML
750 INJECTION, SOLUTION INTRAVENOUS
Status: COMPLETED | OUTPATIENT
Start: 2024-10-27 | End: 2024-10-27

## 2024-10-27 RX ORDER — SODIUM CHLORIDE 0.9 % (FLUSH) 0.9 %
5-40 SYRINGE (ML) INJECTION EVERY 12 HOURS SCHEDULED
Status: DISCONTINUED | OUTPATIENT
Start: 2024-10-27 | End: 2024-10-29 | Stop reason: HOSPADM

## 2024-10-27 RX ORDER — LANOLIN ALCOHOL/MO/W.PET/CERES
400 CREAM (GRAM) TOPICAL DAILY
Status: DISCONTINUED | OUTPATIENT
Start: 2024-10-27 | End: 2024-10-29 | Stop reason: HOSPADM

## 2024-10-27 RX ORDER — PANTOPRAZOLE SODIUM 40 MG/1
40 TABLET, DELAYED RELEASE ORAL
Status: DISCONTINUED | OUTPATIENT
Start: 2024-10-27 | End: 2024-10-29 | Stop reason: HOSPADM

## 2024-10-27 RX ORDER — ENOXAPARIN SODIUM 100 MG/ML
40 INJECTION SUBCUTANEOUS DAILY
Status: DISCONTINUED | OUTPATIENT
Start: 2024-10-27 | End: 2024-10-27

## 2024-10-27 RX ORDER — 0.9 % SODIUM CHLORIDE 0.9 %
500 INTRAVENOUS SOLUTION INTRAVENOUS ONCE
Status: COMPLETED | OUTPATIENT
Start: 2024-10-27 | End: 2024-10-27

## 2024-10-27 RX ORDER — 0.9 % SODIUM CHLORIDE 0.9 %
500 INTRAVENOUS SOLUTION INTRAVENOUS ONCE
Status: DISCONTINUED | OUTPATIENT
Start: 2024-10-27 | End: 2024-10-27

## 2024-10-27 RX ORDER — DOCUSATE SODIUM 100 MG/1
100 CAPSULE, LIQUID FILLED ORAL 2 TIMES DAILY
Status: DISCONTINUED | OUTPATIENT
Start: 2024-10-27 | End: 2024-10-29 | Stop reason: HOSPADM

## 2024-10-27 RX ORDER — AZITHROMYCIN 250 MG/1
500 TABLET, FILM COATED ORAL DAILY
Status: DISCONTINUED | OUTPATIENT
Start: 2024-10-27 | End: 2024-10-27

## 2024-10-27 RX ORDER — MAGNESIUM SULFATE IN WATER 40 MG/ML
2000 INJECTION, SOLUTION INTRAVENOUS ONCE
Status: COMPLETED | OUTPATIENT
Start: 2024-10-27 | End: 2024-10-27

## 2024-10-27 RX ORDER — CARBIDOPA AND LEVODOPA 25; 100 MG/1; MG/1
1 TABLET ORAL 3 TIMES DAILY
Status: DISCONTINUED | OUTPATIENT
Start: 2024-10-27 | End: 2024-10-27

## 2024-10-27 RX ORDER — POLYETHYLENE GLYCOL 3350 17 G/17G
17 POWDER, FOR SOLUTION ORAL DAILY PRN
Status: DISCONTINUED | OUTPATIENT
Start: 2024-10-27 | End: 2024-10-29 | Stop reason: HOSPADM

## 2024-10-27 RX ORDER — AMIODARONE HYDROCHLORIDE 200 MG/1
200 TABLET ORAL DAILY
Status: DISCONTINUED | OUTPATIENT
Start: 2024-10-27 | End: 2024-10-29 | Stop reason: HOSPADM

## 2024-10-27 RX ORDER — ACETAMINOPHEN 650 MG/1
650 SUPPOSITORY RECTAL EVERY 6 HOURS PRN
Status: DISCONTINUED | OUTPATIENT
Start: 2024-10-27 | End: 2024-10-29 | Stop reason: HOSPADM

## 2024-10-27 RX ORDER — CETIRIZINE HYDROCHLORIDE 5 MG/1
5 TABLET ORAL DAILY
Status: DISCONTINUED | OUTPATIENT
Start: 2024-10-27 | End: 2024-10-29 | Stop reason: HOSPADM

## 2024-10-27 RX ORDER — ASPIRIN 81 MG/1
81 TABLET ORAL DAILY
Status: DISCONTINUED | OUTPATIENT
Start: 2024-10-27 | End: 2024-10-27

## 2024-10-27 RX ORDER — GLUCAGON 1 MG/ML
1 KIT INJECTION PRN
Status: DISCONTINUED | OUTPATIENT
Start: 2024-10-27 | End: 2024-10-29 | Stop reason: HOSPADM

## 2024-10-27 RX ORDER — CITALOPRAM HYDROBROMIDE 20 MG/1
10 TABLET ORAL DAILY
Status: DISCONTINUED | OUTPATIENT
Start: 2024-10-27 | End: 2024-10-29 | Stop reason: HOSPADM

## 2024-10-27 RX ORDER — DEXTROSE MONOHYDRATE 100 MG/ML
INJECTION, SOLUTION INTRAVENOUS CONTINUOUS PRN
Status: DISCONTINUED | OUTPATIENT
Start: 2024-10-27 | End: 2024-10-29 | Stop reason: HOSPADM

## 2024-10-27 RX ORDER — LISINOPRIL 5 MG/1
5 TABLET ORAL DAILY
Status: DISCONTINUED | OUTPATIENT
Start: 2024-10-27 | End: 2024-10-27

## 2024-10-27 RX ORDER — SENNOSIDES A AND B 8.6 MG/1
1 TABLET, FILM COATED ORAL NIGHTLY
Status: DISCONTINUED | OUTPATIENT
Start: 2024-10-27 | End: 2024-10-29 | Stop reason: HOSPADM

## 2024-10-27 RX ORDER — B-COMPLEX WITH VITAMIN C
1 TABLET ORAL DAILY
Status: DISCONTINUED | OUTPATIENT
Start: 2024-10-27 | End: 2024-10-27

## 2024-10-27 RX ORDER — ONDANSETRON 4 MG/1
4 TABLET, ORALLY DISINTEGRATING ORAL EVERY 8 HOURS PRN
Status: DISCONTINUED | OUTPATIENT
Start: 2024-10-27 | End: 2024-10-29 | Stop reason: HOSPADM

## 2024-10-27 RX ORDER — 0.9 % SODIUM CHLORIDE 0.9 %
500 INTRAVENOUS SOLUTION INTRAVENOUS ONCE
Status: COMPLETED | OUTPATIENT
Start: 2024-10-27 | End: 2024-10-28

## 2024-10-27 RX ORDER — LIDOCAINE 50 MG/G
OINTMENT TOPICAL 2 TIMES DAILY
Status: DISCONTINUED | OUTPATIENT
Start: 2024-10-27 | End: 2024-10-28

## 2024-10-27 RX ORDER — DILTIAZEM HYDROCHLORIDE 120 MG/1
120 CAPSULE, COATED, EXTENDED RELEASE ORAL DAILY
Status: DISCONTINUED | OUTPATIENT
Start: 2024-10-27 | End: 2024-10-27

## 2024-10-27 RX ORDER — IPRATROPIUM BROMIDE AND ALBUTEROL SULFATE 2.5; .5 MG/3ML; MG/3ML
1 SOLUTION RESPIRATORY (INHALATION) EVERY 4 HOURS PRN
Status: DISCONTINUED | OUTPATIENT
Start: 2024-10-27 | End: 2024-10-29 | Stop reason: HOSPADM

## 2024-10-27 RX ORDER — SODIUM CHLORIDE AND POTASSIUM CHLORIDE 150; 450 MG/100ML; MG/100ML
INJECTION, SOLUTION INTRAVENOUS CONTINUOUS
Status: DISCONTINUED | OUTPATIENT
Start: 2024-10-27 | End: 2024-10-28

## 2024-10-27 RX ORDER — INSULIN LISPRO 100 [IU]/ML
0-8 INJECTION, SOLUTION INTRAVENOUS; SUBCUTANEOUS
Status: DISCONTINUED | OUTPATIENT
Start: 2024-10-27 | End: 2024-10-29 | Stop reason: HOSPADM

## 2024-10-27 RX ORDER — MAGNESIUM HYDROXIDE/ALUMINUM HYDROXICE/SIMETHICONE 120; 1200; 1200 MG/30ML; MG/30ML; MG/30ML
30 SUSPENSION ORAL EVERY 6 HOURS PRN
Status: DISCONTINUED | OUTPATIENT
Start: 2024-10-27 | End: 2024-10-27

## 2024-10-27 RX ORDER — GUAIFENESIN/DEXTROMETHORPHAN 100-10MG/5
10 SYRUP ORAL 3 TIMES DAILY
Status: DISCONTINUED | OUTPATIENT
Start: 2024-10-27 | End: 2024-10-28

## 2024-10-27 RX ORDER — M-VIT,TX,IRON,MINS/CALC/FOLIC 27MG-0.4MG
1 TABLET ORAL DAILY
Status: DISCONTINUED | OUTPATIENT
Start: 2024-10-27 | End: 2024-10-29 | Stop reason: HOSPADM

## 2024-10-27 RX ORDER — BENZONATATE 100 MG/1
200 CAPSULE ORAL 3 TIMES DAILY PRN
Status: DISCONTINUED | OUTPATIENT
Start: 2024-10-27 | End: 2024-10-29 | Stop reason: HOSPADM

## 2024-10-27 RX ORDER — AZITHROMYCIN 250 MG/1
500 TABLET, FILM COATED ORAL EVERY 24 HOURS
Status: DISCONTINUED | OUTPATIENT
Start: 2024-10-28 | End: 2024-10-27

## 2024-10-27 RX ORDER — POTASSIUM CHLORIDE 1500 MG/1
20 TABLET, EXTENDED RELEASE ORAL 2 TIMES DAILY WITH MEALS
Status: DISCONTINUED | OUTPATIENT
Start: 2024-10-27 | End: 2024-10-29 | Stop reason: HOSPADM

## 2024-10-27 RX ORDER — AZITHROMYCIN 250 MG/1
500 TABLET, FILM COATED ORAL EVERY 24 HOURS
Status: DISCONTINUED | OUTPATIENT
Start: 2024-10-27 | End: 2024-10-27

## 2024-10-27 RX ORDER — BENZONATATE 100 MG/1
100 CAPSULE ORAL 3 TIMES DAILY PRN
Status: DISCONTINUED | OUTPATIENT
Start: 2024-10-27 | End: 2024-10-27

## 2024-10-27 RX ORDER — SODIUM CHLORIDE 0.9 % (FLUSH) 0.9 %
5-40 SYRINGE (ML) INJECTION PRN
Status: DISCONTINUED | OUTPATIENT
Start: 2024-10-27 | End: 2024-10-29 | Stop reason: HOSPADM

## 2024-10-27 RX ORDER — SODIUM CHLORIDE 9 MG/ML
INJECTION, SOLUTION INTRAVENOUS PRN
Status: DISCONTINUED | OUTPATIENT
Start: 2024-10-27 | End: 2024-10-29 | Stop reason: HOSPADM

## 2024-10-27 RX ORDER — CARVEDILOL 3.12 MG/1
6.25 TABLET ORAL ONCE
Status: COMPLETED | OUTPATIENT
Start: 2024-10-27 | End: 2024-10-27

## 2024-10-27 RX ORDER — ONDANSETRON 2 MG/ML
4 INJECTION INTRAMUSCULAR; INTRAVENOUS EVERY 6 HOURS PRN
Status: DISCONTINUED | OUTPATIENT
Start: 2024-10-27 | End: 2024-10-29 | Stop reason: HOSPADM

## 2024-10-27 RX ORDER — MAGNESIUM HYDROXIDE/ALUMINUM HYDROXICE/SIMETHICONE 120; 1200; 1200 MG/30ML; MG/30ML; MG/30ML
30 SUSPENSION ORAL
Status: DISCONTINUED | OUTPATIENT
Start: 2024-10-27 | End: 2024-10-29

## 2024-10-27 RX ORDER — ACETAMINOPHEN 325 MG/1
650 TABLET ORAL EVERY 6 HOURS PRN
Status: DISCONTINUED | OUTPATIENT
Start: 2024-10-27 | End: 2024-10-29 | Stop reason: HOSPADM

## 2024-10-27 RX ORDER — PRAVASTATIN SODIUM 40 MG
40 TABLET ORAL NIGHTLY
Status: DISCONTINUED | OUTPATIENT
Start: 2024-10-27 | End: 2024-10-29 | Stop reason: HOSPADM

## 2024-10-27 RX ADMIN — PRAVASTATIN SODIUM 40 MG: 40 TABLET ORAL at 21:13

## 2024-10-27 RX ADMIN — LIDOCAINE: 50 OINTMENT TOPICAL at 21:13

## 2024-10-27 RX ADMIN — APIXABAN 5 MG: 5 TABLET, FILM COATED ORAL at 21:13

## 2024-10-27 RX ADMIN — METFORMIN HYDROCHLORIDE 500 MG: 500 TABLET ORAL at 11:19

## 2024-10-27 RX ADMIN — FOLIC ACID TAB 400 MCG 400 MCG: 400 TAB at 11:18

## 2024-10-27 RX ADMIN — MAGNESIUM SULFATE HEPTAHYDRATE 2000 MG: 40 INJECTION, SOLUTION INTRAVENOUS at 09:10

## 2024-10-27 RX ADMIN — SODIUM CHLORIDE, PRESERVATIVE FREE 10 ML: 5 INJECTION INTRAVENOUS at 21:32

## 2024-10-27 RX ADMIN — DOXYCYCLINE 100 MG: 100 INJECTION, POWDER, LYOPHILIZED, FOR SOLUTION INTRAVENOUS at 22:46

## 2024-10-27 RX ADMIN — SENNOSIDES 8.6 MG: 8.6 TABLET, FILM COATED ORAL at 21:13

## 2024-10-27 RX ADMIN — GUAIFENESIN SYRUP AND DEXTROMETHORPHAN 10 ML: 100; 10 SYRUP ORAL at 21:13

## 2024-10-27 RX ADMIN — PANTOPRAZOLE SODIUM 40 MG: 40 TABLET, DELAYED RELEASE ORAL at 16:40

## 2024-10-27 RX ADMIN — LEVOFLOXACIN 750 MG: 750 INJECTION, SOLUTION INTRAVENOUS at 06:46

## 2024-10-27 RX ADMIN — CEFTRIAXONE SODIUM 1000 MG: 1 INJECTION, POWDER, FOR SOLUTION INTRAMUSCULAR; INTRAVENOUS at 06:39

## 2024-10-27 RX ADMIN — ALUMINUM HYDROXIDE, MAGNESIUM HYDROXIDE, AND SIMETHICONE 30 ML: 1200; 120; 1200 SUSPENSION ORAL at 21:13

## 2024-10-27 RX ADMIN — CITALOPRAM HYDROBROMIDE 10 MG: 20 TABLET ORAL at 11:18

## 2024-10-27 RX ADMIN — AZITHROMYCIN DIHYDRATE 500 MG: 250 TABLET ORAL at 09:06

## 2024-10-27 RX ADMIN — DOCUSATE SODIUM 100 MG: 100 CAPSULE, LIQUID FILLED ORAL at 21:13

## 2024-10-27 RX ADMIN — ALUMINUM HYDROXIDE, MAGNESIUM HYDROXIDE, AND SIMETHICONE 30 ML: 1200; 120; 1200 SUSPENSION ORAL at 16:39

## 2024-10-27 RX ADMIN — POTASSIUM CHLORIDE 20 MEQ: 1500 TABLET, EXTENDED RELEASE ORAL at 09:06

## 2024-10-27 RX ADMIN — ALUMINUM HYDROXIDE, MAGNESIUM HYDROXIDE, AND SIMETHICONE 30 ML: 1200; 120; 1200 SUSPENSION ORAL at 11:17

## 2024-10-27 RX ADMIN — SODIUM CHLORIDE, PRESERVATIVE FREE 10 ML: 5 INJECTION INTRAVENOUS at 09:05

## 2024-10-27 RX ADMIN — CARVEDILOL 6.25 MG: 3.12 TABLET, FILM COATED ORAL at 05:25

## 2024-10-27 RX ADMIN — LIDOCAINE: 50 OINTMENT TOPICAL at 11:17

## 2024-10-27 RX ADMIN — POTASSIUM CHLORIDE AND SODIUM CHLORIDE: 450; 150 INJECTION, SOLUTION INTRAVENOUS at 16:01

## 2024-10-27 RX ADMIN — CETIRIZINE HYDROCHLORIDE 5 MG: 5 TABLET ORAL at 09:06

## 2024-10-27 RX ADMIN — SODIUM CHLORIDE 500 ML: 9 INJECTION, SOLUTION INTRAVENOUS at 09:26

## 2024-10-27 RX ADMIN — Medication 1 TABLET: at 11:57

## 2024-10-27 RX ADMIN — SODIUM CHLORIDE 500 ML: 9 INJECTION, SOLUTION INTRAVENOUS at 22:43

## 2024-10-27 RX ADMIN — GUAIFENESIN SYRUP AND DEXTROMETHORPHAN 10 ML: 100; 10 SYRUP ORAL at 11:17

## 2024-10-27 RX ADMIN — APIXABAN 5 MG: 5 TABLET, FILM COATED ORAL at 11:18

## 2024-10-27 RX ADMIN — METFORMIN HYDROCHLORIDE 500 MG: 500 TABLET ORAL at 16:40

## 2024-10-27 RX ADMIN — DOXYCYCLINE 100 MG: 100 INJECTION, POWDER, LYOPHILIZED, FOR SOLUTION INTRAVENOUS at 11:29

## 2024-10-27 RX ADMIN — PANTOPRAZOLE SODIUM 40 MG: 40 TABLET, DELAYED RELEASE ORAL at 11:19

## 2024-10-27 RX ADMIN — AMIODARONE HYDROCHLORIDE 200 MG: 200 TABLET ORAL at 11:19

## 2024-10-27 RX ADMIN — DOCUSATE SODIUM 100 MG: 100 CAPSULE, LIQUID FILLED ORAL at 11:19

## 2024-10-27 RX ADMIN — POTASSIUM CHLORIDE 20 MEQ: 1500 TABLET, EXTENDED RELEASE ORAL at 16:40

## 2024-10-27 ASSESSMENT — PAIN SCALES - GENERAL
PAINLEVEL_OUTOF10: 0
PAINLEVEL_OUTOF10: 3
PAINLEVEL_OUTOF10: 0

## 2024-10-27 ASSESSMENT — PAIN DESCRIPTION - DESCRIPTORS: DESCRIPTORS: ACHING

## 2024-10-27 ASSESSMENT — PAIN DESCRIPTION - LOCATION: LOCATION: CHEST

## 2024-10-27 NOTE — THERAPY EVALUATION
Diagnosis Date    Acid reflux     Anemia     Diabetes (HCC)     Diabetes mellitus (HCC)     GERD (gastroesophageal reflux disease)     Hypertension     Stroke (HCC)      Past Surgical History:   Procedure Laterality Date    COLONOSCOPY       Cognitive and Communication Status:  Neurologic State: Alert  Orientation Level: Oriented to person and Oriented to place  Cognition: Follows commands    Dysphagia:  Oral Assessment:     P.O. Trials:  PO Trials  Neuromuscular Estim Used: No  Assessment Method(s): Observation;Palpation  Patient Position: sitting upright in bed  Vocal Quality: Low volume;Fatigue  Consistency Presented: Mildly Thick;Thin;Pureed;Regular (provided bite sized piece of nestor cracker d/t pt not able to take bite easily on his own)  How Presented: SLP-fed/Presented;Cup/sip;Cup/gulp;Successive Swallows;Straw  Bolus Acceptance: No impairment  Bolus Formation/Control: Impaired  Type of Impairment: Delayed;Drooling;Piecemeal;Oral holding  Propulsion: Delayed (# of seconds) (delayed with nestor cracker and thickened liq)  Oral Residue: None  Initiation of Swallow: Delayed (# of seconds)  Aspiration Signs/Symptoms: None  Pharyngeal Phase Characteristics: Multiple swallows;Poor endurance  Effective Modifications: Small sips and bites;Cup/sip;Alternate liquids/solids  Comments: pt on modified soft and bite sized diet at home and in hospital. Nurse fed pt. No concerns with aspiration from nurse or noted in BSE. Delayed swallow with thickened liquid and with straw sips. WNL for cup sip think liq, purree, and small bite size of cracker. No oral residue after each trial. Piecemeal bolus formation with food trials.       After evaluation:   []            Patient left in no apparent distress sitting up in chair  [x]            Patient left in no apparent distress in bed  [x]            Call bell left within reach  [x]            Nursing notified  []            Family present  []            Caregiver present  []

## 2024-10-27 NOTE — ED TRIAGE NOTES
Patient presents by EMS c/o chest pain that is produced by cough. EMS reports that patient had pneumonia about 1 month ago and was on antibiotics. Patient family reports symptoms have only worsened.

## 2024-10-27 NOTE — ED PROVIDER NOTES
[facility]  EMERGENCY DEPARTMENT ENCOUNTER NOTE    Date: 10/27/2024  Patient Name: Tyshawn Vaughn    History of Presenting Illness     Chief Complaint   Patient presents with    Chest Pain     History obtained from: Patient    HPI: Tyshawn Vaughn, 79 y.o. male with past medical history as listed and reviewed below presents for shortness of breath, cough, and chest pain.  Over the past month, he has been having intermittent on and off cough and was diagnosed with a pneumonia and completed treatment.  His symptoms started getting worse today.  He reports worsening productive cough as well as chest pain and chest soreness due to the cough.  He also reports shortness of breath, congestion, and sore throat.  No headaches.  Denies any fevers or chills.  No nausea or vomiting.  Denies any abdominal pain, dysuria, hematuria, or any other symptoms.    Medical History   I reviewed the medical, surgical, family, and social history, as well as allergies:    PCP: Reynaldo Boothe Sr., MD    Past Medical History:  Past Medical History:   Diagnosis Date    Acid reflux     Anemia     Diabetes (HCC)     Diabetes mellitus (HCC)     GERD (gastroesophageal reflux disease)     Hypertension     Stroke (HCC)      Past Surgical History:  Past Surgical History:   Procedure Laterality Date    COLONOSCOPY       Current Outpatient Medications:  Current Outpatient Medications   Medication Instructions    acetaminophen (TYLENOL) 500 mg, Oral, EVERY 6 HOURS PRN    albuterol sulfate HFA (VENTOLIN HFA) 108 (90 Base) MCG/ACT inhaler 2 puffs, Inhalation, EVERY 6 HOURS PRN    amiodarone (CORDARONE) 200 mg, Oral, DAILY    apixaban (ELIQUIS) 5 mg, Oral, 2 TIMES DAILY    aspirin 81 mg, Oral, DAILY    B Complex, Folic Acid, TABS Oral    carbidopa-levodopa (SINEMET)  MG per tablet 1 tablet, 3 TIMES DAILY    carvedilol (COREG) 6.25 mg, Oral, 2 TIMES DAILY WITH MEALS    citalopram (CELEXA) 10 MG tablet No

## 2024-10-27 NOTE — PROGRESS NOTES
TRANSFER - IN REPORT:    Verbal report received from betsy alan Tyshawn Vaughn  being received from ed for routine progression of patient care      Report consisted of patient's Situation, Background, Assessment and   Recommendations(SBAR).     Information from the following report(s) Nurse Handoff Report was reviewed with the receiving nurse.    Opportunity for questions and clarification was provided.      Assessment completed upon patient's arrival to unit and care assumed.

## 2024-10-27 NOTE — RT PROTOCOL NOTE
Attempt sputum collection. Patient unable to expectorate at this time. Specimen cup at bedside and verbally instructed on procedure with acknowledge of understanding.

## 2024-10-27 NOTE — H&P
History and Physical  Dr Jefferson Fitch MD      Chief Complaints:     Chief Complaint   Patient presents with    Chest Pain         Subjective:     Tyshawn Vaughn is a 79 y.o. male followed by Reynaldo Boothe Sr., MD and Dr Duncan Pulmonologist and Dr. Jonathan ROWLAND cardiology has a past medical history of Acid reflux, Anemia, Diabetes (HCC), Diabetes mellitus (HCC), GERD (gastroesophageal reflux disease), Hypertension, and Stroke (HCC).    Presents from home with shortness of breath and intermittent chest pain mainly secondary to progressively worsening cough mostly nonproductive but when productive he notes a yellow sputum.  Has history of dysphagia has been following with speech therapy says that he coughs at times when eating but not all the time.  Denies any fevers or chills or sick contacts denies any nausea or vomiting.  At baseline he is working with therapy at home but for the most part is fairly bed dependent.  He followed up with his pulmonologist in September and chest x-ray at that time shows a small left-sided pleural effusion/atelectasis with resolved right-sided effusion and atelectasis but pulmonary placed patient on Z-Hunter as well as a Pred pack.  Patient wife says that he has been having off-and-on coughing for some time now and comes to the emergency room because of repetitive nonproductive coughing leading to chest pain and shortness of breath..  On chart review it is noted in November 2023 the patient was treated for pneumonia and had thoracentesis for a left pleural effusion.  On evaluation no noted hypoxia and had elevated blood pressure 172/94 with heart rate 87 was given Coreg 6.25 mg dose on arrival to acute care blood pressure improved to 132/70 with a heart rate of 74 but it is noted that patient had been taken off all blood pressure medications and heart rate medications including Cardizem, Coreg, lisinopril.  Initial labs showed lactic acid 2.5 and repeat was at 2.7

## 2024-10-28 PROBLEM — K59.00 CONSTIPATION: Status: ACTIVE | Noted: 2024-10-28

## 2024-10-28 LAB
ANION GAP SERPL CALC-SCNC: 8 MMOL/L (ref 2–12)
BACTERIA SPEC CULT: NORMAL
BASOPHILS # BLD: 0 K/UL (ref 0–0.1)
BASOPHILS NFR BLD: 0 % (ref 0–1)
BUN SERPL-MCNC: 13 MG/DL (ref 6–20)
BUN/CREAT SERPL: 13 (ref 12–20)
CA-I BLD-MCNC: 8.6 MG/DL (ref 8.5–10.1)
CHLORIDE SERPL-SCNC: 103 MMOL/L (ref 97–108)
CO2 SERPL-SCNC: 29 MMOL/L (ref 21–32)
CREAT SERPL-MCNC: 1 MG/DL (ref 0.7–1.3)
DIFFERENTIAL METHOD BLD: ABNORMAL
EKG ATRIAL RATE: 81 BPM
EKG DIAGNOSIS: NORMAL
EKG P AXIS: -7 DEGREES
EKG P-R INTERVAL: 203 MS
EKG Q-T INTERVAL: 412 MS
EKG QRS DURATION: 100 MS
EKG QTC CALCULATION (BAZETT): 479 MS
EKG R AXIS: 22 DEGREES
EKG T AXIS: 0 DEGREES
EKG VENTRICULAR RATE: 81 BPM
EOSINOPHIL # BLD: 0.2 K/UL (ref 0–0.4)
EOSINOPHIL NFR BLD: 3 % (ref 0–7)
ERYTHROCYTE [DISTWIDTH] IN BLOOD BY AUTOMATED COUNT: 12.2 % (ref 11.5–14.5)
GLUCOSE BLD STRIP.AUTO-MCNC: 106 MG/DL (ref 65–100)
GLUCOSE BLD STRIP.AUTO-MCNC: 113 MG/DL (ref 65–100)
GLUCOSE BLD STRIP.AUTO-MCNC: 114 MG/DL (ref 65–100)
GLUCOSE BLD STRIP.AUTO-MCNC: 119 MG/DL (ref 65–100)
GLUCOSE BLD STRIP.AUTO-MCNC: 122 MG/DL (ref 65–100)
GLUCOSE SERPL-MCNC: 98 MG/DL (ref 65–100)
GRAM STN SPEC: NORMAL
HCT VFR BLD AUTO: 37.6 % (ref 36.6–50.3)
HGB BLD-MCNC: 12.6 G/DL (ref 12.1–17)
IMM GRANULOCYTES # BLD AUTO: 0 K/UL (ref 0–0.04)
IMM GRANULOCYTES NFR BLD AUTO: 0 % (ref 0–0.5)
LACTATE SERPL-SCNC: 1.4 MMOL/L (ref 0.4–2)
LACTATE SERPL-SCNC: 2.5 MMOL/L (ref 0.4–2)
LYMPHOCYTES # BLD: 1.6 K/UL (ref 0.8–3.5)
LYMPHOCYTES NFR BLD: 22 % (ref 12–49)
Lab: NORMAL
MAGNESIUM SERPL-MCNC: 2.1 MG/DL (ref 1.6–2.4)
MCH RBC QN AUTO: 34.6 PG (ref 26–34)
MCHC RBC AUTO-ENTMCNC: 33.5 G/DL (ref 30–36.5)
MCV RBC AUTO: 103.3 FL (ref 80–99)
MONOCYTES # BLD: 1 K/UL (ref 0–1)
MONOCYTES NFR BLD: 15 % (ref 5–13)
NEUTS SEG # BLD: 4.1 K/UL (ref 1.8–8)
NEUTS SEG NFR BLD: 60 % (ref 32–75)
NRBC # BLD: 0 K/UL (ref 0–0.01)
NRBC BLD-RTO: 0 PER 100 WBC
PERFORMED BY:: ABNORMAL
PLATELET # BLD AUTO: 189 K/UL (ref 150–400)
PMV BLD AUTO: 11.4 FL (ref 8.9–12.9)
POTASSIUM SERPL-SCNC: 4 MMOL/L (ref 3.5–5.1)
RBC # BLD AUTO: 3.64 M/UL (ref 4.1–5.7)
SODIUM SERPL-SCNC: 140 MMOL/L (ref 136–145)
WBC # BLD AUTO: 6.9 K/UL (ref 4.1–11.1)

## 2024-10-28 PROCEDURE — 97165 OT EVAL LOW COMPLEX 30 MIN: CPT

## 2024-10-28 PROCEDURE — 1100000000 HC RM PRIVATE

## 2024-10-28 PROCEDURE — 2580000003 HC RX 258: Performed by: HOSPITALIST

## 2024-10-28 PROCEDURE — 36415 COLL VENOUS BLD VENIPUNCTURE: CPT

## 2024-10-28 PROCEDURE — 83605 ASSAY OF LACTIC ACID: CPT

## 2024-10-28 PROCEDURE — 6370000000 HC RX 637 (ALT 250 FOR IP): Performed by: HOSPITALIST

## 2024-10-28 PROCEDURE — 83735 ASSAY OF MAGNESIUM: CPT

## 2024-10-28 PROCEDURE — 80048 BASIC METABOLIC PNL TOTAL CA: CPT

## 2024-10-28 PROCEDURE — 97116 GAIT TRAINING THERAPY: CPT

## 2024-10-28 PROCEDURE — 85025 COMPLETE CBC W/AUTO DIFF WBC: CPT

## 2024-10-28 PROCEDURE — 2500000003 HC RX 250 WO HCPCS: Performed by: HOSPITALIST

## 2024-10-28 PROCEDURE — 82962 GLUCOSE BLOOD TEST: CPT

## 2024-10-28 PROCEDURE — 97161 PT EVAL LOW COMPLEX 20 MIN: CPT

## 2024-10-28 PROCEDURE — 6360000002 HC RX W HCPCS: Performed by: HOSPITALIST

## 2024-10-28 PROCEDURE — 51798 US URINE CAPACITY MEASURE: CPT

## 2024-10-28 RX ORDER — LACTULOSE 10 G/15ML
20 SOLUTION ORAL 2 TIMES DAILY
Status: COMPLETED | OUTPATIENT
Start: 2024-10-28 | End: 2024-10-28

## 2024-10-28 RX ORDER — GUAIFENESIN/DEXTROMETHORPHAN 100-10MG/5
10 SYRUP ORAL EVERY 6 HOURS PRN
Status: DISCONTINUED | OUTPATIENT
Start: 2024-10-28 | End: 2024-10-29 | Stop reason: HOSPADM

## 2024-10-28 RX ADMIN — METFORMIN HYDROCHLORIDE 500 MG: 500 TABLET ORAL at 08:31

## 2024-10-28 RX ADMIN — ALUMINUM HYDROXIDE, MAGNESIUM HYDROXIDE, AND SIMETHICONE 30 ML: 1200; 120; 1200 SUSPENSION ORAL at 11:56

## 2024-10-28 RX ADMIN — ALUMINUM HYDROXIDE, MAGNESIUM HYDROXIDE, AND SIMETHICONE 30 ML: 1200; 120; 1200 SUSPENSION ORAL at 20:59

## 2024-10-28 RX ADMIN — APIXABAN 5 MG: 5 TABLET, FILM COATED ORAL at 20:59

## 2024-10-28 RX ADMIN — PANTOPRAZOLE SODIUM 40 MG: 40 TABLET, DELAYED RELEASE ORAL at 16:33

## 2024-10-28 RX ADMIN — DOXYCYCLINE 100 MG: 100 INJECTION, POWDER, LYOPHILIZED, FOR SOLUTION INTRAVENOUS at 09:42

## 2024-10-28 RX ADMIN — ALUMINUM HYDROXIDE, MAGNESIUM HYDROXIDE, AND SIMETHICONE 30 ML: 1200; 120; 1200 SUSPENSION ORAL at 08:33

## 2024-10-28 RX ADMIN — APIXABAN 5 MG: 5 TABLET, FILM COATED ORAL at 08:31

## 2024-10-28 RX ADMIN — CITALOPRAM HYDROBROMIDE 10 MG: 20 TABLET ORAL at 08:32

## 2024-10-28 RX ADMIN — SODIUM CHLORIDE, PRESERVATIVE FREE 10 ML: 5 INJECTION INTRAVENOUS at 08:34

## 2024-10-28 RX ADMIN — AMIODARONE HYDROCHLORIDE 200 MG: 200 TABLET ORAL at 08:31

## 2024-10-28 RX ADMIN — DOXYCYCLINE 100 MG: 100 INJECTION, POWDER, LYOPHILIZED, FOR SOLUTION INTRAVENOUS at 22:45

## 2024-10-28 RX ADMIN — DOCUSATE SODIUM 100 MG: 100 CAPSULE, LIQUID FILLED ORAL at 08:31

## 2024-10-28 RX ADMIN — PANTOPRAZOLE SODIUM 40 MG: 40 TABLET, DELAYED RELEASE ORAL at 08:33

## 2024-10-28 RX ADMIN — Medication 1 TABLET: at 08:31

## 2024-10-28 RX ADMIN — LACTULOSE 20 G: 20 SOLUTION ORAL at 20:59

## 2024-10-28 RX ADMIN — SODIUM CHLORIDE, PRESERVATIVE FREE 10 ML: 5 INJECTION INTRAVENOUS at 21:00

## 2024-10-28 RX ADMIN — FOLIC ACID TAB 400 MCG 400 MCG: 400 TAB at 08:31

## 2024-10-28 RX ADMIN — POTASSIUM CHLORIDE 20 MEQ: 1500 TABLET, EXTENDED RELEASE ORAL at 16:33

## 2024-10-28 RX ADMIN — LIDOCAINE: 50 OINTMENT TOPICAL at 08:34

## 2024-10-28 RX ADMIN — ALUMINUM HYDROXIDE, MAGNESIUM HYDROXIDE, AND SIMETHICONE 30 ML: 1200; 120; 1200 SUSPENSION ORAL at 16:33

## 2024-10-28 RX ADMIN — DOCUSATE SODIUM 100 MG: 100 CAPSULE, LIQUID FILLED ORAL at 20:59

## 2024-10-28 RX ADMIN — LACTULOSE 20 G: 20 SOLUTION ORAL at 08:34

## 2024-10-28 RX ADMIN — GUAIFENESIN SYRUP AND DEXTROMETHORPHAN 10 ML: 100; 10 SYRUP ORAL at 08:33

## 2024-10-28 RX ADMIN — CEFTRIAXONE SODIUM 1000 MG: 1 INJECTION, POWDER, FOR SOLUTION INTRAMUSCULAR; INTRAVENOUS at 08:33

## 2024-10-28 RX ADMIN — POTASSIUM CHLORIDE 20 MEQ: 1500 TABLET, EXTENDED RELEASE ORAL at 08:33

## 2024-10-28 RX ADMIN — CETIRIZINE HYDROCHLORIDE 5 MG: 5 TABLET ORAL at 08:33

## 2024-10-28 RX ADMIN — SENNOSIDES 8.6 MG: 8.6 TABLET, FILM COATED ORAL at 21:00

## 2024-10-28 RX ADMIN — PRAVASTATIN SODIUM 40 MG: 40 TABLET ORAL at 21:00

## 2024-10-28 ASSESSMENT — PAIN SCALES - GENERAL
PAINLEVEL_OUTOF10: 0

## 2024-10-28 ASSESSMENT — ENCOUNTER SYMPTOMS
ALLERGIC/IMMUNOLOGIC NEGATIVE: 1
GASTROINTESTINAL NEGATIVE: 1
EYES NEGATIVE: 1
RESPIRATORY NEGATIVE: 1

## 2024-10-28 NOTE — PROGRESS NOTES
Patient unable to chew bite sized potatoes and meats- diet decreased to minced and moist.  MD notified

## 2024-10-28 NOTE — PROGRESS NOTES
Patient has  a wet diaper. Bladder scan is 337 ml. Informed on call. To repeat bladder scan later patient not voided.

## 2024-10-28 NOTE — PROGRESS NOTES
Hospitalist Progress Note          Dr. Jefferson Fitch MD      Date:10/28/2024       Room:[facility]  Patient Name:Tyshawn Vaughn     YOB: 1945     Age:79 y.o.      Chief Complaint   Patient presents with    Chest Pain         HPI as per admitting provider on 10/27/2024  Tyshawn Vaughn is a 79 y.o. male followed by Reynaldo Boothe Sr., MD and Dr Duncan Pulmonologist and Dr. Jonathan ROWLAND cardiology has a past medical history of Acid reflux, Anemia, Diabetes (HCC), Diabetes mellitus (HCC), GERD (gastroesophageal reflux disease), Hypertension, and Stroke (HCC).    Presents from home with shortness of breath and intermittent chest pain mainly secondary to progressively worsening cough mostly nonproductive but when productive he notes a yellow sputum.  Has history of dysphagia has been following with speech therapy says that he coughs at times when eating but not all the time.  Denies any fevers or chills or sick contacts denies any nausea or vomiting.  At baseline he is working with therapy at home but for the most part is fairly bed dependent.  He followed up with his pulmonologist in September and chest x-ray at that time shows a small left-sided pleural effusion/atelectasis with resolved right-sided effusion and atelectasis but pulmonary placed patient on Z-Hunter as well as a Pred pack.  Patient wife says that he has been having off-and-on coughing for some time now and comes to the emergency room because of repetitive nonproductive coughing leading to chest pain and shortness of breath..  On chart review it is noted in November 2023 the patient was treated for pneumonia and had thoracentesis for a left pleural effusion.  On evaluation no noted hypoxia and had elevated blood pressure 172/94 with heart rate 87 was given Coreg 6.25 mg dose on arrival to acute care blood pressure improved to 132/70 with a heart rate of 74 but it is noted that patient had been taken off all blood pressure

## 2024-10-28 NOTE — PROGRESS NOTES
Patient voided 250 ml of danii color urine via external cannula and had 129 ml urine volume retained per bladder scan.

## 2024-10-29 VITALS
SYSTOLIC BLOOD PRESSURE: 130 MMHG | DIASTOLIC BLOOD PRESSURE: 80 MMHG | HEART RATE: 65 BPM | TEMPERATURE: 97.9 F | OXYGEN SATURATION: 95 % | RESPIRATION RATE: 17 BRPM | BODY MASS INDEX: 32.22 KG/M2 | WEIGHT: 205.3 LBS | HEIGHT: 67 IN

## 2024-10-29 LAB
EKG ATRIAL RATE: 86 BPM
EKG DIAGNOSIS: NORMAL
EKG P AXIS: 10 DEGREES
EKG P-R INTERVAL: 206 MS
EKG Q-T INTERVAL: 425 MS
EKG QRS DURATION: 96 MS
EKG QTC CALCULATION (BAZETT): 506 MS
EKG R AXIS: 20 DEGREES
EKG T AXIS: -13 DEGREES
EKG VENTRICULAR RATE: 85 BPM
FOLATE SERPL-MCNC: 24.2 NG/ML (ref 5–21)
GLUCOSE BLD STRIP.AUTO-MCNC: 102 MG/DL (ref 65–100)
GLUCOSE BLD STRIP.AUTO-MCNC: 106 MG/DL (ref 65–100)
IRON SATN MFR SERPL: 30 % (ref 20–50)
IRON SERPL-MCNC: 60 UG/DL (ref 35–150)
PERFORMED BY:: ABNORMAL
PERFORMED BY:: ABNORMAL
TIBC SERPL-MCNC: 200 UG/DL (ref 250–450)
VIT B12 SERPL-MCNC: 629 PG/ML (ref 193–986)

## 2024-10-29 PROCEDURE — 82962 GLUCOSE BLOOD TEST: CPT

## 2024-10-29 PROCEDURE — 2500000003 HC RX 250 WO HCPCS: Performed by: HOSPITALIST

## 2024-10-29 PROCEDURE — 6360000002 HC RX W HCPCS: Performed by: HOSPITALIST

## 2024-10-29 PROCEDURE — 2580000003 HC RX 258: Performed by: HOSPITALIST

## 2024-10-29 PROCEDURE — 97530 THERAPEUTIC ACTIVITIES: CPT

## 2024-10-29 PROCEDURE — 6370000000 HC RX 637 (ALT 250 FOR IP): Performed by: HOSPITALIST

## 2024-10-29 RX ORDER — SUCRALFATE 1 G/1
1 TABLET ORAL
Qty: 120 TABLET | Refills: 3 | Status: SHIPPED | OUTPATIENT
Start: 2024-10-29

## 2024-10-29 RX ORDER — MAGNESIUM HYDROXIDE/ALUMINUM HYDROXICE/SIMETHICONE 120; 1200; 1200 MG/30ML; MG/30ML; MG/30ML
30 SUSPENSION ORAL EVERY 6 HOURS PRN
Qty: 355 ML | Refills: 0 | Status: SHIPPED | OUTPATIENT
Start: 2024-10-29

## 2024-10-29 RX ORDER — BENZONATATE 100 MG/1
100 CAPSULE ORAL 3 TIMES DAILY PRN
Qty: 21 CAPSULE | Refills: 0 | Status: SHIPPED | OUTPATIENT
Start: 2024-10-29 | End: 2024-11-05

## 2024-10-29 RX ORDER — LACTULOSE 10 G/15ML
20 SOLUTION ORAL DAILY PRN
Qty: 473 ML | Refills: 1 | Status: SHIPPED | OUTPATIENT
Start: 2024-10-29

## 2024-10-29 RX ORDER — MAGNESIUM HYDROXIDE/ALUMINUM HYDROXICE/SIMETHICONE 120; 1200; 1200 MG/30ML; MG/30ML; MG/30ML
30 SUSPENSION ORAL EVERY 6 HOURS PRN
Status: DISCONTINUED | OUTPATIENT
Start: 2024-10-29 | End: 2024-10-29 | Stop reason: HOSPADM

## 2024-10-29 RX ADMIN — DOXYCYCLINE 100 MG: 100 INJECTION, POWDER, LYOPHILIZED, FOR SOLUTION INTRAVENOUS at 09:50

## 2024-10-29 RX ADMIN — POTASSIUM CHLORIDE 20 MEQ: 1500 TABLET, EXTENDED RELEASE ORAL at 08:38

## 2024-10-29 RX ADMIN — CITALOPRAM HYDROBROMIDE 10 MG: 20 TABLET ORAL at 08:36

## 2024-10-29 RX ADMIN — PANTOPRAZOLE SODIUM 40 MG: 40 TABLET, DELAYED RELEASE ORAL at 08:38

## 2024-10-29 RX ADMIN — ALUMINUM HYDROXIDE, MAGNESIUM HYDROXIDE, AND SIMETHICONE 30 ML: 1200; 120; 1200 SUSPENSION ORAL at 08:35

## 2024-10-29 RX ADMIN — FOLIC ACID TAB 400 MCG 400 MCG: 400 TAB at 08:38

## 2024-10-29 RX ADMIN — CEFTRIAXONE SODIUM 1000 MG: 1 INJECTION, POWDER, FOR SOLUTION INTRAMUSCULAR; INTRAVENOUS at 08:36

## 2024-10-29 RX ADMIN — Medication 1 TABLET: at 08:38

## 2024-10-29 RX ADMIN — DOCUSATE SODIUM 100 MG: 100 CAPSULE, LIQUID FILLED ORAL at 08:38

## 2024-10-29 RX ADMIN — AMIODARONE HYDROCHLORIDE 200 MG: 200 TABLET ORAL at 08:37

## 2024-10-29 RX ADMIN — CETIRIZINE HYDROCHLORIDE 5 MG: 5 TABLET ORAL at 08:36

## 2024-10-29 RX ADMIN — SODIUM CHLORIDE, PRESERVATIVE FREE 10 ML: 5 INJECTION INTRAVENOUS at 08:35

## 2024-10-29 RX ADMIN — APIXABAN 5 MG: 5 TABLET, FILM COATED ORAL at 08:39

## 2024-10-29 RX ADMIN — ALUMINUM HYDROXIDE, MAGNESIUM HYDROXIDE, AND SIMETHICONE 30 ML: 200; 200; 20 SUSPENSION ORAL at 12:16

## 2024-10-29 ASSESSMENT — PAIN SCALES - GENERAL
PAINLEVEL_OUTOF10: 0

## 2024-10-29 NOTE — PLAN OF CARE
Problem: Chronic Conditions and Co-morbidities  Goal: Patient's chronic conditions and co-morbidity symptoms are monitored and maintained or improved  10/27/2024 1923 by Senait Garcia RN  Outcome: Progressing  10/27/2024 1923 by Senait Garcia RN  Outcome: Progressing  10/27/2024 0949 by Zunilda Kinney LPN  Outcome: Progressing     Problem: Skin/Tissue Integrity  Goal: Absence of new skin breakdown  Description: 1.  Monitor for areas of redness and/or skin breakdown  2.  Assess vascular access sites hourly  3.  Every 4-6 hours minimum:  Change oxygen saturation probe site  4.  Every 4-6 hours:  If on nasal continuous positive airway pressure, respiratory therapy assess nares and determine need for appliance change or resting period.  10/27/2024 1923 by Senait Garcia RN  Outcome: Progressing  10/27/2024 1923 by Senait Garcia RN  Outcome: Progressing  10/27/2024 0949 by Zunilda Kinney LPN  Outcome: Progressing     Problem: Safety - Adult  Goal: Free from fall injury  10/27/2024 1923 by Senait Garcia RN  Outcome: Progressing  10/27/2024 1923 by Senait Garcia RN  Outcome: Progressing  10/27/2024 0949 by Zunilda Kinney LPN  Outcome: Progressing     Problem: ABCDS Injury Assessment  Goal: Absence of physical injury  10/27/2024 1923 by Senait Garcia RN  Outcome: Progressing  10/27/2024 1923 by Senait Garcia RN  Outcome: Progressing  10/27/2024 0949 by Zunilda Kinney LPN  Outcome: Progressing     Problem: Respiratory - Adult  Goal: Achieves optimal ventilation and oxygenation  Outcome: Progressing     Problem: Infection - Adult  Goal: Absence of infection at discharge  Outcome: Progressing  Goal: Absence of fever/infection during anticipated neutropenic period  Outcome: Progressing     Problem: Metabolic/Fluid and Electrolytes - Adult  Goal: Electrolytes maintained within normal limits  Outcome: Progressing     
  Problem: Chronic Conditions and Co-morbidities  Goal: Patient's chronic conditions and co-morbidity symptoms are monitored and maintained or improved  10/28/2024 0853 by Giovana Rodriguez RN  Outcome: Progressing  Flowsheets (Taken 10/28/2024 0853)  Care Plan - Patient's Chronic Conditions and Co-Morbidity Symptoms are Monitored and Maintained or Improved:   Monitor and assess patient's chronic conditions and comorbid symptoms for stability, deterioration, or improvement   Collaborate with multidisciplinary team to address chronic and comorbid conditions and prevent exacerbation or deterioration   Update acute care plan with appropriate goals if chronic or comorbid symptoms are exacerbated and prevent overall improvement and discharge  10/28/2024 0853 by Giovana Rodriguez RN  Outcome: Progressing  Flowsheets (Taken 10/28/2024 0853)  Care Plan - Patient's Chronic Conditions and Co-Morbidity Symptoms are Monitored and Maintained or Improved:   Monitor and assess patient's chronic conditions and comorbid symptoms for stability, deterioration, or improvement   Collaborate with multidisciplinary team to address chronic and comorbid conditions and prevent exacerbation or deterioration   Update acute care plan with appropriate goals if chronic or comorbid symptoms are exacerbated and prevent overall improvement and discharge  10/27/2024 1923 by Senait Garcia RN  Outcome: Progressing  10/27/2024 1923 by Senait Garcia RN  Outcome: Progressing     Problem: Skin/Tissue Integrity  Goal: Absence of new skin breakdown  Description: 1.  Monitor for areas of redness and/or skin breakdown  2.  Assess vascular access sites hourly  3.  Every 4-6 hours minimum:  Change oxygen saturation probe site  4.  Every 4-6 hours:  If on nasal continuous positive airway pressure, respiratory therapy assess nares and determine need for appliance change or resting period.  10/27/2024 1923 by Senait Garcia RN  Outcome: 
  Problem: Chronic Conditions and Co-morbidities  Goal: Patient's chronic conditions and co-morbidity symptoms are monitored and maintained or improved  Outcome: Progressing     Problem: Skin/Tissue Integrity  Goal: Absence of new skin breakdown  Description: 1.  Monitor for areas of redness and/or skin breakdown  2.  Assess vascular access sites hourly  3.  Every 4-6 hours minimum:  Change oxygen saturation probe site  4.  Every 4-6 hours:  If on nasal continuous positive airway pressure, respiratory therapy assess nares and determine need for appliance change or resting period.  Outcome: Progressing     Problem: Safety - Adult  Goal: Free from fall injury  Outcome: Progressing     Problem: ABCDS Injury Assessment  Goal: Absence of physical injury  Outcome: Progressing     
  Problem: Chronic Conditions and Co-morbidities  Goal: Patient's chronic conditions and co-morbidity symptoms are monitored and maintained or improved  Outcome: Progressing  Flowsheets (Taken 10/28/2024 1940)  Care Plan - Patient's Chronic Conditions and Co-Morbidity Symptoms are Monitored and Maintained or Improved:   Monitor and assess patient's chronic conditions and comorbid symptoms for stability, deterioration, or improvement   Collaborate with multidisciplinary team to address chronic and comorbid conditions and prevent exacerbation or deterioration   Update acute care plan with appropriate goals if chronic or comorbid symptoms are exacerbated and prevent overall improvement and discharge     Problem: Skin/Tissue Integrity  Goal: Absence of new skin breakdown  Description: 1.  Monitor for areas of redness and/or skin breakdown  2.  Assess vascular access sites hourly  3.  Every 4-6 hours minimum:  Change oxygen saturation probe site  4.  Every 4-6 hours:  If on nasal continuous positive airway pressure, respiratory therapy assess nares and determine need for appliance change or resting period.  Outcome: Progressing     Problem: Safety - Adult  Goal: Free from fall injury  Outcome: Progressing  Flowsheets (Taken 10/29/2024 0418)  Free From Fall Injury: Instruct family/caregiver on patient safety     Problem: ABCDS Injury Assessment  Goal: Absence of physical injury  Outcome: Progressing  Flowsheets (Taken 10/29/2024 0418)  Absence of Physical Injury: Implement safety measures based on patient assessment     Problem: Respiratory - Adult  Goal: Achieves optimal ventilation and oxygenation  Outcome: Progressing  Flowsheets  Taken 10/29/2024 0418  Achieves optimal ventilation and oxygenation:   Assess for changes in respiratory status   Respiratory therapy support as indicated   Assess for changes in mentation and behavior   Oxygen supplementation based on oxygen saturation or arterial blood gases   Encourage 
OCCUPATIONAL THERAPY TREATMENT  Patient: Tyshawn Vaughn (79 y.o. male)  Date: 10/29/2024  Primary Diagnosis: Parapneumonic effusion [J18.9, J91.8]  Septicemia (HCC) [A41.9]  Sepsis (HCC) [A41.9]  Pneumonia of left lower lobe due to infectious organism [J18.9]       Precautions: Fall Risk                Chart, occupational therapy assessment, plan of care, and goals were reviewed.    ASSESSMENT  Patient continues to benefit from skilled OT services and is slowly progressing towards goals. Patient with good participation this date, able to complete bed mobility, sup to sit with Min A. Patient with good sitting balance, occasionally leaning back with cues to place feet on floor for balance. Patient requires Min A for sit to stand from raised bed on 2nd trial with cues for hand placement and feet placement. Patient with fair standing balance with thomas-walker in R hand. Patient able to complete small, short steps to chair with thomas-walker with verbal cues to stand tall and not reach for chair until turned completely. Patient requires Mod A for stand to sit for safety. Patient left with call bell in place, tray table in front of him in prep for lunch. Nurse notified of patient up in chair.        PLAN :  Patient continues to benefit from skilled intervention to address the above impairments.  Continue treatment per established plan of care to address goals.    Recommend with staff: OOB for meals    Recommend next OT session: seated EOB for dressing, bathing    Recommendation for discharge: (in order for the patient to meet his/her long term goals):   Intermittent occupational therapy up to 2-3x/week in previous living setting with additional support needed for ADL and IADL tasks    Other factors to consider for discharge: patient's current support system is unable to meet their requirements for physical assistance, high risk for falls, not safe to be alone, and concern for safely navigating or managing the home 
Oxygen supplementation based on oxygen saturation or arterial blood gases   Encourage broncho-pulmonary hygiene including cough, deep breathe, incentive spirometry   Assess and instruct to report shortness of breath or any respiratory difficulty  Taken 10/28/2024 1940  Achieves optimal ventilation and oxygenation:   Assess for changes in mentation and behavior   Assess for changes in respiratory status     Problem: Infection - Adult  Goal: Absence of infection at discharge  10/29/2024 0418 by Nayeil Honeycutt RN  Outcome: Progressing  Flowsheets (Taken 10/28/2024 1940)  Absence of infection at discharge:   Assess and monitor for signs and symptoms of infection   Monitor lab/diagnostic results   Monitor all insertion sites i.e., indwelling lines, tubes and drains   Administer medications as ordered   Instruct and encourage patient and family to use good hand hygiene technique  Goal: Absence of fever/infection during anticipated neutropenic period  10/29/2024 0418 by Nayeli Honeycutt RN  Outcome: Progressing  Flowsheets (Taken 10/28/2024 1940)  Absence of fever/infection during anticipated neutropenic period: Monitor white blood cell count     Problem: Infection - Adult  Goal: Absence of fever/infection during anticipated neutropenic period  10/29/2024 0418 by Nayeli Honeycutt RN  Outcome: Progressing  Flowsheets (Taken 10/28/2024 1940)  Absence of fever/infection during anticipated neutropenic period: Monitor white blood cell count     Problem: Metabolic/Fluid and Electrolytes - Adult  Goal: Electrolytes maintained within normal limits  10/29/2024 0418 by Nayeli Honeycutt RN  Outcome: Progressing  Flowsheets (Taken 10/28/2024 1940)  Electrolytes maintained within normal limits:   Monitor labs and assess patient for signs and symptoms of electrolyte imbalances   Administer electrolyte replacement as ordered   Monitor response to electrolyte replacements, including repeat lab results as appropriate     
7 day(s).  2.  Patient will perform lower body dressing with Moderate Assist within 7 day(s).  4.  Patient will perform toilet transfers with Minimal Assist  within 7 day(s).  5.  Patient will perform all aspects of toileting with Minimal Assist within 7 day(s).  6.  Patient will participate in upper extremity therapeutic exercise/activities with Modified [place] for 10 minutes within 7 day(s).    7.  Patient will utilize energy conservation techniques during functional activities with verbal and visual cues within 7 day(s).   10/29/2024 1127 by Patricia Currie, OT  Outcome: Progressing     
ADMISSION: The patient lived with spouse and required moderate assistance for all functional mobility.    Physical Therapy Goals  Initiated 10/28/2024  1.  Patient will move from supine to sit and sit to supine, scoot up and down, and roll side to side in bed with minimal assistance within 7 day(s).    2.  Patient will perform sit to stand with minimal assistance within 7 day(s).  3.  Patient will transfer from bed to chair and chair to bed with minimal assistance using the least restrictive device within 7 day(s).  4.  Patient will ambulate with minimal assistance for 30 feet with the least restrictive device within 7 day(s).        
functional activities with verbal and visual cues within 7 day(s).   Outcome: Progressing

## 2024-10-29 NOTE — DISCHARGE INSTRUCTIONS
NOTIFY YOUR PHYSICIAN FOR ANY OF THE FOLLOWING:   Fever over 101 degrees for 24 hours.   Chest pain, shortness of breath, fever, chills, nausea, vomiting, diarrhea, change in mentation, falling, weakness, bleeding. Severe pain or pain not relieved by medications, as well as any other signs or symptoms that you may have questions about    Monitor accuchecks at least daily and keep log since changed metformin to daily dose   Monitor bowel movements and if no bowel movement in 24-48hrs then give dose of lactulose 20gm oral x 1     Continue augmentin twice daily x 5 days on discharge     Would benefit getting out of bed to chair daily basis otherwise he will continue to get weak  Will restart PT/OT upon discharge home       It was a pleasure to care for you here at [facility] and I am glad you are feeling better to get home.  When you get home you should receive a survey either via email or in postal mail and appreciate if you are able to fill it out and send it back at your convenience.   Here at [facility] we always strive to give \"excellent\" care and hope you are able to express that in the survey    Thank you   Dr Fitch

## 2024-10-29 NOTE — PROGRESS NOTES
[facility] has been called for patient's discharge home. Trip number # [id number] Tamela.  Discharge instructions called to wife at home.  She stated she understood and will review discharge info when he gets home.  Medications, appointments and discharge instructions reviewed.

## 2024-10-29 NOTE — CARE COORDINATION
CM consulted to get new personal care aide assigned. Cm will call [facility] and let them know that a new aide is requested - Consult to CM for :   Comments: Can you reach out to [facility] and see if they can send stronger person that can help patient get out of bed into chair  [facility] and was happy with them but after his last fall several years ago they stopped coming   Provider: (Not yet assigned)   Question: Reason for Consult? Answer: Home Health Services       Pt. Has medicaid for DC transportation. Nurse to call [phone]  to get authorization/trip number.    CM sent referral to  to resume services.  [facility] accepted pt.

## 2024-10-29 NOTE — DISCHARGE SUMMARY
MG tablet  Commonly known as: PRAVACHOL     Ventolin  (90 Base) MCG/ACT inhaler  Generic drug: albuterol sulfate HFA            STOP taking these medications      aspirin 81 MG EC tablet     carbidopa-levodopa  MG per tablet  Commonly known as: SINEMET     dilTIAZem 120 MG extended release capsule  Commonly known as: CARDIZEM CD     lisinopril 5 MG tablet  Commonly known as: PRINIVIL;ZESTRIL     polyethylene glycol 17 g packet  Commonly known as: GLYCOLAX     predniSONE 20 MG tablet  Commonly known as: DELTASONE     primidone 50 MG tablet  Commonly known as: MYSOLINE               Where to Get Your Medications        These medications were sent to [id number] - [facility] [place] - [address] - P [phone] - F [phone]  [address] [place] [address]      Phone: [phone]   aluminum & magnesium hydroxide-simethicone 200-200-20 MG/5ML Susp suspension  amoxicillin-clavulanate 875-125 MG per tablet  benzonatate 100 MG capsule  lactulose 10 GM/15ML solution  metFORMIN 500 MG tablet  sucralfate 1 GM tablet           * Follow-up Care/Patient Instructions:  Activity: activity as tolerated and fall precautions   Diet:  soft bite sized thin liquids cardiac diabetic diet       [place] [address]  [phone]  Call today  As needed    Reynaldo Boothe Sr., MD  [address] [address] [phone]          Debbie Morales Jr., MD  [address] [facility] [address] [phone]    Follow up  for gastric reflux, Jan. 2, 2025 at 1030 AM      This is dictation was done by dragon, computer voice recognition software. Quite often unanticipated grammatical, syntax, homophones and other interpretive errors or inadvertently transcribed by the computer software. Please excuse errors that have escaped final proofreading. Thank you.       Spent 45 minutes evaluting and  normal...

## 2024-10-29 NOTE — PROGRESS NOTES
[facility] in to transport patient to home.  Belongings sent with patient.  Wife notified that patient is on his way home.

## 2024-10-29 NOTE — PROGRESS NOTES
Has rested well. Turned q 2 hrs. Male [place] changed post bertram-care. Has coughed intermittently with sputum production. Denies chest pain. No BM this shift.

## 2024-10-29 NOTE — CARE COORDINATION
[facility] called to DC pickup. [facility] stated they are waiting for Insurance to call them and then they will head to hospital

## 2024-10-30 LAB
L PNEUMO1 AG UR QL IA: NEGATIVE
SPECIMEN SOURCE: NORMAL

## 2024-11-02 LAB
BACTERIA SPEC CULT: NORMAL
BACTERIA SPEC CULT: NORMAL
Lab: NORMAL
Lab: NORMAL

## 2025-02-07 ENCOUNTER — OFFICE VISIT (OUTPATIENT)
Age: 80
End: 2025-02-07
Payer: MEDICARE

## 2025-02-07 VITALS
TEMPERATURE: 97.9 F | WEIGHT: 205 LBS | BODY MASS INDEX: 32.18 KG/M2 | HEIGHT: 67 IN | DIASTOLIC BLOOD PRESSURE: 81 MMHG | RESPIRATION RATE: 18 BRPM | SYSTOLIC BLOOD PRESSURE: 159 MMHG | HEART RATE: 78 BPM | OXYGEN SATURATION: 97 %

## 2025-02-07 DIAGNOSIS — K21.00 GASTROESOPHAGEAL REFLUX DISEASE WITH ESOPHAGITIS WITHOUT HEMORRHAGE: Primary | ICD-10-CM

## 2025-02-07 DIAGNOSIS — K21.9 LARYNGOPHARYNGEAL REFLUX: ICD-10-CM

## 2025-02-07 DIAGNOSIS — K59.00 CONSTIPATION, UNSPECIFIED CONSTIPATION TYPE: ICD-10-CM

## 2025-02-07 DIAGNOSIS — R05.3 CHRONIC COUGH: ICD-10-CM

## 2025-02-07 PROCEDURE — 3077F SYST BP >= 140 MM HG: CPT | Performed by: INTERNAL MEDICINE

## 2025-02-07 PROCEDURE — 1123F ACP DISCUSS/DSCN MKR DOCD: CPT | Performed by: INTERNAL MEDICINE

## 2025-02-07 PROCEDURE — 1159F MED LIST DOCD IN RCRD: CPT | Performed by: INTERNAL MEDICINE

## 2025-02-07 PROCEDURE — 1036F TOBACCO NON-USER: CPT | Performed by: INTERNAL MEDICINE

## 2025-02-07 PROCEDURE — 1160F RVW MEDS BY RX/DR IN RCRD: CPT | Performed by: INTERNAL MEDICINE

## 2025-02-07 PROCEDURE — G8417 CALC BMI ABV UP PARAM F/U: HCPCS | Performed by: INTERNAL MEDICINE

## 2025-02-07 PROCEDURE — 99204 OFFICE O/P NEW MOD 45 MIN: CPT | Performed by: INTERNAL MEDICINE

## 2025-02-07 PROCEDURE — 3079F DIAST BP 80-89 MM HG: CPT | Performed by: INTERNAL MEDICINE

## 2025-02-07 PROCEDURE — G8427 DOCREV CUR MEDS BY ELIG CLIN: HCPCS | Performed by: INTERNAL MEDICINE

## 2025-02-07 ASSESSMENT — ENCOUNTER SYMPTOMS
ANAL BLEEDING: 0
CONSTIPATION: 1
ABDOMINAL PAIN: 0
RECTAL PAIN: 0
ALLERGIC/IMMUNOLOGIC NEGATIVE: 1
RESPIRATORY NEGATIVE: 1
BLOOD IN STOOL: 0
DIARRHEA: 0
VOMITING: 0
ABDOMINAL DISTENTION: 0
NAUSEA: 0

## 2025-02-07 NOTE — PROGRESS NOTES
Chief Complaint   Patient presents with    Follow-up    Gastroesophageal Reflux     \"Have you been to the ER, urgent care clinic since your last visit?  Hospitalized since your last visit?\"    NO    “Have you seen or consulted any other health care providers outside our system since your last visit?”    NO

## 2025-02-07 NOTE — PROGRESS NOTES
Tyshawn Vaughn is a 79 y.o. male who presents today for the following:  Chief Complaint   Patient presents with    Gastroesophageal Reflux    New Patient         Allergies   Allergen Reactions    Fentanyl Other (See Comments)     confusion    Morphine Other (See Comments)     confusion    Tramadol Other (See Comments)     confusion       Current Outpatient Medications   Medication Sig Dispense Refill    metFORMIN (GLUCOPHAGE) 500 MG tablet Take 1 tablet by mouth daily (with breakfast) 60 tablet 3    sucralfate (CARAFATE) 1 GM tablet Take 1 tablet by mouth 4 times daily (before meals and nightly) 120 tablet 3    acetaminophen (TYLENOL) 500 MG tablet Take 2 tablets by mouth every 6 hours as needed for Pain      B Complex, Folic Acid, TABS Take 1 tablet by mouth daily      albuterol sulfate HFA (VENTOLIN HFA) 108 (90 Base) MCG/ACT inhaler Inhale 2 puffs into the lungs every 6 hours as needed for Wheezing      amiodarone (CORDARONE) 200 MG tablet Take 1 tablet by mouth daily 30 tablet 3    apixaban (ELIQUIS) 5 MG TABS tablet Take 1 tablet by mouth 2 times daily 60 tablet 3    citalopram (CELEXA) 10 MG tablet Take 1 tablet by mouth daily      docusate sodium (COLACE) 100 MG capsule Take 1 capsule by mouth 2 times daily      diclofenac sodium (VOLTAREN) 1 % GEL Apply topically 4 times daily      pantoprazole (PROTONIX) 40 MG tablet Take 1 tablet by mouth daily      pravastatin (PRAVACHOL) 40 MG tablet Take 1 tablet by mouth nightly       No current facility-administered medications for this visit.       Past Medical History:   Diagnosis Date    Acid reflux     Anemia     Diabetes (HCC)     Diabetes mellitus (HCC)     GERD (gastroesophageal reflux disease)     Hypertension     Stroke (HCC)        Past Surgical History:   Procedure Laterality Date    COLONOSCOPY         Family History   Problem Relation Age of Onset    Heart Disease Father     Hypertension Brother     Diabetes Brother        Social History     Socioeconomic

## 2025-02-10 ENCOUNTER — OFFICE VISIT (OUTPATIENT)
Age: 80
End: 2025-02-10
Payer: MEDICARE

## 2025-02-10 VITALS
SYSTOLIC BLOOD PRESSURE: 130 MMHG | BODY MASS INDEX: 32.11 KG/M2 | TEMPERATURE: 98 F | OXYGEN SATURATION: 93 % | HEIGHT: 67 IN | RESPIRATION RATE: 18 BRPM | HEART RATE: 69 BPM | DIASTOLIC BLOOD PRESSURE: 86 MMHG

## 2025-02-10 DIAGNOSIS — I87.303 CHRONIC VENOUS HYPERTENSION INVOLVING BOTH SIDES: ICD-10-CM

## 2025-02-10 DIAGNOSIS — I73.9 PERIPHERAL VASCULAR DISEASE (HCC): Primary | ICD-10-CM

## 2025-02-10 PROCEDURE — G8427 DOCREV CUR MEDS BY ELIG CLIN: HCPCS | Performed by: SURGERY

## 2025-02-10 PROCEDURE — 3079F DIAST BP 80-89 MM HG: CPT | Performed by: SURGERY

## 2025-02-10 PROCEDURE — 1123F ACP DISCUSS/DSCN MKR DOCD: CPT | Performed by: SURGERY

## 2025-02-10 PROCEDURE — 3075F SYST BP GE 130 - 139MM HG: CPT | Performed by: SURGERY

## 2025-02-10 PROCEDURE — 99212 OFFICE O/P EST SF 10 MIN: CPT | Performed by: SURGERY

## 2025-02-10 PROCEDURE — G8417 CALC BMI ABV UP PARAM F/U: HCPCS | Performed by: SURGERY

## 2025-02-10 PROCEDURE — 1036F TOBACCO NON-USER: CPT | Performed by: SURGERY

## 2025-02-10 RX ORDER — POTASSIUM CHLORIDE 1500 MG/1
20 TABLET, EXTENDED RELEASE ORAL ONCE
COMMUNITY
Start: 2025-02-09

## 2025-02-10 ASSESSMENT — PATIENT HEALTH QUESTIONNAIRE - PHQ9
SUM OF ALL RESPONSES TO PHQ QUESTIONS 1-9: 0
2. FEELING DOWN, DEPRESSED OR HOPELESS: NOT AT ALL
SUM OF ALL RESPONSES TO PHQ9 QUESTIONS 1 & 2: 0
1. LITTLE INTEREST OR PLEASURE IN DOING THINGS: NOT AT ALL
SUM OF ALL RESPONSES TO PHQ QUESTIONS 1-9: 0

## 2025-02-10 NOTE — PROGRESS NOTES
Identified pt with two pt identifiers (name and ). Reviewed chart in preparation for visit and have obtained necessary documentation.    Tyshawn Vaughn is a 79 y.o. male  Chief Complaint   Patient presents with    Follow-up     PVD     /86 (Site: Right Upper Arm, Position: Sitting, Cuff Size: Large Adult)   Pulse 69   Temp 98 °F (36.7 °C) (Oral)   Resp 18   Ht 1.702 m (5' 7\")   SpO2 93%   BMI 32.11 kg/m²     1. Have you been to the ER, urgent care clinic since your last visit?  Hospitalized since your last visit?yes    2. Have you seen or consulted any other health care providers outside of the [facility] since your last visit?  Include any pap smears or colon screening. yes

## 2025-02-15 NOTE — PROGRESS NOTES
Vascular History and Physical    Patient: Tyshawn Vaughn  MRN: [id number]    YOB: 1945  Age: 79 y.o.  Sex: male     Chief Complaint:  Chief Complaint   Patient presents with    Follow-up     PVD       History of Present Illness: Tyshawn Vaughn is a 79 y.o. very pleasant gentleman is here today with his wife for surveillance vascular examination.  Patient has known history of mild PAD as well as chronic venous hypertension.  Last ELIE examination shows right ankle index 0.9 left-sided 0.8.  Patient also has chronic bilateral leg edema recommended compression stocking.  Patient complaining of the sudden left leg pain.  However has not gotten any worse.  Patient has done much exercises.  Patient denies chest pain shortness of breath.    Social History:  Social Connections: Not on file       Past Medical History:  Past Medical History:   Diagnosis Date    Acid reflux     Anemia     Diabetes (HCC)     Diabetes mellitus (HCC)     GERD (gastroesophageal reflux disease)     Hypertension     Stroke (HCC)        Surgical History:  Past Surgical History:   Procedure Laterality Date    COLONOSCOPY         Allergies:  Allergies   Allergen Reactions    Fentanyl Other (See Comments)     confusion    Morphine Other (See Comments)     confusion    Tramadol Other (See Comments)     confusion       Current Meds:  Current Outpatient Medications   Medication Sig Dispense Refill    potassium chloride (K-TAB) 20 MEQ TBCR extended release tablet Take 1 tablet by mouth once      metFORMIN (GLUCOPHAGE) 500 MG tablet Take 1 tablet by mouth daily (with breakfast) 60 tablet 3    sucralfate (CARAFATE) 1 GM tablet Take 1 tablet by mouth 4 times daily (before meals and nightly) 120 tablet 3    acetaminophen (TYLENOL) 500 MG tablet Take 2 tablets by mouth every 6 hours as needed for Pain      B Complex, Folic Acid, TABS Take 1 tablet by mouth daily      albuterol sulfate HFA (VENTOLIN HFA) 108 (90 Base) MCG/ACT inhaler Inhale 2 puffs

## 2025-05-06 ENCOUNTER — TRANSCRIBE ORDERS (OUTPATIENT)
Facility: HOSPITAL | Age: 80
End: 2025-05-06

## 2025-05-06 DIAGNOSIS — S93.305A OPEN DISLOCATION OF LEFT FOOT, INITIAL ENCOUNTER: Primary | ICD-10-CM

## 2025-05-06 DIAGNOSIS — S91.302A OPEN DISLOCATION OF LEFT FOOT, INITIAL ENCOUNTER: Primary | ICD-10-CM

## 2025-05-22 ENCOUNTER — OFFICE VISIT (OUTPATIENT)
Age: 80
End: 2025-05-22
Payer: MEDICARE

## 2025-05-22 VITALS
BODY MASS INDEX: 32.18 KG/M2 | HEIGHT: 67 IN | DIASTOLIC BLOOD PRESSURE: 81 MMHG | TEMPERATURE: 97.5 F | HEART RATE: 72 BPM | OXYGEN SATURATION: 98 % | WEIGHT: 205 LBS | SYSTOLIC BLOOD PRESSURE: 136 MMHG | RESPIRATION RATE: 18 BRPM

## 2025-05-22 DIAGNOSIS — R19.7 DIARRHEA OF PRESUMED INFECTIOUS ORIGIN: Primary | ICD-10-CM

## 2025-05-22 DIAGNOSIS — R05.3 CHRONIC COUGH: ICD-10-CM

## 2025-05-22 DIAGNOSIS — R19.7 DIARRHEA OF PRESUMED INFECTIOUS ORIGIN: ICD-10-CM

## 2025-05-22 DIAGNOSIS — K21.9 LARYNGOPHARYNGEAL REFLUX: ICD-10-CM

## 2025-05-22 DIAGNOSIS — R13.19 OTHER DYSPHAGIA: ICD-10-CM

## 2025-05-22 DIAGNOSIS — K21.00 GASTROESOPHAGEAL REFLUX DISEASE WITH ESOPHAGITIS WITHOUT HEMORRHAGE: ICD-10-CM

## 2025-05-22 PROCEDURE — G8417 CALC BMI ABV UP PARAM F/U: HCPCS | Performed by: INTERNAL MEDICINE

## 2025-05-22 PROCEDURE — 99214 OFFICE O/P EST MOD 30 MIN: CPT | Performed by: INTERNAL MEDICINE

## 2025-05-22 PROCEDURE — G8427 DOCREV CUR MEDS BY ELIG CLIN: HCPCS | Performed by: INTERNAL MEDICINE

## 2025-05-22 PROCEDURE — 1126F AMNT PAIN NOTED NONE PRSNT: CPT | Performed by: INTERNAL MEDICINE

## 2025-05-22 PROCEDURE — 1036F TOBACCO NON-USER: CPT | Performed by: INTERNAL MEDICINE

## 2025-05-22 PROCEDURE — 3079F DIAST BP 80-89 MM HG: CPT | Performed by: INTERNAL MEDICINE

## 2025-05-22 PROCEDURE — 1123F ACP DISCUSS/DSCN MKR DOCD: CPT | Performed by: INTERNAL MEDICINE

## 2025-05-22 PROCEDURE — 3075F SYST BP GE 130 - 139MM HG: CPT | Performed by: INTERNAL MEDICINE

## 2025-05-27 ENCOUNTER — HOSPITAL ENCOUNTER (OUTPATIENT)
Facility: HOSPITAL | Age: 80
Discharge: HOME | End: 2025-05-30
Payer: MEDICARE

## 2025-05-27 ENCOUNTER — RESULTS FOLLOW-UP (OUTPATIENT)
Age: 80
End: 2025-05-27

## 2025-05-27 DIAGNOSIS — S91.302A OPEN DISLOCATION OF LEFT FOOT, INITIAL ENCOUNTER: ICD-10-CM

## 2025-05-27 DIAGNOSIS — S93.305A OPEN DISLOCATION OF LEFT FOOT, INITIAL ENCOUNTER: ICD-10-CM

## 2025-05-27 DIAGNOSIS — J90 PLEURAL EFFUSION: ICD-10-CM

## 2025-05-27 PROCEDURE — A9579 GAD-BASE MR CONTRAST NOS,1ML: HCPCS | Performed by: STUDENT IN AN ORGANIZED HEALTH CARE EDUCATION/TRAINING PROGRAM

## 2025-05-27 PROCEDURE — 71046 X-RAY EXAM CHEST 2 VIEWS: CPT

## 2025-05-27 PROCEDURE — 6360000004 HC RX CONTRAST MEDICATION: Performed by: STUDENT IN AN ORGANIZED HEALTH CARE EDUCATION/TRAINING PROGRAM

## 2025-05-27 PROCEDURE — 73720 MRI LWR EXTREMITY W/O&W/DYE: CPT

## 2025-05-27 RX ADMIN — GADOTERIDOL 20 ML: 279.3 INJECTION, SOLUTION INTRAVENOUS at 11:49

## 2025-05-28 ASSESSMENT — ENCOUNTER SYMPTOMS
ABDOMINAL PAIN: 0
BACK PAIN: 1
ABDOMINAL DISTENTION: 0
RECTAL PAIN: 0
DIARRHEA: 1
CONSTIPATION: 0
RESPIRATORY NEGATIVE: 1
NAUSEA: 0
VOMITING: 0
ANAL BLEEDING: 0
ALLERGIC/IMMUNOLOGIC NEGATIVE: 1
BLOOD IN STOOL: 0

## 2025-05-28 NOTE — PROGRESS NOTES
Chief Complaint   Patient presents with    Follow-up     Have you been to the ER, urgent care clinic since your last visit?  Hospitalized since your last visit?   NO    Have you seen or consulted any other health care providers outside our system since your last visit?   NO            
loose and he has diarrhea a lot.  He may have a bowel movement in the mornings but also all day long.  He states his stools were formerly constipated.          Review of Systems   Constitutional: Negative.    HENT:  Negative for nosebleeds.    Respiratory: Negative.     Cardiovascular: Negative.    Gastrointestinal:  Positive for diarrhea. Negative for abdominal distention, abdominal pain, anal bleeding, blood in stool, constipation, nausea, rectal pain and vomiting.   Genitourinary: Negative.    Musculoskeletal:  Positive for arthralgias, back pain and gait problem.   Skin: Negative.    Allergic/Immunologic: Negative.    Hematological: Negative.    Psychiatric/Behavioral: Negative.     All other systems reviewed and are negative.          /81 (BP Site: Right Upper Arm, Patient Position: Sitting, BP Cuff Size: Large Adult)   Pulse 72   Temp 97.5 °F (36.4 °C) (Temporal)   Resp 18   Ht 1.702 m (5' 7\")   Wt 93 kg (205 lb)   SpO2 98%   BMI 32.11 kg/m²     Physical Exam  Vitals and nursing note reviewed.   Constitutional:       Appearance: Normal appearance. He is obese.   HENT:      Head: Normocephalic and atraumatic.      Nose: Nose normal.   Eyes:      General: No scleral icterus.  Cardiovascular:      Rate and Rhythm: Normal rate and regular rhythm.      Pulses: Normal pulses.      Heart sounds: Normal heart sounds.   Pulmonary:      Effort: Pulmonary effort is normal.      Breath sounds: Normal breath sounds.   Abdominal:      General: Abdomen is flat. There is no distension.      Palpations: Abdomen is soft. There is no mass.      Tenderness: There is no abdominal tenderness. There is no right CVA tenderness, left CVA tenderness, guarding or rebound.      Hernia: No hernia is present.   Musculoskeletal:      Right lower leg: No edema.      Left lower leg: No edema.   Skin:     General: Skin is warm and dry.   Neurological:      General: No focal deficit present.      Mental Status: He is alert and

## 2025-08-08 ENCOUNTER — HOSPITAL ENCOUNTER (EMERGENCY)
Facility: HOSPITAL | Age: 80
Discharge: HOME | End: 2025-08-08
Attending: EMERGENCY MEDICINE
Payer: MEDICARE

## 2025-08-08 VITALS
BODY MASS INDEX: 32.18 KG/M2 | HEIGHT: 67 IN | WEIGHT: 205 LBS | TEMPERATURE: 98.4 F | DIASTOLIC BLOOD PRESSURE: 82 MMHG | OXYGEN SATURATION: 97 % | HEART RATE: 70 BPM | RESPIRATION RATE: 16 BRPM | SYSTOLIC BLOOD PRESSURE: 160 MMHG

## 2025-08-08 DIAGNOSIS — R11.2 NAUSEA AND VOMITING, UNSPECIFIED VOMITING TYPE: Primary | ICD-10-CM

## 2025-08-08 DIAGNOSIS — I10 ELEVATED BLOOD PRESSURE READING WITH DIAGNOSIS OF HYPERTENSION: ICD-10-CM

## 2025-08-08 LAB
ALBUMIN SERPL-MCNC: 3.9 G/DL (ref 3.5–5)
ALBUMIN/GLOB SERPL: 0.8 (ref 1.1–2.2)
ALP SERPL-CCNC: 80 U/L (ref 45–117)
ALT SERPL-CCNC: 31 U/L (ref 12–78)
ANION GAP SERPL CALC-SCNC: 8 MMOL/L (ref 2–12)
AST SERPL W P-5'-P-CCNC: 21 U/L (ref 15–37)
BASOPHILS # BLD: 0.02 K/UL (ref 0–0.1)
BASOPHILS NFR BLD: 0.3 % (ref 0–1)
BILIRUB SERPL-MCNC: 0.6 MG/DL (ref 0.2–1)
BUN SERPL-MCNC: 9 MG/DL (ref 6–20)
BUN/CREAT SERPL: 8 (ref 12–20)
CA-I BLD-MCNC: 9.5 MG/DL (ref 8.5–10.1)
CHLORIDE SERPL-SCNC: 103 MMOL/L (ref 97–108)
CO2 SERPL-SCNC: 33 MMOL/L (ref 21–32)
CREAT SERPL-MCNC: 1.07 MG/DL (ref 0.7–1.3)
DIFFERENTIAL METHOD BLD: ABNORMAL
EOSINOPHIL # BLD: 0.09 K/UL (ref 0–0.4)
EOSINOPHIL NFR BLD: 1.3 % (ref 0–7)
ERYTHROCYTE [DISTWIDTH] IN BLOOD BY AUTOMATED COUNT: 12.2 % (ref 11.5–14.5)
GLOBULIN SER CALC-MCNC: 4.6 G/DL (ref 2–4)
GLUCOSE SERPL-MCNC: 117 MG/DL (ref 65–100)
HCT VFR BLD AUTO: 45.9 % (ref 36.6–50.3)
HGB BLD-MCNC: 15.5 G/DL (ref 12.1–17)
IMM GRANULOCYTES # BLD AUTO: 0.03 K/UL (ref 0–0.04)
IMM GRANULOCYTES NFR BLD AUTO: 0.4 % (ref 0–0.5)
LIPASE SERPL-CCNC: 21 U/L (ref 13–75)
LYMPHOCYTES # BLD: 1.49 K/UL (ref 0.8–3.5)
LYMPHOCYTES NFR BLD: 20.8 % (ref 12–49)
MAGNESIUM SERPL-MCNC: 1.9 MG/DL (ref 1.6–2.4)
MCH RBC QN AUTO: 34.2 PG (ref 26–34)
MCHC RBC AUTO-ENTMCNC: 33.8 G/DL (ref 30–36.5)
MCV RBC AUTO: 101.3 FL (ref 80–99)
MONOCYTES # BLD: 0.64 K/UL (ref 0–1)
MONOCYTES NFR BLD: 8.9 % (ref 5–13)
NEUTS SEG # BLD: 4.89 K/UL (ref 1.8–8)
NEUTS SEG NFR BLD: 68.3 % (ref 32–75)
NRBC # BLD: 0 K/UL (ref 0–0.01)
NRBC BLD-RTO: 0 PER 100 WBC
PLATELET # BLD AUTO: 257 K/UL (ref 150–400)
PMV BLD AUTO: 11.3 FL (ref 8.9–12.9)
POTASSIUM SERPL-SCNC: 3.8 MMOL/L (ref 3.5–5.1)
PROT SERPL-MCNC: 8.5 G/DL (ref 6.4–8.2)
RBC # BLD AUTO: 4.53 M/UL (ref 4.1–5.7)
SODIUM SERPL-SCNC: 144 MMOL/L (ref 136–145)
WBC # BLD AUTO: 7.2 K/UL (ref 4.1–11.1)

## 2025-08-08 PROCEDURE — 83735 ASSAY OF MAGNESIUM: CPT

## 2025-08-08 PROCEDURE — 36415 COLL VENOUS BLD VENIPUNCTURE: CPT

## 2025-08-08 PROCEDURE — 94762 N-INVAS EAR/PLS OXIMTRY CONT: CPT

## 2025-08-08 PROCEDURE — 85025 COMPLETE CBC W/AUTO DIFF WBC: CPT

## 2025-08-08 PROCEDURE — 83690 ASSAY OF LIPASE: CPT

## 2025-08-08 PROCEDURE — 80053 COMPREHEN METABOLIC PANEL: CPT

## 2025-08-08 PROCEDURE — 99283 EMERGENCY DEPT VISIT LOW MDM: CPT

## 2025-08-08 RX ORDER — ONDANSETRON 4 MG/1
4 TABLET, ORALLY DISINTEGRATING ORAL EVERY 8 HOURS PRN
Qty: 15 TABLET | Refills: 0 | Status: SHIPPED | OUTPATIENT
Start: 2025-08-08

## 2025-08-08 ASSESSMENT — PAIN SCALES - GENERAL: PAINLEVEL_OUTOF10: 0

## 2025-08-08 ASSESSMENT — LIFESTYLE VARIABLES
HOW MANY STANDARD DRINKS CONTAINING ALCOHOL DO YOU HAVE ON A TYPICAL DAY: PATIENT DOES NOT DRINK
HOW OFTEN DO YOU HAVE A DRINK CONTAINING ALCOHOL: NEVER

## 2025-08-08 ASSESSMENT — PAIN - FUNCTIONAL ASSESSMENT: PAIN_FUNCTIONAL_ASSESSMENT: 0-10

## 2025-08-22 ENCOUNTER — OFFICE VISIT (OUTPATIENT)
Age: 80
End: 2025-08-22
Payer: MEDICARE

## 2025-08-22 VITALS
WEIGHT: 205 LBS | HEIGHT: 67 IN | OXYGEN SATURATION: 97 % | RESPIRATION RATE: 20 BRPM | TEMPERATURE: 97.5 F | SYSTOLIC BLOOD PRESSURE: 139 MMHG | DIASTOLIC BLOOD PRESSURE: 87 MMHG | HEART RATE: 90 BPM | BODY MASS INDEX: 32.18 KG/M2

## 2025-08-22 DIAGNOSIS — K21.00 GASTROESOPHAGEAL REFLUX DISEASE WITH ESOPHAGITIS WITHOUT HEMORRHAGE: ICD-10-CM

## 2025-08-22 DIAGNOSIS — K52.9 CHRONIC DIARRHEA: Primary | ICD-10-CM

## 2025-08-22 DIAGNOSIS — R13.19 OTHER DYSPHAGIA: ICD-10-CM

## 2025-08-22 DIAGNOSIS — R05.3 CHRONIC COUGH: ICD-10-CM

## 2025-08-22 PROBLEM — K21.9 GASTROESOPHAGEAL REFLUX DISEASE WITHOUT ESOPHAGITIS: Status: ACTIVE | Noted: 2022-10-10

## 2025-08-22 PROBLEM — D64.9 ANEMIA: Status: ACTIVE | Noted: 2022-10-10

## 2025-08-22 PROBLEM — E78.5 HYPERLIPIDEMIA: Status: ACTIVE | Noted: 2018-08-08

## 2025-08-22 PROCEDURE — 3079F DIAST BP 80-89 MM HG: CPT | Performed by: INTERNAL MEDICINE

## 2025-08-22 PROCEDURE — 3075F SYST BP GE 130 - 139MM HG: CPT | Performed by: INTERNAL MEDICINE

## 2025-08-22 PROCEDURE — 99214 OFFICE O/P EST MOD 30 MIN: CPT | Performed by: INTERNAL MEDICINE

## 2025-08-22 PROCEDURE — 1123F ACP DISCUSS/DSCN MKR DOCD: CPT | Performed by: INTERNAL MEDICINE

## 2025-08-22 RX ORDER — CITALOPRAM HYDROBROMIDE 20 MG/1
TABLET ORAL
COMMUNITY
Start: 2025-08-08

## 2025-08-22 RX ORDER — LISINOPRIL 10 MG/1
10 TABLET ORAL DAILY
COMMUNITY

## 2025-09-03 ASSESSMENT — ENCOUNTER SYMPTOMS
BACK PAIN: 1
VOMITING: 0
RESPIRATORY NEGATIVE: 1
ANAL BLEEDING: 0
NAUSEA: 0
BLOOD IN STOOL: 0
DIARRHEA: 1
RECTAL PAIN: 0
ABDOMINAL DISTENTION: 0
CONSTIPATION: 0
ALLERGIC/IMMUNOLOGIC NEGATIVE: 1
ABDOMINAL PAIN: 0